# Patient Record
Sex: FEMALE | Race: WHITE | NOT HISPANIC OR LATINO | Employment: FULL TIME | URBAN - METROPOLITAN AREA
[De-identification: names, ages, dates, MRNs, and addresses within clinical notes are randomized per-mention and may not be internally consistent; named-entity substitution may affect disease eponyms.]

---

## 2017-06-01 ENCOUNTER — OFFICE VISIT (OUTPATIENT)
Dept: OBSTETRICS AND GYNECOLOGY | Facility: CLINIC | Age: 42
End: 2017-06-01
Payer: COMMERCIAL

## 2017-06-01 VITALS
DIASTOLIC BLOOD PRESSURE: 76 MMHG | SYSTOLIC BLOOD PRESSURE: 122 MMHG | BODY MASS INDEX: 26.53 KG/M2 | WEIGHT: 169.06 LBS | HEIGHT: 67 IN

## 2017-06-01 DIAGNOSIS — Z12.31 VISIT FOR SCREENING MAMMOGRAM: ICD-10-CM

## 2017-06-01 DIAGNOSIS — Z01.419 WELL WOMAN EXAM WITH ROUTINE GYNECOLOGICAL EXAM: Primary | ICD-10-CM

## 2017-06-01 DIAGNOSIS — Z12.4 ROUTINE PAPANICOLAOU SMEAR: ICD-10-CM

## 2017-06-01 PROCEDURE — 99999 PR PBB SHADOW E&M-EST. PATIENT-LVL III: CPT | Mod: PBBFAC,,, | Performed by: OBSTETRICS & GYNECOLOGY

## 2017-06-01 PROCEDURE — 88175 CYTOPATH C/V AUTO FLUID REDO: CPT

## 2017-06-01 PROCEDURE — 99396 PREV VISIT EST AGE 40-64: CPT | Mod: S$GLB,,, | Performed by: OBSTETRICS & GYNECOLOGY

## 2017-06-01 RX ORDER — AMITRIPTYLINE HYDROCHLORIDE 50 MG/1
TABLET, FILM COATED ORAL
COMMUNITY
Start: 2017-05-23 | End: 2017-09-11

## 2017-06-01 RX ORDER — BUTALBITAL, ACETAMINOPHEN AND CAFFEINE 300; 40; 50 MG/1; MG/1; MG/1
CAPSULE ORAL
COMMUNITY
Start: 2017-05-03 | End: 2017-09-11

## 2017-06-01 NOTE — PROGRESS NOTES
"  HPI:  41 y.o.   OB History      Para Term  AB Living    2 2 2       SAB TAB Ectopic Multiple Live Births               Patient's last menstrual period was 2013.   Patient here for her annual gynecologic exam.  She has no complaints at this time.    ROS:  GENERAL: No fever, chills, fatigability or weight loss.  SKIN: No rashes, itching or changes in color or texture of skin.  HEAD: No headaches or recent head trauma.  EYES: Visual acuity fine. No photophobia, ocular pain or diplopia.  EARS: Denies ear pain, discharge or vertigo.  NOSE: No loss of smell, no epistaxis or postnasal drip.  MOUTH & THROAT: No hoarseness or change in voice. No excessive gum bleeding.  NODES: Denies swollen glands.  CHEST: Denies FERNANDES, cyanosis, wheezing, cough and sputum production.  CARDIOVASCULAR: Denies chest pain, PND, orthopnea or reduced exercise tolerance.  ABDOMEN: Appetite fine. No weight loss. Denies diarrhea, abdominal pain, hematemesis or blood in stool.  URINARY: No flank pain, dysuria or hematuria.  PERIPHERAL VASCULAR: No claudication or cyanosis.  MUSCULOSKELETAL: No joint stiffness or swelling. Denies back pain.  NEUROLOGIC: No history of seizures, paralysis, alteration of gait or coordination.    PE:   /76   Ht 5' 7" (1.702 m)   Wt 76.7 kg (169 lb 1.5 oz)   LMP 2013   BMI 26.48 kg/m²   APPEARANCE: Well nourished, well developed, in no acute distress.  NECK: Neck symmetric without masses or thyromegaly.  NODES: No inguinal lymph node enlargement.  ABDOMEN: Soft. No tenderness or masses. No hepatosplenomegaly. No hernias.  BREASTS: Symmetrical, no skin changes or visible lesions. No palpable masses, nipple discharge or adenopathy bilaterally.  PELVIC: Normal external female genitalia without lesions. Normal hair distribution. Adequate perineal body, normal urethral meatus. Vagina moist and well rugated without lesions or discharge. No significant cystocele or rectocele. Uterus and cervix " surgically absent. Bimanual exam revealed no masses, tenderness or abnormality.    Procedure:  Pap Smear    Assessment:  Normal Gynecologic Exam    Plan:  Mammogram and Colonoscopy as per current recommendations.   Return to clinic in one year or for any problems or complaints.  Vag hyst 2013  Co crmaps  Ovaries in, set up us  p tto call when crmaps bad, ana ltry us at that time  May want removeal

## 2017-09-11 ENCOUNTER — OFFICE VISIT (OUTPATIENT)
Dept: OBSTETRICS AND GYNECOLOGY | Facility: CLINIC | Age: 42
End: 2017-09-11
Payer: COMMERCIAL

## 2017-09-11 VITALS
HEIGHT: 67 IN | BODY MASS INDEX: 26.61 KG/M2 | SYSTOLIC BLOOD PRESSURE: 108 MMHG | WEIGHT: 169.56 LBS | DIASTOLIC BLOOD PRESSURE: 72 MMHG

## 2017-09-11 DIAGNOSIS — R10.2 PELVIC PAIN SYNDROME: Primary | ICD-10-CM

## 2017-09-11 PROCEDURE — 99213 OFFICE O/P EST LOW 20 MIN: CPT | Mod: S$GLB,,, | Performed by: OBSTETRICS & GYNECOLOGY

## 2017-09-11 PROCEDURE — 3008F BODY MASS INDEX DOCD: CPT | Mod: S$GLB,,, | Performed by: OBSTETRICS & GYNECOLOGY

## 2017-09-11 PROCEDURE — 99999 PR PBB SHADOW E&M-EST. PATIENT-LVL II: CPT | Mod: PBBFAC,,, | Performed by: OBSTETRICS & GYNECOLOGY

## 2017-09-11 RX ORDER — SERTRALINE HYDROCHLORIDE 50 MG/1
50 TABLET, FILM COATED ORAL DAILY
COMMUNITY
End: 2017-10-10

## 2017-09-11 RX ORDER — TRAZODONE HYDROCHLORIDE 50 MG/1
TABLET ORAL
COMMUNITY
Start: 2017-08-06 | End: 2018-12-28

## 2017-09-11 NOTE — PROGRESS NOTES
"41 y.o.   OB History      Para Term  AB Living    2 2 2          SAB TAB Ectopic Multiple Live Births                     Comlaining of: pelvic pain  Pt and  here to discuss removal of ovaries, had vag hust   Hx of cycsts, been to ED before, been on suppressive ocp also  Pain sometimes with sex  On and off      ROS:  GENERAL: No fever, chills, fatigability or weight loss.  SKIN: No rashes, itching or changes in color or texture of skin.  HEAD: No headaches or recent head trauma.  EYES: Visual acuity fine. No photophobia, ocular pain or diplopia.  EARS: Denies ear pain, discharge or vertigo.  NOSE: No loss of smell, no epistaxis or postnasal drip.  MOUTH & THROAT: No hoarseness or change in voice. No excessive gum bleeding.  NODES: Denies swollen glands.  CHEST: Denies FERNANDES, cyanosis, wheezing, cough and sputum production.  CARDIOVASCULAR: Denies chest pain, PND, orthopnea or reduced exercise tolerance.  ABDOMEN: Appetite fine. No weight loss. Denies diarrhea, abdominal pain, hematemesis or blood in stool.  URINARY: No flank pain, dysuria or hematuria.  PERIPHERAL VASCULAR: No claudication or cyanosis.  MUSCULOSKELETAL: No joint stiffness or swelling. Denies back pain.  NEUROLOGIC: No history of seizures, paralysis, alteration of gait or coordination      PE: /72   Ht 5' 7" (1.702 m)   Wt 76.9 kg (169 lb 8.5 oz)   LMP 2013   BMI 26.55 kg/m²    Exam def    Discussed removal of ovaires, pro and con, prob can do with scope  Risk of recurrent pain, reasons for pain  Etc    Pt feels its def diff from her bowel problem pain  Plan, set up vag us         "

## 2017-09-12 ENCOUNTER — TELEPHONE (OUTPATIENT)
Dept: OBSTETRICS AND GYNECOLOGY | Facility: CLINIC | Age: 42
End: 2017-09-12

## 2017-09-13 ENCOUNTER — TELEPHONE (OUTPATIENT)
Dept: OBSTETRICS AND GYNECOLOGY | Facility: CLINIC | Age: 42
End: 2017-09-13

## 2017-09-13 NOTE — TELEPHONE ENCOUNTER
----- Message from Gayle Walls sent at 9/13/2017  3:06 PM CDT -----  Contact: Self/ [phone]  Patient is returning your call.  Please advise.

## 2017-09-13 NOTE — TELEPHONE ENCOUNTER
----- Message from Michael A. Wiedemann, MD sent at 9/12/2017  7:19 PM CDT -----  Set up vag us only for pain, ovary cysts, a Wednesday would be good, need order placed, :)   thanks

## 2017-09-14 ENCOUNTER — TELEPHONE (OUTPATIENT)
Dept: OBSTETRICS AND GYNECOLOGY | Facility: CLINIC | Age: 42
End: 2017-09-14

## 2017-09-14 DIAGNOSIS — N83.209 OVARIAN CYST: Primary | ICD-10-CM

## 2017-09-14 NOTE — TELEPHONE ENCOUNTER
----- Message from Margarita Quiroz sent at 9/14/2017 11:35 AM CDT -----  Contact: [phone]/ self   Patient called in returning your call. Please advise.

## 2017-09-14 NOTE — TELEPHONE ENCOUNTER
Returned patients call.   Patient scheduled for ultrasound on 9-20-17 at 0800 per physician previous note. Patient verbalized understanding.

## 2017-09-18 ENCOUNTER — TELEPHONE (OUTPATIENT)
Dept: OBSTETRICS AND GYNECOLOGY | Facility: CLINIC | Age: 42
End: 2017-09-18

## 2017-09-18 DIAGNOSIS — R52 PAIN: ICD-10-CM

## 2017-09-18 DIAGNOSIS — N83.209 OVARIAN CYST: Primary | ICD-10-CM

## 2017-09-18 NOTE — TELEPHONE ENCOUNTER
9/18/2017 @ 1138 spoke with pt surgery confirmed for 10/12 and pre op scheduled for 10/10 at 0815 with Dr Wiedemann. Pt verbalized understanding.

## 2017-09-19 ENCOUNTER — TELEPHONE (OUTPATIENT)
Dept: OBSTETRICS AND GYNECOLOGY | Facility: CLINIC | Age: 42
End: 2017-09-19

## 2017-09-19 NOTE — TELEPHONE ENCOUNTER
Pt would like to know if her surgery that is scheduled for 10/12 can be rescheduled to the following week like 10/18 or 10/19

## 2017-09-19 NOTE — TELEPHONE ENCOUNTER
----- Message from Brock Boateng sent at 9/19/2017  4:37 PM CDT -----  Contact: self/[phone]  Patient states she is calling again to have her procedure rescheduled and still has not received a call back.    Please call and advise.

## 2017-09-20 ENCOUNTER — OFFICE VISIT (OUTPATIENT)
Dept: OBSTETRICS AND GYNECOLOGY | Facility: CLINIC | Age: 42
End: 2017-09-20
Payer: COMMERCIAL

## 2017-09-20 DIAGNOSIS — R10.2 FEMALE PELVIC PAIN: Primary | ICD-10-CM

## 2017-09-20 DIAGNOSIS — N83.209 OVARIAN CYST: ICD-10-CM

## 2017-09-20 PROCEDURE — 76830 TRANSVAGINAL US NON-OB: CPT | Mod: S$GLB,,, | Performed by: OBSTETRICS & GYNECOLOGY

## 2017-09-26 ENCOUNTER — TELEPHONE (OUTPATIENT)
Dept: OBSTETRICS AND GYNECOLOGY | Facility: CLINIC | Age: 42
End: 2017-09-26

## 2017-10-06 NOTE — TELEPHONE ENCOUNTER
----- Message from Kayce Robles sent at 9/25/2017 11:08 AM CDT -----  Contact: [phone]/ self   Pt would oliek to reschedule her procedure . Pt states this is her six message since last week and she haven't received a call back . Please advise

## 2017-10-06 NOTE — TELEPHONE ENCOUNTER
----- Message from Genesis Gatica sent at 9/18/2017  4:31 PM CDT -----  Contact: [place] [phone]  Calling to reschedule her procedure. Please advice

## 2017-10-06 NOTE — TELEPHONE ENCOUNTER
----- Message from Kayec Robles sent at 9/21/2017 10:05 AM CDT -----  Contact: [phone]/self   Pt its requesting to reschedule her procedure . Please advise

## 2017-10-10 ENCOUNTER — ANESTHESIA EVENT (OUTPATIENT)
Dept: SURGERY | Facility: HOSPITAL | Age: 42
End: 2017-10-10
Payer: COMMERCIAL

## 2017-10-10 ENCOUNTER — HOSPITAL ENCOUNTER (OUTPATIENT)
Dept: PREADMISSION TESTING | Facility: HOSPITAL | Age: 42
Discharge: HOME | End: 2017-10-10
Attending: OBSTETRICS & GYNECOLOGY
Payer: COMMERCIAL

## 2017-10-10 ENCOUNTER — CLINICAL SUPPORT (OUTPATIENT)
Dept: LAB | Facility: HOSPITAL | Age: 42
End: 2017-10-10
Attending: OBSTETRICS & GYNECOLOGY
Payer: COMMERCIAL

## 2017-10-10 ENCOUNTER — OFFICE VISIT (OUTPATIENT)
Dept: OBSTETRICS AND GYNECOLOGY | Facility: CLINIC | Age: 42
End: 2017-10-10
Payer: COMMERCIAL

## 2017-10-10 VITALS
HEIGHT: 67 IN | BODY MASS INDEX: 26.37 KG/M2 | WEIGHT: 168 LBS | DIASTOLIC BLOOD PRESSURE: 62 MMHG | SYSTOLIC BLOOD PRESSURE: 116 MMHG

## 2017-10-10 DIAGNOSIS — Z01.818 PRE-OP EXAM: Primary | ICD-10-CM

## 2017-10-10 DIAGNOSIS — Z01.818 PRE-OP EXAM: ICD-10-CM

## 2017-10-10 DIAGNOSIS — R10.2 FEMALE PELVIC PAIN: ICD-10-CM

## 2017-10-10 DIAGNOSIS — Z87.19 HISTORY OF DIVERTICULITIS: ICD-10-CM

## 2017-10-10 DIAGNOSIS — R10.2 FEMALE PELVIC PAIN: Primary | ICD-10-CM

## 2017-10-10 DIAGNOSIS — R10.2 PELVIC PAIN SYNDROME: ICD-10-CM

## 2017-10-10 PROCEDURE — 99024 POSTOP FOLLOW-UP VISIT: CPT | Mod: S$GLB,,, | Performed by: OBSTETRICS & GYNECOLOGY

## 2017-10-10 PROCEDURE — 99999 PR PBB SHADOW E&M-EST. PATIENT-LVL II: CPT | Mod: PBBFAC,,, | Performed by: OBSTETRICS & GYNECOLOGY

## 2017-10-10 PROCEDURE — 93005 ELECTROCARDIOGRAM TRACING: CPT

## 2017-10-10 RX ORDER — FLUOXETINE HYDROCHLORIDE 20 MG/1
20 CAPSULE ORAL DAILY
COMMUNITY
End: 2018-12-28

## 2017-10-10 RX ORDER — LIDOCAINE HYDROCHLORIDE 10 MG/ML
1 INJECTION, SOLUTION EPIDURAL; INFILTRATION; INTRACAUDAL; PERINEURAL ONCE
Status: CANCELLED | OUTPATIENT
Start: 2017-10-10 | End: 2017-10-10

## 2017-10-10 RX ORDER — SODIUM CHLORIDE, SODIUM LACTATE, POTASSIUM CHLORIDE, CALCIUM CHLORIDE 600; 310; 30; 20 MG/100ML; MG/100ML; MG/100ML; MG/100ML
INJECTION, SOLUTION INTRAVENOUS CONTINUOUS
Status: CANCELLED | OUTPATIENT
Start: 2017-10-10

## 2017-10-10 NOTE — PROGRESS NOTES
Pt wants removal of ovaries for pain  Discussed risks, incl riks of laparotomy and bowel injury  Consents done  Pt wants to proceed  Had colon surgery since hyst  Discussed increased risk

## 2017-10-10 NOTE — ANESTHESIA PREPROCEDURE EVALUATION
10/10/2017  Socorro Huang is a 42 y.o., female w ovarian cyst is scheduled for laparoscopic bilateral oophorectomy under [facility] on 10/19/2017.    PRIOR ANES (in Epic)   2013-05-06 Hysteredtomy-Vag GA   [mid 2, fent 150, prop 150] VSS   [sevo, fent 100] VSS NAAC   [ETT 7.0, [place]#2, Grade I]  2012-12-18 Hysteroscopy, D&C   [mid 2, fent 50]   [sevo, phenyleph 100]   [atraumatic]    ANES-RELATED MED/SURG 2017-10-18  Patient Active Problem List   Diagnosis    Irregular intermenstrual bleeding    Endometrial hyperplasia, complex    Pre-operative clearance    S/P hysterectomy    Dysthymic disorder    History of diverticulitis     Past Medical History:   Diagnosis Date    Abnormal Pap smear     Anxiety     celexa & prozac didn't help much    Constipation - functional     Headache(784.0)     Hemorrhoid     Leukopenia     benign, [facility] in [facility] 2000    Menorrhagia     Strain of neck muscle     tx with PT at [facility] in past     Past Surgical History:   Procedure Laterality Date    COLON SURGERY  2014         DILATION AND CURETTAGE OF UTERUS  12/18/2012    complex hyperplasia, no atypia    HEMORRHOID SURGERY  2014    PARTIAL HYSTERECTOMY  2013    for atypia    THYROIDECTOMY, PARTIAL  2013    TONSILLECTOMY      TUBAL LIGATION       ALLERGIES  Review of patient's allergies indicates:   Allergen Reactions    Celexa [citalopram] Nausea Only     Stomach upset     ANES-RELATED HOME Rx  Levothyroxine Trazodone    fluoxetine    Anesthesia Evaluation    I have reviewed the Patient Summary Reports.    I have reviewed the Nursing Notes.   I have reviewed the Medications.     Review of Systems  Anesthesia Hx:  No problems with previous Anesthesia  History of prior surgery of interest to airway management or planning: Previous anesthesia: General, MAC  Procedure performed at an [facility].    Procedure performed at an [facility].  Denies Personal Hx of Anesthesia complications.   Social:  Non-Smoker, Social Alcohol Use    Hematology/Oncology:  Hematology Normal        EENT/Dental:EENT/Dental Normal   Cardiovascular:   Exercise tolerance: good Denies Hypertension.  Denies Dysrhythmias.   Denies Angina.        Pulmonary:  Pulmonary Normal    Renal/:  Renal/ Normal     Hepatic/GI:  Hepatic/GI Normal    Musculoskeletal:  Musculoskeletal Normal    OB/GYN/PEDS:  Intermittent pelvic pain   Neurological:   Headaches (hx of tension headaches)    Endocrine:   Hypothyroidism    Dermatological:  Skin Normal    Psych:   depression        Wt Readings from Last 1 Encounters:   10/10/17 76.2 kg (167 lb 15.9 oz)     Temp Readings from Last 1 Encounters:   07/28/14 37 °C (98.6 °F)     BP Readings from Last 1 Encounters:   10/10/17 116/62     Pulse Readings from Last 1 Encounters:   06/25/15 80     SpO2 Readings from Last 1 Encounters:   10/08/13 100%       Physical Exam  General:  Well nourished    Airway/Jaw/Neck:  Airway Findings: Mouth Opening: Small, but > 3cm Tongue: Normal  General Airway Assessment: Adult  Mallampati: III  Improves to II with phonation.  TM Distance: Normal, at least 6 cm        Eyes/Ears/Nose:  EYES/EARS/NOSE FINDINGS: Normal   Dental:  Dental Findings: In tact   Chest/Lungs:  Chest/Lungs Clear    Heart/Vascular:  Heart Findings: Normal Heart murmur: negative    Abdomen:  Abdomen Findings: Normal      Mental Status:  Mental Status Findings: Normal      Lab Results   Component Value Date    WBC 3.59 (L) 10/10/2017    HGB 13.2 10/10/2017    HCT 38.6 10/10/2017    MCV 92 10/10/2017     10/10/2017       Chemistry        Component Value Date/Time     10/10/2017 0958    K 4.0 10/10/2017 0958     10/10/2017 0958    CO2 28 10/10/2017 0958    BUN 18 10/10/2017 0958    CREATININE 0.7 10/10/2017 0958    GLU 95 10/10/2017 0958        Component Value Date/Time    CALCIUM 9.1  10/10/2017 0958    ALKPHOS 52 (L) 10/27/2014 0747    AST 27 10/27/2014 0747    ALT 30 10/27/2014 0747    BILITOT 0.6 10/27/2014 0747    ESTGFRAFRICA >60 10/10/2017 0958    EGFRNONAA >60 10/10/2017 0958            Lab Results   Component Value Date    ALBUMIN 3.9 10/27/2014      Lab Results   Component Value Date    TSH 1.846 08/01/2012       CXR 2017-10-27  No convincing evidence of intrathoracic disease identified.    EKG 2017-10-10  Normal sinus rhythm  Normal ECG  No previous ECGs available  Confirmed by Moustapha    ECHO        Anesthesia Plan  Type of Anesthesia, risks & benefits discussed:  Anesthesia Type:  general  Patient's Preference:   Intra-op Monitoring Plan:   Intra-op Monitoring Plan Comments:   Post Op Pain Control Plan:   Post Op Pain Control Plan Comments:   Induction:   IV  Beta Blocker:  Patient is not currently on a Beta-Blocker (No further documentation required).       Informed Consent: Patient understands risks and agrees with Anesthesia plan.  Questions answered.   ASA Score: 2     Day of Surgery Review of History & Physical:            Ready For Surgery From Anesthesia Perspective.

## 2017-10-10 NOTE — PRE-PROCEDURE INSTRUCTIONS
[facility] - [phone]    Allergies, medical, surgical, family and psychosocial histories reviewed with patient. Periop plan of care reviewed. Preop instructions given, including medications to take and to hold. Time allotted for questions to be addressed.  Patient verbalized understanding.

## 2017-10-10 NOTE — DISCHARGE INSTRUCTIONS
Your surgery is scheduled for 10/19/17.    Please report to Outpatient Surgery Intake Office on the 2nd FLOOR at 6:30a.m.          INSTRUCTIONS IMPORTANT!!!  ¨ Do not eat or drink after 12 midnight-including water. OK to brush teeth, no   gum, candy or mints!    ¨ Take only these medicines with a small swallow of water-morning of surgery: Levothyroxine        ____  Proceed to [facility] on 10/10/17 for additional blood test.        ____  Do not wear makeup, including mascara.  ____  No powder, lotions or creams to surgical area.  ____  Please remove all jewelry, including piercings and leave at home.  ____  No money or valuables needed. Please leave at home.  ____  Please bring any documents given by your doctor.  ____  If going home the same day, arrange for a ride home. You will not be able to             drive if Anesthesia was used.  ____  Wear loose fitting clothing. Allow for dressings, bandages.  ____  Stop Aspirin, Ibuprofen, Motrin and Aleve at least 3-5 days before surgery, unless otherwise instructed by your doctor, or the nurse.   You MAY use Tylenol/acetaminophen until day of surgery.  ____  Wash the surgical area with Hibiclens the night before surgery, and again the             morning of surgery.  Be sure to rinse hibiclens off completely (if instructed by nurse).  ____  If you take diabetic medication, do not take am of surgery unless instructed by Doctor.  ____  Call MD for temperature above 101 degrees.  ____ Stop taking any Fish Oil supplement or any Vitamins that contain Vitamin E at least 5 days prior to surgery.  ____ Do Not wear your contact lenses the day of your procedure.  You may wear your glasses.        I have read or had read and explained to me, and understand the above information.  Additional comments or instructions:  For additional questions call [phone]     Take a Hibiclens shower twice a day for 3 days prior to surgery, including the morning of  surgery.   Gargle with Listerine twice a day for 3 days prior to surgery, including the morning of surgery.      Pre-Op Bathing Instructions    Before surgery, you can play an important role in your own health.    Because skin is not sterile, we need to be sure that your skin is as free of germs as possible. By following the instructions below, you can reduce the number of germs on your skin before surgery.    IMPORTANT: You will need to shower with a special soap called Hibiclens*, available at any pharmacy.  If you are allergic to Chlorhexidine (the antiseptic in Hibiclens), use an antibacterial soap such as Dial Soap for your preoperative shower.  You will shower with Hibiclens both the night before your surgery and the morning of your surgery.  Do not use Hibiclens on the head, face or genitals to avoid injury to those areas.    STEP #1: THE NIGHT BEFORE YOUR SURGERY     1. Do not shave the area of your body where your surgery will be performed.  2. Shower and wash your hair and body as usual with your normal soap and shampoo.  3. Rinse your hair and body thoroughly after you shower to remove all soap residue.  4. With your hand, apply one packet of Hibiclens soap to the surgical site.   5. Wash the site gently for five (5) minutes. Do not scrub your skin too hard.   6. Do not wash with your regular soap after Hibiclens is used.  7. Rinse your body thoroughly.  8. Pat yourself dry with a clean, soft towel.  9. Do not use lotion, cream, or powder.  10. Wear clean clothes.    STEP #2: THE MORNING OF YOUR SURGERY     1. Repeat Step #1.    * Not to be used by people allergic to Chlorhexidine.          Anesthesia: General Anesthesia     You are watched continuously during your procedure by your anesthesia provider.     Youre due to have surgery. During surgery, youll be given medicine called anesthesia or anesthetic. This will keep you comfortable and pain-free. Your anesthesia provider will use general  anesthesia.  What is general anesthesia?  General anesthesia puts you into a state like deep sleep. It goes into the bloodstream (IV anesthetics), into the lungs (gas anesthetics), or both. You feel nothing during the procedure. You will not remember it. During the procedure, the anesthesia provider monitors you continuously. He or she checks your heart rate and rhythm, blood pressure, breathing, and blood oxygen.  · IV anesthetics. IV anesthetics are given through an IV line in your arm. Theyre often given first. This is so you are asleep before a gas anesthetic is started. Some kinds of IV anesthetics relieve pain. Others relax you. Your doctor will decide which kind is best in your case.  · Gas anesthetics. Gas anesthetics are breathed into the lungs. They are often used to keep you asleep. They can be given through a facemask or a tube placed in your larynx or trachea (breathing tube).  ¨ If you have a facemask, your anesthesia provider will most likely place it over your nose and mouth while youre still awake. Youll breathe oxygen through the mask as your IV anesthetic is started. Gas anesthetic may be added through the mask.  ¨ If you have a tube in the larynx or trachea, it will be inserted into your throat after youre asleep.  Anesthesia tools and medicines  You will likely have:  · IV anesthetics. These are put into an IV line into your bloodstream.  · Gas anesthetics. You breathe these anesthetics into your lungs, where they pass into your bloodstream.  · Pulse oximeter. This is a small clip that is attached to the end of your finger. This measures your blood oxygen level.  · Electrocardiography leads (electrodes). These are small sticky pads that are placed on your chest. They record your heart rate and rhythm.  · Blood pressure cuff. This reads your blood pressure.  Risks and possible complications  General anesthesia has some risks. These include:  · Breathing problems  · Nausea and vomiting  · Sore  throat or hoarseness (usually temporary)  · Allergic reaction to the anesthetic  · Irregular heartbeat (rare)  · Cardiac arrest (rare)   Anesthesia safety  · Follow all instructions you are given for how long not to eat or drink before your procedure.  · Be sure your doctor knows what medicines and drugs you take. This includes over-the-counter medicines, herbs, supplements, alcohol or other drugs. You will be asked when those were last taken.  · Have an adult family member or friend drive you home after the procedure.  · For the first 24 hours after your surgery:  ¨ Do not drive or use heavy equipment.  ¨ Do not make important decisions or sign legal documents. If important decisions or signing legal documents is necessary during the first 24 hours after surgery, have a trusted family member or spouse act on your behalf.  ¨ Avoid alcohol.  ¨ Have a responsible adult stay with you. He or she can watch for problems and help keep you safe.  Date Last Reviewed: 12/1/2016  © 5815-2730 [employer]. [address] [place] [address]. All rights reserved. This information is not intended as a substitute for professional medical care. Always follow your healthcare professional's instructions.        Understanding Unilateral Salpingo-Oophorectomy    A unilateral salpingo-oophorectomy is a type of surgery. During the surgery, a surgeon removes one ovary and one fallopian tube from your pelvis. The ovaries are located on either side of the uterus. They make your eggs (ova). They also make the hormone estrogen. The fallopian tubes link the ovaries to the uterus. They carry the ova to the uterus.  You can still have children after you have this procedure. You have one ovary and fallopian tube left.  How to say it  [id number]-[id number]-[id number]   Why unilateral salpingo-oophorectomy is done  This procedure is done if you have cancer in an ovary or fallopian tube. Or you may have it to lower your risk  for cancer in these parts of the body. You are at high risk if you have a family member who had ovarian or breast cancer. You may also have a mutation in the breast cancer susceptiblity (BRCA) genes. These genes make tumor suppressor proteins. If these genes are mutated, they do not work as they should and cells are more likely to develop cancer.  This procedure may also be done if you have your uterus removed. This is called a hysterectomy.  Other health problems may call for taking out an ovary and fallopian tube at the same time.  How unilateral salpingo-oophorectomy is done  You will check into a hospital. You may need to spend a few days there after this surgery. During the procedure:  · You are given medicine to make you fall asleep. You wont feel any pain.  · The surgeon makes a cut (incision) in your belly to reach your reproductive organs.  · The surgeon removes the ovary and fallopian tube.  · The surgeon then ties and stitches up all open wounds. The cut in the abdomen is closed up.  Risks of unilateral salpingo-oophorectomy  · Stomach cancer  · Bleeding  · Infection  Date Last Reviewed: 6/1/2016  © 4600-7053 [employer]. [address] [place] [address], [place] [address]. All rights reserved. This information is not intended as a substitute for professional medical care. Always follow your healthcare professional's instructions.

## 2017-10-18 NOTE — H&P
Pt is 42, co on and off chronic pelvic pain, with hx of ovarian cysts, desires removal of ovaries. We discussed alternative options, observation, meds for suppression, pt wants the surgery, despite normal us recently. She states it is chronic and affecting her life. We discussed the high risk of surgery, including the poss need for laparotomy, and risk of bowel and bladder injury. Pt and  understand and want to proceed with surgery, review of chart does show prev ov cycsts.   Consents done  PMH no chronic problems , see list,   psh tonisls, hyst, colon surgery, see list in chart for complete  Meds, prozac, synthroi, see list  pe  Healthy, vss  Head/neck normal  abd soft  Pelvic mod tender, no masses  extr normal  Assessment, chronic pelvic pain, subactue, hx of cysts, poss adhesions  Plan, proceed with laparoscopy with removal of ovaries, we also discussed need for hrt after

## 2017-10-19 ENCOUNTER — ANESTHESIA (OUTPATIENT)
Dept: SURGERY | Facility: HOSPITAL | Age: 42
End: 2017-10-19
Payer: COMMERCIAL

## 2017-10-19 ENCOUNTER — HOSPITAL ENCOUNTER (OUTPATIENT)
Facility: HOSPITAL | Age: 42
Discharge: HOME | End: 2017-10-19
Attending: OBSTETRICS & GYNECOLOGY | Admitting: OBSTETRICS & GYNECOLOGY
Payer: COMMERCIAL

## 2017-10-19 ENCOUNTER — SURGERY (OUTPATIENT)
Age: 42
End: 2017-10-19

## 2017-10-19 VITALS
TEMPERATURE: 98 F | HEART RATE: 60 BPM | OXYGEN SATURATION: 95 % | SYSTOLIC BLOOD PRESSURE: 118 MMHG | DIASTOLIC BLOOD PRESSURE: 72 MMHG | RESPIRATION RATE: 16 BRPM

## 2017-10-19 DIAGNOSIS — R10.2 PELVIC PAIN SYNDROME: ICD-10-CM

## 2017-10-19 DIAGNOSIS — R10.2 FEMALE PELVIC PAIN: ICD-10-CM

## 2017-10-19 PROCEDURE — 58661 LAPAROSCOPY REMOVE ADNEXA: CPT | Mod: 50,,, | Performed by: OBSTETRICS & GYNECOLOGY

## 2017-10-19 PROCEDURE — 71000015 HC POSTOP RECOV 1ST HR: Performed by: OBSTETRICS & GYNECOLOGY

## 2017-10-19 PROCEDURE — 36000708 HC OR TIME LEV III 1ST 15 MIN: Performed by: OBSTETRICS & GYNECOLOGY

## 2017-10-19 PROCEDURE — 63600175 PHARM REV CODE 636 W HCPCS: Performed by: NURSE ANESTHETIST, CERTIFIED REGISTERED

## 2017-10-19 PROCEDURE — 88342 IMHCHEM/IMCYTCHM 1ST ANTB: CPT | Mod: 26,,, | Performed by: PATHOLOGY

## 2017-10-19 PROCEDURE — 25000003 PHARM REV CODE 250: Performed by: OBSTETRICS & GYNECOLOGY

## 2017-10-19 PROCEDURE — 71000016 HC POSTOP RECOV ADDL HR: Performed by: OBSTETRICS & GYNECOLOGY

## 2017-10-19 PROCEDURE — 36000709 HC OR TIME LEV III EA ADD 15 MIN: Performed by: OBSTETRICS & GYNECOLOGY

## 2017-10-19 PROCEDURE — 71000033 HC RECOVERY, INTIAL HOUR: Performed by: OBSTETRICS & GYNECOLOGY

## 2017-10-19 PROCEDURE — 63600175 PHARM REV CODE 636 W HCPCS: Performed by: OBSTETRICS & GYNECOLOGY

## 2017-10-19 PROCEDURE — 25000003 PHARM REV CODE 250: Performed by: NURSE ANESTHETIST, CERTIFIED REGISTERED

## 2017-10-19 PROCEDURE — 25000003 PHARM REV CODE 250: Performed by: ANESTHESIOLOGY

## 2017-10-19 PROCEDURE — 25000003 PHARM REV CODE 250: Performed by: NURSE PRACTITIONER

## 2017-10-19 PROCEDURE — 88305 TISSUE EXAM BY PATHOLOGIST: CPT | Mod: 26,,, | Performed by: PATHOLOGY

## 2017-10-19 PROCEDURE — 37000008 HC ANESTHESIA 1ST 15 MINUTES: Performed by: OBSTETRICS & GYNECOLOGY

## 2017-10-19 PROCEDURE — 71000039 HC RECOVERY, EACH ADD'L HOUR: Performed by: OBSTETRICS & GYNECOLOGY

## 2017-10-19 PROCEDURE — 27201423 OPTIME MED/SURG SUP & DEVICES STERILE SUPPLY: Performed by: OBSTETRICS & GYNECOLOGY

## 2017-10-19 PROCEDURE — 37000009 HC ANESTHESIA EA ADD 15 MINS: Performed by: OBSTETRICS & GYNECOLOGY

## 2017-10-19 PROCEDURE — 88305 TISSUE EXAM BY PATHOLOGIST: CPT | Performed by: PATHOLOGY

## 2017-10-19 PROCEDURE — 63600175 PHARM REV CODE 636 W HCPCS: Performed by: ANESTHESIOLOGY

## 2017-10-19 RX ORDER — HYDROMORPHONE HYDROCHLORIDE 2 MG/ML
0.4 INJECTION, SOLUTION INTRAMUSCULAR; INTRAVENOUS; SUBCUTANEOUS EVERY 5 MIN PRN
Status: COMPLETED | OUTPATIENT
Start: 2017-10-19 | End: 2017-10-19

## 2017-10-19 RX ORDER — DIPHENHYDRAMINE HCL 25 MG
25 CAPSULE ORAL EVERY 4 HOURS PRN
Status: DISCONTINUED | OUTPATIENT
Start: 2017-10-19 | End: 2017-10-19 | Stop reason: HOSPADM

## 2017-10-19 RX ORDER — HYDROCODONE BITARTRATE AND ACETAMINOPHEN 5; 325 MG/1; MG/1
1 TABLET ORAL EVERY 4 HOURS PRN
Status: DISCONTINUED | OUTPATIENT
Start: 2017-10-19 | End: 2017-10-19 | Stop reason: HOSPADM

## 2017-10-19 RX ORDER — SODIUM CHLORIDE, SODIUM LACTATE, POTASSIUM CHLORIDE, CALCIUM CHLORIDE 600; 310; 30; 20 MG/100ML; MG/100ML; MG/100ML; MG/100ML
INJECTION, SOLUTION INTRAVENOUS CONTINUOUS
Status: DISCONTINUED | OUTPATIENT
Start: 2017-10-19 | End: 2017-10-19 | Stop reason: HOSPADM

## 2017-10-19 RX ORDER — ONDANSETRON 2 MG/ML
INJECTION INTRAMUSCULAR; INTRAVENOUS
Status: DISCONTINUED | OUTPATIENT
Start: 2017-10-19 | End: 2017-10-19

## 2017-10-19 RX ORDER — ACETAMINOPHEN 10 MG/ML
INJECTION, SOLUTION INTRAVENOUS
Status: DISCONTINUED | OUTPATIENT
Start: 2017-10-19 | End: 2017-10-19

## 2017-10-19 RX ORDER — LIDOCAINE HCL/PF 100 MG/5ML
SYRINGE (ML) INTRAVENOUS
Status: DISCONTINUED | OUTPATIENT
Start: 2017-10-19 | End: 2017-10-19

## 2017-10-19 RX ORDER — ONDANSETRON 8 MG/1
8 TABLET, ORALLY DISINTEGRATING ORAL EVERY 8 HOURS PRN
Status: DISCONTINUED | OUTPATIENT
Start: 2017-10-19 | End: 2017-10-19 | Stop reason: HOSPADM

## 2017-10-19 RX ORDER — DIPHENHYDRAMINE HYDROCHLORIDE 50 MG/ML
25 INJECTION INTRAMUSCULAR; INTRAVENOUS EVERY 4 HOURS PRN
Status: DISCONTINUED | OUTPATIENT
Start: 2017-10-19 | End: 2017-10-19 | Stop reason: HOSPADM

## 2017-10-19 RX ORDER — FENTANYL CITRATE 50 UG/ML
INJECTION, SOLUTION INTRAMUSCULAR; INTRAVENOUS
Status: DISCONTINUED | OUTPATIENT
Start: 2017-10-19 | End: 2017-10-19

## 2017-10-19 RX ORDER — MIDAZOLAM HYDROCHLORIDE 1 MG/ML
INJECTION, SOLUTION INTRAMUSCULAR; INTRAVENOUS
Status: DISCONTINUED | OUTPATIENT
Start: 2017-10-19 | End: 2017-10-19

## 2017-10-19 RX ORDER — CEFAZOLIN SODIUM 2 G/50ML
2 SOLUTION INTRAVENOUS
Status: COMPLETED | OUTPATIENT
Start: 2017-10-19 | End: 2017-10-19

## 2017-10-19 RX ORDER — SODIUM CHLORIDE 0.9 % (FLUSH) 0.9 %
3 SYRINGE (ML) INJECTION
Status: DISCONTINUED | OUTPATIENT
Start: 2017-10-19 | End: 2017-10-19 | Stop reason: HOSPADM

## 2017-10-19 RX ORDER — ROCURONIUM BROMIDE 10 MG/ML
INJECTION, SOLUTION INTRAVENOUS
Status: DISCONTINUED | OUTPATIENT
Start: 2017-10-19 | End: 2017-10-19

## 2017-10-19 RX ORDER — PROPOFOL 10 MG/ML
VIAL (ML) INTRAVENOUS
Status: DISCONTINUED | OUTPATIENT
Start: 2017-10-19 | End: 2017-10-19

## 2017-10-19 RX ORDER — SODIUM CHLORIDE 0.9 % (FLUSH) 0.9 %
3 SYRINGE (ML) INJECTION EVERY 8 HOURS
Status: DISCONTINUED | OUTPATIENT
Start: 2017-10-19 | End: 2017-10-19 | Stop reason: HOSPADM

## 2017-10-19 RX ORDER — HYDROMORPHONE HYDROCHLORIDE 2 MG/ML
0.2 INJECTION, SOLUTION INTRAMUSCULAR; INTRAVENOUS; SUBCUTANEOUS EVERY 5 MIN PRN
Status: DISCONTINUED | OUTPATIENT
Start: 2017-10-19 | End: 2017-10-19 | Stop reason: HOSPADM

## 2017-10-19 RX ORDER — KETOROLAC TROMETHAMINE 30 MG/ML
INJECTION, SOLUTION INTRAMUSCULAR; INTRAVENOUS
Status: DISCONTINUED | OUTPATIENT
Start: 2017-10-19 | End: 2017-10-19

## 2017-10-19 RX ORDER — NEOSTIGMINE METHYLSULFATE 1 MG/ML
INJECTION, SOLUTION INTRAVENOUS
Status: DISCONTINUED | OUTPATIENT
Start: 2017-10-19 | End: 2017-10-19

## 2017-10-19 RX ORDER — LIDOCAINE HYDROCHLORIDE 10 MG/ML
1 INJECTION, SOLUTION EPIDURAL; INFILTRATION; INTRACAUDAL; PERINEURAL ONCE
Status: DISCONTINUED | OUTPATIENT
Start: 2017-10-19 | End: 2017-10-19 | Stop reason: HOSPADM

## 2017-10-19 RX ORDER — PHENYLEPHRINE HYDROCHLORIDE 10 MG/ML
INJECTION INTRAVENOUS
Status: DISCONTINUED | OUTPATIENT
Start: 2017-10-19 | End: 2017-10-19

## 2017-10-19 RX ORDER — MIDAZOLAM HYDROCHLORIDE 5 MG/ML
INJECTION INTRAMUSCULAR; INTRAVENOUS
Status: DISCONTINUED
Start: 2017-10-19 | End: 2017-10-19 | Stop reason: HOSPADM

## 2017-10-19 RX ORDER — GLYCOPYRROLATE 0.2 MG/ML
INJECTION INTRAMUSCULAR; INTRAVENOUS
Status: DISCONTINUED | OUTPATIENT
Start: 2017-10-19 | End: 2017-10-19

## 2017-10-19 RX ORDER — DEXAMETHASONE SODIUM PHOSPHATE 4 MG/ML
INJECTION, SOLUTION INTRA-ARTICULAR; INTRALESIONAL; INTRAMUSCULAR; INTRAVENOUS; SOFT TISSUE
Status: DISCONTINUED | OUTPATIENT
Start: 2017-10-19 | End: 2017-10-19

## 2017-10-19 RX ADMIN — MIDAZOLAM 2 MG: 1 INJECTION INTRAMUSCULAR; INTRAVENOUS at 08:10

## 2017-10-19 RX ADMIN — HYDROMORPHONE HYDROCHLORIDE 0.4 MG: 2 INJECTION, SOLUTION INTRAMUSCULAR; INTRAVENOUS; SUBCUTANEOUS at 09:10

## 2017-10-19 RX ADMIN — MIDAZOLAM 5 MG: 1 INJECTION INTRAMUSCULAR; INTRAVENOUS at 08:10

## 2017-10-19 RX ADMIN — LIDOCAINE HYDROCHLORIDE 75 MG: 20 INJECTION, SOLUTION INTRAVENOUS at 08:10

## 2017-10-19 RX ADMIN — NEOSTIGMINE METHYLSULFATE 4 MG: 1 INJECTION INTRAVENOUS at 08:10

## 2017-10-19 RX ADMIN — CEFAZOLIN SODIUM 2 G: 2 SOLUTION INTRAVENOUS at 08:10

## 2017-10-19 RX ADMIN — PHENYLEPHRINE HYDROCHLORIDE 100 MCG: 10 INJECTION INTRAVENOUS at 08:10

## 2017-10-19 RX ADMIN — DEXAMETHASONE SODIUM PHOSPHATE 8 MG: 4 INJECTION, SOLUTION INTRAMUSCULAR; INTRAVENOUS at 08:10

## 2017-10-19 RX ADMIN — DIPHENHYDRAMINE HYDROCHLORIDE 25 MG: 50 INJECTION, SOLUTION INTRAMUSCULAR; INTRAVENOUS at 10:10

## 2017-10-19 RX ADMIN — SODIUM CHLORIDE, SODIUM LACTATE, POTASSIUM CHLORIDE, AND CALCIUM CHLORIDE: .6; .31; .03; .02 INJECTION, SOLUTION INTRAVENOUS at 08:10

## 2017-10-19 RX ADMIN — ONDANSETRON 8 MG: 2 INJECTION, SOLUTION INTRAMUSCULAR; INTRAVENOUS at 08:10

## 2017-10-19 RX ADMIN — KETOROLAC TROMETHAMINE 30 MG: 30 INJECTION, SOLUTION INTRAMUSCULAR; INTRAVENOUS at 08:10

## 2017-10-19 RX ADMIN — HYDROMORPHONE HYDROCHLORIDE 0.5 MG: 2 INJECTION, SOLUTION INTRAMUSCULAR; INTRAVENOUS; SUBCUTANEOUS at 09:10

## 2017-10-19 RX ADMIN — HYDROCODONE BITARTRATE AND ACETAMINOPHEN 1 TABLET: 5; 325 TABLET ORAL at 10:10

## 2017-10-19 RX ADMIN — ROCURONIUM BROMIDE 20 MG: 10 INJECTION, SOLUTION INTRAVENOUS at 08:10

## 2017-10-19 RX ADMIN — PROPOFOL 100 MG: 10 INJECTION, EMULSION INTRAVENOUS at 08:10

## 2017-10-19 RX ADMIN — ACETAMINOPHEN 1000 MG: 10 INJECTION, SOLUTION INTRAVENOUS at 08:10

## 2017-10-19 RX ADMIN — PROMETHAZINE HYDROCHLORIDE 6.25 MG: 25 INJECTION INTRAMUSCULAR; INTRAVENOUS at 09:10

## 2017-10-19 RX ADMIN — FENTANYL CITRATE 150 MCG: 50 INJECTION, SOLUTION INTRAMUSCULAR; INTRAVENOUS at 08:10

## 2017-10-19 RX ADMIN — GLYCOPYRROLATE 600 MCG: 0.2 INJECTION, SOLUTION INTRAMUSCULAR; INTRAVENOUS at 08:10

## 2017-10-19 RX ADMIN — SODIUM CHLORIDE, SODIUM LACTATE, POTASSIUM CHLORIDE, AND CALCIUM CHLORIDE 10 ML/HR: .6; .31; .03; .02 INJECTION, SOLUTION INTRAVENOUS at 07:10

## 2017-10-19 NOTE — PLAN OF CARE
Patient has met PACU discharge criteria, VSS, pain well controlled, O2 sat 97on RA arouseseasily. Family updated by phone. Released from PACU by Dr. Trinh

## 2017-10-19 NOTE — ANESTHESIA POSTPROCEDURE EVALUATION
Anesthesia Post Evaluation    Patient: Socorro Huang    Procedure(s) Performed: Procedure(s) (LRB):  OOPHORECTOMY-LAPAROSCOPIC (Bilateral)    Final Anesthesia Type: general  Patient location during evaluation: PACU  Patient participation: Yes- Able to Participate  Level of consciousness: awake  Post-procedure vital signs: reviewed and stable  Pain management: adequate  PONV status at discharge: No PONV  Anesthetic complications: no      Cardiovascular status: stable  Respiratory status: unassisted, spontaneous ventilation and room air  Hydration status: euvolemic  Follow-up not needed.        Visit Vitals  BP (!) 94/59   Pulse (!) 54   Temp 36.5 °C (97.7 °F)   Resp 18   LMP 05/05/2013   SpO2 97%   Breastfeeding? No       Pain/Delaney Score: Pain Assessment Performed: Yes (10/19/2017  9:05 AM)  Presence of Pain: complains of pain/discomfort (10/19/2017  9:05 AM)  Pain Rating Prior to Med Admin: 4 (10/19/2017 10:11 AM)  Pain Rating Post Med Admin: 4 (10/19/2017 10:00 AM)  Delaney Score: 9 (10/19/2017 10:00 AM)  Modified Delaney Score: 19 (10/19/2017 10:00 AM)

## 2017-10-19 NOTE — ANESTHESIA RELEASE NOTE
Anesthesia Release from PACU Note    Patient: Socorro Huang    Procedure(s) Performed: Procedure(s) (LRB):  OOPHORECTOMY-LAPAROSCOPIC (Bilateral)    Anesthesia type: general    Post pain: Adequate analgesia    Post assessment: no apparent anesthetic complications    Last Vitals:   Visit Vitals  BP (!) 94/59   Pulse (!) 54   Temp 36.5 °C (97.7 °F)   Resp 18   LMP 05/05/2013   SpO2 97%   Breastfeeding? No       Post vital signs: stable    Level of consciousness: awake    Nausea/Vomiting: no nausea/no vomiting    Complications: none    Airway Patency: patent    Respiratory: unassisted    Cardiovascular: stable    Hydration: euvolemic

## 2017-10-19 NOTE — PLAN OF CARE
Patient sitting up in bed, dressed. AAOX3. VSS. DENIES ANY PAIN. Itching subsided. Patient urinated without difficulty. Patients mother instructed to walk to Dr. Wiedemann's office and obtain prescription for pain medication, verbalized understanding. Instructions given to the patient per MD orders with time for questions, verbalized understanding. Patient discharged via wheelchair to the car with mother.

## 2017-10-19 NOTE — OP NOTE
DATE OF PROCEDURE:  10/19/2017.    SURGEON:  Michael Wiedemann, M.D.    ASSISTANT:  lBanca Jordan.    PREOPERATIVE DIAGNOSES:  1.  Chronic pelvic pain.  2.  History of ovarian cyst.    POSTOPERATIVE DIAGNOSES:  1.  Chronic pelvic pain.  2.  History of ovarian cyst plus slight ovarian adhesions.    PROCEDURE:  Laparoscopic bilateral salpingo-oophorectomy.    PROCEDURE IN DETAIL:  Socorro was placed under general endotracheal anesthesia and   2 g of Ancef were administered.  She was placed in supine position.  A sterile   Keller was placed.  The abdomen was analyzed.  We did a three-puncture   laparoscopy.  The first incision and placement of the Veress needle was about 8   cm, 45 degrees up from the umbilicus.  It was in the left upper quadrant.  This   was done due to previous incisions in the umbilicus and a history of colon   surgery.  The Veress needle was placed.  The 5 mm trocar was placed.  The scope   was inserted and intraabdominal contents were visualized.  There were no   adhesions to the anterior abdominal wall.  There was no injury to the underlying   structures and the gross abdomen and pelvis looked fairly normal.  A second   5-mm puncture in trocar was placed in the suprapubic region and then a 10-mm was   placed in the umbilicus and it was free of adhesions.  Evaluation of the   ovaries showed a normal-sized ovary on the right with 2 piano string wire   adhesions to the sidewall.  These were taken down.  The ovary was lifted way up   away from the sidewall and ureter.  On the left side, this ovary was slightly   enlarged with 3 to 4 little clear cyst and a few more adhesions to the sidewall,   which were also taken down.  We were able to lift the ovaries up and using a   5-mm LigaSure, cauterized and cut the infundibular ligament bilaterally way high   above the ureters.  Both ovaries were placed in the EndoCatch bag and they were   retrieved through the umbilical incision.  The pedicles were inspected.   The   CO2 was let exit.  There was no active bleeding; therefore, all instruments were   removed.  A figure-of-eight 0 Vicryl suture was placed on the fascia and the   umbilical incision.  Then, all incisions were closed with a 4-0 Monocryl.  The   patient tolerated the procedure well.  Blood loss was minimal.  There were no   complications.  The slight adhesions and cystic in nature of the ovaries could   have been a possible source of her pain.  We had discussed this in detail   preoperatively.  In any case, the surgery went well with no complications.  The   patient was taken to Recovery in good condition.      BRAYDON  dd: 10/19/2017 09:30:59 (CDT)  td: 10/19/2017 10:06:05 (CD)  Doc ID   #[id number]  Job ID #[id number]    CC:

## 2017-10-19 NOTE — DISCHARGE INSTRUCTIONS
DIET: You may resume your home diet. If nausea is present, increase your diet gradually with fluids and bland foods    ACTIVITY LEVEL: You have received sedation or an anesthetic, you may feel sleepy for several hours. Rest until you are more awake. Gradually resume your normal activities    Medications: Pain medication should be taken only if needed and as directed. If antibiotics are prescribed, the medication should be taken until completed. You will be given an updated list of you medications.    No driving, alcoholic beverages or signing legal documents for next 24 hours or while taking pain medication.       CALL THE DOCTOR:    For any obvious bleeding (some dried blood over the incision is normal).      Redness, swelling, foul smell around incision or fever over 101.   Shortness of breath, Coughing up Bloody sputum, Pains or Swelling in your Calves .   Persistent pain or nausea not relieved by medication.    If any unusual problems or difficulties occur contact your doctor. If you cannot contact your doctor but feel your signs and symptoms warrant a physicians attention return to the emergency room.      Discharge Instructions for Oophorectomy  You had a procedure called oophorectomy. This is the surgical removal of one or more ovaries. These walnut-sized organs in your pelvic area make and release the eggs. The eggs can grow to become a baby when combined with a mans sperm. Ovaries also make hormones that regulate your menstrual cycle (also called your period). Your periods may stop if both ovaries were removed if you were still menstruating before the surgery. You may have other symptoms of menopause as well, such as hot flashes. A hysterectomy to remove the uterus is often done with oophorectomy.   Activity  · Rest when you are tired.  · Take your medicine exactly as instructed by your doctor.  · Continue the coughing and deep breathing exercises that you learned in the hospital.  · Listen to your  body. If an activity causes pain, stop.  · Limit your activity for 4 to 6 weeks.  · Dont lift anything heavier than 10 pounds.  · Don't do strenuous activities, such as mowing the lawn, vacuuming, or playing sports.  · Limit your activity to regular short walks. Gradually increase your pace and distance as you feel able.  · Dont drive for until you are comfortable without taking narcotics. This may take up to 2 weeks. You may ride in a car for short trips.  Other home care  · Dont put anything in your vagina until your doctor says its OK.  ¨ Dont douche.  ¨ Dont use tampons.  ¨ Dont have sex.  · Shower as needed.  ¨ Wash your incision gently with mild soap and warm water.  ¨ Keep your incision clean and dry.  · Check your temperature each day for 1 week after your surgery.  · Eat a healthy, well-balanced diet.  · Avoid constipation.  ¨ Use laxatives, stool softeners, or enemas as directed by your doctor.  ¨ Eat more high-fiber foods.  ¨ Drink 6 to 8 glasses of water every day, unless directed otherwise.  Follow-up care  Make a follow-up appointment as directed by our staff.     When to call your doctor  Call your doctor right away if you have any of the following:  · Fever above 100.4°F (38°C) or chills  · Bright red bleeding or foul smelling discharge from your vagina  · Trouble urinating, or burning during urination  · Severe abdominal pain or bloating  · Redness, swelling, or draining at your incision site  · Shortness of breath or chest pain  · Nausea and vomiting  · Increasing pain with or without activity   Date Last Reviewed: 5/19/2015  © 2176-0988 [employer]. [address] [place] [address], [place] [address]. All rights reserved. This information is not intended as a substitute for professional medical care. Always follow your healthcare professional's instructions.      Discharge Instructions: After Your Surgery  Youve just had surgery. During surgery, you were given medicine called  anesthesia to keep you relaxed and free of pain. After surgery, you may have some pain or nausea. This is common. Here are some tips for feeling better and getting well after surgery.     Stay on schedule with your medicine.   Going home  Your healthcare provider will show you how to take care of yourself when you go home. He or she will also answer your questions. Have an adult family member or friend drive you home. For the first 24 hours after your surgery:  · Do not drive or use heavy equipment.  · Do not make important decisions or sign legal papers.  · Do not drink alcohol.  · Have someone stay with you, if needed. He or she can watch for problems and help keep you safe.  Be sure to go to all follow-up visits with your healthcare provider. And rest after your surgery for as long as your healthcare provider tells you to.  Coping with pain  If you have pain after surgery, pain medicine will help you feel better. Take it as told, before pain becomes severe. Also, ask your healthcare provider or pharmacist about other ways to control pain. This might be with heat, ice, or relaxation. And follow any other instructions your surgeon or nurse gives you.  Tips for taking pain medicine  To get the best relief possible, remember these points:  · Pain medicines can upset your stomach. Taking them with a little food may help.  · Most pain relievers taken by mouth need at least 20 to 30 minutes to start to work.  · Taking medicine on a schedule can help you remember to take it. Try to time your medicine so that you can take it before starting an activity. This might be before you get dressed, go for a walk, or sit down for dinner.  · Constipation is a common side effect of pain medicines. Call your healthcare provider before taking any medicines such as laxatives or stool softeners to help ease constipation. Also ask if you should skip any foods. Drinking lots of fluids and eating foods such as fruits and vegetables that  are high in fiber can also help. Remember, do not take laxatives unless your surgeon has prescribed them.  · Drinking alcohol and taking pain medicine can cause dizziness and slow your breathing. It can even be deadly. Do not drink alcohol while taking pain medicine.  · Pain medicine can make you react more slowly to things. Do not drive or run machinery while taking pain medicine.  Your healthcare provider may tell you to take acetaminophen to help ease your pain. Ask him or her how much you are supposed to take each day. Acetaminophen or other pain relievers may interact with your prescription medicines or other over-the-counter (OTC) medicines. Some prescription medicines have acetaminophen and other ingredients. Using both prescription and OTC acetaminophen for pain can cause you to overdose. Read the labels on your OTC medicines with care. This will help you to clearly know the list of ingredients, how much to take, and any warnings. It may also help you not take too much acetaminophen. If you have questions or do not understand the information, ask your pharmacist or healthcare provider to explain it to you before you take the OTC medicine.  Managing nausea  Some people have an upset stomach after surgery. This is often because of anesthesia, pain, or pain medicine, or the stress of surgery. These tips will help you handle nausea and eat healthy foods as you get better. If you were on a special food plan before surgery, ask your healthcare provider if you should follow it while you get better. These tips may help:  · Do not push yourself to eat. Your body will tell you when to eat and how much.  · Start off with clear liquids and soup. They are easier to digest.  · Next try semi-solid foods, such as mashed potatoes, applesauce, and gelatin, as you feel ready.  · Slowly move to solid foods. Dont eat fatty, rich, or spicy foods at first.  · Do not force yourself to have 3 large meals a day. Instead eat smaller  amounts more often.  · Take pain medicines with a small amount of solid food, such as crackers or toast, to avoid nausea.     Call your surgeon if  · You still have pain an hour after taking medicine. The medicine may not be strong enough.  · You feel too sleepy, dizzy, or groggy. The medicine may be too strong.  · You have side effects like nausea, vomiting, or skin changes, such as rash, itching, or hives.       If you have obstructive sleep apnea  You were given anesthesia medicine during surgery to keep you comfortable and free of pain. After surgery, you may have more apnea spells because of this medicine and other medicines you were given. The spells may last longer than usual.   At home:  · Keep using the continuous positive airway pressure (CPAP) device when you sleep. Unless your healthcare provider tells you not to, use it when you sleep, day or night. CPAP is a common device used to treat obstructive sleep apnea.  · Talk with your provider before taking any pain medicine, muscle relaxants, or sedatives. Your provider will tell you about the possible dangers of taking these medicines.  Date Last Reviewed: 12/1/2016  © 7304-2553 [employer]. [address] [place] [address], [place] [address]. All rights reserved. This information is not intended as a substitute for professional medical care. Always follow your healthcare professional's instructions.

## 2017-10-20 ENCOUNTER — TELEPHONE (OUTPATIENT)
Dept: OBSTETRICS AND GYNECOLOGY | Facility: CLINIC | Age: 42
End: 2017-10-20

## 2017-10-20 RX ORDER — ESTRADIOL 0.1 MG/D
1 FILM, EXTENDED RELEASE TRANSDERMAL
Qty: 8 PATCH | Refills: 11 | Status: SHIPPED | OUTPATIENT
Start: 2017-10-23 | End: 2022-05-27

## 2017-10-20 NOTE — TELEPHONE ENCOUNTER
1. I sent hormone patches to use because we took both her ovaries,,use till I see her    2. Make appt 4pm on Monday 30th, tell to come 3:30  thanks

## 2017-10-27 ENCOUNTER — TELEPHONE (OUTPATIENT)
Dept: OBSTETRICS AND GYNECOLOGY | Facility: CLINIC | Age: 42
End: 2017-10-27

## 2017-10-27 NOTE — TELEPHONE ENCOUNTER
----- Message from Margarita Quiroz sent at 10/27/2017  9:48 AM CDT -----  Contact: Dr Shetty [phone]  Dr Shetty would like to speak with you regarding patient's results. Please advise.

## 2017-10-30 ENCOUNTER — OFFICE VISIT (OUTPATIENT)
Dept: OBSTETRICS AND GYNECOLOGY | Facility: CLINIC | Age: 42
End: 2017-10-30
Payer: COMMERCIAL

## 2017-10-30 ENCOUNTER — LAB VISIT (OUTPATIENT)
Dept: LAB | Facility: HOSPITAL | Age: 42
End: 2017-10-30
Attending: OBSTETRICS & GYNECOLOGY
Payer: COMMERCIAL

## 2017-10-30 VITALS
HEIGHT: 67 IN | BODY MASS INDEX: 26.82 KG/M2 | SYSTOLIC BLOOD PRESSURE: 122 MMHG | WEIGHT: 170.88 LBS | DIASTOLIC BLOOD PRESSURE: 74 MMHG

## 2017-10-30 DIAGNOSIS — N80.109 CHOCOLATE CYST OF OVARY: Primary | ICD-10-CM

## 2017-10-30 DIAGNOSIS — N80.109 CHOCOLATE CYST OF OVARY: ICD-10-CM

## 2017-10-30 DIAGNOSIS — Z98.890 POST-OPERATIVE STATE: ICD-10-CM

## 2017-10-30 LAB — CANCER AG125 SERPL-ACNC: 12 U/ML

## 2017-10-30 PROCEDURE — 86304 IMMUNOASSAY TUMOR CA 125: CPT

## 2017-10-30 PROCEDURE — 99999 PR PBB SHADOW E&M-EST. PATIENT-LVL II: CPT | Mod: PBBFAC,,, | Performed by: OBSTETRICS & GYNECOLOGY

## 2017-10-30 PROCEDURE — 99024 POSTOP FOLLOW-UP VISIT: CPT | Mod: S$GLB,,, | Performed by: OBSTETRICS & GYNECOLOGY

## 2017-10-30 PROCEDURE — 36415 COLL VENOUS BLD VENIPUNCTURE: CPT

## 2017-10-30 RX ORDER — PREGABALIN 150 MG/1
150 CAPSULE ORAL 2 TIMES DAILY
Refills: 2 | COMMUNITY
Start: 2017-10-05 | End: 2017-11-13

## 2017-10-30 RX ORDER — PANTOPRAZOLE SODIUM 40 MG/1
40 TABLET, DELAYED RELEASE ORAL DAILY
Refills: 2 | Status: ON HOLD | COMMUNITY
Start: 2017-10-08 | End: 2021-11-28

## 2017-10-30 RX ORDER — BUTALBITAL, ACETAMINOPHEN AND CAFFEINE 300; 40; 50 MG/1; MG/1; MG/1
CAPSULE ORAL
Refills: 3 | Status: ON HOLD | COMMUNITY
Start: 2017-10-08 | End: 2021-11-28

## 2017-10-30 NOTE — PROGRESS NOTES
Post bso  Dint use estrogen patch, has hot flashes  abd soft, healing well  Discussed path  Ca 125  discussec car/actiity  Fu 4

## 2017-11-02 ENCOUNTER — TELEPHONE (OUTPATIENT)
Dept: OBSTETRICS AND GYNECOLOGY | Facility: CLINIC | Age: 42
End: 2017-11-02

## 2017-11-02 NOTE — TELEPHONE ENCOUNTER
----- Message from Genesis Gatica sent at 11/2/2017  1:24 PM CDT -----  Contact: Self [phone]  Patient Returning Your Phone Call

## 2017-11-02 NOTE — TELEPHONE ENCOUNTER
----- Message from Genesis Gatica sent at 11/2/2017 10:40 AM CDT -----  Contact: Self [phone]  Calling to talk to nurse concerning questions on the procedure she had and would like her test results on her lab. Please advice

## 2017-11-06 ENCOUNTER — TELEPHONE (OUTPATIENT)
Dept: OBSTETRICS AND GYNECOLOGY | Facility: CLINIC | Age: 42
End: 2017-11-06

## 2017-11-06 NOTE — TELEPHONE ENCOUNTER
----- Message from Michael A. Wiedemann, MD sent at 11/6/2017  6:54 AM CST -----  Tell her, DARRYL, her ca 125 was nroaml, yay

## 2017-11-13 ENCOUNTER — OFFICE VISIT (OUTPATIENT)
Dept: OBSTETRICS AND GYNECOLOGY | Facility: CLINIC | Age: 42
End: 2017-11-13
Payer: COMMERCIAL

## 2017-11-13 ENCOUNTER — TELEPHONE (OUTPATIENT)
Dept: OBSTETRICS AND GYNECOLOGY | Facility: CLINIC | Age: 42
End: 2017-11-13

## 2017-11-13 VITALS
SYSTOLIC BLOOD PRESSURE: 100 MMHG | DIASTOLIC BLOOD PRESSURE: 60 MMHG | BODY MASS INDEX: 26.76 KG/M2 | WEIGHT: 170.88 LBS

## 2017-11-13 DIAGNOSIS — L08.9 INFECTION OF WOUND HEMATOMA: Primary | ICD-10-CM

## 2017-11-13 DIAGNOSIS — T14.8XXA INFECTION OF WOUND HEMATOMA: Primary | ICD-10-CM

## 2017-11-13 PROCEDURE — 99213 OFFICE O/P EST LOW 20 MIN: CPT | Mod: S$GLB,,, | Performed by: OBSTETRICS & GYNECOLOGY

## 2017-11-13 PROCEDURE — 99999 PR PBB SHADOW E&M-EST. PATIENT-LVL II: CPT | Mod: PBBFAC,,, | Performed by: OBSTETRICS & GYNECOLOGY

## 2017-11-13 RX ORDER — SULFAMETHOXAZOLE AND TRIMETHOPRIM 800; 160 MG/1; MG/1
1 TABLET ORAL 2 TIMES DAILY
Qty: 12 TABLET | Refills: 1 | Status: SHIPPED | OUTPATIENT
Start: 2017-11-13 | End: 2018-12-28

## 2017-11-13 RX ORDER — VALACYCLOVIR HYDROCHLORIDE 1 G/1
TABLET, FILM COATED ORAL
Refills: 2 | COMMUNITY
Start: 2017-11-02 | End: 2018-12-28

## 2017-11-13 NOTE — PROGRESS NOTES
42 y.o.   OB History      Para Term  AB Living    2 2 2          SAB TAB Ectopic Multiple Live Births                     Comlaining of: pt had laparoscopy oct  janeth, was rechecked post op dong fine, now co   Redness and discomfort near incison  No trama or drainage  No other co      ROS:  GENERAL: No fever, chills, fatigability or weight loss.  SKIN: No rashes, itching or changes in color or texture of skin.  HEAD: No headaches or recent head trauma.  EYES: Visual acuity fine. No photophobia, ocular pain or diplopia.  EARS: Denies ear pain, discharge or vertigo.  NOSE: No loss of smell, no epistaxis or postnasal drip.  MOUTH & THROAT: No hoarseness or change in voice. No excessive gum bleeding.  NODES: Denies swollen glands.  CHEST: Denies FERNANDES, cyanosis, wheezing, cough and sputum production.  CARDIOVASCULAR: Denies chest pain, PND, orthopnea or reduced exercise tolerance.  ABDOMEN: Appetite fine. No weight loss. Denies diarrhea, abdominal pain, hematemesis or blood in stool.  URINARY: No flank pain, dysuria or hematuria.  PERIPHERAL VASCULAR: No claudication or cyanosis.  MUSCULOSKELETAL: No joint stiffness or swelling. Denies back pain.  NEUROLOGIC: No history of seizures, paralysis, alteration of gait or coordination      PE: /60   Wt 77.5 kg (170 lb 13.7 oz)   LMP 2013   BMI 26.76 kg/m²    abd soft  Inc erythematous, no drainage, sl surrounding erythema  ? Reaction to suture?    ass wound erythem  Plan, will give ab's , may exepect a suture to come out?  Call next week to update me

## 2017-11-13 NOTE — TELEPHONE ENCOUNTER
----- Message from Silvestre Schumacher sent at 11/13/2017  8:07 AM CST -----  Contact: self/[phone]  She had a procedure on 10/19; her incision is red, and painfully swollen; she is requesting an appointment today.

## 2017-11-16 ENCOUNTER — TELEPHONE (OUTPATIENT)
Dept: OBSTETRICS AND GYNECOLOGY | Facility: CLINIC | Age: 42
End: 2017-11-16

## 2017-11-16 DIAGNOSIS — R53.83 FATIGUE, UNSPECIFIED TYPE: Primary | ICD-10-CM

## 2017-11-16 NOTE — TELEPHONE ENCOUNTER
Pt states since starting the patches she is still having hot flashes that have not improved and she is tired all the time. She is wondering if that is due to the patches?

## 2017-11-16 NOTE — TELEPHONE ENCOUNTER
----- Message from Kaylah Gr sent at 11/16/2017  2:16 PM CST -----  Contact: [phone] self  Patient called in requesting to speak with you. Patient prefers to speak with a nurse. Please advise.

## 2017-11-17 NOTE — TELEPHONE ENCOUNTER
No, its not likeley from the patches, but lets do labs,stay on patch till labs done,orders in,t edson Weber , ask how the lower incision is?

## 2017-11-22 ENCOUNTER — HOSPITAL ENCOUNTER (OUTPATIENT)
Dept: RADIOLOGY | Facility: HOSPITAL | Age: 42
Discharge: HOME | End: 2017-11-22
Attending: OBSTETRICS & GYNECOLOGY
Payer: COMMERCIAL

## 2017-11-22 ENCOUNTER — LAB VISIT (OUTPATIENT)
Dept: LAB | Facility: HOSPITAL | Age: 42
End: 2017-11-22
Attending: OBSTETRICS & GYNECOLOGY
Payer: COMMERCIAL

## 2017-11-22 VITALS — HEIGHT: 67 IN | WEIGHT: 170 LBS | BODY MASS INDEX: 26.68 KG/M2

## 2017-11-22 DIAGNOSIS — R53.83 FATIGUE, UNSPECIFIED TYPE: ICD-10-CM

## 2017-11-22 DIAGNOSIS — Z12.31 VISIT FOR SCREENING MAMMOGRAM: ICD-10-CM

## 2017-11-22 LAB
BASOPHILS # BLD AUTO: 0.02 K/UL
BASOPHILS NFR BLD: 0.7 %
DIFFERENTIAL METHOD: ABNORMAL
EOSINOPHIL # BLD AUTO: 0.1 K/UL
EOSINOPHIL NFR BLD: 1.7 %
ERYTHROCYTE [DISTWIDTH] IN BLOOD BY AUTOMATED COUNT: 11.8 %
ESTRADIOL SERPL-MCNC: 50 PG/ML
FSH SERPL-ACNC: 42.2 MIU/ML
HCT VFR BLD AUTO: 37 %
HGB BLD-MCNC: 12.8 G/DL
LYMPHOCYTES # BLD AUTO: 1.1 K/UL
LYMPHOCYTES NFR BLD: 35.3 %
MCH RBC QN AUTO: 31.9 PG
MCHC RBC AUTO-ENTMCNC: 34.6 G/DL
MCV RBC AUTO: 92 FL
MONOCYTES # BLD AUTO: 0.4 K/UL
MONOCYTES NFR BLD: 11.7 %
NEUTROPHILS # BLD AUTO: 1.5 K/UL
NEUTROPHILS NFR BLD: 50.6 %
PLATELET # BLD AUTO: 195 K/UL
PMV BLD AUTO: 8.9 FL
RBC # BLD AUTO: 4.01 M/UL
TSH SERPL DL<=0.005 MIU/L-ACNC: 1.66 UIU/ML
WBC # BLD AUTO: 3 K/UL

## 2017-11-22 PROCEDURE — 77063 BREAST TOMOSYNTHESIS BI: CPT | Mod: 26,,, | Performed by: RADIOLOGY

## 2017-11-22 PROCEDURE — 36415 COLL VENOUS BLD VENIPUNCTURE: CPT

## 2017-11-22 PROCEDURE — 83001 ASSAY OF GONADOTROPIN (FSH): CPT

## 2017-11-22 PROCEDURE — 77067 SCR MAMMO BI INCL CAD: CPT | Mod: 26,,, | Performed by: RADIOLOGY

## 2017-11-22 PROCEDURE — 85025 COMPLETE CBC W/AUTO DIFF WBC: CPT

## 2017-11-22 PROCEDURE — 82670 ASSAY OF TOTAL ESTRADIOL: CPT

## 2017-11-22 PROCEDURE — 77067 SCR MAMMO BI INCL CAD: CPT | Mod: TC

## 2017-11-22 PROCEDURE — 84443 ASSAY THYROID STIM HORMONE: CPT

## 2017-11-26 ENCOUNTER — TELEPHONE (OUTPATIENT)
Dept: OBSTETRICS AND GYNECOLOGY | Facility: CLINIC | Age: 42
End: 2017-11-26

## 2017-11-26 RX ORDER — MEDROXYPROGESTERONE ACETATE 5 MG/1
5 TABLET ORAL DAILY
Qty: 30 TABLET | Refills: 2 | Status: SHIPPED | OUTPATIENT
Start: 2017-11-26 | End: 2018-02-25 | Stop reason: SDUPTHER

## 2017-11-26 NOTE — TELEPHONE ENCOUNTER
Tell her the labs werent bad,, if still having bad hot flashes (if not getting better) i'm going to add an oral progesterone to take daily to see if helps,   Continue the patch twice a week too, thanks

## 2017-11-27 NOTE — TELEPHONE ENCOUNTER
Informed patient of results. Patient voiced understanding. Notified patient to call office if there were any further questions.   Patient would like to try progesterone pill. Please advise.

## 2017-11-29 ENCOUNTER — PATIENT MESSAGE (OUTPATIENT)
Dept: OBSTETRICS AND GYNECOLOGY | Facility: CLINIC | Age: 42
End: 2017-11-29

## 2018-02-26 RX ORDER — MEDROXYPROGESTERONE ACETATE 5 MG/1
5 TABLET ORAL DAILY
Qty: 30 TABLET | Refills: 2 | Status: SHIPPED | OUTPATIENT
Start: 2018-02-26 | End: 2018-06-02 | Stop reason: SDUPTHER

## 2018-06-04 RX ORDER — MEDROXYPROGESTERONE ACETATE 5 MG/1
5 TABLET ORAL DAILY
Qty: 30 TABLET | Refills: 2 | Status: SHIPPED | OUTPATIENT
Start: 2018-06-04 | End: 2018-12-28

## 2018-12-28 ENCOUNTER — OFFICE VISIT (OUTPATIENT)
Dept: OBSTETRICS AND GYNECOLOGY | Facility: CLINIC | Age: 43
End: 2018-12-28
Payer: COMMERCIAL

## 2018-12-28 ENCOUNTER — LAB VISIT (OUTPATIENT)
Dept: LAB | Facility: HOSPITAL | Age: 43
End: 2018-12-28
Attending: OBSTETRICS & GYNECOLOGY
Payer: COMMERCIAL

## 2018-12-28 VITALS
DIASTOLIC BLOOD PRESSURE: 64 MMHG | WEIGHT: 170 LBS | HEIGHT: 67 IN | SYSTOLIC BLOOD PRESSURE: 102 MMHG | BODY MASS INDEX: 26.68 KG/M2

## 2018-12-28 DIAGNOSIS — Z12.4 ROUTINE PAPANICOLAOU SMEAR: Primary | ICD-10-CM

## 2018-12-28 DIAGNOSIS — N85.00 ENDOMETRIAL HYPERPLASIA: ICD-10-CM

## 2018-12-28 LAB — CANCER AG125 SERPL-ACNC: 9 U/ML

## 2018-12-28 PROCEDURE — 86304 IMMUNOASSAY TUMOR CA 125: CPT

## 2018-12-28 PROCEDURE — 99396 PREV VISIT EST AGE 40-64: CPT | Mod: S$GLB,,, | Performed by: OBSTETRICS & GYNECOLOGY

## 2018-12-28 PROCEDURE — 36415 COLL VENOUS BLD VENIPUNCTURE: CPT

## 2018-12-28 PROCEDURE — 99999 PR PBB SHADOW E&M-EST. PATIENT-LVL III: CPT | Mod: PBBFAC,,, | Performed by: OBSTETRICS & GYNECOLOGY

## 2018-12-28 PROCEDURE — 88175 CYTOPATH C/V AUTO FLUID REDO: CPT

## 2018-12-28 RX ORDER — AMITRIPTYLINE HYDROCHLORIDE 10 MG/1
10 TABLET, FILM COATED ORAL DAILY
Refills: 0 | COMMUNITY
Start: 2018-11-29 | End: 2020-06-19

## 2018-12-28 RX ORDER — BUPROPION HYDROCHLORIDE 150 MG/1
150 TABLET ORAL DAILY
Refills: 0 | COMMUNITY
Start: 2018-12-10 | End: 2020-06-19

## 2018-12-28 NOTE — PROGRESS NOTES
"  HPI:  43 y.o.   OB History      Para Term  AB Living    2 2 2          SAB TAB Ectopic Multiple Live Births                    Patient's last menstrual period was 2013.   Patient here for her annual gynecologic exam.  She has no complaints at this time.    ROS:  GENERAL: No fever, chills, fatigability or weight loss.  SKIN: No rashes, itching or changes in color or texture of skin.  HEAD: No headaches or recent head trauma.  EYES: Visual acuity fine. No photophobia, ocular pain or diplopia.  EARS: Denies ear pain, discharge or vertigo.  NOSE: No loss of smell, no epistaxis or postnasal drip.  MOUTH & THROAT: No hoarseness or change in voice. No excessive gum bleeding.  NODES: Denies swollen glands.  CHEST: Denies FERNANDES, cyanosis, wheezing, cough and sputum production.  CARDIOVASCULAR: Denies chest pain, PND, orthopnea or reduced exercise tolerance.  ABDOMEN: Appetite fine. No weight loss. Denies diarrhea, abdominal pain, hematemesis or blood in stool.  URINARY: No flank pain, dysuria or hematuria.  PERIPHERAL VASCULAR: No claudication or cyanosis.  MUSCULOSKELETAL: No joint stiffness or swelling. Denies back pain.  NEUROLOGIC: No history of seizures, paralysis, alteration of gait or coordination.    PE:   /64 (BP Location: Right arm)   Ht 5' 7" (1.702 m)   Wt 77.1 kg (169 lb 15.6 oz)   LMP 2013   BMI 26.62 kg/m²   APPEARANCE: Well nourished, well developed, in no acute distress.  NECK: Neck symmetric without masses or thyromegaly.  NODES: No inguinal lymph node enlargement.  ABDOMEN: Soft. No tenderness or masses. No hepatosplenomegaly. No hernias.  BREASTS: Symmetrical, no skin changes or visible lesions. No palpable masses, nipple discharge or adenopathy bilaterally.  PELVIC: Normal external female genitalia without lesions. Normal hair distribution. Adequate perineal body, normal urethral meatus. Vagina moist and well rugated without lesions or discharge. No significant " cystocele or rectocele. Uterus and cervix surgically absent. Bimanual exam revealed no masses, tenderness or abnormality.    Procedure:  Pap Smear    Assessment:  Normal Gynecologic Exam    Plan:  Mammogram and Colonoscopy as per current recommendations.   Return to clinic in one year or for any problems or complaints.  Ov out  Ca 125  hrt thereafter  Pt was using estrogen pelets, too $  Will try oral if lab good

## 2018-12-30 ENCOUNTER — TELEPHONE (OUTPATIENT)
Dept: OBSTETRICS AND GYNECOLOGY | Facility: CLINIC | Age: 43
End: 2018-12-30

## 2018-12-30 RX ORDER — ESTRADIOL AND NORETHINDRONE ACETATE 1; .5 MG/1; MG/1
1 TABLET ORAL DAILY
Qty: 30 TABLET | Refills: 1 | Status: SHIPPED | OUTPATIENT
Start: 2018-12-30 | End: 2019-02-25 | Stop reason: SDUPTHER

## 2018-12-30 NOTE — TELEPHONE ENCOUNTER
1. Tell ca 125 normal, yay!  2. im sending hromone, both estrogen and prog to try for a month, update us thereafter,  Take each night  thanks

## 2019-02-25 RX ORDER — ESTRADIOL AND NORETHINDRONE ACETATE 1; .5 MG/1; MG/1
TABLET, FILM COATED ORAL
Qty: 30 TABLET | Refills: 1 | Status: ON HOLD | OUTPATIENT
Start: 2019-02-25 | End: 2021-11-28

## 2019-12-13 ENCOUNTER — TELEPHONE (OUTPATIENT)
Dept: ADMINISTRATIVE | Facility: HOSPITAL | Age: 44
End: 2019-12-13

## 2020-06-16 ENCOUNTER — TELEPHONE (OUTPATIENT)
Dept: FAMILY MEDICINE | Facility: CLINIC | Age: 45
End: 2020-06-16

## 2020-06-16 NOTE — TELEPHONE ENCOUNTER
----- Message from Chinyere Orona sent at 6/16/2020  4:14 PM CDT -----  Contact: Tabitha King [phone]  Type:  Sooner Appointment Request     Caller is requesting a sooner appointment.  Caller declined first available appointment listed below.  Caller will not accept being placed on the waitlist and is requesting a message be sent to doctor.  Name of Caller: pt  When is the first available appointment? 07-24-20  Symptoms or Reason: Dizziness    Would the patient rather a call back or a response via [facility]? Call back  Best Call Back Number: [phone]  Additional Information: Pt started feeling dizziness about 3 days ago

## 2020-06-16 NOTE — TELEPHONE ENCOUNTER
Called and spoke with pt in regards of her message. Pt informed me that she once saw Dr. Adams when he was at [facility], and is needing to be seen for Dizziness. Informed pt that Dr. Adams is out of the office this week. Pt made an appt to see Dr. Maldonado on 6/19/2020 for her dizziness.

## 2020-06-19 ENCOUNTER — OFFICE VISIT (OUTPATIENT)
Dept: FAMILY MEDICINE | Facility: CLINIC | Age: 45
End: 2020-06-19
Payer: COMMERCIAL

## 2020-06-19 VITALS
SYSTOLIC BLOOD PRESSURE: 90 MMHG | DIASTOLIC BLOOD PRESSURE: 76 MMHG | BODY MASS INDEX: 24.42 KG/M2 | HEART RATE: 73 BPM | WEIGHT: 155.56 LBS | HEIGHT: 67 IN | TEMPERATURE: 98 F | OXYGEN SATURATION: 98 %

## 2020-06-19 DIAGNOSIS — F41.9 ANXIETY: ICD-10-CM

## 2020-06-19 DIAGNOSIS — E03.9 HYPOTHYROIDISM, UNSPECIFIED TYPE: Primary | ICD-10-CM

## 2020-06-19 DIAGNOSIS — K21.9 GASTROESOPHAGEAL REFLUX DISEASE WITHOUT ESOPHAGITIS: ICD-10-CM

## 2020-06-19 DIAGNOSIS — Z11.4 ENCOUNTER FOR SCREENING FOR HIV: ICD-10-CM

## 2020-06-19 DIAGNOSIS — R42 DIZZINESS: ICD-10-CM

## 2020-06-19 DIAGNOSIS — Z12.31 ENCOUNTER FOR SCREENING MAMMOGRAM FOR MALIGNANT NEOPLASM OF BREAST: ICD-10-CM

## 2020-06-19 DIAGNOSIS — Z00.00 ROUTINE MEDICAL EXAM: ICD-10-CM

## 2020-06-19 PROCEDURE — 99999 PR PBB SHADOW E&M-EST. PATIENT-LVL III: CPT | Mod: PBBFAC,,, | Performed by: INTERNAL MEDICINE

## 2020-06-19 PROCEDURE — 99999 PR PBB SHADOW E&M-EST. PATIENT-LVL III: ICD-10-PCS | Mod: PBBFAC,,, | Performed by: INTERNAL MEDICINE

## 2020-06-19 PROCEDURE — 99204 OFFICE O/P NEW MOD 45 MIN: CPT | Mod: S$GLB,,, | Performed by: INTERNAL MEDICINE

## 2020-06-19 PROCEDURE — 99204 PR OFFICE/OUTPT VISIT, NEW, LEVL IV, 45-59 MIN: ICD-10-PCS | Mod: S$GLB,,, | Performed by: INTERNAL MEDICINE

## 2020-06-19 RX ORDER — BUPROPION HYDROCHLORIDE 150 MG/1
150 TABLET ORAL DAILY
Qty: 90 TABLET | Refills: 1 | Status: ON HOLD | OUTPATIENT
Start: 2020-06-19 | End: 2021-11-28

## 2020-06-19 RX ORDER — HYDROXYZINE PAMOATE 25 MG/1
25 CAPSULE ORAL NIGHTLY PRN
Qty: 90 CAPSULE | Refills: 1 | Status: ON HOLD | OUTPATIENT
Start: 2020-06-19 | End: 2021-11-28

## 2020-06-19 NOTE — PROGRESS NOTES
[facility] Note    Chief Complaint      Chief Complaint   Patient presents with    Dizziness     pt states she has been having dizziness for a month now.     History of Present Illness      Socorro Huang is a 44 y.o. female who presents today for chief complaint dizziness. Patient is new to me.    PCP: Pura Maldonado MD  Patient comes to appointment alone.     Active Problem List with Overview Notes    Diagnosis Date Noted    Dizziness 06/19/2020     x4 episodes on 1 month Feels unsteady walking and like room is spinning, no lightheadedness, no falls loc, having some tinnitus without hearing changes,no sinus congestion or ear pain, saw ENT Vocke last week who said she was having some GERD and does have chronic allergies     Did get a new job recently and unsure of this is anxiety mediated, is not sleeping       Anxiety 06/19/2020     History of now exacerbated by job change, palpitations, anxiety, not sleeping, reflux       Gastroesophageal reflux disease without esophagitis 06/19/2020     Started on nexium by ENT, has seen moi in past       Hypothyroidism 06/19/2020     subtotoal thyroidetomy with benita for goiter       Pelvic pain syndrome 10/19/2017    History of diverticulitis 10/10/2017    S/P hysterectomy 05/06/2013       HPI     Health Maintenance   Topic Date Due    Lipid Panel  10/27/2019    Mammogram  11/22/2019    TETANUS VACCINE  11/14/2022       Past Medical History:   Diagnosis Date    Abnormal Pap smear     Anxiety     celexa & prozac didn't help much    Constipation - functional     Headache(784.0)     Hemorrhoid     Leukopenia     benign, evaluated in [facility] y 2000    Menorrhagia     Strain of neck muscle     tx with PT at [facility] in past       Past Surgical History:   Procedure Laterality Date    BREAST SURGERY      COLON SURGERY  2014         DILATION AND CURETTAGE OF UTERUS  12/18/2012    complex hyperplasia, no atypia    HEMORRHOID  SURGERY  2014    HYSTERECTOMY      OOPHORECTOMY      PARTIAL HYSTERECTOMY  2013    for atypia    THYROIDECTOMY, PARTIAL  2013    TONSILLECTOMY      TUBAL LIGATION         family history includes Alcohol abuse in her cousin and maternal uncle; Breast cancer in her maternal grandmother; Cancer in her father; Depression in her sister; Diabetes in her paternal grandmother; Drug abuse in her maternal aunt; Emphysema in her mother; Heart disease (age of onset: 50) in her father; Hypertension in her paternal grandmother; Ovarian cancer in her maternal grandmother; Schizophrenia in her paternal aunt; Suicide in her brother.    Social History     Tobacco Use    Smoking status: Never Smoker    Smokeless tobacco: Never Used   Substance Use Topics    Alcohol use: Yes     Comment: socially    Drug use: No       Review of Systems   Constitutional: Negative for chills and fever.   HENT: Positive for tinnitus. Negative for congestion, hearing loss and sore throat.    Eyes: Negative for blurred vision and discharge.   Respiratory: Negative for cough and shortness of breath.    Cardiovascular: Negative for chest pain and palpitations.   Gastrointestinal: Negative for constipation, diarrhea, nausea and vomiting.   Genitourinary: Negative for dysuria and hematuria.   Musculoskeletal: Negative for falls and myalgias.   Skin: Negative for itching and rash.   Neurological: Positive for dizziness. Negative for headaches.   Psychiatric/Behavioral: Negative for depression. The patient is nervous/anxious and has insomnia.         Outpatient Encounter Medications as of 6/19/2020   Medication Sig Note Dispense Refill    levothyroxine (SYNTHROID) 125 MCG tablet Take 62.5 mcg by mouth once daily. 12/3/2015: Received from: External Pharmacy  5    pantoprazole (PROTONIX) 40 MG tablet Take 40 mg by mouth once daily. 10/30/2017: Received from: External Pharmacy Received Sig: TAKE 1 TABLET BY MOUTH EVERY DAY  2    buPROPion (WELLBUTRIN  "XL) 150 MG TB24 tablet Take 1 tablet (150 mg total) by mouth once daily.  90 tablet 1    butalbital-acetaminophen-caff -40 mg Cap TAKE ONE CAPSULE BY MOUTH EVERY DAY AS NEEDED FOR HEADACHE 10/30/2017: Received from: External Pharmacy  3    estradiol (VIVELLE-DOT) 0.1 mg/24 hr PTSW Place 1 patch onto the skin twice a week.  8 patch 11    hydrOXYzine pamoate (VISTARIL) 25 MG Cap Take 1 capsule (25 mg total) by mouth nightly as needed.  90 capsule 1    MIMVEY 1-0.5 mg per tablet TAKE 1 TABLET BY MOUTH EVERY DAY (Patient not taking: Reported on 6/19/2020)  30 tablet 1    [DISCONTINUED] amitriptyline (ELAVIL) 10 MG tablet Take 10 mg by mouth once daily.   0    [DISCONTINUED] buPROPion (WELLBUTRIN XL) 150 MG TB24 tablet Take 150 mg by mouth once daily.   0     No facility-administered encounter medications on file as of 6/19/2020.         Review of patient's allergies indicates:   Allergen Reactions    Celexa [citalopram] Nausea Only     Stomach upset           Physical Exam      Vital Signs  Temp: 97.7 °F (36.5 °C)  Temp src: Oral  Pulse: 73  SpO2: 98 %  BP: 90/76  Pain Score: 0-No pain  Height and Weight  Height: 5' 7" (170.2 cm)  Weight: 70.5 kg (155 lb 8.6 oz)  BSA (Calculated - sq m): 1.83 sq meters  BMI (Calculated): 24.4  Weight in (lb) to have BMI = 25: 159.3]    Physical Exam  Vitals signs reviewed.   Constitutional:       Appearance: She is well-developed.   HENT:      Head: Normocephalic and atraumatic.      Right Ear: Tympanic membrane and external ear normal.      Left Ear: Tympanic membrane and external ear normal.   Eyes:      General:         Right eye: No discharge.         Left eye: No discharge.      Comments: No nystagmus, nl EOM   Neck:      Musculoskeletal: Normal range of motion.      Thyroid: No thyromegaly.   Cardiovascular:      Rate and Rhythm: Normal rate and regular rhythm.      Heart sounds: Normal heart sounds. No murmur.      Comments: Frequent pvcs   Pulmonary:      Effort: " Pulmonary effort is normal. No respiratory distress.      Breath sounds: Normal breath sounds.   Abdominal:      General: Bowel sounds are normal. There is no distension.      Palpations: Abdomen is soft.      Tenderness: There is no abdominal tenderness.   Musculoskeletal: Normal range of motion.         General: No deformity.   Skin:     General: Skin is warm and dry.      Findings: No rash.   Neurological:      Mental Status: She is alert and oriented to person, place, and time.   Psychiatric:         Behavior: Behavior normal.          Laboratory & Radiology:  None for review       Assessment/Plan     Socorro Huang is a 44 y.o.female with:    Gastroesophageal reflux disease without esophagitis  Ct ppi     Hypothyroidism  Due tfts    Dizziness  Not characteristic for inner ear causes of dizziness, tx as anxiety for now and if not improving do further work up    Anxiety  Start wellbutrin back with vistaril bridge and follow up 6 weeks       Orders Placed This Encounter   Procedures    Mammo Digital Screening Bilat w/ Jose     Standing Status:   Future     Standing Expiration Date:   8/19/2021     Order Specific Question:   May the Radiologist modify the order per protocol to meet the clinical needs of the patient?     Answer:   Yes    CBC auto differential     Standing Status:   Future     Standing Expiration Date:   8/18/2021    Lipid Panel     Standing Status:   Future     Standing Expiration Date:   8/18/2021    Comprehensive metabolic panel     Standing Status:   Future     Standing Expiration Date:   8/18/2021    T4, free     Standing Status:   Future     Standing Expiration Date:   8/19/2021    TSH     Standing Status:   Future     Standing Expiration Date:   8/19/2021    HIV 1/2 Ag/Ab (4th Gen)     Standing Status:   Future     Standing Expiration Date:   8/18/2021       -Continue current medications and maintain follow up with specialists.  Return to clinic in 6 weeks  months.  Future Appointments    Date Time Provider Department Center   7/11/2020  9:30 AM [facility] M[facility]1 [facility]   7/24/2020  7:40 AM APPOINTMENT LAB, [facility]   7/30/2020  3:30 PM Pura Maldonado MD [facility] [place]       Pura Maldonado MD  6/19/2020 11:58 AM    Primary Care Internal Medicine - [facility]

## 2020-06-19 NOTE — ASSESSMENT & PLAN NOTE
Not characteristic for inner ear causes of dizziness, tx as anxiety for now and if not improving do further work up

## 2020-10-05 ENCOUNTER — PATIENT MESSAGE (OUTPATIENT)
Dept: INTERNAL MEDICINE | Facility: CLINIC | Age: 45
End: 2020-10-05

## 2021-01-04 ENCOUNTER — PATIENT MESSAGE (OUTPATIENT)
Dept: ADMINISTRATIVE | Facility: HOSPITAL | Age: 46
End: 2021-01-04

## 2021-01-05 ENCOUNTER — IMMUNIZATION (OUTPATIENT)
Dept: OBSTETRICS AND GYNECOLOGY | Facility: CLINIC | Age: 46
End: 2021-01-05
Payer: COMMERCIAL

## 2021-01-05 DIAGNOSIS — Z23 NEED FOR VACCINATION: ICD-10-CM

## 2021-01-05 PROCEDURE — 91300 COVID-19, MRNA, LNP-S, PF, 30 MCG/0.3 ML DOSE VACCINE: CPT | Mod: PBBFAC | Performed by: FAMILY MEDICINE

## 2021-01-28 ENCOUNTER — IMMUNIZATION (OUTPATIENT)
Dept: OBSTETRICS AND GYNECOLOGY | Facility: CLINIC | Age: 46
End: 2021-01-28
Payer: COMMERCIAL

## 2021-01-28 DIAGNOSIS — Z23 NEED FOR VACCINATION: Primary | ICD-10-CM

## 2021-01-28 PROCEDURE — 0002A COVID-19, MRNA, LNP-S, PF, 30 MCG/0.3 ML DOSE VACCINE: CPT | Mod: PBBFAC | Performed by: FAMILY MEDICINE

## 2021-01-28 PROCEDURE — 91300 COVID-19, MRNA, LNP-S, PF, 30 MCG/0.3 ML DOSE VACCINE: CPT | Mod: PBBFAC | Performed by: FAMILY MEDICINE

## 2021-03-18 ENCOUNTER — PATIENT MESSAGE (OUTPATIENT)
Dept: RESEARCH | Facility: HOSPITAL | Age: 46
End: 2021-03-18

## 2021-03-26 ENCOUNTER — PATIENT MESSAGE (OUTPATIENT)
Dept: RESEARCH | Facility: HOSPITAL | Age: 46
End: 2021-03-26

## 2021-04-05 ENCOUNTER — PATIENT MESSAGE (OUTPATIENT)
Dept: ADMINISTRATIVE | Facility: HOSPITAL | Age: 46
End: 2021-04-05

## 2021-07-07 ENCOUNTER — PATIENT MESSAGE (OUTPATIENT)
Dept: ADMINISTRATIVE | Facility: HOSPITAL | Age: 46
End: 2021-07-07

## 2021-08-25 DIAGNOSIS — Z12.31 OTHER SCREENING MAMMOGRAM: ICD-10-CM

## 2021-10-05 ENCOUNTER — PATIENT MESSAGE (OUTPATIENT)
Dept: ADMINISTRATIVE | Facility: HOSPITAL | Age: 46
End: 2021-10-05

## 2021-11-05 ENCOUNTER — HOSPITAL ENCOUNTER (OUTPATIENT)
Dept: RADIOLOGY | Facility: HOSPITAL | Age: 46
Discharge: HOME | End: 2021-11-05
Attending: INTERNAL MEDICINE
Payer: COMMERCIAL

## 2021-11-05 VITALS — BODY MASS INDEX: 26.63 KG/M2 | WEIGHT: 170 LBS

## 2021-11-05 DIAGNOSIS — Z12.31 OTHER SCREENING MAMMOGRAM: ICD-10-CM

## 2021-11-05 PROCEDURE — 77067 SCR MAMMO BI INCL CAD: CPT | Mod: 26,,, | Performed by: RADIOLOGY

## 2021-11-05 PROCEDURE — 77063 MAMMO DIGITAL SCREENING BILAT WITH TOMO: ICD-10-PCS | Mod: 26,,, | Performed by: RADIOLOGY

## 2021-11-05 PROCEDURE — 77063 BREAST TOMOSYNTHESIS BI: CPT | Mod: 26,,, | Performed by: RADIOLOGY

## 2021-11-05 PROCEDURE — 77067 MAMMO DIGITAL SCREENING BILAT WITH TOMO: ICD-10-PCS | Mod: 26,,, | Performed by: RADIOLOGY

## 2021-11-05 PROCEDURE — 77067 SCR MAMMO BI INCL CAD: CPT | Mod: TC,PO

## 2021-11-27 ENCOUNTER — HOSPITAL ENCOUNTER (INPATIENT)
Facility: HOSPITAL | Age: 46
LOS: 3 days | Discharge: HOME | DRG: 392 | End: 2021-11-30
Attending: EMERGENCY MEDICINE | Admitting: COLON & RECTAL SURGERY
Payer: COMMERCIAL

## 2021-11-27 DIAGNOSIS — R10.30 LOWER ABDOMINAL PAIN: ICD-10-CM

## 2021-11-27 DIAGNOSIS — K57.92 ACUTE DIVERTICULITIS: Primary | ICD-10-CM

## 2021-11-27 DIAGNOSIS — R19.7 DIARRHEA, UNSPECIFIED TYPE: ICD-10-CM

## 2021-11-27 LAB
ALBUMIN SERPL BCP-MCNC: 3.8 G/DL (ref 3.5–5.2)
ALP SERPL-CCNC: 110 U/L (ref 55–135)
ALT SERPL W/O P-5'-P-CCNC: 78 U/L (ref 10–44)
ANION GAP SERPL CALC-SCNC: 10 MMOL/L (ref 8–16)
AST SERPL-CCNC: 49 U/L (ref 10–40)
BASOPHILS # BLD AUTO: 0.02 K/UL (ref 0–0.2)
BASOPHILS NFR BLD: 0.3 % (ref 0–1.9)
BILIRUB SERPL-MCNC: 0.4 MG/DL (ref 0.1–1)
BILIRUB UR QL STRIP: NEGATIVE
BUN SERPL-MCNC: 7 MG/DL (ref 6–20)
BUN SERPL-MCNC: 7 MG/DL (ref 6–30)
CALCIUM SERPL-MCNC: 9.9 MG/DL (ref 8.7–10.5)
CHLORIDE SERPL-SCNC: 103 MMOL/L (ref 95–110)
CHLORIDE SERPL-SCNC: 104 MMOL/L (ref 95–110)
CLARITY UR REFRACT.AUTO: CLEAR
CO2 SERPL-SCNC: 22 MMOL/L (ref 23–29)
COLOR UR AUTO: NORMAL
CREAT SERPL-MCNC: 0.4 MG/DL (ref 0.5–1.4)
CREAT SERPL-MCNC: 0.6 MG/DL (ref 0.5–1.4)
CRP SERPL-MCNC: 26 MG/L (ref 0–8.2)
CTP QC/QA: YES
DIFFERENTIAL METHOD: ABNORMAL
EOSINOPHIL # BLD AUTO: 0.1 K/UL (ref 0–0.5)
EOSINOPHIL NFR BLD: 1.3 % (ref 0–8)
ERYTHROCYTE [DISTWIDTH] IN BLOOD BY AUTOMATED COUNT: 11.5 % (ref 11.5–14.5)
EST. GFR  (AFRICAN AMERICAN): >60 ML/MIN/1.73 M^2
EST. GFR  (NON AFRICAN AMERICAN): >60 ML/MIN/1.73 M^2
GLUCOSE SERPL-MCNC: 101 MG/DL (ref 70–110)
GLUCOSE SERPL-MCNC: 106 MG/DL (ref 70–110)
GLUCOSE UR QL STRIP: NEGATIVE
HCT VFR BLD AUTO: 37.1 % (ref 37–48.5)
HCT VFR BLD CALC: 35 %PCV (ref 36–54)
HGB BLD-MCNC: 12.7 G/DL (ref 12–16)
HGB UR QL STRIP: NEGATIVE
IMM GRANULOCYTES # BLD AUTO: 0.02 K/UL (ref 0–0.04)
IMM GRANULOCYTES NFR BLD AUTO: 0.3 % (ref 0–0.5)
KETONES UR QL STRIP: NEGATIVE
LEUKOCYTE ESTERASE UR QL STRIP: NEGATIVE
LIPASE SERPL-CCNC: 14 U/L (ref 4–60)
LYMPHOCYTES # BLD AUTO: 0.8 K/UL (ref 1–4.8)
LYMPHOCYTES NFR BLD: 9.9 % (ref 18–48)
MCH RBC QN AUTO: 31.9 PG (ref 27–31)
MCHC RBC AUTO-ENTMCNC: 34.2 G/DL (ref 32–36)
MCV RBC AUTO: 93 FL (ref 82–98)
MONOCYTES # BLD AUTO: 0.5 K/UL (ref 0.3–1)
MONOCYTES NFR BLD: 6.1 % (ref 4–15)
NEUTROPHILS # BLD AUTO: 6.4 K/UL (ref 1.8–7.7)
NEUTROPHILS NFR BLD: 82.1 % (ref 38–73)
NITRITE UR QL STRIP: NEGATIVE
NRBC BLD-RTO: 0 /100 WBC
PH UR STRIP: 8 [PH] (ref 5–8)
PLATELET # BLD AUTO: 203 K/UL (ref 150–450)
PMV BLD AUTO: 9.6 FL (ref 9.2–12.9)
POC IONIZED CALCIUM: 1.18 MMOL/L (ref 1.06–1.42)
POC TCO2 (MEASURED): 27 MMOL/L (ref 23–29)
POTASSIUM BLD-SCNC: 4.3 MMOL/L (ref 3.5–5.1)
POTASSIUM SERPL-SCNC: 4.2 MMOL/L (ref 3.5–5.1)
PROT SERPL-MCNC: 7.4 G/DL (ref 6–8.4)
PROT UR QL STRIP: NEGATIVE
RBC # BLD AUTO: 3.98 M/UL (ref 4–5.4)
SAMPLE: ABNORMAL
SARS-COV-2 RDRP RESP QL NAA+PROBE: NEGATIVE
SODIUM BLD-SCNC: 139 MMOL/L (ref 136–145)
SODIUM SERPL-SCNC: 135 MMOL/L (ref 136–145)
SP GR UR STRIP: 1 (ref 1–1.03)
URN SPEC COLLECT METH UR: NORMAL
WBC # BLD AUTO: 7.81 K/UL (ref 3.9–12.7)

## 2021-11-27 PROCEDURE — 99285 EMERGENCY DEPT VISIT HI MDM: CPT | Mod: CS,,, | Performed by: PHYSICIAN ASSISTANT

## 2021-11-27 PROCEDURE — 96375 TX/PRO/DX INJ NEW DRUG ADDON: CPT

## 2021-11-27 PROCEDURE — 86140 C-REACTIVE PROTEIN: CPT | Performed by: SURGERY

## 2021-11-27 PROCEDURE — 25000003 PHARM REV CODE 250: Performed by: SURGERY

## 2021-11-27 PROCEDURE — U0002 COVID-19 LAB TEST NON-CDC: HCPCS | Performed by: EMERGENCY MEDICINE

## 2021-11-27 PROCEDURE — 83690 ASSAY OF LIPASE: CPT | Performed by: PHYSICIAN ASSISTANT

## 2021-11-27 PROCEDURE — 25000003 PHARM REV CODE 250: Performed by: PHYSICIAN ASSISTANT

## 2021-11-27 PROCEDURE — 99285 EMERGENCY DEPT VISIT HI MDM: CPT | Mod: 25

## 2021-11-27 PROCEDURE — 20600001 HC STEP DOWN PRIVATE ROOM

## 2021-11-27 PROCEDURE — 63600175 PHARM REV CODE 636 W HCPCS: Performed by: PHYSICIAN ASSISTANT

## 2021-11-27 PROCEDURE — 80053 COMPREHEN METABOLIC PANEL: CPT | Performed by: PHYSICIAN ASSISTANT

## 2021-11-27 PROCEDURE — 63600175 PHARM REV CODE 636 W HCPCS: Performed by: SURGERY

## 2021-11-27 PROCEDURE — 96361 HYDRATE IV INFUSION ADD-ON: CPT

## 2021-11-27 PROCEDURE — 96374 THER/PROPH/DIAG INJ IV PUSH: CPT

## 2021-11-27 PROCEDURE — 99285 PR EMERGENCY DEPT VISIT,LEVEL V: ICD-10-PCS | Mod: CS,,, | Performed by: PHYSICIAN ASSISTANT

## 2021-11-27 PROCEDURE — 99222 1ST HOSP IP/OBS MODERATE 55: CPT | Mod: AI,,, | Performed by: COLON & RECTAL SURGERY

## 2021-11-27 PROCEDURE — 85025 COMPLETE CBC W/AUTO DIFF WBC: CPT | Performed by: PHYSICIAN ASSISTANT

## 2021-11-27 PROCEDURE — 99222 PR INITIAL HOSPITAL CARE,LEVL II: ICD-10-PCS | Mod: AI,,, | Performed by: COLON & RECTAL SURGERY

## 2021-11-27 PROCEDURE — 25500020 PHARM REV CODE 255: Performed by: EMERGENCY MEDICINE

## 2021-11-27 PROCEDURE — 81003 URINALYSIS AUTO W/O SCOPE: CPT | Performed by: PHYSICIAN ASSISTANT

## 2021-11-27 RX ORDER — HYDROMORPHONE HYDROCHLORIDE 1 MG/ML
0.5 INJECTION, SOLUTION INTRAMUSCULAR; INTRAVENOUS; SUBCUTANEOUS EVERY 6 HOURS PRN
Status: DISCONTINUED | OUTPATIENT
Start: 2021-11-27 | End: 2021-11-30 | Stop reason: HOSPADM

## 2021-11-27 RX ORDER — DEXTROSE MONOHYDRATE, SODIUM CHLORIDE, AND POTASSIUM CHLORIDE 50; 1.49; 9 G/1000ML; G/1000ML; G/1000ML
INJECTION, SOLUTION INTRAVENOUS CONTINUOUS
Status: DISCONTINUED | OUTPATIENT
Start: 2021-11-27 | End: 2021-11-28

## 2021-11-27 RX ORDER — OXYCODONE AND ACETAMINOPHEN 5; 325 MG/1; MG/1
1 TABLET ORAL EVERY 4 HOURS PRN
Status: ON HOLD | COMMUNITY
End: 2021-11-28

## 2021-11-27 RX ORDER — PROMETHAZINE HYDROCHLORIDE 25 MG/1
25 TABLET ORAL EVERY 4 HOURS
Status: ON HOLD | COMMUNITY
End: 2021-11-28

## 2021-11-27 RX ORDER — ONDANSETRON 2 MG/ML
4 INJECTION INTRAMUSCULAR; INTRAVENOUS
Status: COMPLETED | OUTPATIENT
Start: 2021-11-27 | End: 2021-11-27

## 2021-11-27 RX ORDER — MORPHINE SULFATE 4 MG/ML
4 INJECTION, SOLUTION INTRAMUSCULAR; INTRAVENOUS
Status: COMPLETED | OUTPATIENT
Start: 2021-11-27 | End: 2021-11-27

## 2021-11-27 RX ORDER — DICYCLOMINE HYDROCHLORIDE 10 MG/1
20 CAPSULE ORAL
Status: ON HOLD | COMMUNITY
End: 2021-11-28

## 2021-11-27 RX ORDER — SODIUM CHLORIDE 0.9 % (FLUSH) 0.9 %
10 SYRINGE (ML) INJECTION
Status: DISCONTINUED | OUTPATIENT
Start: 2021-11-27 | End: 2021-11-30 | Stop reason: HOSPADM

## 2021-11-27 RX ORDER — PANTOPRAZOLE SODIUM 40 MG/1
40 TABLET, DELAYED RELEASE ORAL DAILY
COMMUNITY
End: 2022-05-27

## 2021-11-27 RX ORDER — ONDANSETRON 2 MG/ML
4 INJECTION INTRAMUSCULAR; INTRAVENOUS EVERY 8 HOURS PRN
Status: DISCONTINUED | OUTPATIENT
Start: 2021-11-27 | End: 2021-11-28

## 2021-11-27 RX ORDER — ENOXAPARIN SODIUM 100 MG/ML
40 INJECTION SUBCUTANEOUS EVERY 24 HOURS
Status: DISCONTINUED | OUTPATIENT
Start: 2021-11-27 | End: 2021-11-30 | Stop reason: HOSPADM

## 2021-11-27 RX ORDER — ACETAMINOPHEN 500 MG
1000 TABLET ORAL EVERY 6 HOURS
Status: DISCONTINUED | OUTPATIENT
Start: 2021-11-27 | End: 2021-11-30 | Stop reason: HOSPADM

## 2021-11-27 RX ORDER — NALOXONE HCL 0.4 MG/ML
0.02 VIAL (ML) INJECTION
Status: DISCONTINUED | OUTPATIENT
Start: 2021-11-27 | End: 2021-11-30 | Stop reason: HOSPADM

## 2021-11-27 RX ORDER — KETOROLAC TROMETHAMINE 30 MG/ML
15 INJECTION, SOLUTION INTRAMUSCULAR; INTRAVENOUS EVERY 6 HOURS
Status: COMPLETED | OUTPATIENT
Start: 2021-11-27 | End: 2021-11-30

## 2021-11-27 RX ADMIN — ACETAMINOPHEN 1000 MG: 500 TABLET ORAL at 12:11

## 2021-11-27 RX ADMIN — MORPHINE SULFATE 4 MG: 4 INJECTION INTRAVENOUS at 09:11

## 2021-11-27 RX ADMIN — KETOROLAC TROMETHAMINE 15 MG: 30 INJECTION, SOLUTION INTRAMUSCULAR; INTRAVENOUS at 12:11

## 2021-11-27 RX ADMIN — ENOXAPARIN SODIUM 40 MG: 100 INJECTION SUBCUTANEOUS at 06:11

## 2021-11-27 RX ADMIN — KETOROLAC TROMETHAMINE 15 MG: 30 INJECTION, SOLUTION INTRAMUSCULAR; INTRAVENOUS at 06:11

## 2021-11-27 RX ADMIN — DEXTROSE MONOHYDRATE, SODIUM CHLORIDE, AND POTASSIUM CHLORIDE: 50; 9; 1.49 INJECTION, SOLUTION INTRAVENOUS at 12:11

## 2021-11-27 RX ADMIN — ACETAMINOPHEN 1000 MG: 500 TABLET ORAL at 11:11

## 2021-11-27 RX ADMIN — HYDROMORPHONE HYDROCHLORIDE 0.5 MG: 1 INJECTION, SOLUTION INTRAMUSCULAR; INTRAVENOUS; SUBCUTANEOUS at 06:11

## 2021-11-27 RX ADMIN — MORPHINE SULFATE 4 MG: 4 INJECTION INTRAVENOUS at 08:11

## 2021-11-27 RX ADMIN — PIPERACILLIN SODIUM AND TAZOBACTAM SODIUM 4.5 G: 4; .5 INJECTION, POWDER, FOR SOLUTION INTRAVENOUS at 09:11

## 2021-11-27 RX ADMIN — IOHEXOL 75 ML: 350 INJECTION, SOLUTION INTRAVENOUS at 08:11

## 2021-11-27 RX ADMIN — ACETAMINOPHEN 1000 MG: 500 TABLET ORAL at 06:11

## 2021-11-27 RX ADMIN — KETOROLAC TROMETHAMINE 15 MG: 30 INJECTION, SOLUTION INTRAMUSCULAR; INTRAVENOUS at 11:11

## 2021-11-27 RX ADMIN — ONDANSETRON 4 MG: 2 INJECTION INTRAMUSCULAR; INTRAVENOUS at 08:11

## 2021-11-27 RX ADMIN — PIPERACILLIN SODIUM AND TAZOBACTAM SODIUM 4.5 G: 4; .5 INJECTION, POWDER, FOR SOLUTION INTRAVENOUS at 06:11

## 2021-11-28 PROBLEM — K57.92 ACUTE DIVERTICULITIS: Status: ACTIVE | Noted: 2021-11-28

## 2021-11-28 LAB
ANION GAP SERPL CALC-SCNC: 8 MMOL/L (ref 8–16)
BUN SERPL-MCNC: 5 MG/DL (ref 6–20)
CALCIUM SERPL-MCNC: 8.5 MG/DL (ref 8.7–10.5)
CHLORIDE SERPL-SCNC: 109 MMOL/L (ref 95–110)
CO2 SERPL-SCNC: 24 MMOL/L (ref 23–29)
CREAT SERPL-MCNC: 0.6 MG/DL (ref 0.5–1.4)
CRP SERPL-MCNC: 20.4 MG/L (ref 0–8.2)
ERYTHROCYTE [DISTWIDTH] IN BLOOD BY AUTOMATED COUNT: 11.3 % (ref 11.5–14.5)
EST. GFR  (AFRICAN AMERICAN): >60 ML/MIN/1.73 M^2
EST. GFR  (NON AFRICAN AMERICAN): >60 ML/MIN/1.73 M^2
GLUCOSE SERPL-MCNC: 114 MG/DL (ref 70–110)
HCT VFR BLD AUTO: 31.6 % (ref 37–48.5)
HGB BLD-MCNC: 10.4 G/DL (ref 12–16)
MCH RBC QN AUTO: 31.9 PG (ref 27–31)
MCHC RBC AUTO-ENTMCNC: 32.9 G/DL (ref 32–36)
MCV RBC AUTO: 97 FL (ref 82–98)
PLATELET # BLD AUTO: 173 K/UL (ref 150–450)
PMV BLD AUTO: 8.9 FL (ref 9.2–12.9)
POTASSIUM SERPL-SCNC: 3.9 MMOL/L (ref 3.5–5.1)
RBC # BLD AUTO: 3.26 M/UL (ref 4–5.4)
SODIUM SERPL-SCNC: 141 MMOL/L (ref 136–145)
WBC # BLD AUTO: 2.61 K/UL (ref 3.9–12.7)

## 2021-11-28 PROCEDURE — 85027 COMPLETE CBC AUTOMATED: CPT | Performed by: SURGERY

## 2021-11-28 PROCEDURE — 20600001 HC STEP DOWN PRIVATE ROOM

## 2021-11-28 PROCEDURE — 25000003 PHARM REV CODE 250

## 2021-11-28 PROCEDURE — 36415 COLL VENOUS BLD VENIPUNCTURE: CPT | Performed by: SURGERY

## 2021-11-28 PROCEDURE — 63600175 PHARM REV CODE 636 W HCPCS: Performed by: SURGERY

## 2021-11-28 PROCEDURE — 25000003 PHARM REV CODE 250: Performed by: SURGERY

## 2021-11-28 PROCEDURE — 86140 C-REACTIVE PROTEIN: CPT | Performed by: SURGERY

## 2021-11-28 PROCEDURE — 80048 BASIC METABOLIC PNL TOTAL CA: CPT | Performed by: SURGERY

## 2021-11-28 RX ORDER — PROMETHAZINE HYDROCHLORIDE 12.5 MG/1
12.5 TABLET ORAL EVERY 6 HOURS PRN
Status: DISCONTINUED | OUTPATIENT
Start: 2021-11-28 | End: 2021-11-30 | Stop reason: HOSPADM

## 2021-11-28 RX ORDER — ONDANSETRON 2 MG/ML
4 INJECTION INTRAMUSCULAR; INTRAVENOUS EVERY 8 HOURS PRN
Status: DISCONTINUED | OUTPATIENT
Start: 2021-11-28 | End: 2021-11-30 | Stop reason: HOSPADM

## 2021-11-28 RX ORDER — PANTOPRAZOLE SODIUM 40 MG/1
40 TABLET, DELAYED RELEASE ORAL DAILY
Status: DISCONTINUED | OUTPATIENT
Start: 2021-11-28 | End: 2021-11-30 | Stop reason: HOSPADM

## 2021-11-28 RX ADMIN — ACETAMINOPHEN 1000 MG: 500 TABLET ORAL at 11:11

## 2021-11-28 RX ADMIN — KETOROLAC TROMETHAMINE 15 MG: 30 INJECTION, SOLUTION INTRAMUSCULAR; INTRAVENOUS at 05:11

## 2021-11-28 RX ADMIN — PIPERACILLIN SODIUM AND TAZOBACTAM SODIUM 4.5 G: 4; .5 INJECTION, POWDER, FOR SOLUTION INTRAVENOUS at 09:11

## 2021-11-28 RX ADMIN — PANTOPRAZOLE SODIUM 40 MG: 40 TABLET, DELAYED RELEASE ORAL at 09:11

## 2021-11-28 RX ADMIN — ACETAMINOPHEN 1000 MG: 500 TABLET ORAL at 12:11

## 2021-11-28 RX ADMIN — HYDROMORPHONE HYDROCHLORIDE 0.5 MG: 1 INJECTION, SOLUTION INTRAMUSCULAR; INTRAVENOUS; SUBCUTANEOUS at 12:11

## 2021-11-28 RX ADMIN — DEXTROSE MONOHYDRATE, SODIUM CHLORIDE, AND POTASSIUM CHLORIDE: 50; 9; 1.49 INJECTION, SOLUTION INTRAVENOUS at 02:11

## 2021-11-28 RX ADMIN — HYDROMORPHONE HYDROCHLORIDE 0.5 MG: 1 INJECTION, SOLUTION INTRAMUSCULAR; INTRAVENOUS; SUBCUTANEOUS at 06:11

## 2021-11-28 RX ADMIN — PIPERACILLIN SODIUM AND TAZOBACTAM SODIUM 4.5 G: 4; .5 INJECTION, POWDER, FOR SOLUTION INTRAVENOUS at 06:11

## 2021-11-28 RX ADMIN — KETOROLAC TROMETHAMINE 15 MG: 30 INJECTION, SOLUTION INTRAMUSCULAR; INTRAVENOUS at 11:11

## 2021-11-28 RX ADMIN — ACETAMINOPHEN 1000 MG: 500 TABLET ORAL at 05:11

## 2021-11-28 RX ADMIN — KETOROLAC TROMETHAMINE 15 MG: 30 INJECTION, SOLUTION INTRAMUSCULAR; INTRAVENOUS at 12:11

## 2021-11-28 RX ADMIN — ENOXAPARIN SODIUM 40 MG: 100 INJECTION SUBCUTANEOUS at 05:11

## 2021-11-28 RX ADMIN — PROMETHAZINE HYDROCHLORIDE 12.5 MG: 12.5 TABLET ORAL at 06:11

## 2021-11-28 RX ADMIN — PIPERACILLIN SODIUM AND TAZOBACTAM SODIUM 4.5 G: 4; .5 INJECTION, POWDER, FOR SOLUTION INTRAVENOUS at 02:11

## 2021-11-28 RX ADMIN — HYDROMORPHONE HYDROCHLORIDE 0.5 MG: 1 INJECTION, SOLUTION INTRAMUSCULAR; INTRAVENOUS; SUBCUTANEOUS at 08:11

## 2021-11-28 RX ADMIN — PROMETHAZINE HYDROCHLORIDE 12.5 MG: 12.5 TABLET ORAL at 12:11

## 2021-11-29 ENCOUNTER — PATIENT MESSAGE (OUTPATIENT)
Dept: SURGERY | Facility: CLINIC | Age: 46
End: 2021-11-29
Payer: COMMERCIAL

## 2021-11-29 ENCOUNTER — TELEPHONE (OUTPATIENT)
Dept: SURGERY | Facility: CLINIC | Age: 46
End: 2021-11-29
Payer: COMMERCIAL

## 2021-11-29 DIAGNOSIS — K57.92 ACUTE DIVERTICULITIS: Primary | ICD-10-CM

## 2021-11-29 LAB
ANION GAP SERPL CALC-SCNC: 8 MMOL/L (ref 8–16)
BUN SERPL-MCNC: 9 MG/DL (ref 6–20)
CALCIUM SERPL-MCNC: 9.1 MG/DL (ref 8.7–10.5)
CHLORIDE SERPL-SCNC: 104 MMOL/L (ref 95–110)
CO2 SERPL-SCNC: 27 MMOL/L (ref 23–29)
CREAT SERPL-MCNC: 0.6 MG/DL (ref 0.5–1.4)
CRP SERPL-MCNC: 11.7 MG/L (ref 0–8.2)
ERYTHROCYTE [DISTWIDTH] IN BLOOD BY AUTOMATED COUNT: 11.1 % (ref 11.5–14.5)
EST. GFR  (AFRICAN AMERICAN): >60 ML/MIN/1.73 M^2
EST. GFR  (NON AFRICAN AMERICAN): >60 ML/MIN/1.73 M^2
GLUCOSE SERPL-MCNC: 92 MG/DL (ref 70–110)
HCT VFR BLD AUTO: 32.6 % (ref 37–48.5)
HGB BLD-MCNC: 11 G/DL (ref 12–16)
MCH RBC QN AUTO: 31.4 PG (ref 27–31)
MCHC RBC AUTO-ENTMCNC: 33.7 G/DL (ref 32–36)
MCV RBC AUTO: 93 FL (ref 82–98)
PLATELET # BLD AUTO: 210 K/UL (ref 150–450)
PMV BLD AUTO: 9 FL (ref 9.2–12.9)
POTASSIUM SERPL-SCNC: 3.8 MMOL/L (ref 3.5–5.1)
RBC # BLD AUTO: 3.5 M/UL (ref 4–5.4)
SODIUM SERPL-SCNC: 139 MMOL/L (ref 136–145)
WBC # BLD AUTO: 2.23 K/UL (ref 3.9–12.7)

## 2021-11-29 PROCEDURE — 25000003 PHARM REV CODE 250: Performed by: SURGERY

## 2021-11-29 PROCEDURE — 25000003 PHARM REV CODE 250

## 2021-11-29 PROCEDURE — 86140 C-REACTIVE PROTEIN: CPT | Performed by: SURGERY

## 2021-11-29 PROCEDURE — 85027 COMPLETE CBC AUTOMATED: CPT | Performed by: SURGERY

## 2021-11-29 PROCEDURE — 20600001 HC STEP DOWN PRIVATE ROOM

## 2021-11-29 PROCEDURE — 80048 BASIC METABOLIC PNL TOTAL CA: CPT | Performed by: SURGERY

## 2021-11-29 PROCEDURE — 36415 COLL VENOUS BLD VENIPUNCTURE: CPT | Performed by: SURGERY

## 2021-11-29 PROCEDURE — 63600175 PHARM REV CODE 636 W HCPCS: Performed by: SURGERY

## 2021-11-29 RX ORDER — AMOXICILLIN AND CLAVULANATE POTASSIUM 875; 125 MG/1; MG/1
1 TABLET, FILM COATED ORAL EVERY 12 HOURS
Status: DISCONTINUED | OUTPATIENT
Start: 2021-11-29 | End: 2021-11-30 | Stop reason: HOSPADM

## 2021-11-29 RX ADMIN — AMOXICILLIN AND CLAVULANATE POTASSIUM 1 TABLET: 875; 125 TABLET, FILM COATED ORAL at 08:11

## 2021-11-29 RX ADMIN — PANTOPRAZOLE SODIUM 40 MG: 40 TABLET, DELAYED RELEASE ORAL at 10:11

## 2021-11-29 RX ADMIN — KETOROLAC TROMETHAMINE 15 MG: 30 INJECTION, SOLUTION INTRAMUSCULAR; INTRAVENOUS at 06:11

## 2021-11-29 RX ADMIN — KETOROLAC TROMETHAMINE 15 MG: 30 INJECTION, SOLUTION INTRAMUSCULAR; INTRAVENOUS at 12:11

## 2021-11-29 RX ADMIN — PIPERACILLIN SODIUM AND TAZOBACTAM SODIUM 4.5 G: 4; .5 INJECTION, POWDER, FOR SOLUTION INTRAVENOUS at 02:11

## 2021-11-29 RX ADMIN — HYDROMORPHONE HYDROCHLORIDE 0.5 MG: 1 INJECTION, SOLUTION INTRAMUSCULAR; INTRAVENOUS; SUBCUTANEOUS at 03:11

## 2021-11-29 RX ADMIN — AMOXICILLIN AND CLAVULANATE POTASSIUM 1 TABLET: 875; 125 TABLET, FILM COATED ORAL at 10:11

## 2021-11-29 RX ADMIN — ACETAMINOPHEN 1000 MG: 500 TABLET ORAL at 05:11

## 2021-11-29 RX ADMIN — ACETAMINOPHEN 1000 MG: 500 TABLET ORAL at 12:11

## 2021-11-30 VITALS
HEART RATE: 67 BPM | BODY MASS INDEX: 26.68 KG/M2 | RESPIRATION RATE: 14 BRPM | HEIGHT: 67 IN | DIASTOLIC BLOOD PRESSURE: 78 MMHG | SYSTOLIC BLOOD PRESSURE: 117 MMHG | TEMPERATURE: 98 F | WEIGHT: 170 LBS | OXYGEN SATURATION: 96 %

## 2021-11-30 LAB
ANION GAP SERPL CALC-SCNC: 9 MMOL/L (ref 8–16)
BUN SERPL-MCNC: 13 MG/DL (ref 6–20)
CALCIUM SERPL-MCNC: 9.4 MG/DL (ref 8.7–10.5)
CHLORIDE SERPL-SCNC: 105 MMOL/L (ref 95–110)
CO2 SERPL-SCNC: 23 MMOL/L (ref 23–29)
CREAT SERPL-MCNC: 0.6 MG/DL (ref 0.5–1.4)
CRP SERPL-MCNC: 6.8 MG/L (ref 0–8.2)
ERYTHROCYTE [DISTWIDTH] IN BLOOD BY AUTOMATED COUNT: 11.4 % (ref 11.5–14.5)
EST. GFR  (AFRICAN AMERICAN): >60 ML/MIN/1.73 M^2
EST. GFR  (NON AFRICAN AMERICAN): >60 ML/MIN/1.73 M^2
GLUCOSE SERPL-MCNC: 94 MG/DL (ref 70–110)
HCT VFR BLD AUTO: 35.7 % (ref 37–48.5)
HGB BLD-MCNC: 12.1 G/DL (ref 12–16)
MCH RBC QN AUTO: 31.6 PG (ref 27–31)
MCHC RBC AUTO-ENTMCNC: 33.9 G/DL (ref 32–36)
MCV RBC AUTO: 93 FL (ref 82–98)
PLATELET # BLD AUTO: 236 K/UL (ref 150–450)
PMV BLD AUTO: 8.8 FL (ref 9.2–12.9)
POTASSIUM SERPL-SCNC: 3.6 MMOL/L (ref 3.5–5.1)
RBC # BLD AUTO: 3.83 M/UL (ref 4–5.4)
SODIUM SERPL-SCNC: 137 MMOL/L (ref 136–145)
WBC # BLD AUTO: 2.92 K/UL (ref 3.9–12.7)

## 2021-11-30 PROCEDURE — 36415 COLL VENOUS BLD VENIPUNCTURE: CPT | Performed by: SURGERY

## 2021-11-30 PROCEDURE — 25000003 PHARM REV CODE 250: Performed by: SURGERY

## 2021-11-30 PROCEDURE — 25000003 PHARM REV CODE 250

## 2021-11-30 PROCEDURE — 80048 BASIC METABOLIC PNL TOTAL CA: CPT | Performed by: SURGERY

## 2021-11-30 PROCEDURE — 63600175 PHARM REV CODE 636 W HCPCS: Performed by: SURGERY

## 2021-11-30 PROCEDURE — 86140 C-REACTIVE PROTEIN: CPT | Performed by: SURGERY

## 2021-11-30 PROCEDURE — 85027 COMPLETE CBC AUTOMATED: CPT | Performed by: SURGERY

## 2021-11-30 RX ORDER — AMOXICILLIN AND CLAVULANATE POTASSIUM 875; 125 MG/1; MG/1
1 TABLET, FILM COATED ORAL EVERY 12 HOURS
Qty: 14 TABLET | Refills: 0 | Status: SHIPPED | OUTPATIENT
Start: 2021-11-30 | End: 2021-12-07

## 2021-11-30 RX ADMIN — KETOROLAC TROMETHAMINE 15 MG: 30 INJECTION, SOLUTION INTRAMUSCULAR; INTRAVENOUS at 12:11

## 2021-11-30 RX ADMIN — PANTOPRAZOLE SODIUM 40 MG: 40 TABLET, DELAYED RELEASE ORAL at 08:11

## 2021-11-30 RX ADMIN — LEVOTHYROXINE SODIUM 125 MCG: 0.03 TABLET ORAL at 06:11

## 2021-11-30 RX ADMIN — KETOROLAC TROMETHAMINE 15 MG: 30 INJECTION, SOLUTION INTRAMUSCULAR; INTRAVENOUS at 06:11

## 2021-11-30 RX ADMIN — AMOXICILLIN AND CLAVULANATE POTASSIUM 1 TABLET: 875; 125 TABLET, FILM COATED ORAL at 08:11

## 2021-11-30 RX ADMIN — ACETAMINOPHEN 1000 MG: 500 TABLET ORAL at 12:11

## 2021-12-01 ENCOUNTER — PATIENT MESSAGE (OUTPATIENT)
Dept: ENDOSCOPY | Facility: HOSPITAL | Age: 46
End: 2021-12-01
Payer: COMMERCIAL

## 2021-12-01 DIAGNOSIS — Z12.11 COLON CANCER SCREENING: Primary | ICD-10-CM

## 2021-12-01 DIAGNOSIS — Z12.11 SCREENING FOR COLON CANCER: Primary | ICD-10-CM

## 2021-12-01 RX ORDER — POLYETHYLENE GLYCOL 3350, SODIUM SULFATE ANHYDROUS, SODIUM BICARBONATE, SODIUM CHLORIDE, POTASSIUM CHLORIDE 236; 22.74; 6.74; 5.86; 2.97 G/4L; G/4L; G/4L; G/4L; G/4L
4 POWDER, FOR SOLUTION ORAL ONCE
Qty: 4000 ML | Refills: 0 | Status: SHIPPED | OUTPATIENT
Start: 2021-12-01 | End: 2021-12-01

## 2021-12-06 ENCOUNTER — PATIENT MESSAGE (OUTPATIENT)
Dept: SURGERY | Facility: CLINIC | Age: 46
End: 2021-12-06
Payer: COMMERCIAL

## 2021-12-07 ENCOUNTER — TELEPHONE (OUTPATIENT)
Dept: SURGERY | Facility: CLINIC | Age: 46
End: 2021-12-07
Payer: COMMERCIAL

## 2021-12-08 ENCOUNTER — TELEPHONE (OUTPATIENT)
Dept: SURGERY | Facility: CLINIC | Age: 46
End: 2021-12-08
Payer: COMMERCIAL

## 2021-12-08 DIAGNOSIS — K57.92 ACUTE DIVERTICULITIS: Primary | ICD-10-CM

## 2021-12-09 ENCOUNTER — HOSPITAL ENCOUNTER (OUTPATIENT)
Dept: RADIOLOGY | Facility: HOSPITAL | Age: 46
Discharge: HOME | End: 2021-12-09
Attending: COLON & RECTAL SURGERY
Payer: COMMERCIAL

## 2021-12-09 DIAGNOSIS — K57.92 ACUTE DIVERTICULITIS: ICD-10-CM

## 2021-12-09 PROCEDURE — 74177 CT ABDOMEN PELVIS WITH CONTRAST: ICD-10-PCS | Mod: 26,,, | Performed by: RADIOLOGY

## 2021-12-09 PROCEDURE — A9698 NON-RAD CONTRAST MATERIALNOC: HCPCS | Performed by: COLON & RECTAL SURGERY

## 2021-12-09 PROCEDURE — 25500020 PHARM REV CODE 255: Performed by: COLON & RECTAL SURGERY

## 2021-12-09 PROCEDURE — 74177 CT ABD & PELVIS W/CONTRAST: CPT | Mod: 26,,, | Performed by: RADIOLOGY

## 2021-12-09 PROCEDURE — 74177 CT ABD & PELVIS W/CONTRAST: CPT | Mod: TC

## 2021-12-09 RX ADMIN — IOHEXOL 1000 ML: 9 SOLUTION ORAL at 02:12

## 2021-12-09 RX ADMIN — IOHEXOL 75 ML: 350 INJECTION, SOLUTION INTRAVENOUS at 02:12

## 2021-12-13 ENCOUNTER — TELEPHONE (OUTPATIENT)
Dept: SURGERY | Facility: CLINIC | Age: 46
End: 2021-12-13
Payer: COMMERCIAL

## 2021-12-14 ENCOUNTER — OFFICE VISIT (OUTPATIENT)
Dept: SURGERY | Facility: CLINIC | Age: 46
End: 2021-12-14
Payer: COMMERCIAL

## 2021-12-14 VITALS
SYSTOLIC BLOOD PRESSURE: 128 MMHG | DIASTOLIC BLOOD PRESSURE: 78 MMHG | BODY MASS INDEX: 26.45 KG/M2 | HEIGHT: 67 IN | WEIGHT: 168.5 LBS | HEART RATE: 71 BPM

## 2021-12-14 DIAGNOSIS — K57.92 DIVERTICULITIS: Primary | ICD-10-CM

## 2021-12-14 PROCEDURE — 99213 OFFICE O/P EST LOW 20 MIN: CPT | Mod: S$GLB,,, | Performed by: COLON & RECTAL SURGERY

## 2021-12-14 PROCEDURE — 99999 PR PBB SHADOW E&M-EST. PATIENT-LVL III: CPT | Mod: PBBFAC,,, | Performed by: COLON & RECTAL SURGERY

## 2021-12-14 PROCEDURE — 99999 PR PBB SHADOW E&M-EST. PATIENT-LVL III: ICD-10-PCS | Mod: PBBFAC,,, | Performed by: COLON & RECTAL SURGERY

## 2021-12-14 PROCEDURE — 99213 PR OFFICE/OUTPT VISIT, EST, LEVL III, 20-29 MIN: ICD-10-PCS | Mod: S$GLB,,, | Performed by: COLON & RECTAL SURGERY

## 2021-12-29 DIAGNOSIS — Z01.812 PRE-PROCEDURE LAB EXAM: ICD-10-CM

## 2022-01-04 ENCOUNTER — LAB VISIT (OUTPATIENT)
Dept: PRIMARY CARE CLINIC | Facility: CLINIC | Age: 47
End: 2022-01-04
Payer: COMMERCIAL

## 2022-01-04 DIAGNOSIS — Z01.812 PRE-PROCEDURE LAB EXAM: ICD-10-CM

## 2022-01-04 PROCEDURE — U0005 INFEC AGEN DETEC AMPLI PROBE: HCPCS | Performed by: CLINICAL NURSE SPECIALIST

## 2022-01-04 PROCEDURE — U0003 INFECTIOUS AGENT DETECTION BY NUCLEIC ACID (DNA OR RNA); SEVERE ACUTE RESPIRATORY SYNDROME CORONAVIRUS 2 (SARS-COV-2) (CORONAVIRUS DISEASE [COVID-19]), AMPLIFIED PROBE TECHNIQUE, MAKING USE OF HIGH THROUGHPUT TECHNOLOGIES AS DESCRIBED BY CMS-2020-01-R: HCPCS | Performed by: CLINICAL NURSE SPECIALIST

## 2022-01-05 ENCOUNTER — PATIENT MESSAGE (OUTPATIENT)
Dept: WOUND CARE | Facility: CLINIC | Age: 47
End: 2022-01-05
Payer: COMMERCIAL

## 2022-01-05 LAB — SARS-COV-2 RNA RESP QL NAA+PROBE: DETECTED

## 2022-01-05 NOTE — TELEPHONE ENCOUNTER
The patient was called with her covid pos result to confirm her scope/procedure is cancelled.  She is feeling ok but did state she had a cough last week for one day only.

## 2022-01-06 ENCOUNTER — PATIENT MESSAGE (OUTPATIENT)
Dept: ENDOSCOPY | Facility: HOSPITAL | Age: 47
End: 2022-01-06
Payer: COMMERCIAL

## 2022-01-06 DIAGNOSIS — Z01.818 PRE-OP TESTING: Primary | ICD-10-CM

## 2022-01-07 ENCOUNTER — PATIENT MESSAGE (OUTPATIENT)
Dept: ADMINISTRATIVE | Facility: OTHER | Age: 47
End: 2022-01-07
Payer: COMMERCIAL

## 2022-01-10 ENCOUNTER — PATIENT MESSAGE (OUTPATIENT)
Dept: ADMINISTRATIVE | Facility: HOSPITAL | Age: 47
End: 2022-01-10
Payer: COMMERCIAL

## 2022-01-14 ENCOUNTER — OFFICE VISIT (OUTPATIENT)
Dept: INTERNAL MEDICINE | Facility: CLINIC | Age: 47
End: 2022-01-14
Payer: COMMERCIAL

## 2022-01-14 VITALS
HEIGHT: 67 IN | TEMPERATURE: 99 F | WEIGHT: 171.5 LBS | BODY MASS INDEX: 26.92 KG/M2 | RESPIRATION RATE: 18 BRPM | DIASTOLIC BLOOD PRESSURE: 80 MMHG | OXYGEN SATURATION: 96 % | SYSTOLIC BLOOD PRESSURE: 118 MMHG | HEART RATE: 75 BPM

## 2022-01-14 DIAGNOSIS — E03.9 HYPOTHYROIDISM, UNSPECIFIED TYPE: ICD-10-CM

## 2022-01-14 DIAGNOSIS — Z87.19 HISTORY OF DIVERTICULITIS: ICD-10-CM

## 2022-01-14 DIAGNOSIS — G47.00 INSOMNIA, UNSPECIFIED TYPE: Primary | ICD-10-CM

## 2022-01-14 PROCEDURE — 99999 PR PBB SHADOW E&M-EST. PATIENT-LVL III: ICD-10-PCS | Mod: PBBFAC,,, | Performed by: INTERNAL MEDICINE

## 2022-01-14 PROCEDURE — 99214 OFFICE O/P EST MOD 30 MIN: CPT | Mod: S$GLB,,, | Performed by: INTERNAL MEDICINE

## 2022-01-14 PROCEDURE — 99999 PR PBB SHADOW E&M-EST. PATIENT-LVL III: CPT | Mod: PBBFAC,,, | Performed by: INTERNAL MEDICINE

## 2022-01-14 PROCEDURE — 99214 PR OFFICE/OUTPT VISIT, EST, LEVL IV, 30-39 MIN: ICD-10-PCS | Mod: S$GLB,,, | Performed by: INTERNAL MEDICINE

## 2022-01-14 RX ORDER — TRAZODONE HYDROCHLORIDE 100 MG/1
100 TABLET ORAL NIGHTLY
Qty: 30 TABLET | Refills: 1 | Status: SHIPPED | OUTPATIENT
Start: 2022-01-14 | End: 2022-05-27

## 2022-01-14 RX ORDER — BUPROPION HYDROCHLORIDE 150 MG/1
TABLET, EXTENDED RELEASE ORAL
COMMUNITY
Start: 2021-12-24 | End: 2022-05-27

## 2022-01-14 NOTE — PROGRESS NOTES
[facility] Note    Chief Complaint      Chief Complaint   Patient presents with    Hospital Follow Up       History of Present Illness      Socorro Huang is a 46 y.o. female who presents today for   Chief Complaint   Patient presents with    Hospital Follow Up   .  Patient comes to appointment for hosp f/u for diverticulitis back in November . She is obviously completely back to normal at this time . Of note she was scheduled for colonoscopy last week but was postponed until m arch due to a positive covid test on 1/4/22 . She is now and has been since the 4th completely asymptomatic .she was vaccinated and boosted .     Problem List Items Addressed This Visit        Endocrine    Hypothyroidism    Overview     subtotoal thyroidetomy with benita for goiter   Cont current regimen             GI    History of diverticulitis    Overview     Back to baseline is feeling great             Other    Insomnia - Primary    Overview     trazadone 100 mg po qhs                  Past Medical History:  Past Medical History:   Diagnosis Date    Abnormal Pap smear     Anxiety     celexa & prozac didn't help much    Constipation - functional     Headache(784.0)     Hemorrhoid     Leukopenia     benign, evaluated in [facility] y 2000    Menorrhagia     Strain of neck muscle     tx with PT at [facility] in past       Past Surgical History:  Past Surgical History:   Procedure Laterality Date    BREAST RECONSTRUCTION  2018    BREAST SURGERY      COLON SURGERY  2014         DILATION AND CURETTAGE OF UTERUS  12/18/2012    complex hyperplasia, no atypia    HEMORRHOID SURGERY  2014    HYSTERECTOMY      OOPHORECTOMY      PARTIAL HYSTERECTOMY  2013    for atypia    THYROIDECTOMY, PARTIAL  2013    TONSILLECTOMY      TUBAL LIGATION         Family History:  family history includes Alcohol abuse in her cousin and maternal uncle; Breast cancer in her maternal grandmother; Cancer in her father; Depression in  her sister; Diabetes in her paternal grandmother; Drug abuse in her maternal aunt; Emphysema in her mother; Heart disease (age of onset: 50) in her father; Hypertension in her paternal grandmother; Ovarian cancer in her maternal grandmother; Schizophrenia in her paternal aunt; Suicide in her brother.    Social History:  Social History     Socioeconomic History    Marital status:     Number of children: 2   Occupational History     Employer: [employer]   Tobacco Use    Smoking status: Never Smoker    Smokeless tobacco: Never Used   Substance and Sexual Activity    Alcohol use: Yes     Comment: socially    Drug use: No    Sexual activity: Yes     Partners: Male     Birth control/protection: Surgical   Other Topics Concern    Financial Status: Employed Yes     Comment: Dental Assistant and [employer]    Caffeine Use: Substantial Yes    Firearms: Does patient have access to a firearm? 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 Fulton , GYN Dr Ruby                   Review of Systems:   Review of Systems   Constitutional: Negative for fever and weight loss.   HENT: Negative for congestion, hearing loss and sore throat.    Eyes: Negative for blurred vision.   Respiratory: Negative for cough and shortness of breath.    Cardiovascular: Negative for  chest pain, palpitations, claudication and leg swelling.   Gastrointestinal: Negative for abdominal pain, constipation, diarrhea and heartburn.   Genitourinary: Negative for dysuria.   Musculoskeletal: Negative for back pain and myalgias.   Skin: Negative for rash.   Neurological: Negative for focal weakness and headaches.   Psychiatric/Behavioral: Negative for depression and suicidal ideas. The patient is not nervous/anxious.          Medications:  Outpatient Encounter Medications as of 1/14/2022   Medication Sig Dispense Refill    buPROPion (WELLBUTRIN SR) 150 MG TBSR 12 hr tablet       pantoprazole (PROTONIX) 40 MG tablet Take 40 mg by mouth once daily.      estradiol (VIVELLE-DOT) 0.1 mg/24 hr PTSW Place 1 patch onto the skin twice a week. 8 patch 11    traZODone (DESYREL) 100 MG tablet Take 1 tablet (100 mg total) by mouth every evening. 30 tablet 1     No facility-administered encounter medications on file as of 1/14/2022.        Allergies:  Review of patient's allergies indicates:   Allergen Reactions    Celexa [citalopram] Nausea Only     Stomach upset         Physical Exam      Vitals:    01/14/22 0932   BP: 118/80   Pulse: 75   Resp: 18   Temp: 98.6 °F (37 °C)      Body mass index is 26.86 kg/m².    Physical Exam  Constitutional:       Appearance: She is well-developed and well-nourished.   HENT:      Mouth/Throat:      Mouth: Oropharynx is clear and moist.   Eyes:      Extraocular Movements: EOM normal.      Pupils: Pupils are equal, round, and reactive to light.   Neck:      Thyroid: No thyromegaly.   Cardiovascular:      Rate and Rhythm: Normal rate.      Heart sounds: Normal heart sounds. No murmur heard.  No friction rub. No gallop.    Pulmonary:      Breath sounds: Normal breath sounds.   Abdominal:      General: Bowel sounds are normal.      Palpations: Abdomen is soft.   Musculoskeletal:         General: Normal range of motion.      Cervical back: Normal range of motion.   Lymphadenopathy:       Cervical: No cervical adenopathy.   Skin:     General: Skin is warm.      Findings: No rash.   Neurological:      Mental Status: She is alert and oriented to person, place, and time.      Cranial Nerves: No cranial nerve deficit.   Psychiatric:         Mood and Affect: Mood and affect normal.         Behavior: Behavior normal.          Laboratory:  CBC:  Recent Labs   Lab Result Units 11/28/21 0427 11/28/21 0427 11/29/21 0411 11/29/21 0411 11/30/21  0441   WBC K/uL 2.61*   < > 2.23*   < > 2.92*   RBC M/uL 3.26*   < > 3.50*   < > 3.83*   Hemoglobin g/dL 10.4*   < > 11.0*   < > 12.1   Hematocrit % 31.6*   < > 32.6*   < > 35.7*   Platelets K/uL 173   < > 210   < > 236   MCV fL 97   < > 93   < > 93   MCH pg 31.9*   < > 31.4*   < > 31.6*   MCHC g/dL 32.9  --  33.7  --  33.9    < > = values in this interval not displayed.     CMP:  Recent Labs   Lab Result Units 11/27/21  0757 11/28/21 0427 11/30/21 0441   Glucose mg/dL 101   < > 94   Calcium mg/dL 9.9   < > 9.4   Albumin g/dL 3.8  --   --    Total Protein g/dL 7.4  --   --    Sodium mmol/L 135*   < > 137   Potassium mmol/L 4.2   < > 3.6   CO2 mmol/L 22*   < > 23   Chloride mmol/L 103   < > 105   BUN mg/dL 7   < > 13   Alkaline Phosphatase U/L 110  --   --    ALT U/L 78*  --   --    AST U/L 49*  --   --    Total Bilirubin mg/dL 0.4  --   --     < > = values in this interval not displayed.     URINALYSIS:  Recent Labs   Lab Result Units 11/27/21  0746   Color, UA  Straw   Specific [place], UA  1.005   pH, UA  8.0   Protein, UA  Negative   Nitrite, UA  Negative   Leukocytes, UA  Negative      LIPIDS:  No results for input(s): TSH, HDL, CHOL, TRIG, LDLCALC, CHOLHDL, NONHDLCHOL, TOTALCHOLEST in the last 2160 hours.  TSH:  No results for input(s): TSH in the last 2160 hours.  A1C:  No results for input(s): HGBA1C in the last 2160 hours.    Radiology:        Assessment:     Socorro Huang is a 46 y.o.female with:    Insomnia, unspecified type  -     traZODone (DESYREL)  100 MG tablet; Take 1 tablet (100 mg total) by mouth every evening.  Dispense: 30 tablet; Refill: 1    History of diverticulitis    Hypothyroidism, unspecified type          Plan:     Problem List Items Addressed This Visit        Endocrine    Hypothyroidism    Overview     subtotoal thyroidetomy with benita for goiter   Cont current regimen             GI    History of diverticulitis    Overview     Back to baseline is feeling great             Other    Insomnia - Primary    Overview     trazadone 100 mg po qhs                As above, continue current medications and maintain follow up with specialists.  Return to clinic in 6 months.      Frederick W Dantagnan [facility] [place]

## 2022-01-20 ENCOUNTER — PATIENT MESSAGE (OUTPATIENT)
Dept: ENDOSCOPY | Facility: HOSPITAL | Age: 47
End: 2022-01-20
Payer: COMMERCIAL

## 2022-01-21 ENCOUNTER — PATIENT MESSAGE (OUTPATIENT)
Dept: ENDOSCOPY | Facility: HOSPITAL | Age: 47
End: 2022-01-21
Payer: COMMERCIAL

## 2022-01-24 ENCOUNTER — TELEPHONE (OUTPATIENT)
Dept: ENDOSCOPY | Facility: HOSPITAL | Age: 47
End: 2022-01-24
Payer: COMMERCIAL

## 2022-01-24 ENCOUNTER — TELEPHONE (OUTPATIENT)
Dept: GASTROENTEROLOGY | Facility: CLINIC | Age: 47
End: 2022-01-24
Payer: COMMERCIAL

## 2022-01-24 DIAGNOSIS — K57.92 DIVERTICULITIS: Primary | ICD-10-CM

## 2022-01-24 DIAGNOSIS — Z01.818 PRE-OP TESTING: ICD-10-CM

## 2022-01-24 NOTE — TELEPHONE ENCOUNTER
Spoke to patient states she was trying to reach the [facility] number, message sent to staff

## 2022-01-24 NOTE — TELEPHONE ENCOUNTER
Spoke to Socorro Huang scheduled w Dr. Melo, has prep and instructions covid test scheduled prior to appointment

## 2022-01-31 ENCOUNTER — LAB VISIT (OUTPATIENT)
Dept: PRIMARY CARE CLINIC | Facility: CLINIC | Age: 47
End: 2022-01-31
Payer: COMMERCIAL

## 2022-01-31 DIAGNOSIS — Z01.818 PRE-OP TESTING: ICD-10-CM

## 2022-01-31 LAB
SARS-COV-2 RNA RESP QL NAA+PROBE: NOT DETECTED
SARS-COV-2- CYCLE NUMBER: NORMAL

## 2022-01-31 PROCEDURE — U0005 INFEC AGEN DETEC AMPLI PROBE: HCPCS | Performed by: COLON & RECTAL SURGERY

## 2022-01-31 PROCEDURE — U0003 INFECTIOUS AGENT DETECTION BY NUCLEIC ACID (DNA OR RNA); SEVERE ACUTE RESPIRATORY SYNDROME CORONAVIRUS 2 (SARS-COV-2) (CORONAVIRUS DISEASE [COVID-19]), AMPLIFIED PROBE TECHNIQUE, MAKING USE OF HIGH THROUGHPUT TECHNOLOGIES AS DESCRIBED BY CMS-2020-01-R: HCPCS | Performed by: COLON & RECTAL SURGERY

## 2022-05-26 ENCOUNTER — OFFICE VISIT (OUTPATIENT)
Dept: INTERNAL MEDICINE | Facility: CLINIC | Age: 47
End: 2022-05-26
Payer: COMMERCIAL

## 2022-05-26 VITALS
TEMPERATURE: 98 F | OXYGEN SATURATION: 98 % | WEIGHT: 157.94 LBS | HEIGHT: 67 IN | SYSTOLIC BLOOD PRESSURE: 110 MMHG | DIASTOLIC BLOOD PRESSURE: 78 MMHG | BODY MASS INDEX: 24.79 KG/M2 | HEART RATE: 78 BPM | RESPIRATION RATE: 18 BRPM

## 2022-05-26 DIAGNOSIS — J01.10 ACUTE NON-RECURRENT FRONTAL SINUSITIS: Primary | ICD-10-CM

## 2022-05-26 PROCEDURE — 99214 PR OFFICE/OUTPT VISIT, EST, LEVL IV, 30-39 MIN: ICD-10-PCS | Mod: 25,S$GLB,, | Performed by: INTERNAL MEDICINE

## 2022-05-26 PROCEDURE — 99214 OFFICE O/P EST MOD 30 MIN: CPT | Mod: 25,S$GLB,, | Performed by: INTERNAL MEDICINE

## 2022-05-26 PROCEDURE — 99999 PR PBB SHADOW E&M-EST. PATIENT-LVL IV: CPT | Mod: PBBFAC,,, | Performed by: INTERNAL MEDICINE

## 2022-05-26 PROCEDURE — 96372 THER/PROPH/DIAG INJ SC/IM: CPT | Mod: S$GLB,,, | Performed by: INTERNAL MEDICINE

## 2022-05-26 PROCEDURE — 96372 PR INJECTION,THERAP/PROPH/DIAG2ST, IM OR SUBCUT: ICD-10-PCS | Mod: S$GLB,,, | Performed by: INTERNAL MEDICINE

## 2022-05-26 PROCEDURE — 99999 PR PBB SHADOW E&M-EST. PATIENT-LVL IV: ICD-10-PCS | Mod: PBBFAC,,, | Performed by: INTERNAL MEDICINE

## 2022-05-26 RX ORDER — PROMETHAZINE HYDROCHLORIDE AND DEXTROMETHORPHAN HYDROBROMIDE 6.25; 15 MG/5ML; MG/5ML
5 SYRUP ORAL EVERY 6 HOURS PRN
Qty: 180 ML | Refills: 0 | Status: SHIPPED | OUTPATIENT
Start: 2022-05-26 | End: 2022-06-05

## 2022-05-26 RX ORDER — AZITHROMYCIN 250 MG/1
TABLET, FILM COATED ORAL
Qty: 6 TABLET | Refills: 0 | Status: SHIPPED | OUTPATIENT
Start: 2022-05-26 | End: 2022-05-31

## 2022-05-26 RX ORDER — TRIAMCINOLONE ACETONIDE 40 MG/ML
40 INJECTION, SUSPENSION INTRA-ARTICULAR; INTRAMUSCULAR ONCE
Status: COMPLETED | OUTPATIENT
Start: 2022-05-26 | End: 2022-05-26

## 2022-05-26 RX ADMIN — TRIAMCINOLONE ACETONIDE 40 MG: 40 INJECTION, SUSPENSION INTRA-ARTICULAR; INTRAMUSCULAR at 04:05

## 2022-05-26 NOTE — PROGRESS NOTES
[facility] Note    Chief Complaint      Chief Complaint   Patient presents with    Cough       History of Present Illness      Socorro Huang is a 46 y.o. female who presents today for   Chief Complaint   Patient presents with    Cough   .  Patient comes to appointment here for acute visit related to above .started last weekend with cough , sinus pressure and pain . She denies fever . She has taken 2 covid tests both negative     Problem List Items Addressed This Visit        ENT    Acute non-recurrent frontal sinusitis - Primary    Overview     im kenalog  zpak   Promethazine dm                    Past Medical History:  Past Medical History:   Diagnosis Date    Abnormal Pap smear     Anxiety     celexa & prozac didn't help much    Constipation - functional     Headache(784.0)     Hemorrhoid     Leukopenia     benign, evaluated in [place] y 2000    Menorrhagia     Strain of neck muscle     tx with PT at [facility] in past       Past Surgical History:  Past Surgical History:   Procedure Laterality Date    BREAST RECONSTRUCTION  2018    BREAST SURGERY      COLON SURGERY  2014         COLONOSCOPY N/A 2/3/2022    Procedure: COLONOSCOPY;  Surgeon: ASHTYN Melo MD;  Location: [facility];  Service: Endoscopy;  Laterality: N/A;    DILATION AND CURETTAGE OF UTERUS  12/18/2012    complex hyperplasia, no atypia    HEMORRHOID SURGERY  2014    HYSTERECTOMY      OOPHORECTOMY      PARTIAL HYSTERECTOMY  2013    for atypia    THYROIDECTOMY, PARTIAL  2013    TONSILLECTOMY      TUBAL LIGATION         Family History:  family history includes Alcohol abuse in her cousin and maternal uncle; Breast cancer in her maternal grandmother; Cancer in her father; Depression in her sister; Diabetes in her paternal grandmother; Drug abuse in her maternal aunt; Emphysema in her mother; Heart disease (age of onset: 50) in her father; Hypertension in her paternal grandmother; Ovarian cancer in her  maternal grandmother; Schizophrenia in her paternal aunt; Suicide in her brother.    Social History:  Social History     Socioeconomic History    Marital status:     Number of children: 2   Occupational History     Employer: [employer]   Tobacco Use    Smoking status: Never Smoker    Smokeless tobacco: Never Used   Substance and Sexual Activity    Alcohol use: Yes     Comment: socially    Drug use: No    Sexual activity: Yes     Partners: Male     Birth control/protection: Surgical   Other Topics Concern    Financial Status: Employed Yes     Comment: Dental Assistant and [employer]    Caffeine Use: Substantial Yes    Firearms: Does patient have access to a firearm? 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 Fulton , GYN Dr Ruby                   Review of Systems:   Review of Systems   Constitutional: Negative for chills and fever.   HENT: Positive for congestion and sinus pain.    Respiratory: Positive for cough. Negative for sputum production and shortness of breath.    Cardiovascular: Negative for chest pain.   Gastrointestinal: Negative for nausea and vomiting.   Neurological: Negative for headaches.         Medications:  Outpatient Encounter Medications as of 5/26/2022   Medication Sig Dispense Refill    linaCLOtide (LINZESS) 72 mcg Cap  capsule Take 1 capsule (72 mcg total) by mouth before breakfast. 30 capsule 11    azithromycin (Z-SYL) 250 MG tablet Take 2 tablets by mouth on day 1; Take 1 tablet by mouth on days 2-5 6 tablet 0    buPROPion (WELLBUTRIN SR) 150 MG TBSR 12 hr tablet       estradiol (VIVELLE-DOT) 0.1 mg/24 hr PTSW Place 1 patch onto the skin twice a week. 8 patch 11    pantoprazole (PROTONIX) 40 MG tablet Take 40 mg by mouth once daily.      promethazine-dextromethorphan (PROMETHAZINE-DM) 6.25-15 mg/5 mL Syrp Take 5 mLs by mouth every 6 (six) hours as needed (cough). 180 mL 0    traZODone (DESYREL) 100 MG tablet Take 1 tablet (100 mg total) by mouth every evening. 30 tablet 1     Facility-Administered Encounter Medications as of 5/26/2022   Medication Dose Route Frequency Provider Last Rate Last Admin    triamcinolone acetonide injection 40 mg  40 mg Intramuscular Once Blake Adams MD            Allergies:  Review of patient's allergies indicates:   Allergen Reactions    Celexa [citalopram] Nausea Only     Stomach upset         Physical Exam         Vitals:    05/26/22 1509   BP: 110/78   Pulse: 78   Resp: 18   Temp: 97.9 °F (36.6 °C)         Physical Exam  Constitutional:       Appearance: She is well-developed.   HENT:      Nose:      Right Sinus: Frontal sinus tenderness present.      Left Sinus: Frontal sinus tenderness present.   Eyes:      Pupils: Pupils are equal, round, and reactive to light.   Neck:      Thyroid: No thyromegaly.   Cardiovascular:      Rate and Rhythm: Normal rate.      Heart sounds: Normal heart sounds. No murmur heard.    No friction rub. No gallop.   Pulmonary:      Breath sounds: Normal breath sounds.   Abdominal:      General: Bowel sounds are normal.      Palpations: Abdomen is soft.   Musculoskeletal:         General: Normal range of motion.      Cervical back: Normal range of motion.   Lymphadenopathy:      Cervical: No cervical adenopathy.   Skin:     General: Skin is warm.       Findings: No rash.   Neurological:      Mental Status: She is alert and oriented to person, place, and time.      Cranial Nerves: No cranial nerve deficit.   Psychiatric:         Behavior: Behavior normal.          Laboratory:  CBC:  No results for input(s): WBC, RBC, HGB, HCT, PLT, MCV, MCH, MCHC in the last 2160 hours.  CMP:  No results for input(s): GLU, CALCIUM, ALBUMIN, PROT, NA, K, CO2, CL, BUN, ALKPHOS, ALT, AST, BILITOT in the last 2160 hours.    Invalid input(s): CREATININ  URINALYSIS:  No results for input(s): COLORU, CLARITYU, SPECGRAV, PHUR, PROTEINUA, GLUCOSEU, BILIRUBINCON, BLOODU, WBCU, RBCU, BACTERIA, MUCUS, NITRITE, LEUKOCYTESUR, UROBILINOGEN, HYALINECASTS in the last 2160 hours.   LIPIDS:  No results for input(s): TSH, HDL, CHOL, TRIG, LDLCALC, CHOLHDL, NONHDLCHOL, TOTALCHOLEST in the last 2160 hours.  TSH:  No results for input(s): TSH in the last 2160 hours.  A1C:  No results for input(s): HGBA1C in the last 2160 hours.    Radiology:        Assessment:     Socorro Huang is a 46 y.o.female with:    Acute non-recurrent frontal sinusitis  -     azithromycin (Z-SYL) 250 MG tablet; Take 2 tablets by mouth on day 1; Take 1 tablet by mouth on days 2-5  Dispense: 6 tablet; Refill: 0  -     promethazine-dextromethorphan (PROMETHAZINE-DM) 6.25-15 mg/5 mL Syrp; Take 5 mLs by mouth every 6 (six) hours as needed (cough).  Dispense: 180 mL; Refill: 0  -     triamcinolone acetonide injection 40 mg          Plan:     Problem List Items Addressed This Visit        ENT    Acute non-recurrent frontal sinusitis - Primary    Overview     im kenalog  zpak   Promethazine dm                  As above, continue current medications and maintain follow up with specialists.  Return to clinic as schedule      Frederick W Dantagnan [facility]

## 2022-05-26 NOTE — LETTER
May 26, 2022      [facility]  [address]  [facility] [address]  Phone: [phone]  Fax: [phone]       Patient: Socorro Huang   YOB: 1975  Date of Visit: 05/26/2022    To Whom It May Concern:    Padmini Huang  was at [facility] on 05/26/2022. The patient may return to work/school on 05/30/2022 with no restrictions. If you have any questions or concerns, or if I can be of further assistance, please do not hesitate to contact me.    Sincerely,      Blake Adams MD

## 2022-05-30 ENCOUNTER — PATIENT MESSAGE (OUTPATIENT)
Dept: INTERNAL MEDICINE | Facility: CLINIC | Age: 47
End: 2022-05-30
Payer: COMMERCIAL

## 2022-06-08 ENCOUNTER — PATIENT MESSAGE (OUTPATIENT)
Dept: INTERNAL MEDICINE | Facility: CLINIC | Age: 47
End: 2022-06-08
Payer: COMMERCIAL

## 2022-06-09 ENCOUNTER — OFFICE VISIT (OUTPATIENT)
Dept: SURGERY | Facility: CLINIC | Age: 47
End: 2022-06-09
Payer: COMMERCIAL

## 2022-06-09 ENCOUNTER — TELEPHONE (OUTPATIENT)
Dept: SURGERY | Facility: CLINIC | Age: 47
End: 2022-06-09
Payer: COMMERCIAL

## 2022-06-09 ENCOUNTER — PATIENT MESSAGE (OUTPATIENT)
Dept: SURGERY | Facility: CLINIC | Age: 47
End: 2022-06-09

## 2022-06-09 ENCOUNTER — HOSPITAL ENCOUNTER (OUTPATIENT)
Dept: RADIOLOGY | Facility: HOSPITAL | Age: 47
Discharge: HOME | End: 2022-06-09
Attending: COLON & RECTAL SURGERY
Payer: COMMERCIAL

## 2022-06-09 VITALS
BODY MASS INDEX: 24.56 KG/M2 | HEIGHT: 67 IN | SYSTOLIC BLOOD PRESSURE: 118 MMHG | WEIGHT: 156.5 LBS | HEART RATE: 88 BPM | DIASTOLIC BLOOD PRESSURE: 82 MMHG

## 2022-06-09 DIAGNOSIS — K57.92 DIVERTICULITIS: ICD-10-CM

## 2022-06-09 DIAGNOSIS — K57.92 DIVERTICULITIS: Primary | ICD-10-CM

## 2022-06-09 DIAGNOSIS — R10.32 LEFT LOWER QUADRANT ABDOMINAL PAIN: ICD-10-CM

## 2022-06-09 PROCEDURE — 74176 CT ABDOMEN PELVIS WITHOUT CONTRAST: ICD-10-PCS | Mod: 26,,, | Performed by: STUDENT IN AN ORGANIZED HEALTH CARE EDUCATION/TRAINING PROGRAM

## 2022-06-09 PROCEDURE — 74176 CT ABD & PELVIS W/O CONTRAST: CPT | Mod: 26,,, | Performed by: STUDENT IN AN ORGANIZED HEALTH CARE EDUCATION/TRAINING PROGRAM

## 2022-06-09 PROCEDURE — 99213 OFFICE O/P EST LOW 20 MIN: CPT | Mod: S$GLB,,, | Performed by: COLON & RECTAL SURGERY

## 2022-06-09 PROCEDURE — 99999 PR PBB SHADOW E&M-EST. PATIENT-LVL III: ICD-10-PCS | Mod: PBBFAC,,, | Performed by: COLON & RECTAL SURGERY

## 2022-06-09 PROCEDURE — 99213 PR OFFICE/OUTPT VISIT, EST, LEVL III, 20-29 MIN: ICD-10-PCS | Mod: S$GLB,,, | Performed by: COLON & RECTAL SURGERY

## 2022-06-09 PROCEDURE — 99999 PR PBB SHADOW E&M-EST. PATIENT-LVL III: CPT | Mod: PBBFAC,,, | Performed by: COLON & RECTAL SURGERY

## 2022-06-09 PROCEDURE — 74176 CT ABD & PELVIS W/O CONTRAST: CPT | Mod: TC

## 2022-06-09 RX ORDER — AMOXICILLIN AND CLAVULANATE POTASSIUM 875; 125 MG/1; MG/1
1 TABLET, FILM COATED ORAL EVERY 12 HOURS
Qty: 14 TABLET | Refills: 0 | Status: SHIPPED | OUTPATIENT
Start: 2022-06-09 | End: 2022-06-21

## 2022-06-09 RX ORDER — HYOSCYAMINE SULFATE 0.125 MG
125 TABLET ORAL EVERY 4 HOURS PRN
Qty: 60 TABLET | Refills: 4 | Status: SHIPPED | OUTPATIENT
Start: 2022-06-09 | End: 2022-06-13

## 2022-06-09 NOTE — PROGRESS NOTES
CRS Office Visit Follow-up  Referring Md:   No referring provider defined for this encounter.    SUBJECTIVE:     Chief Complaint: diverticulitis    History of Present Illness:  Patient is a 46 y.o. female presents with diverticulitis. The patient is a established patient to this practice.     Course is as follows:  with history of anxiety, history of anal dysplasia, and previous laparoscopic sigmoid colectomy for diverticulitis was admitted 11/27/21 to 11/30/21 for worsening diverticulitis despite outpatient antibiotic management with augmentin.    This is her 1st episode of diverticulitis since her resection in 2018. She states that she thinks she had a colonoscopy within the last 3 years which was unremarkable.  For her anal dysplasia, she follows with Dr. Louis at [facility] for high resolution anoscopy.  Aside from her previous laparoscopic sigmoid colectomy, she has had a transvaginal hysterectomy but no other abdominal surgeries.  She had an aunt with colon cancer in her 60s but denies any family history of inflammatory bowel disease.    Repeat CT on 12/9/21 performed due to ongoing abdominal pain and inflammation was improved.     Current status:  12/14/21:  Presents for evaluation.  Having intermittent increasing difficulty with constipation.  Taking MiraLax daily.  Bowel movements are thin.  Abdominal pain improved.  No further fevers or chills.  Off antibiotics.  6/9/22:  Presents for evaluation for acute onset left lower quadrant abdominal pain radiating to her back.  Increased cramping.  Change in her bowel movements.  No fevers.  Feel similar to past episodes of diverticulitis.    Last Colonoscopy:  2/3/22:  Impression:            - Patent end-to-end colo-rectal anastomosis,                          characterized by healthy appearing mucosa.                          - The examined portion of the ileum was normal.                          - Stool in the entire examined colon.                       "    - No specimens collected.     Review of Systems:  Review of Systems   Constitutional: Negative for chills, diaphoresis, fever, malaise/fatigue and weight loss.   HENT: Negative for congestion.    Respiratory: Negative for shortness of breath.    Cardiovascular: Negative for chest pain and leg swelling.   Gastrointestinal: Positive for constipation. Negative for abdominal pain, blood in stool, nausea and vomiting.   Genitourinary: Negative for dysuria.   Musculoskeletal: Negative for back pain and myalgias.   Skin: Negative for rash.   Neurological: Negative for dizziness and weakness.   Endo/Heme/Allergies: Does not bruise/bleed easily.   Psychiatric/Behavioral: Negative for depression.       OBJECTIVE:     Vital Signs (Most Recent)  /82 (BP Location: Left arm, Patient Position: Sitting, BP Method: Large (Automatic))   Pulse 88   Ht 5' 7" (1.702 m)   Wt 71 kg (156 lb 8.4 oz)   LMP 05/05/2013   BMI 24.52 kg/m²     Physical Exam:  General: White female in no distress   Neuro: alert and oriented x 4.  Moves all extremities.     HEENT: no icterus.  Trachea midline  Respiratory: respirations are even and unlabored  Cardiac: regular rate  Abdomen:  Soft, no diffuse tenderness or peritonitis.  Focally tender in the left lower quadrant.  Nondistended.  Extremities: Warm dry and intact  Skin: no rashes  Anorectal:  Deferred    Labs: Alb 3.8.  Normal renal function    Imaging:   CT abd pelvis on 11/27/21 personally reviewed and shows inflammation just proximal to her anastomosis with an anastomosis of the descending colon to the distal sigmoid colon.    CT abd pelvis on 12/9/21 personally reviewed and shows improvement and resolution of the inflammation above her anastomosis.        ASSESSMENT/PLAN:     Socorro was seen today for abdominal pain.    Diagnoses and all orders for this visit:    Diverticulitis  -     CT Abdomen Pelvis  Without Contrast; Future  -     amoxicillin-clavulanate 875-125mg (AUGMENTIN) " 875-125 mg per tablet; Take 1 tablet by mouth every 12 (twelve) hours.  -     hyoscyamine (ANASPAZ,LEVSIN) 0.125 mg Tab; Take 1 tablet (125 mcg total) by mouth every 4 (four) hours as needed (cramping).        46 y.o. female with recurrent diverticulitis following laparoscopic resection in 2018 at outside hospital.  She presents with a 2nd clinical recurrence.  Plan for further evaluation with a CT scan of the abdomen pelvis as well as empiric treatment with anti spasm medications and antibiotics.  I will follow up with her with the results of her CT.      ASHTYN Melo MD, FACS, FASCRS  Staff Surgeon  Colon & Rectal Surgery

## 2022-06-10 ENCOUNTER — PATIENT MESSAGE (OUTPATIENT)
Dept: SURGERY | Facility: CLINIC | Age: 47
End: 2022-06-10
Payer: COMMERCIAL

## 2022-06-14 ENCOUNTER — PATIENT MESSAGE (OUTPATIENT)
Dept: SURGERY | Facility: CLINIC | Age: 47
End: 2022-06-14
Payer: COMMERCIAL

## 2022-06-21 ENCOUNTER — OFFICE VISIT (OUTPATIENT)
Dept: INTERNAL MEDICINE | Facility: CLINIC | Age: 47
End: 2022-06-21
Payer: COMMERCIAL

## 2022-06-21 VITALS
OXYGEN SATURATION: 98 % | DIASTOLIC BLOOD PRESSURE: 60 MMHG | SYSTOLIC BLOOD PRESSURE: 114 MMHG | WEIGHT: 159.63 LBS | HEIGHT: 67 IN | BODY MASS INDEX: 25.06 KG/M2 | HEART RATE: 68 BPM

## 2022-06-21 DIAGNOSIS — H81.13 BENIGN POSITIONAL VERTIGO, BILATERAL: Primary | ICD-10-CM

## 2022-06-21 PROCEDURE — 99213 PR OFFICE/OUTPT VISIT, EST, LEVL III, 20-29 MIN: ICD-10-PCS | Mod: S$GLB,,, | Performed by: INTERNAL MEDICINE

## 2022-06-21 PROCEDURE — 99213 OFFICE O/P EST LOW 20 MIN: CPT | Mod: S$GLB,,, | Performed by: INTERNAL MEDICINE

## 2022-06-21 PROCEDURE — 99999 PR PBB SHADOW E&M-EST. PATIENT-LVL III: CPT | Mod: PBBFAC,,, | Performed by: INTERNAL MEDICINE

## 2022-06-21 PROCEDURE — 99999 PR PBB SHADOW E&M-EST. PATIENT-LVL III: ICD-10-PCS | Mod: PBBFAC,,, | Performed by: INTERNAL MEDICINE

## 2022-06-21 RX ORDER — MECLIZINE HYDROCHLORIDE 25 MG/1
25 TABLET ORAL 3 TIMES DAILY PRN
Qty: 30 TABLET | Refills: 0 | Status: SHIPPED | OUTPATIENT
Start: 2022-06-21 | End: 2022-07-19 | Stop reason: CLARIF

## 2022-06-21 RX ORDER — FLUTICASONE PROPIONATE 50 MCG
1 SPRAY, SUSPENSION (ML) NASAL DAILY
Qty: 18 G | Refills: 0 | Status: SHIPPED | OUTPATIENT
Start: 2022-06-21 | End: 2023-09-22

## 2022-06-21 NOTE — LETTER
June 21, 2022      [facility]  [address]  [facility] [address]  Phone: [phone]  Fax: [phone]       Patient: Socorro Huang   YOB: 1975  Date of Visit: 06/21/2022    To Whom It May Concern:    Padmini Huang  was at [facility] on 06/21/2022. The patient may return to work 6/22/22. If you have any questions or concerns, or if I can be of further assistance, please do not hesitate to contact me.    Sincerely,            Aaliyah Morataya MD

## 2022-06-21 NOTE — PROGRESS NOTES
Subjective:       Patient ID: Socorro Huang is a 46 y.o. female.    Chief Complaint: Dizziness and Nausea (Symptoms since two weeks)      Pt and problem are new to me.    Socorro Huang is 46 y.o. female who presents for vertigo with change in position X 1 days.  Pt also had episode 2wks ago at beginning of her diverticulitis flair.  Pt does not have hx of vertigo.          Review of Systems   Constitutional: Negative for chills and fever.   HENT: Negative for ear discharge, ear pain, hearing loss, sinus pressure/congestion and tinnitus.    Respiratory: Negative for shortness of breath.    Gastrointestinal: Positive for nausea. Negative for abdominal pain, constipation, diarrhea and vomiting.   Neurological: Positive for vertigo.         Objective:      Physical Exam  Vitals reviewed.   Constitutional:       General: She is awake.      Appearance: Normal appearance.   HENT:      Head: Normocephalic and atraumatic.      Right Ear: Tympanic membrane is not erythematous, retracted or bulging. Tympanic membrane has decreased mobility.      Left Ear: Tympanic membrane is not erythematous, retracted or bulging. Tympanic membrane has decreased mobility.      Ears:      Comments: (+) Hallpike     Nose:      Right Turbinates: Swollen.      Left Turbinates: Swollen.      Mouth/Throat:      Mouth: Mucous membranes are moist.      Pharynx: Oropharynx is clear.   Eyes:      Extraocular Movements: Extraocular movements intact.      Conjunctiva/sclera: Conjunctivae normal.      Pupils: Pupils are equal, round, and reactive to light.   Cardiovascular:      Rate and Rhythm: Normal rate and regular rhythm.      Heart sounds: No murmur heard.    No friction rub. No gallop.   Pulmonary:      Effort: Pulmonary effort is normal.      Breath sounds: Normal breath sounds.   Abdominal:      General: Bowel sounds are normal. There is no distension.      Tenderness: There is no abdominal tenderness. There is no guarding or rebound.    Musculoskeletal:      Right lower leg: No edema.      Left lower leg: No edema.   Lymphadenopathy:      Cervical: No cervical adenopathy.   Neurological:      Mental Status: She is alert and oriented to person, place, and time.   Psychiatric:         Mood and Affect: Mood normal.         Behavior: Behavior is cooperative.         Assessment:       1. Benign positional vertigo, bilateral        Plan:     Pt with s/s of bilateral benign positional vertigo.  Will tx for possible sinus congestion contributing to the BPPV with Claritin 10 mg po daily and Flonase 1 spray per nostril daily.  Will tx vertigo with Meclizine 25 mg po TID prn.      Socorro was seen today for dizziness and nausea.    Diagnoses and all orders for this visit:    Benign positional vertigo, bilateral  -     meclizine (ANTIVERT) 25 mg tablet; Take 1 tablet (25 mg total) by mouth 3 (three) times daily as needed for Dizziness.  -     fluticasone propionate (FLONASE) 50 mcg/actuation nasal spray; 1 spray (50 mcg total) by Each Nostril route once daily.

## 2022-07-05 ENCOUNTER — OFFICE VISIT (OUTPATIENT)
Dept: SURGERY | Facility: CLINIC | Age: 47
End: 2022-07-05
Payer: COMMERCIAL

## 2022-07-05 VITALS
DIASTOLIC BLOOD PRESSURE: 86 MMHG | BODY MASS INDEX: 25.22 KG/M2 | WEIGHT: 160.69 LBS | HEART RATE: 75 BPM | SYSTOLIC BLOOD PRESSURE: 123 MMHG | HEIGHT: 67 IN

## 2022-07-05 DIAGNOSIS — K57.92 DIVERTICULITIS: Primary | ICD-10-CM

## 2022-07-05 PROCEDURE — 99214 PR OFFICE/OUTPT VISIT, EST, LEVL IV, 30-39 MIN: ICD-10-PCS | Mod: S$GLB,,, | Performed by: COLON & RECTAL SURGERY

## 2022-07-05 PROCEDURE — 99999 PR PBB SHADOW E&M-EST. PATIENT-LVL IV: ICD-10-PCS | Mod: PBBFAC,,, | Performed by: COLON & RECTAL SURGERY

## 2022-07-05 PROCEDURE — 99999 PR PBB SHADOW E&M-EST. PATIENT-LVL IV: CPT | Mod: PBBFAC,,, | Performed by: COLON & RECTAL SURGERY

## 2022-07-05 PROCEDURE — 99214 OFFICE O/P EST MOD 30 MIN: CPT | Mod: S$GLB,,, | Performed by: COLON & RECTAL SURGERY

## 2022-07-05 RX ORDER — METRONIDAZOLE 500 MG/1
500 TABLET ORAL 3 TIMES DAILY
Qty: 3 TABLET | Refills: 0 | Status: SHIPPED | OUTPATIENT
Start: 2022-07-05 | End: 2022-07-06

## 2022-07-05 RX ORDER — NEOMYCIN SULFATE 500 MG/1
TABLET ORAL
Qty: 6 TABLET | Refills: 0 | Status: ON HOLD | OUTPATIENT
Start: 2022-07-05 | End: 2022-07-22 | Stop reason: HOSPADM

## 2022-07-05 NOTE — PROGRESS NOTES
CRS Office Visit Follow-up  Referring Md:   No referring provider defined for this encounter.    SUBJECTIVE:     Chief Complaint: diverticulitis    History of Present Illness:  Patient is a 46 y.o. female presents with diverticulitis. The patient is a established patient to this practice.     Course is as follows:  with history of anxiety, history of anal dysplasia, and previous laparoscopic sigmoid colectomy for diverticulitis was admitted 11/27/21 to 11/30/21 for worsening diverticulitis despite outpatient antibiotic management with augmentin.    This is her 1st episode of diverticulitis since her resection in 2018. She states that she thinks she had a colonoscopy within the last 3 years which was unremarkable.  For her anal dysplasia, she follows with Dr. Louis at [facility] for high resolution anoscopy.  Aside from her previous laparoscopic sigmoid colectomy, she has had a transvaginal hysterectomy but no other abdominal surgeries.  She had an aunt with colon cancer in her 60s but denies any family history of inflammatory bowel disease.    Repeat CT on 12/9/21 performed due to ongoing abdominal pain and inflammation was improved.     Current status:  12/14/21:  Presents for evaluation.  Having intermittent increasing difficulty with constipation.  Taking MiraLax daily.  Bowel movements are thin.  Abdominal pain improved.  No further fevers or chills.  Off antibiotics.  6/9/22:  Presents for evaluation for acute onset left lower quadrant abdominal pain radiating to her back.  Increased cramping.  Change in her bowel movements.  No fevers.  Feel similar to past episodes of diverticulitis.   - CT with Acute diverticulitis involving proximal descending colon.  No evidence of complication such as perforation or abscess  7/5/22:  Presents for evaluation.  Due to 2 episodes of diverticulitis within 6 months, she wishes to have resection performed.  Functionally, she has intermittent constipation and has bowel  "movements 2-3 times per week.    Last Colonoscopy:  2/3/22:  Impression:            - Patent end-to-end colo-rectal anastomosis,                          characterized by healthy appearing mucosa.                          - The examined portion of the ileum was normal.                          - Stool in the entire examined colon.                          - No specimens collected.     Review of Systems:  Review of Systems   Constitutional: Negative for chills, diaphoresis, fever, malaise/fatigue and weight loss.   HENT: Negative for congestion.    Respiratory: Negative for shortness of breath.    Cardiovascular: Negative for chest pain and leg swelling.   Gastrointestinal: Positive for constipation. Negative for abdominal pain, blood in stool, nausea and vomiting.   Genitourinary: Negative for dysuria.   Musculoskeletal: Negative for back pain and myalgias.   Skin: Negative for rash.   Neurological: Negative for dizziness and weakness.   Endo/Heme/Allergies: Does not bruise/bleed easily.   Psychiatric/Behavioral: Negative for depression.       OBJECTIVE:     Vital Signs (Most Recent)  /86 (BP Location: Left arm, Patient Position: Sitting, BP Method: Large (Automatic))   Pulse 75   Ht 5' 7" (1.702 m)   Wt 72.9 kg (160 lb 11.5 oz)   LMP 05/05/2013   BMI 25.17 kg/m²     Physical Exam:  General: White female in no distress   Neuro: alert and oriented x 4.  Moves all extremities.     HEENT: no icterus.  Trachea midline  Respiratory: respirations are even and unlabored  Cardiac: regular rate  Abdomen:  Soft, no diffuse tenderness or peritonitis.  Nondistended.  No hernia.  Extremities: Warm dry and intact  Skin: no rashes  Anorectal:  Deferred    Labs: Alb 3.8.  Normal renal function    Imaging:   CT abd pelvis on 11/27/21 personally reviewed and shows inflammation just proximal to her anastomosis with an anastomosis of the descending colon to the distal sigmoid colon.    CT abd pelvis on 12/9/21 personally " reviewed and shows improvement and resolution of the inflammation above her anastomosis.    CT abd pelvis on 6/9/22 personally reviewed and shows proximal descending colon segmental inflammation approximately 5 cm distal to the splenic flexure     ASSESSMENT/PLAN:     Socorro was seen today for consult for surgery.    Diagnoses and all orders for this visit:    Diverticulitis  -     Case Request Operating Room: COLECTOMY, SIGMOID, LAPAROSCOPIC, ERAS low, SIGMOIDOSCOPY, FLEXIBLE, ANOSCOPY        46 y.o. female with recurrent diverticulitis following laparoscopic resection in 2018 at outside hospital.  She recently recovered from her 2nd recurrence over the past 6 months.  She therefore wishes to have repeat resection performed to decrease her chance of future recurrence.  Discussed the risks of anastomotic leak, inability to get the colon to reach, bleeding, recurrence, hernia.  On review of her CT scan, she appears to have adequate proximal colon to allow for a transverse colon to upper rectal anastomosis.  This could be performed either with a Baker style anastomosis or an end-to-end depending on intraoperative findings.  Will plan to access the abdomen through a Pfannenstiel incision for hand port.  Consents were signed today and all questions were answered.        ASHTYN Melo MD, FACS, FASCRS  Staff Surgeon  Colon & Rectal Surgery

## 2022-07-05 NOTE — H&P (VIEW-ONLY)
CRS Office Visit Follow-up  Referring Md:   No referring provider defined for this encounter.    SUBJECTIVE:     Chief Complaint: diverticulitis    History of Present Illness:  Patient is a 46 y.o. female presents with diverticulitis. The patient is a established patient to this practice.     Course is as follows:  with history of anxiety, history of anal dysplasia, and previous laparoscopic sigmoid colectomy for diverticulitis was admitted 11/27/21 to 11/30/21 for worsening diverticulitis despite outpatient antibiotic management with augmentin.    This is her 1st episode of diverticulitis since her resection in 2018. She states that she thinks she had a colonoscopy within the last 3 years which was unremarkable.  For her anal dysplasia, she follows with Dr. Louis at [facility] for high resolution anoscopy.  Aside from her previous laparoscopic sigmoid colectomy, she has had a transvaginal hysterectomy but no other abdominal surgeries.  She had an aunt with colon cancer in her 60s but denies any family history of inflammatory bowel disease.    Repeat CT on 12/9/21 performed due to ongoing abdominal pain and inflammation was improved.     Current status:  12/14/21:  Presents for evaluation.  Having intermittent increasing difficulty with constipation.  Taking MiraLax daily.  Bowel movements are thin.  Abdominal pain improved.  No further fevers or chills.  Off antibiotics.  6/9/22:  Presents for evaluation for acute onset left lower quadrant abdominal pain radiating to her back.  Increased cramping.  Change in her bowel movements.  No fevers.  Feel similar to past episodes of diverticulitis.   - CT with Acute diverticulitis involving proximal descending colon.  No evidence of complication such as perforation or abscess  7/5/22:  Presents for evaluation.  Due to 2 episodes of diverticulitis within 6 months, she wishes to have resection performed.  Functionally, she has intermittent constipation and has bowel  "movements 2-3 times per week.    Last Colonoscopy:  2/3/22:  Impression:            - Patent end-to-end colo-rectal anastomosis,                          characterized by healthy appearing mucosa.                          - The examined portion of the ileum was normal.                          - Stool in the entire examined colon.                          - No specimens collected.     Review of Systems:  Review of Systems   Constitutional: Negative for chills, diaphoresis, fever, malaise/fatigue and weight loss.   HENT: Negative for congestion.    Respiratory: Negative for shortness of breath.    Cardiovascular: Negative for chest pain and leg swelling.   Gastrointestinal: Positive for constipation. Negative for abdominal pain, blood in stool, nausea and vomiting.   Genitourinary: Negative for dysuria.   Musculoskeletal: Negative for back pain and myalgias.   Skin: Negative for rash.   Neurological: Negative for dizziness and weakness.   Endo/Heme/Allergies: Does not bruise/bleed easily.   Psychiatric/Behavioral: Negative for depression.       OBJECTIVE:     Vital Signs (Most Recent)  /86 (BP Location: Left arm, Patient Position: Sitting, BP Method: Large (Automatic))   Pulse 75   Ht 5' 7" (1.702 m)   Wt 72.9 kg (160 lb 11.5 oz)   LMP 05/05/2013   BMI 25.17 kg/m²     Physical Exam:  General: White female in no distress   Neuro: alert and oriented x 4.  Moves all extremities.     HEENT: no icterus.  Trachea midline  Respiratory: respirations are even and unlabored  Cardiac: regular rate  Abdomen:  Soft, no diffuse tenderness or peritonitis.  Nondistended.  No hernia.  Extremities: Warm dry and intact  Skin: no rashes  Anorectal:  Deferred    Labs: Alb 3.8.  Normal renal function    Imaging:   CT abd pelvis on 11/27/21 personally reviewed and shows inflammation just proximal to her anastomosis with an anastomosis of the descending colon to the distal sigmoid colon.    CT abd pelvis on 12/9/21 personally " reviewed and shows improvement and resolution of the inflammation above her anastomosis.    CT abd pelvis on 6/9/22 personally reviewed and shows proximal descending colon segmental inflammation approximately 5 cm distal to the splenic flexure     ASSESSMENT/PLAN:     Socorro was seen today for consult for surgery.    Diagnoses and all orders for this visit:    Diverticulitis  -     Case Request Operating Room: COLECTOMY, SIGMOID, LAPAROSCOPIC, ERAS low, SIGMOIDOSCOPY, FLEXIBLE, ANOSCOPY        46 y.o. female with recurrent diverticulitis following laparoscopic resection in 2018 at outside hospital.  She recently recovered from her 2nd recurrence over the past 6 months.  She therefore wishes to have repeat resection performed to decrease her chance of future recurrence.  Discussed the risks of anastomotic leak, inability to get the colon to reach, bleeding, recurrence, hernia.  On review of her CT scan, she appears to have adequate proximal colon to allow for a transverse colon to upper rectal anastomosis.  This could be performed either with a Baker style anastomosis or an end-to-end depending on intraoperative findings.  Will plan to access the abdomen through a Pfannenstiel incision for hand port.  Consents were signed today and all questions were answered.        ASHTYN Melo MD, FACS, FASCRS  Staff Surgeon  Colon & Rectal Surgery

## 2022-07-19 ENCOUNTER — ANESTHESIA EVENT (OUTPATIENT)
Dept: SURGERY | Facility: HOSPITAL | Age: 47
DRG: 331 | End: 2022-07-19
Payer: COMMERCIAL

## 2022-07-19 ENCOUNTER — TELEPHONE (OUTPATIENT)
Dept: SURGERY | Facility: CLINIC | Age: 47
End: 2022-07-19
Payer: COMMERCIAL

## 2022-07-19 RX ORDER — METRONIDAZOLE 500 MG/1
500 TABLET ORAL
Status: ON HOLD | COMMUNITY
End: 2022-07-22 | Stop reason: HOSPADM

## 2022-07-19 NOTE — PRE-PROCEDURE INSTRUCTIONS
Preoperative NPO and Bowel Prep instructions given per CRS Dept. Patient's questions answered and patient V/C/U.    PREOP INSTRUCTIONS:    Shower instructions as well as directions to the Surgery Center were given.Encouraged to wear loose fitting,comfortable clothing.Medication instructions for pm prior to and am of procedure reviewed.Instructed to avoid taking vitamins,supplements,aspirin and ibuprofen the morning of surgery.     Patient advised of the updated visitor policy    Patient reports h/o PONV in the past, but not w/recent treatment  Patient given arrival time of 0600 to [facility]

## 2022-07-19 NOTE — ANESTHESIA PREPROCEDURE EVALUATION
[facility]  Anesthesia Pre-Operative Evaluation         Patient Name: Socorro Huang  YOB: 1975  MRN: [id number]    SUBJECTIVE:     Pre-operative evaluation for Procedure(s) (LRB):  COLECTOMY, SIGMOID, LAPAROSCOPIC, ERAS low (N/A)  SIGMOIDOSCOPY, FLEXIBLE (N/A)  ANOSCOPY (N/A)     07/19/2022    Socorro Huang is a 46 y.o. female w/ a significant PMHx of Anxiety, diverticulitis, and GERD who now presents for the above procedure(s).      LDA: None documented     Prev airway: 10/19/2017  Present Prior to Hospital Arrival?: No; Placement Date: 10/19/17; Placement Time: 0822; Method of Intubation: Direct laryngoscopy; Inserted by: Other (Charles Fahrenholtz SRNA); Airway Device: Endotracheal Tube; Mask Ventilation: Easy; Intubated: Postinduction; Blade: Marysol #3; Airway Device Size: 7.5; Style: Cuffed; Cuff Inflation: Minimal occlusive pressure; Placement Verified By: Auscultation, ETT Condensation, Capnometry; Grade: Grade I; Complicating Factors: None; Intubation Findings: Positive EtCO2, Bilateral breath sounds, Atraumatic/Condition of teeth unchanged;  Depth of Insertion: 23; Securment: Lips; Complications: None; Breath Sounds: Equal Bilateral; Insertion Attempts: 1; Removal Date: 10/19/17;  Removal Time: 0900    Drips: None documented.    Patient Active Problem List   Diagnosis    S/P hysterectomy    History of diverticulitis    Pelvic pain syndrome    Dizziness    Anxiety    Gastroesophageal reflux disease without esophagitis    Hypothyroidism    Acute diverticulitis    Insomnia    Acute non-recurrent frontal sinusitis       Review of patient's allergies indicates:   Allergen Reactions    Celexa [citalopram] Nausea Only     Stomach upset       Current Outpatient Medications:  No current facility-administered medications for this encounter.    Current Outpatient Medications:     metroNIDAZOLE (FLAGYL) 500 MG tablet, Take 500 mg by mouth. Take 1 tablet at 1pm, 2pm and  11 pm the day before your Surgery., Disp: , Rfl:     neomycin (MYCIFRADIN) 500 mg Tab, Take 2 tablets at 1pm, 2pm and 11 pm., Disp: 6 tablet, Rfl: 0    fluticasone propionate (FLONASE) 50 mcg/actuation nasal spray, 1 spray (50 mcg total) by Each Nostril route once daily., Disp: 18 g, Rfl: 0    hyoscyamine (ANASPAZ,LEVSIN) 0.125 mg Tab, TAKE 1 TABLET (125 MCG TOTAL) BY MOUTH EVERY 4 (FOUR) HOURS AS NEEDED (CRAMPING)., Disp: 60 tablet, Rfl: 4    linaCLOtide (LINZESS) 72 mcg Cap capsule, Take 1 capsule (72 mcg total) by mouth before breakfast., Disp: 30 capsule, Rfl: 11    Past Surgical History:   Procedure Laterality Date    BREAST RECONSTRUCTION  2018    BREAST SURGERY      COLON SURGERY  2014         COLONOSCOPY N/A 2/3/2022    Procedure: COLONOSCOPY;  Surgeon: ASHTYN Melo MD;  Location: [facility];  Service: Endoscopy;  Laterality: N/A;    DILATION AND CURETTAGE OF UTERUS  12/18/2012    complex hyperplasia, no atypia    HEMORRHOID SURGERY  2014    HYSTERECTOMY      OOPHORECTOMY      PARTIAL HYSTERECTOMY  2013    for atypia    THYROIDECTOMY, PARTIAL  2013    TONSILLECTOMY      TUBAL LIGATION         Social History     Socioeconomic History    Marital status:     Number of children: 2   Occupational History     Employer: [employer]   Tobacco Use    Smoking status: Never Smoker    Smokeless tobacco: Never Used   Substance and Sexual Activity    Alcohol use: Yes     Comment: socially    Drug use: No    Sexual activity: Yes     Partners: Male     Birth control/protection: Surgical   Other Topics Concern    Financial Status: Employed Yes     Comment: Dental Assistant and [employer]    Caffeine Use: Substantial Yes    Firearms: Does patient have access to a firearm? 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 Fulton , GYN Dr Ruby                   OBJECTIVE:     Vital Signs Range (Last 24H):         Significant Labs:  Lab Results   Component Value Date    WBC 2.92 (L) 11/30/2021    HGB 12.1 11/30/2021    HCT 35.7 (L) 11/30/2021     11/30/2021    CHOL 140 10/27/2014    TRIG 37 10/27/2014    HDL 54 10/27/2014    ALT 78 (H) 11/27/2021    AST 49 (H) 11/27/2021     11/30/2021    K 3.6 11/30/2021     11/30/2021    CREATININE 0.6 11/30/2021    BUN 13 11/30/2021    CO2 23 11/30/2021    TSH 1.659 11/22/2017       Diagnostic Studies: No relevant studies.    EKG:   Results for orders placed or performed in visit on 10/10/17   EKG 12-lead    Collection Time: 10/10/17 10:04 AM    Narrative    Test Reason : Z01.818  Blood Pressure :   Vent. Rate : 068 BPM     Atrial Rate : 068 BPM     P-R Int : 150 ms          QRS Dur : 090 ms      QT Int : 388 ms       P-R-T Axes : -02 027 060 degrees     QTc Int : 412 ms    Normal sinus rhythm  Normal ECG  No previous ECGs available  Confirmed by Yadira Gerard MD ([id number]) on 10/10/2017 11:13:54 AM    Referred By: KATHIE KUMAR           Confirmed By:Yadira Gerard MD       2D ECHO:  TTE:  No results found for this or any previous visit.    FELECIA:  No results found for this or any previous visit.    ASSESSMENT/PLAN:                                                                                                                  07/19/2022  Socorro Huang is a 46 y.o.,  female.      Pre-op Assessment    I have reviewed the Patient Summary Reports.     I have reviewed the Nursing Notes. I have reviewed the NPO Status.   I have reviewed the Medications.     Review of Systems  Anesthesia Hx:  Hx of Anesthetic complications PONV History of prior surgery of interest to airway management or planning: Denies Family Hx of Anesthesia complications.  Personal Hx of Anesthesia complications, Post-Operative Nausea/Vomiting, in the past, but not with recent anesthetics / prophylaxis   Social:  Non-Smoker    Hepatic/GI:   GERD    Psych:   Psychiatric History anxiety          Physical Exam  General: Well nourished, Cooperative, Alert and Oriented    Airway:  Mallampati: I / I  Mouth Opening: Normal  TM Distance: Normal  Tongue: Normal  Neck ROM: Normal ROM    Dental:  Intact    Chest/Lungs:  Clear to auscultation, Normal Respiratory Rate    Heart:  Rate: Normal  Rhythm: Regular Rhythm        Anesthesia Plan  Type of Anesthesia, risks & benefits discussed:    Anesthesia Type: Gen ETT  Intra-op Monitoring Plan: Standard ASA Monitors  Post Op Pain Control Plan: multimodal analgesia and IV/PO Opioids PRN  Induction:  IV  Airway Plan: Direct, Post-Induction  Informed Consent: Informed consent signed with the Patient and all parties understand the risks and agree with anesthesia plan.  All questions answered.   ASA Score: 2  Day of Surgery Review of History & Physical: H&P Update referred to the surgeon/provider.    Ready For Surgery From Anesthesia Perspective.     .

## 2022-07-20 ENCOUNTER — ANESTHESIA (OUTPATIENT)
Dept: SURGERY | Facility: HOSPITAL | Age: 47
DRG: 331 | End: 2022-07-20
Payer: COMMERCIAL

## 2022-07-20 ENCOUNTER — HOSPITAL ENCOUNTER (INPATIENT)
Facility: HOSPITAL | Age: 47
LOS: 2 days | Discharge: HOME | DRG: 331 | End: 2022-07-22
Attending: COLON & RECTAL SURGERY | Admitting: COLON & RECTAL SURGERY
Payer: COMMERCIAL

## 2022-07-20 DIAGNOSIS — K57.92 DIVERTICULITIS: Primary | ICD-10-CM

## 2022-07-20 LAB
ABO + RH BLD: NORMAL
BLD GP AB SCN CELLS X3 SERPL QL: NORMAL

## 2022-07-20 PROCEDURE — 25000003 PHARM REV CODE 250

## 2022-07-20 PROCEDURE — 44213 LAP MOBIL SPLENIC FL ADD-ON: CPT | Mod: ,,, | Performed by: COLON & RECTAL SURGERY

## 2022-07-20 PROCEDURE — D9220A PRA ANESTHESIA: ICD-10-PCS | Mod: ,,, | Performed by: STUDENT IN AN ORGANIZED HEALTH CARE EDUCATION/TRAINING PROGRAM

## 2022-07-20 PROCEDURE — 36415 COLL VENOUS BLD VENIPUNCTURE: CPT | Performed by: COLON & RECTAL SURGERY

## 2022-07-20 PROCEDURE — 63600175 PHARM REV CODE 636 W HCPCS

## 2022-07-20 PROCEDURE — 44207 L COLECTOMY/COLOPROCTOSTOMY: CPT | Mod: ,,, | Performed by: COLON & RECTAL SURGERY

## 2022-07-20 PROCEDURE — 37000008 HC ANESTHESIA 1ST 15 MINUTES: Performed by: COLON & RECTAL SURGERY

## 2022-07-20 PROCEDURE — 27201423 OPTIME MED/SURG SUP & DEVICES STERILE SUPPLY: Performed by: COLON & RECTAL SURGERY

## 2022-07-20 PROCEDURE — 88307 TISSUE EXAM BY PATHOLOGIST: CPT | Mod: 26,,, | Performed by: PATHOLOGY

## 2022-07-20 PROCEDURE — 20600001 HC STEP DOWN PRIVATE ROOM

## 2022-07-20 PROCEDURE — 25000003 PHARM REV CODE 250: Performed by: NURSE PRACTITIONER

## 2022-07-20 PROCEDURE — D9220A PRA ANESTHESIA: Mod: ,,, | Performed by: STUDENT IN AN ORGANIZED HEALTH CARE EDUCATION/TRAINING PROGRAM

## 2022-07-20 PROCEDURE — S0030 INJECTION, METRONIDAZOLE: HCPCS

## 2022-07-20 PROCEDURE — 44213 PR LAP, SURG MOBIL SPLENIC FL DUR PTL COLECTOMY: ICD-10-PCS | Mod: ,,, | Performed by: COLON & RECTAL SURGERY

## 2022-07-20 PROCEDURE — 88307 PR  SURG PATH,LEVEL V: ICD-10-PCS | Mod: 26,,, | Performed by: PATHOLOGY

## 2022-07-20 PROCEDURE — 88305 TISSUE EXAM BY PATHOLOGIST: CPT | Performed by: PATHOLOGY

## 2022-07-20 PROCEDURE — 71000033 HC RECOVERY, INTIAL HOUR: Performed by: COLON & RECTAL SURGERY

## 2022-07-20 PROCEDURE — 37000009 HC ANESTHESIA EA ADD 15 MINS: Performed by: COLON & RECTAL SURGERY

## 2022-07-20 PROCEDURE — 71000015 HC POSTOP RECOV 1ST HR: Performed by: COLON & RECTAL SURGERY

## 2022-07-20 PROCEDURE — 86901 BLOOD TYPING SEROLOGIC RH(D): CPT | Performed by: NURSE PRACTITIONER

## 2022-07-20 PROCEDURE — 63600175 PHARM REV CODE 636 W HCPCS: Performed by: SURGERY

## 2022-07-20 PROCEDURE — 88305 TISSUE EXAM BY PATHOLOGIST: CPT | Mod: 26,,, | Performed by: PATHOLOGY

## 2022-07-20 PROCEDURE — 44207 PR LAP,SURG,COLECTOMY,W/ANAST: ICD-10-PCS | Mod: ,,, | Performed by: COLON & RECTAL SURGERY

## 2022-07-20 PROCEDURE — 36000710: Performed by: COLON & RECTAL SURGERY

## 2022-07-20 PROCEDURE — 88307 TISSUE EXAM BY PATHOLOGIST: CPT | Performed by: PATHOLOGY

## 2022-07-20 PROCEDURE — 63600175 PHARM REV CODE 636 W HCPCS: Performed by: NURSE PRACTITIONER

## 2022-07-20 PROCEDURE — 36000711: Performed by: COLON & RECTAL SURGERY

## 2022-07-20 PROCEDURE — 25000003 PHARM REV CODE 250: Performed by: SURGERY

## 2022-07-20 PROCEDURE — 88305 TISSUE EXAM BY PATHOLOGIST: ICD-10-PCS | Mod: 26,,, | Performed by: PATHOLOGY

## 2022-07-20 PROCEDURE — 99900035 HC TECH TIME PER 15 MIN (STAT)

## 2022-07-20 RX ORDER — KETAMINE HCL IN 0.9 % NACL 50 MG/5 ML
SYRINGE (ML) INTRAVENOUS
Status: DISCONTINUED | OUTPATIENT
Start: 2022-07-20 | End: 2022-07-20

## 2022-07-20 RX ORDER — HALOPERIDOL 5 MG/ML
0.5 INJECTION INTRAMUSCULAR EVERY 10 MIN PRN
Status: DISCONTINUED | OUTPATIENT
Start: 2022-07-20 | End: 2022-07-20 | Stop reason: HOSPADM

## 2022-07-20 RX ORDER — METRONIDAZOLE 500 MG/100ML
500 INJECTION, SOLUTION INTRAVENOUS
Status: DISCONTINUED | OUTPATIENT
Start: 2022-07-20 | End: 2022-07-20 | Stop reason: HOSPADM

## 2022-07-20 RX ORDER — SODIUM CHLORIDE 9 MG/ML
INJECTION, SOLUTION INTRAVENOUS CONTINUOUS
Status: DISCONTINUED | OUTPATIENT
Start: 2022-07-20 | End: 2022-07-22 | Stop reason: HOSPADM

## 2022-07-20 RX ORDER — HYDROMORPHONE HYDROCHLORIDE 1 MG/ML
0.25 INJECTION, SOLUTION INTRAMUSCULAR; INTRAVENOUS; SUBCUTANEOUS ONCE
Status: DISCONTINUED | OUTPATIENT
Start: 2022-07-20 | End: 2022-07-22 | Stop reason: HOSPADM

## 2022-07-20 RX ORDER — SODIUM CHLORIDE 0.9 % (FLUSH) 0.9 %
10 SYRINGE (ML) INJECTION
Status: DISCONTINUED | OUTPATIENT
Start: 2022-07-20 | End: 2022-07-22 | Stop reason: HOSPADM

## 2022-07-20 RX ORDER — SCOLOPAMINE TRANSDERMAL SYSTEM 1 MG/1
1 PATCH, EXTENDED RELEASE TRANSDERMAL ONCE
Status: DISCONTINUED | OUTPATIENT
Start: 2022-07-20 | End: 2022-07-22 | Stop reason: HOSPADM

## 2022-07-20 RX ORDER — ALVIMOPAN 12 MG/1
12 CAPSULE ORAL 2 TIMES DAILY
Status: DISCONTINUED | OUTPATIENT
Start: 2022-07-21 | End: 2022-07-22 | Stop reason: HOSPADM

## 2022-07-20 RX ORDER — ONDANSETRON 2 MG/ML
INJECTION INTRAMUSCULAR; INTRAVENOUS
Status: DISCONTINUED | OUTPATIENT
Start: 2022-07-20 | End: 2022-07-20

## 2022-07-20 RX ORDER — FLUTICASONE PROPIONATE 50 MCG
1 SPRAY, SUSPENSION (ML) NASAL DAILY
Status: DISCONTINUED | OUTPATIENT
Start: 2022-07-20 | End: 2022-07-22 | Stop reason: HOSPADM

## 2022-07-20 RX ORDER — OXYCODONE HYDROCHLORIDE 5 MG/1
5 TABLET ORAL EVERY 4 HOURS PRN
Status: DISCONTINUED | OUTPATIENT
Start: 2022-07-20 | End: 2022-07-22 | Stop reason: HOSPADM

## 2022-07-20 RX ORDER — MIDAZOLAM HYDROCHLORIDE 1 MG/ML
INJECTION, SOLUTION INTRAMUSCULAR; INTRAVENOUS
Status: DISCONTINUED | OUTPATIENT
Start: 2022-07-20 | End: 2022-07-20

## 2022-07-20 RX ORDER — IBUPROFEN 400 MG/1
800 TABLET ORAL EVERY 8 HOURS
Status: DISCONTINUED | OUTPATIENT
Start: 2022-07-21 | End: 2022-07-22 | Stop reason: HOSPADM

## 2022-07-20 RX ORDER — LIDOCAINE HYDROCHLORIDE 20 MG/ML
INJECTION INTRAVENOUS
Status: DISCONTINUED | OUTPATIENT
Start: 2022-07-20 | End: 2022-07-20

## 2022-07-20 RX ORDER — SODIUM CHLORIDE 9 MG/ML
INJECTION, SOLUTION INTRAVENOUS CONTINUOUS
Status: DISCONTINUED | OUTPATIENT
Start: 2022-07-20 | End: 2022-07-20

## 2022-07-20 RX ORDER — ACETAMINOPHEN 500 MG
1000 TABLET ORAL ONCE
Status: DISCONTINUED | OUTPATIENT
Start: 2022-07-20 | End: 2022-07-20

## 2022-07-20 RX ORDER — MUPIROCIN 20 MG/G
OINTMENT TOPICAL 2 TIMES DAILY
Status: DISCONTINUED | OUTPATIENT
Start: 2022-07-20 | End: 2022-07-22 | Stop reason: HOSPADM

## 2022-07-20 RX ORDER — LIDOCAINE HYDROCHLORIDE 10 MG/ML
1 INJECTION, SOLUTION EPIDURAL; INFILTRATION; INTRACAUDAL; PERINEURAL
Status: COMPLETED | OUTPATIENT
Start: 2022-07-20 | End: 2022-07-20

## 2022-07-20 RX ORDER — ACETAMINOPHEN 10 MG/ML
1000 INJECTION, SOLUTION INTRAVENOUS EVERY 8 HOURS
Status: COMPLETED | OUTPATIENT
Start: 2022-07-20 | End: 2022-07-21

## 2022-07-20 RX ORDER — ONDANSETRON 2 MG/ML
4 INJECTION INTRAMUSCULAR; INTRAVENOUS EVERY 8 HOURS PRN
Status: DISCONTINUED | OUTPATIENT
Start: 2022-07-20 | End: 2022-07-22 | Stop reason: HOSPADM

## 2022-07-20 RX ORDER — MUPIROCIN 20 MG/G
1 OINTMENT TOPICAL
Status: COMPLETED | OUTPATIENT
Start: 2022-07-20 | End: 2022-07-20

## 2022-07-20 RX ORDER — GABAPENTIN 300 MG/1
300 CAPSULE ORAL 3 TIMES DAILY
Status: DISCONTINUED | OUTPATIENT
Start: 2022-07-20 | End: 2022-07-22 | Stop reason: HOSPADM

## 2022-07-20 RX ORDER — ACETAMINOPHEN 650 MG/20.3ML
975 LIQUID ORAL
Status: COMPLETED | OUTPATIENT
Start: 2022-07-20 | End: 2022-07-20

## 2022-07-20 RX ORDER — SODIUM CHLORIDE 9 MG/ML
INJECTION, SOLUTION INTRAVENOUS
Status: COMPLETED | OUTPATIENT
Start: 2022-07-20 | End: 2022-07-20

## 2022-07-20 RX ORDER — GABAPENTIN 300 MG/1
300 CAPSULE ORAL
Status: COMPLETED | OUTPATIENT
Start: 2022-07-20 | End: 2022-07-20

## 2022-07-20 RX ORDER — LIDOCAINE HYDROCHLORIDE ANHYDROUS AND DEXTROSE MONOHYDRATE .8; 5 G/100ML; G/100ML
INJECTION, SOLUTION INTRAVENOUS CONTINUOUS PRN
Status: DISCONTINUED | OUTPATIENT
Start: 2022-07-20 | End: 2022-07-20

## 2022-07-20 RX ORDER — HEPARIN SODIUM 5000 [USP'U]/ML
5000 INJECTION, SOLUTION INTRAVENOUS; SUBCUTANEOUS EVERY 8 HOURS
Status: COMPLETED | OUTPATIENT
Start: 2022-07-20 | End: 2022-07-20

## 2022-07-20 RX ORDER — ALVIMOPAN 12 MG/1
12 CAPSULE ORAL
Status: COMPLETED | OUTPATIENT
Start: 2022-07-20 | End: 2022-07-20

## 2022-07-20 RX ORDER — HYDROMORPHONE HYDROCHLORIDE 1 MG/ML
0.25 INJECTION, SOLUTION INTRAMUSCULAR; INTRAVENOUS; SUBCUTANEOUS EVERY 4 HOURS PRN
Status: DISCONTINUED | OUTPATIENT
Start: 2022-07-20 | End: 2022-07-22 | Stop reason: HOSPADM

## 2022-07-20 RX ORDER — OXYCODONE HYDROCHLORIDE 10 MG/1
10 TABLET ORAL
Status: DISCONTINUED | OUTPATIENT
Start: 2022-07-20 | End: 2022-07-22 | Stop reason: HOSPADM

## 2022-07-20 RX ORDER — ENOXAPARIN SODIUM 100 MG/ML
40 INJECTION SUBCUTANEOUS EVERY 24 HOURS
Status: DISCONTINUED | OUTPATIENT
Start: 2022-07-21 | End: 2022-07-22 | Stop reason: HOSPADM

## 2022-07-20 RX ORDER — ROCURONIUM BROMIDE 10 MG/ML
INJECTION, SOLUTION INTRAVENOUS
Status: DISCONTINUED | OUTPATIENT
Start: 2022-07-20 | End: 2022-07-20

## 2022-07-20 RX ORDER — PROPOFOL 10 MG/ML
VIAL (ML) INTRAVENOUS
Status: DISCONTINUED | OUTPATIENT
Start: 2022-07-20 | End: 2022-07-20

## 2022-07-20 RX ORDER — HYDROMORPHONE HYDROCHLORIDE 1 MG/ML
0.2 INJECTION, SOLUTION INTRAMUSCULAR; INTRAVENOUS; SUBCUTANEOUS EVERY 5 MIN PRN
Status: DISCONTINUED | OUTPATIENT
Start: 2022-07-20 | End: 2022-07-20 | Stop reason: HOSPADM

## 2022-07-20 RX ORDER — ACETAMINOPHEN 500 MG
1000 TABLET ORAL EVERY 8 HOURS
Status: DISCONTINUED | OUTPATIENT
Start: 2022-07-21 | End: 2022-07-22 | Stop reason: HOSPADM

## 2022-07-20 RX ORDER — DEXAMETHASONE SODIUM PHOSPHATE 4 MG/ML
INJECTION, SOLUTION INTRA-ARTICULAR; INTRALESIONAL; INTRAMUSCULAR; INTRAVENOUS; SOFT TISSUE
Status: DISCONTINUED | OUTPATIENT
Start: 2022-07-20 | End: 2022-07-20

## 2022-07-20 RX ORDER — FENTANYL CITRATE 50 UG/ML
INJECTION, SOLUTION INTRAMUSCULAR; INTRAVENOUS
Status: DISCONTINUED | OUTPATIENT
Start: 2022-07-20 | End: 2022-07-20

## 2022-07-20 RX ORDER — HALOPERIDOL 5 MG/ML
INJECTION INTRAMUSCULAR
Status: COMPLETED
Start: 2022-07-20 | End: 2022-07-20

## 2022-07-20 RX ORDER — PHENYLEPHRINE HCL IN 0.9% NACL 1 MG/10 ML
SYRINGE (ML) INTRAVENOUS
Status: DISCONTINUED | OUTPATIENT
Start: 2022-07-20 | End: 2022-07-20

## 2022-07-20 RX ORDER — EPHEDRINE SULFATE 50 MG/ML
INJECTION, SOLUTION INTRAVENOUS
Status: DISCONTINUED | OUTPATIENT
Start: 2022-07-20 | End: 2022-07-20

## 2022-07-20 RX ORDER — METRONIDAZOLE 500 MG/100ML
INJECTION, SOLUTION INTRAVENOUS
Status: DISCONTINUED | OUTPATIENT
Start: 2022-07-20 | End: 2022-07-20

## 2022-07-20 RX ORDER — SODIUM CHLORIDE 0.9 % (FLUSH) 0.9 %
10 SYRINGE (ML) INJECTION
Status: DISCONTINUED | OUTPATIENT
Start: 2022-07-20 | End: 2022-07-20 | Stop reason: HOSPADM

## 2022-07-20 RX ORDER — TRIPROLIDINE/PSEUDOEPHEDRINE 2.5MG-60MG
600 TABLET ORAL
Status: COMPLETED | OUTPATIENT
Start: 2022-07-20 | End: 2022-07-20

## 2022-07-20 RX ADMIN — Medication 200 MCG: at 09:07

## 2022-07-20 RX ADMIN — HALOPERIDOL 0.5 MG: 5 INJECTION INTRAMUSCULAR at 11:07

## 2022-07-20 RX ADMIN — ACETAMINOPHEN 976.6 MG: 160 SOLUTION ORAL at 06:07

## 2022-07-20 RX ADMIN — GABAPENTIN 300 MG: 300 CAPSULE ORAL at 02:07

## 2022-07-20 RX ADMIN — GABAPENTIN 300 MG: 300 CAPSULE ORAL at 09:07

## 2022-07-20 RX ADMIN — IBUPROFEN 800 MG: 800 INJECTION INTRAVENOUS at 09:07

## 2022-07-20 RX ADMIN — LIDOCAINE HYDROCHLORIDE 100 MG: 20 INJECTION, SOLUTION INTRAVENOUS at 08:07

## 2022-07-20 RX ADMIN — ALVIMOPAN 12 MG: 12 CAPSULE ORAL at 06:07

## 2022-07-20 RX ADMIN — PROPOFOL 150 MG: 10 INJECTION, EMULSION INTRAVENOUS at 08:07

## 2022-07-20 RX ADMIN — Medication 30 MG: at 08:07

## 2022-07-20 RX ADMIN — FENTANYL CITRATE 100 MCG: 50 INJECTION, SOLUTION INTRAMUSCULAR; INTRAVENOUS at 08:07

## 2022-07-20 RX ADMIN — OXYCODONE HYDROCHLORIDE 10 MG: 10 TABLET ORAL at 09:07

## 2022-07-20 RX ADMIN — CEFTRIAXONE SODIUM 2 G: 1 INJECTION, SOLUTION INTRAVENOUS at 08:07

## 2022-07-20 RX ADMIN — MUPIROCIN 1 G: 20 OINTMENT TOPICAL at 06:07

## 2022-07-20 RX ADMIN — EPHEDRINE SULFATE 15 MG: 50 INJECTION INTRAVENOUS at 09:07

## 2022-07-20 RX ADMIN — Medication 100 MCG: at 10:07

## 2022-07-20 RX ADMIN — ONDANSETRON 4 MG: 2 INJECTION INTRAMUSCULAR; INTRAVENOUS at 10:07

## 2022-07-20 RX ADMIN — Medication 200 MCG: at 08:07

## 2022-07-20 RX ADMIN — Medication 10 MG: at 09:07

## 2022-07-20 RX ADMIN — ROCURONIUM BROMIDE 20 MG: 10 INJECTION, SOLUTION INTRAVENOUS at 09:07

## 2022-07-20 RX ADMIN — SODIUM CHLORIDE: 0.9 INJECTION, SOLUTION INTRAVENOUS at 07:07

## 2022-07-20 RX ADMIN — LIDOCAINE HYDROCHLORIDE 0.02 MG/KG/MIN: 8 INJECTION, SOLUTION INTRAVENOUS at 08:07

## 2022-07-20 RX ADMIN — ROCURONIUM BROMIDE 10 MG: 10 INJECTION, SOLUTION INTRAVENOUS at 08:07

## 2022-07-20 RX ADMIN — METRONIDAZOLE 500 MG: 500 INJECTION, SOLUTION INTRAVENOUS at 08:07

## 2022-07-20 RX ADMIN — HYDROMORPHONE HYDROCHLORIDE 0.25 MG: 1 INJECTION, SOLUTION INTRAMUSCULAR; INTRAVENOUS; SUBCUTANEOUS at 10:07

## 2022-07-20 RX ADMIN — SODIUM CHLORIDE: 0.9 INJECTION, SOLUTION INTRAVENOUS at 11:07

## 2022-07-20 RX ADMIN — MUPIROCIN: 20 OINTMENT TOPICAL at 09:07

## 2022-07-20 RX ADMIN — LIDOCAINE HYDROCHLORIDE 10 MG: 10 INJECTION, SOLUTION EPIDURAL; INFILTRATION; INTRACAUDAL at 06:07

## 2022-07-20 RX ADMIN — DEXAMETHASONE SODIUM PHOSPHATE 8 MG: 4 INJECTION, SOLUTION INTRAMUSCULAR; INTRAVENOUS at 08:07

## 2022-07-20 RX ADMIN — SUGAMMADEX 200 MG: 100 INJECTION, SOLUTION INTRAVENOUS at 10:07

## 2022-07-20 RX ADMIN — GABAPENTIN 300 MG: 300 CAPSULE ORAL at 06:07

## 2022-07-20 RX ADMIN — HEPARIN SODIUM 5000 UNITS: 5000 INJECTION INTRAVENOUS; SUBCUTANEOUS at 06:07

## 2022-07-20 RX ADMIN — Medication 100 MCG: at 08:07

## 2022-07-20 RX ADMIN — IBUPROFEN 600 MG: 100 SUSPENSION ORAL at 06:07

## 2022-07-20 RX ADMIN — HYDROMORPHONE HYDROCHLORIDE 0.25 MG: 1 INJECTION, SOLUTION INTRAMUSCULAR; INTRAVENOUS; SUBCUTANEOUS at 12:07

## 2022-07-20 RX ADMIN — ACETAMINOPHEN 1000 MG: 10 INJECTION INTRAVENOUS at 09:07

## 2022-07-20 RX ADMIN — HALOPERIDOL LACTATE 0.5 MG: 5 INJECTION, SOLUTION INTRAMUSCULAR at 11:07

## 2022-07-20 RX ADMIN — OXYCODONE HYDROCHLORIDE 10 MG: 10 TABLET ORAL at 02:07

## 2022-07-20 RX ADMIN — SODIUM CHLORIDE, SODIUM GLUCONATE, SODIUM ACETATE, POTASSIUM CHLORIDE, MAGNESIUM CHLORIDE, SODIUM PHOSPHATE, DIBASIC, AND POTASSIUM PHOSPHATE: .53; .5; .37; .037; .03; .012; .00082 INJECTION, SOLUTION INTRAVENOUS at 08:07

## 2022-07-20 RX ADMIN — SCOPALAMINE 1 PATCH: 1 PATCH, EXTENDED RELEASE TRANSDERMAL at 06:07

## 2022-07-20 RX ADMIN — ACETAMINOPHEN 1000 MG: 10 INJECTION INTRAVENOUS at 01:07

## 2022-07-20 RX ADMIN — ROCURONIUM BROMIDE 40 MG: 10 INJECTION, SOLUTION INTRAVENOUS at 08:07

## 2022-07-20 RX ADMIN — OXYCODONE HYDROCHLORIDE 10 MG: 10 TABLET ORAL at 06:07

## 2022-07-20 RX ADMIN — IBUPROFEN 800 MG: 800 INJECTION INTRAVENOUS at 01:07

## 2022-07-20 RX ADMIN — Medication 200 MCG: at 10:07

## 2022-07-20 RX ADMIN — OXYCODONE 5 MG: 5 TABLET ORAL at 11:07

## 2022-07-20 RX ADMIN — SODIUM CHLORIDE: 0.9 INJECTION, SOLUTION INTRAVENOUS at 08:07

## 2022-07-20 RX ADMIN — MIDAZOLAM HYDROCHLORIDE 2 MG: 1 INJECTION, SOLUTION INTRAMUSCULAR; INTRAVENOUS at 07:07

## 2022-07-20 NOTE — OP NOTE
MESSI ROCHA  [id number]  [id number]    OPERATIVE NOTE:    DATE OF OPERATION: 07/20/2022    PREOPERATIVE DIAGNOSIS:  Recurrent diverticulitis following sigmoid resection    POSTOPERATIVE DIAGNOSIS:  Recurrent diverticulitis following sigmoid resection    PROCEDURE PERFORMED:   1. Laparoscopic left colectomy  2. Laparoscopic mobilization of the splenic flexure  3. Flexible sigmoidoscopy.      ATTENDING SURGEON: ASHTYN Melo MD    RESIDENT/FELLOW SURGEON: Dragan Stoner MD    ANESTHESIA: General    ESTIMATED BLOOD LOSS:  100 mL    FINDINGS:  1. Prior end-to-end colocolonic anastomosis in the distal sigmoid colon.  Negative leak test.  Bilateral ureters identified and protected.    SPECIMENS:   1. Left colon  2. Proximal anastomotic donut  3. Distal anastomotic donut.     COMPLICATIONS:  None apparent    DRAINS:  None    DISPOSITION: PACU    CONDITION: good    INDICATION FOR PROCEDURE:  Messi Rocha is a 46 y.o. female who previously undergone sigmoid resection for symptomatic diverticulitis.  She presented twice over the past year with recurrence.  It appeared that she had had an incomplete resection of her sigmoid colon.  Repeat resection was therefore offered to include the descending colon based on prior CT scans.        DESCRIPTION OF PROCEDURE:    After informed consent was reviewed, the patient was taken to the Operating Room and placed under general anesthesia.  A Keller  catheter was sterilely inserted.  Preoperative antibiotics with ceftriaxone and Flagyl were given.  The patient was placed in lithotomy position. The arms were tucked and the anterior abdominal wall was prepped and draped in the usual sterile fashion.  A time out was perfomed.                 The abdomen was entered through a 8 cm Pfannenstiel incision 2 fingerbreadths above the pubic symphysis.  The skin was incised sharply.  The fascia was incised with electrocautery.  Fascial flaps were elevated up to the umbilicus and down to the  pubic symphysis.  The midline of the rectus muscles was then divided.  The abdomen was entered sharply without injury to the underlying bowel.  The sleeve of the GelPort was then placed. Three 5mm trocars were inserted in the right lower quadrant, just above the umbilicus, and the left lower quadrant.   The top of the GelPort was then placed and the abdomen was insufflated.    The ligament Treitz was identified.  Next to the ligament of Treitz, the inferior mesenteric vein was identified.  The IMV had not been previously divided.  The IMV was elevated and the peritoneum was incised.  The retroperitoneum was identified and swept posteriorly out to the abdominal sidewall and to the superior pole of the kidney.  Dissection continued inferiorly around the left colic artery.  The DIANNA pedicle was then elevated.  The DIANNA pedicle remained intact with a patent DIANNA as well as left colic artery.  Both the DIANNA and the left colic artery were divided with the ligature after identification of the left ureter..   Lateral attachments were taken down with the ligature.  The omentum was taken off of the transverse colon the lesser sac was entered.  Splenic flexure was completely mobilized using the ligature.  Splenic flexure was then rotated medially and attachments to the inferior border the pancreas were divided.  The left colon was mobilized up and over the 4th portion of the duodenum.  Prior to mobilization, the splenic flexure was marked as the proximal transection point.  With this, the colon had excellent laxity.   The cap of the GelPort was then removed.  The specimen was brought out.  Dissection continued down to the top of the rectum was identified by the splaying of the tenia coli, the absence of epiploica, and the passage of rectal sizers.  This appeared approximately 5-7 cm below the prior end-to-end anastomosis.  The rectum was circumferentially dissected and divided with a contour stapler with a green load.  The  intervening mesentery was divided with the ligature.  The proximal extent of resection was identified by the marking previously placed near the splenic flexure to include the left colon.  The colon was isolated at this level.  The mesentery was divided with the ligature up to the marginal artery.  The marginal artery was tested and found to be pulsatile.  The colon was divided with a Furness clamp after a 2 0 nylon pursestring had been placed. The colon was divided and opened and appeared healthy.  A 29 EEA anvil was inserted and the pursestring was tied down and wrapped around the 2nd time for reinforcement.                The EEA stapler was placed into the anal canal and advanced to the top of the rectal stump.  The spike was deployed just posterior to the staple line.  The anvil was connected and the stapler closed.  Care was taken to avoid entrapment of the bladder or ureters.  The stapler was fired and removed.  On the back table, two circumferential anastmotic donuts were found.  Leak testing was done with a colonoscope.  The anastomosis appeared healthy.  Leak test was negative. The anastomosis was then oversewn with 3-0 vicryl sutures.  The abdomen was reinsufflated.  The transverse colon set straight in abdomen without any twisting.  The small bowel was placed on top of the transverse colon and the omentum was pulled down into the pelvis.              All members of the team then changed gowns and gloves and new instruments were used for a clean closure.     The Pfannenstiel incision was closed in layers.  The posterior peritoneum and fascia were closed with a #1 Running PDS.  Simple #1 PDS sutures were used to reapproximate the muscle.  The anterior rectus fascia was closed with #1 Running PDS after hemostasis had been ensured.    All incisions were irrigated with saline and hemostasis was noted.  Skin incisions were closed with 4-0 Vicryl in subcuticular fashion and steri-strips were applied.                 The patient tolerated the procedure well.  There were no apparent intraoperative complications.  All sponge, needle, and instrument counts were correct x2.  The patient was extubated and taken to PACU in stable condition.                ASHTYN Melo MD, FACS, FASCRS  Staff Surgeon  Colon & Rectal Surgery

## 2022-07-20 NOTE — BRIEF OP NOTE
[facility] - Surgery ([facility])  Brief Operative Note    SUMMARY     Surgery Date: 7/20/2022     Surgeon(s) and Role:     * ASHTYN Vargas MD - Primary     * Amol Stoner MD - Fellow    Assisting Surgeon: None    Pre-op Diagnosis:  Diverticulitis [K57.92]    Post-op Diagnosis:  Post-Op Diagnosis Codes:     * Diverticulitis [K57.92]    Procedure(s) (LRB):  COLECTOMY, SIGMOID, LAPAROSCOPIC, ERAS low (Left)  SIGMOIDOSCOPY, FLEXIBLE (N/A)  ANOSCOPY (N/A)  MOBILIZATION, SPLENIC FLEXURE    Anesthesia: General    Operative Findings: No unexpected findings.  Evidence of previous disease just distal to splenic flexure.  Negative leak test    Estimated Blood Loss: * No values recorded between 7/20/2022  8:25 AM and 7/20/2022 10:42 AM *    EBL: 20cc         Specimens:   Specimen (24h ago, onward)             Start     Ordered    07/20/22 0959  Specimen to Pathology, Surgery General Surgery (Colon Rectal)  Once        Comments: Pre-op Diagnosis: Diverticulitis [K57.92]Procedure(s):COLECTOMY, SIGMOID, LAPAROSCOPIC, ERAS lowSIGMOIDOSCOPY, FLEXIBLEANOSCOPY Number of specimens: 2Name of specimens: 1. Left Colon - perm2. Proximal and distal anastomotic donuts - perm     References:    Click here for ordering Quick Tip   Question Answer Comment   Procedure Type: General Surgery Colon Rectal   Specimen Class: Complex case/Special    Which provider would you like to cc? ASHTYN VARGAS    Release to patient Immediate        07/20/22 1001                [id number]

## 2022-07-20 NOTE — PLAN OF CARE
POC reviewed with pt, verbalized understanding.    AAOx4, VSS on RA.   C/o pain to incision site,  Pain managed with scheduled and PRN meds.  Clear liquid diet,   3 lap sites and one transverse incision with steri strips intact.    Keller in place, yellow urine output   All needs met, no complaints offered at this time. Bed locked in lowest position, call bell within reach. Frequent rounds for safety.

## 2022-07-20 NOTE — ANESTHESIA PROCEDURE NOTES
Intubation    Date/Time: 7/20/2022 8:17 AM  Performed by: Luz Marina Varghese MD  Authorized by: Weston Chacon MD     Intubation:     Induction:  Intravenous    Intubated:  Postinduction    Mask Ventilation:  Easy mask    Attempts:  1    Attempted By:  Student    Method of Intubation:  Direct    Blade:  Marysol 3    Laryngeal View Grade: Grade I - full view of cords      Difficult Airway Encountered?: No      Complications:  None    Airway Device:  Oral endotracheal tube    Airway Device Size:  7.0    Style/Cuff Inflation:  Cuffed (inflated to minimal occlusive pressure)    Tube secured:  22    Secured at:  The teeth    Placement Verified By:  Capnometry    Complicating Factors:  None    Findings Post-Intubation:  BS equal bilateral and atraumatic/condition of teeth unchanged

## 2022-07-20 NOTE — NURSING TRANSFER
Nursing Transfer Note      7/20/2022     Reason patient is being transferred: post-procedure    Transfer To: [place] [id number]    Transfer via bed    Transfer with IV pole and fluids    Transported by transporter x2    Medicines sent: n/a    Any special needs or follow-up needed: routine    Chart send with patient: Yes    Notified: spouse    Patient reassessed at: 07/20/2022 at 1100

## 2022-07-20 NOTE — TRANSFER OF CARE
"Anesthesia Transfer of Care Note    Patient: Socorro Huang    Procedure(s) Performed: Procedure(s) (LRB):  COLECTOMY, SIGMOID, LAPAROSCOPIC, ERAS low (Left)  SIGMOIDOSCOPY, FLEXIBLE (N/A)  ANOSCOPY (N/A)  MOBILIZATION, SPLENIC FLEXURE    Patient location: PACU    Anesthesia Type: general    Transport from OR: Transported from OR on 6-10 L/min O2 by face mask with adequate spontaneous ventilation    Post pain: adequate analgesia    Post assessment: no apparent anesthetic complications    Post vital signs: stable    Level of consciousness: awake and alert    Nausea/Vomiting: no nausea/vomiting    Complications: none    Transfer of care protocol was followed      Last vitals:   Visit Vitals  /76 (BP Location: Right arm, Patient Position: Lying)   Pulse 71   Temp 36.6 °C (97.8 °F) (Oral)   Resp 14   Ht 5' 7" (1.702 m)   Wt 70.9 kg (156 lb 4.9 oz)   LMP 05/05/2013   SpO2 97%   Breastfeeding No   BMI 24.48 kg/m²     " From: Janet Bartlett  To: Eric Ko M.D.  Sent: 8/11/2017 11:38 AM PDT  Subject: Procedure Question    Please fax the chest x-ray order to the [facility] -- [phone]    Janet has an appointment with you on Monday, but she needs to get this chest xray done today in [place] because your technician isn't in the office on Monday the day of her appointment.    Thanks  Hannah Bartlett  daughter-in -law

## 2022-07-21 LAB
ANION GAP SERPL CALC-SCNC: 7 MMOL/L (ref 8–16)
BASOPHILS # BLD AUTO: 0.01 K/UL (ref 0–0.2)
BASOPHILS NFR BLD: 0.2 % (ref 0–1.9)
BUN SERPL-MCNC: 4 MG/DL (ref 6–20)
CALCIUM SERPL-MCNC: 8.6 MG/DL (ref 8.7–10.5)
CHLORIDE SERPL-SCNC: 108 MMOL/L (ref 95–110)
CO2 SERPL-SCNC: 24 MMOL/L (ref 23–29)
CREAT SERPL-MCNC: 0.6 MG/DL (ref 0.5–1.4)
DIFFERENTIAL METHOD: ABNORMAL
EOSINOPHIL # BLD AUTO: 0 K/UL (ref 0–0.5)
EOSINOPHIL NFR BLD: 0.2 % (ref 0–8)
ERYTHROCYTE [DISTWIDTH] IN BLOOD BY AUTOMATED COUNT: 11.9 % (ref 11.5–14.5)
EST. GFR  (AFRICAN AMERICAN): >60 ML/MIN/1.73 M^2
EST. GFR  (NON AFRICAN AMERICAN): >60 ML/MIN/1.73 M^2
GLUCOSE SERPL-MCNC: 91 MG/DL (ref 70–110)
HCT VFR BLD AUTO: 35.9 % (ref 37–48.5)
HGB BLD-MCNC: 12.2 G/DL (ref 12–16)
IMM GRANULOCYTES # BLD AUTO: 0.01 K/UL (ref 0–0.04)
IMM GRANULOCYTES NFR BLD AUTO: 0.2 % (ref 0–0.5)
LYMPHOCYTES # BLD AUTO: 0.7 K/UL (ref 1–4.8)
LYMPHOCYTES NFR BLD: 11.3 % (ref 18–48)
MCH RBC QN AUTO: 32.9 PG (ref 27–31)
MCHC RBC AUTO-ENTMCNC: 34 G/DL (ref 32–36)
MCV RBC AUTO: 97 FL (ref 82–98)
MONOCYTES # BLD AUTO: 0.6 K/UL (ref 0.3–1)
MONOCYTES NFR BLD: 9.3 % (ref 4–15)
NEUTROPHILS # BLD AUTO: 5.1 K/UL (ref 1.8–7.7)
NEUTROPHILS NFR BLD: 78.8 % (ref 38–73)
NRBC BLD-RTO: 0 /100 WBC
PLATELET # BLD AUTO: 207 K/UL (ref 150–450)
PMV BLD AUTO: 8.4 FL (ref 9.2–12.9)
POTASSIUM SERPL-SCNC: 3.8 MMOL/L (ref 3.5–5.1)
RBC # BLD AUTO: 3.71 M/UL (ref 4–5.4)
SODIUM SERPL-SCNC: 139 MMOL/L (ref 136–145)
WBC # BLD AUTO: 6.45 K/UL (ref 3.9–12.7)

## 2022-07-21 PROCEDURE — 97165 OT EVAL LOW COMPLEX 30 MIN: CPT

## 2022-07-21 PROCEDURE — 63600175 PHARM REV CODE 636 W HCPCS: Performed by: SURGERY

## 2022-07-21 PROCEDURE — 25000003 PHARM REV CODE 250: Performed by: NURSE PRACTITIONER

## 2022-07-21 PROCEDURE — 25000003 PHARM REV CODE 250: Performed by: SURGERY

## 2022-07-21 PROCEDURE — 97161 PT EVAL LOW COMPLEX 20 MIN: CPT

## 2022-07-21 PROCEDURE — 36415 COLL VENOUS BLD VENIPUNCTURE: CPT | Performed by: SURGERY

## 2022-07-21 PROCEDURE — 85025 COMPLETE CBC W/AUTO DIFF WBC: CPT | Performed by: SURGERY

## 2022-07-21 PROCEDURE — 80048 BASIC METABOLIC PNL TOTAL CA: CPT | Performed by: SURGERY

## 2022-07-21 PROCEDURE — 25000242 PHARM REV CODE 250 ALT 637 W/ HCPCS: Performed by: SURGERY

## 2022-07-21 PROCEDURE — 20600001 HC STEP DOWN PRIVATE ROOM

## 2022-07-21 RX ADMIN — MUPIROCIN: 20 OINTMENT TOPICAL at 08:07

## 2022-07-21 RX ADMIN — IBUPROFEN 800 MG: 400 TABLET, FILM COATED ORAL at 01:07

## 2022-07-21 RX ADMIN — ACETAMINOPHEN 1000 MG: 500 TABLET ORAL at 01:07

## 2022-07-21 RX ADMIN — OXYCODONE HYDROCHLORIDE 10 MG: 10 TABLET ORAL at 11:07

## 2022-07-21 RX ADMIN — OXYCODONE HYDROCHLORIDE 10 MG: 10 TABLET ORAL at 08:07

## 2022-07-21 RX ADMIN — ALVIMOPAN 12 MG: 12 CAPSULE ORAL at 08:07

## 2022-07-21 RX ADMIN — IBUPROFEN 800 MG: 400 TABLET, FILM COATED ORAL at 10:07

## 2022-07-21 RX ADMIN — ACETAMINOPHEN 1000 MG: 500 TABLET ORAL at 10:07

## 2022-07-21 RX ADMIN — HYDROMORPHONE HYDROCHLORIDE 0.25 MG: 1 INJECTION, SOLUTION INTRAMUSCULAR; INTRAVENOUS; SUBCUTANEOUS at 08:07

## 2022-07-21 RX ADMIN — GABAPENTIN 300 MG: 300 CAPSULE ORAL at 01:07

## 2022-07-21 RX ADMIN — OXYCODONE HYDROCHLORIDE 10 MG: 10 TABLET ORAL at 03:07

## 2022-07-21 RX ADMIN — IBUPROFEN 800 MG: 800 INJECTION INTRAVENOUS at 06:07

## 2022-07-21 RX ADMIN — GABAPENTIN 300 MG: 300 CAPSULE ORAL at 08:07

## 2022-07-21 RX ADMIN — OXYCODONE HYDROCHLORIDE 10 MG: 10 TABLET ORAL at 07:07

## 2022-07-21 RX ADMIN — ACETAMINOPHEN 1000 MG: 10 INJECTION INTRAVENOUS at 05:07

## 2022-07-21 RX ADMIN — ENOXAPARIN SODIUM 40 MG: 100 INJECTION SUBCUTANEOUS at 05:07

## 2022-07-21 RX ADMIN — HYDROMORPHONE HYDROCHLORIDE 0.25 MG: 1 INJECTION, SOLUTION INTRAMUSCULAR; INTRAVENOUS; SUBCUTANEOUS at 01:07

## 2022-07-21 RX ADMIN — FLUTICASONE PROPIONATE 50 MCG: 50 SPRAY, METERED NASAL at 08:07

## 2022-07-21 RX ADMIN — HYDROMORPHONE HYDROCHLORIDE 0.25 MG: 1 INJECTION, SOLUTION INTRAMUSCULAR; INTRAVENOUS; SUBCUTANEOUS at 06:07

## 2022-07-21 NOTE — PLAN OF CARE
Problem: Physical Therapy  Goal: Physical Therapy Goal  Description: Discharge Recommendation: Home, no PT needs.    Evaluation completed today. Pt demonstrated a high level of functional mobility and does not require acute PT services.     Peace Jaime, WALI  7/21/2022      Outcome: Met

## 2022-07-21 NOTE — ASSESSMENT & PLAN NOTE
OR 7/20 sigmoid resection    -await return of bowel function, lfd  -continue multi modality for pain control  -encourage ambulation and use of IS  -cinthya hose and SCD  -prophalactic lovenox and PPI  -dc nuñez, await void

## 2022-07-21 NOTE — PLAN OF CARE
POC reviewed with pt, verbalized understanding.    AAOx4, VSS on RA.   C/o pain to incision site,  Pain managed with scheduled and PRN meds.  Low fiber low residue diet,   3 lap sites and one transverse incision with steri strips intact.    Keller dcd this AM, voided adequate in the restroom  Ambulated independently in the hallway  All needs met, no complaints at this time. Bed locked in lowest position, call bell within reach. Frequent rounds for safety.

## 2022-07-21 NOTE — PT/OT/SLP EVAL
"Occupational Therapy   Co-Evaluation and Discharge Note    Name: Socorro Huang  MRN: [id number]  Admitting Diagnosis:  History of diverticulitis   Recent Surgery: Procedure(s) (LRB):  COLECTOMY, SIGMOID, LAPAROSCOPIC, ERAS low (Left)  SIGMOIDOSCOPY, FLEXIBLE (N/A)  ANOSCOPY (N/A)  MOBILIZATION, SPLENIC FLEXURE (N/A) 1 Day Post-Op    Recommendations:     Discharge Recommendations: home  Discharge Equipment Recommendations:  none  Barriers to discharge:  None    Assessment:     Socorro Huang is a 46 y.o. female with a medical diagnosis of History of diverticulitis. Pt agreeable to participate in evaluation.  Pt demonstrated tub and toilet transfer, as well as UB/LB dressing and functional mobility without the need for physical assistance. Pt expressed no concerns with her ability to perform ADLs and functional mobility at home. At this time, patient is functioning at their prior level of function and does not require further acute OT services. Pt is safe to return home with assist from family as needed when medically appropriate for d/c.    Plan:     During this hospitalization, patient does not require further acute OT services.  Please re-consult if situation changes.    · Plan of Care Reviewed with: patient    Subjective     Chief Complaint: Pain at incision site   Patient/Family Comments/goals: "My  works from home and can help if I need anything"    Occupational Profile:  Living Environment: Pt lives with her  in a [facility] with no STANISLAV. Pt has access to a tub/shower combo and a WIS with bath bench.   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  Patient found up in chair with telemetry upon OT entry to room.    Additional staff present: PT present for co-tx as appropriate for patient 2* medical complexities, decreased activity tolerance for 2 sessions, and level of skilled assist needed for task completion.    General Precautions: Standard, fall   Orthopedic Precautions:N/A   Braces: N/A  Respiratory Status: Room air     Occupational Performance:    Bed Mobility:    · Not assessed 2* pt up in bedside chair upon OT entry     Functional Mobility/Transfers:  · Patient completed Sit <> Stand Transfer from bedside chair with supervision with no assistive device   · Patient completed Toilet Transfer Step Transfer technique with supervision with no AD  · Patient completed  Shower Transfer Step Transfer technique with supervision with no AD  · Functional Mobility: Pt ambulated community distances within linares with supervision and no AD    Activities of Daily Living:  · Upper Body Dressing: supervision to don hospital gown over back in standing   · Lower Body Dressing: supervision don/doff B socks sitting in bedside chair    Cognitive/Visual Perceptual:  Cognitive/Psychosocial Skills:     -       Oriented to: Person, Place, Time and Situation   -       Follows Commands/attention:Follows multistep  commands  -       Communication: clear/fluent  -       Memory: No Deficits noted  -       Safety awareness/insight to disability: intact   -       Mood/Affect/Coping skills/emotional control: Appropriate to situation, Cooperative and Pleasant  Visual/Perceptual:      -Intact     Physical Exam:  Balance:    -       Supervision sitting and standing balance   Upper Extremity Range of Motion:     -       Right Upper Extremity: WFL  -       Left Upper Extremity: WFL  Upper Extremity Strength:    -       Right  Upper Extremity: WFL  -       Left Upper Extremity: WFL   Strength:    -       Right Upper Extremity: WFL  -       Left Upper Extremity: WFL    AMPAC 6 Click ADL:  AMPAC Total Score: 23    Treatment & Education:  - Discussed OT POC and answered all questions within OT scope of practice   - Instructed patient to use call light to have nursing staff assist with needs/transfers  - Therapist provided facilitation and instruction of proper body mechanics and fall prevention strategies during tasks listed above  - Instructed patient to sit in bedside chair and walk within halls daily to increase OOB activity tolerance  - Encouraged patient to alert team if any concerns arise regarding ADLs   Education:    Patient left ambulatory in room with all lines intact    GOALS:   Multidisciplinary Problems     Occupational Therapy Goals     Not on file          Multidisciplinary Problems (Resolved)        Problem: Occupational Therapy    Goal Priority Disciplines Outcome Interventions   Occupational Therapy Goal   (Resolved)     OT, PT/OT Met                    History:     Past Medical History:   Diagnosis Date    Abnormal Pap smear     Anxiety     celexa & prozac didn't help much    Constipation - functional     Headache(784.0)     Hemorrhoid     Leukopenia     benign, [facility]    Menorrhagia     Strain of neck muscle     tx with PT at [facility] in past       Past Surgical History:   Procedure Laterality Date    ANOSCOPY N/A 7/20/2022    Procedure: ANOSCOPY;  Surgeon: ASHTYN Melo MD;  Location: [address];  Service: Colon and Rectal;  Laterality: N/A;    BREAST RECONSTRUCTION  2018    BREAST SURGERY      COLON SURGERY  2014         COLONOSCOPY N/A 2/3/2022    Procedure: COLONOSCOPY;  Surgeon: ASHTYN Melo MD;  Location: [facility];  Service: Endoscopy;  Laterality: N/A;    DILATION AND CURETTAGE OF UTERUS  12/18/2012    complex hyperplasia, no atypia    FLEXIBLE SIGMOIDOSCOPY  N/A 7/20/2022    Procedure: SIGMOIDOSCOPY, FLEXIBLE;  Surgeon: ASHTYN Melo MD;  Location: [facility] OR 2ND FLR;  Service: Colon and Rectal;  Laterality: N/A;    HEMORRHOID SURGERY  2014    HYSTERECTOMY      LAPAROSCOPIC SIGMOIDECTOMY Left 7/20/2022    Procedure: COLECTOMY, SIGMOID, LAPAROSCOPIC, ERAS low;  Surgeon: ASHTYN Melo MD;  Location: [facility] OR 2ND FLR;  Service: Colon and Rectal;  Laterality: Left;    MOBILIZATION OF SPLENIC FLEXURE N/A 7/20/2022    Procedure: MOBILIZATION, SPLENIC FLEXURE;  Surgeon: ASHTYN Melo MD;  [facility];  Service: Colon and Rectal;  Laterality: N/A;    OOPHORECTOMY      PARTIAL HYSTERECTOMY  2013    for atypia    THYROIDECTOMY, PARTIAL  2013    TONSILLECTOMY      TUBAL LIGATION         Time Tracking:     OT Date of Treatment: 07/21/22  OT Start Time: 0938  OT Stop Time: 0947  OT Total Time (min): 9 min    Billable Minutes:Evaluation 9    7/21/2022

## 2022-07-21 NOTE — PLAN OF CARE
Pt Aox4, VS remained stable throughout shift, pain controlled with PRN meds. Pt has nuñez catheter for urinary elimination scheduled to be removed at 0600. Educated on POC, resting comfortably, bed low and locked, call light within reach, will continue to monitor.       Problem: Adult Inpatient Plan of Care  Goal: Plan of Care Review  Outcome: Ongoing, Progressing     Problem: Adult Inpatient Plan of Care  Goal: Patient-Specific Goal (Individualized)  Outcome: Ongoing, Progressing     Problem: Adult Inpatient Plan of Care  Goal: Absence of Hospital-Acquired Illness or Injury  Outcome: Ongoing, Progressing     Problem: Adult Inpatient Plan of Care  Goal: Optimal Comfort and Wellbeing  Outcome: Ongoing, Progressing

## 2022-07-21 NOTE — PT/OT/SLP EVAL
"Physical Therapy Co-Evaluation and Discharge Note    Patient Name:  Socorro Huang   MRN:  [id number]  Admitting Diagnosis:  History of diverticulitis   Recent Surgery: Procedure(s) (LRB):  COLECTOMY, SIGMOID, LAPAROSCOPIC, ERAS low (Left)  SIGMOIDOSCOPY, FLEXIBLE (N/A)  ANOSCOPY (N/A)  MOBILIZATION, SPLENIC FLEXURE (N/A) 1 Day Post-Op  Admit Date: 7/20/2022  Length of Stay: 1 days    Recommendations:     Discharge Recommendations:  home   Discharge Equipment Recommendations: none   Barriers to discharge: None    Assessment:     Socorro Hunag is a 46 y.o. female admitted with a medical diagnosis of History of diverticulitis. At this time, patient is functioning at their prior level of function and does not require further acute PT services. Pt educated on continuing to ambulate in hallway 3x/day.      GOALS: No goals identified at [facility].    Plan:     During this hospitalization, patient does not require further acute PT services.  Please re-consult if situation changes.      Subjective     RN notified prior to session.  Patient agreeable to PT evaluation.    Chief Complaint: "I feel great. I was about to take a shower."  Patient/Family Comments/goals: To return to PLOF  Pain/Comfort:  · Pain Rating 1: 5/10 (abdomen pain)  · Pain Addressed 1: Reposition, Distraction  · Pain Rating Post-Intervention 1: other (see comments) (did not rate)    Social History:  Residence: lives with their spouse 1-story house with 0 STANISLAV. Pt's bathroom has a walk-in shower.  Support available: family  Equipment Used: bath bench  Equipment Owned (not using): None  Prior level of function: independent  Work: Employed.   Drive: yes.     Patient reports they will have assistance from spouse upon discharge.    Objective:     Additional staff present:  OT and Supervising PT; OT for co-evaluation due to patient's medical complexities requiring two skilled therapists in order to appropriately assess patient's functional deficits as well as ensure " "patient safety, accommodate for limited activity tolerance, and provide appropriate, skilled assistance to maximize functional potential during evaluation.    Patient found up in chair with   upon PT entry to room.    General Precautions: Standard, fall   Orthopedic Precautions:N/A   Braces: N/A   Body mass index is 24.48 kg/m².  Oxygen Device: Room Air  Vitals: /66 (BP Location: Right arm, Patient Position: Lying)   Pulse 73   Temp 97.8 °F (36.6 °C) (Oral)   Resp 20   Ht 5' 7" (1.702 m)   Wt 70.9 kg (156 lb 4.9 oz)   LMP 05/05/2013   SpO2 98%   Breastfeeding No   BMI 24.48 kg/m²     Exam:  · Cognition:   · Alert and Cooperative  · AxOx4  · Command following: Follows multistep verbal commands  · Fluency: clear/fluent  · Skin Integrity: Visible skin intact  · Edema: None noted   · Sensation: Intact  · Hearing: Intact  · Vision:  Intact  · Postural Assessment: no deviations noted  · Range of Motion:  · RUE: WNL  · LUE: WNL  · RLE: WNL  · LLE: WNL  · Strength Exam:  · Bilateral Upper Extremity Strength: grossly 5/5  · Bilateral Lower Extremity Strength: grossly 5/5    Outcome Measures:  AM-PAC 6 CLICK MOBILITY  Turning over in bed (including adjusting bedclothes, sheets and blankets)?: 4  Sitting down on and standing up from a chair with arms (e.g., wheelchair, bedside commode, etc.): 4  Moving from lying on back to sitting on the side of the bed?: 4  Moving to and from a bed to a chair (including a wheelchair)?: 4  Need to walk in hospital room?: 4  Climbing 3-5 steps with a railing?: 4  Basic Mobility Total Score: 24     Functional Mobility:  Bed Mobility:   · Pt found/returned to bedside chair    Transfers:   · Sit <> Stand Transfer: supervision with no assistive device   · Stand <> Sit Transfer: supervision with no assistive device   · [id number] from bedside chair    Standing Balance:   Static Standing Balance: Good : able to maintain standing balance against moderate resistance   Dynamic Standing " Balance: Good : able to stand independently unsupported and cross midline moderately   Assistance Level Required: Supervision   Patient used: no assistive device    Postural deviations noted: no deviations noted   Comments: no LOB and VSS    Gait:   · Patient ambulated: 300 feet   · Patient required: supervision  · Patient used:  no assistive device   · Gait Pattern observed: reciprocal gait  · Gait Deviation(s): decreased elvia  · Impairments due to: pain  · all lines remained intact throughout ambulation trial  · Mask donned for out of room ambulation  · Comments: Pt demonstrated good endurance and did not require any standing or seated rest breaks; Pt able to perform 180 degree turns and vertical/horizontal head turns with no LOB    Education:   Time provided for education, counseling and discussion of health disposition in regards to patient's current status   All questions answered within PT scope of practice and to patient's satisfaction   PT role in POC to address current functional deficits   Pt educated on proper body mechanics, safety techniques, and energy conservation with PT facilitation and cueing throughout session   Call nursing/pct to transfer to chair/use bathroom. Pt stated understanding.    Patient left up in chair with all lines intact, call button in reach and RN notified.    History:     Past Medical History:   Diagnosis Date    Abnormal Pap smear     Anxiety     celexa & prozac didn't help much    Constipation - functional     Headache(784.0)     Hemorrhoid     Leukopenia     benign, evaluated in [facility] y 2000    Menorrhagia     Strain of neck muscle     tx with PT at [facility] in past       Past Surgical History:   Procedure Laterality Date    ANOSCOPY N/A 7/20/2022    Procedure: ANOSCOPY;  Surgeon: ASHTYN Melo MD;  Location: [facility] OR [address] [place] [address];  Service: Colon and Rectal;  Laterality: N/A;    BREAST RECONSTRUCTION  2018    BREAST SURGERY      COLON SURGERY  2014          COLONOSCOPY N/A 2/3/2022    Procedure: COLONOSCOPY;  Surgeon: ASHTYN Melo MD;  Location: [facility] ENDO;  Service: Endoscopy;  Laterality: N/A;    DILATION AND CURETTAGE OF UTERUS  12/18/2012    complex hyperplasia, no atypia    FLEXIBLE SIGMOIDOSCOPY N/A 7/20/2022    Procedure: SIGMOIDOSCOPY, FLEXIBLE;  Surgeon: ASHTYN Melo MD;  Location: [facility] OR 2ND FLR;  Service: Colon and Rectal;  Laterality: N/A;    HEMORRHOID SURGERY  2014    HYSTERECTOMY      LAPAROSCOPIC SIGMOIDECTOMY Left 7/20/2022    Procedure: COLECTOMY, SIGMOID, LAPAROSCOPIC, ERAS low;  Surgeon: ASHTYN Melo MD;  Location: [facility] OR 2ND FLR;  Service: Colon and Rectal;  Laterality: Left;    MOBILIZATION OF SPLENIC FLEXURE N/A 7/20/2022    Procedure: MOBILIZATION, SPLENIC FLEXURE;  Surgeon: ASHTYN Melo MD;  [facility];  Service: Colon and Rectal;  Laterality: N/A;    OOPHORECTOMY      PARTIAL HYSTERECTOMY  2013    for atypia    THYROIDECTOMY, PARTIAL  2013    TONSILLECTOMY      TUBAL LIGATION         Family History   Problem Relation Age of Onset    Emphysema Mother     Cancer Father         lung    Heart disease Father 50    Diabetes Paternal Grandmother     Hypertension Paternal Grandmother     Depression Sister     Suicide Brother     Drug abuse Maternal Aunt     Schizophrenia Paternal Aunt     Alcohol abuse Maternal Uncle     Alcohol abuse Cousin     Breast cancer Maternal Grandmother     Ovarian cancer Maternal Grandmother     ADD / ADHD Neg Hx     Anxiety disorder Neg Hx     Bipolar disorder Neg Hx     Dementia Neg Hx     OCD Neg Hx     Paranoid behavior Neg Hx     Physical abuse Neg Hx     Seizures Neg Hx     Self injury Neg Hx     Sexual abuse Neg Hx        Social History     Socioeconomic History    Marital status:     Number of children: 2   Occupational History     Employer: [employer]   Tobacco Use    Smoking status: Never Smoker    Smokeless  tobacco: Never Used   Substance and Sexual Activity    Alcohol use: Yes     Comment: socially    Drug use: No    Sexual activity: Yes     Partners: Male     Birth control/protection: Surgical   Other Topics Concern    Financial Status: Employed Yes     Comment: Dental Assistant and Estition    Caffeine Use: Substantial Yes    Firearms: Does patient have access to a firearm? 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 Fulton , GYN Dr Ruby                   Time Tracking:     PT Received On: 07/21/22  PT Start Time: 0938     PT Stop Time: 0947  PT Total Time (min): 9 min     Billable Minutes: Evaluation 9 minutes    Peace Jaime, SPT  7/21/2022

## 2022-07-21 NOTE — PLAN OF CARE
[address] [facility]  Initial Discharge Assessment       Primary Care Provider: Blake Adams MD    Admission Diagnosis: Diverticulitis [K57.92]    Admission Date: 7/20/2022  Expected Discharge Date: 7/23/2022    Discharge Barriers Identified: None    Payor: AETNA / Plan: AETNA OPEN CHOICE / Product Type: PPO /     Extended Emergency Contact Information  Primary Emergency Contact: Fernando Huang  Address: [address] [place] [address] [facility]  Home Phone: [phone]  Relation: Spouse    Discharge Plan A: Home  Discharge Plan B: Home Health      [id number] - [place] - [address]  [address] [address]  Phone: [phone] Fax: [phone]      Initial Assessment (most recent)     Adult Discharge Assessment - 07/21/22 1145        Discharge Assessment    Assessment Type Discharge Planning Assessment     Confirmed/corrected address, phone number and insurance Yes     Confirmed Demographics Correct on Facesheet     Source of Information patient     Communicated ZORAIDA with patient/caregiver Yes     Lives With spouse     Do you expect to return to your current living situation? Yes     Do you have help at home or someone to help you manage your care at home? Yes     Prior to hospitilization cognitive status: Alert/Oriented     Current cognitive status: Alert/Oriented     Walking or Climbing Stairs Difficulty none     Dressing/Bathing Difficulty none     Equipment Currently Used at Home none     Readmission within 30 days? No     Do you currently have service(s) that help you manage your care at home? No     Do you take prescription medications? Yes     Do you have prescription coverage? Yes     Do you have any problems affording any of your prescribed medications? No     Is the patient taking medications as prescribed? yes     Who is going to help you get home at discharge? Spouse     Are you on dialysis? No     Do you take  coumadin? No     Discharge Plan A Home     Discharge Plan B Home Health     DME Needed Upon Discharge  none     Discharge Plan discussed with: Patient     Discharge Barriers Identified None                  Kristi Iyer RN, CM   Ext: [phone]

## 2022-07-21 NOTE — ASSESSMENT & PLAN NOTE
Patient has chronic hypothyroidism. TFTs reviewed-   Lab Results   Component Value Date    TSH 1.659 11/22/2017   . Will continue chronic levothyroxine and adjust for and clinical changes.

## 2022-07-21 NOTE — PLAN OF CARE
Evaluation completed; pt is functioning at prior level and does not require skilled acute OT services at this time.   Problem: Occupational Therapy  Goal: Occupational Therapy Goal  Outcome: Met

## 2022-07-21 NOTE — PROGRESS NOTES
[facility]  Colorectal Surgery  Progress Note    Patient Name: Socorro Huang  MRN: [id number]  Admission Date: 7/20/2022  Hospital Length of Stay: 1 days  Attending Physician: ASHTYN Melo MD    Subjective:     Interval History: no acute events overnite, adequate pain control, no n/v, denies flatus or bm    Post-Op Info:  Procedure(s) (LRB):  COLECTOMY, SIGMOID, LAPAROSCOPIC, ERAS low (Left)  SIGMOIDOSCOPY, FLEXIBLE (N/A)  ANOSCOPY (N/A)  MOBILIZATION, SPLENIC FLEXURE (N/A)   1 Day Post-Op      Medications:  Continuous Infusions:   sodium chloride 0.9% 40 mL/hr at 07/20/22 1117     Scheduled Meds:   acetaminophen  1,000 mg Oral Q8H    alvimopan  12 mg Oral BID    enoxaparin  40 mg Subcutaneous Daily    fluticasone propionate  1 spray Each Nostril Daily    gabapentin  300 mg Oral TID    HYDROmorphone  0.25 mg Intravenous Once    ibuprofen  800 mg Oral Q8H    mupirocin   Nasal BID    scopolamine  1 patch Transdermal Once     PRN Meds:   HYDROmorphone    ondansetron    oxyCODONE    oxyCODONE    sodium chloride 0.9%        Objective:     Vital Signs (Most Recent):  Temp: 98.3 °F (36.8 °C) (07/21/22 0821)  Pulse: 61 (07/21/22 0821)  Resp: 16 (07/21/22 0822)  BP: 112/69 (07/21/22 0821)  SpO2: 98 % (07/21/22 0821) Vital Signs (24h Range):  Temp:  [97.3 °F (36.3 °C)-98.3 °F (36.8 °C)] 98.3 °F (36.8 °C)  Pulse:  [61-81] 61  Resp:  [13-18] 16  SpO2:  [96 %-100 %] 98 %  BP: (112-125)/(59-75) 112/69     Intake/Output - Last 3 Shifts         07/19 0700 07/20 0659 07/20 0700 07/21 0659 07/21 0700 07/22 0659    IV Piggyback  800     Total Intake(mL/kg)  800 (11.3)     Urine (mL/kg/hr)  3500 (2.1)     Stool  0     Total Output  3500     Net  -2700            Stool Occurrence  0 x             Physical Exam  Vitals and nursing note reviewed.   Constitutional:       Appearance: She is well-developed.   HENT:      Head: Normocephalic.   Eyes:      Pupils: Pupils are equal, round, and reactive to light.    Cardiovascular:      Rate and Rhythm: Normal rate and regular rhythm.      Heart sounds: Normal heart sounds.   Pulmonary:      Effort: Pulmonary effort is normal. No respiratory distress.      Breath sounds: Normal breath sounds. No wheezing or rales.   Abdominal:      Palpations: Abdomen is soft. There is no mass.      Tenderness: There is no guarding or rebound.      Comments: Abd inc line healing well   Skin:     General: Skin is warm and dry.   Neurological:      Mental Status: She is alert and oriented to person, place, and time.   Psychiatric:         Behavior: Behavior normal.         Thought Content: Thought content normal.         Judgment: Judgment normal.           Significant Labs:  BMP (Last 3 Results):   Recent Labs   Lab 07/21/22  0244   GLU 91      K 3.8      CO2 24   BUN 4*   CREATININE 0.6   CALCIUM 8.6*     CBC (Last 3 Results):   Recent Labs   Lab 07/21/22  0244   WBC 6.45   RBC 3.71*   HGB 12.2   HCT 35.9*      MCV 97   MCH 32.9*   MCHC 34.0       Significant Diagnostics:  None    Assessment/Plan:     * History of diverticulitis  OR 7/20 sigmoid resection    -await return of bowel function, lfd  -continue multi modality for pain control  -encourage ambulation and use of IS  -cinthya hose and SCD  -prophalactic lovenox and PPI  -dc nuñez, await void    Hypothyroidism  Patient has chronic hypothyroidism. TFTs reviewed-   Lab Results   Component Value Date    TSH 1.659 11/22/2017   . Will continue chronic levothyroxine and adjust for and clinical changes.    Gastroesophageal reflux disease without esophagitis  Continue home meds    Anxiety  Resume home meds          Kim Crooks NP  Colorectal Surgery  [facility]

## 2022-07-21 NOTE — SUBJECTIVE & OBJECTIVE
Subjective:     Interval History: no acute events overnite, adequate pain control, no n/v, denies flatus or bm    Post-Op Info:  Procedure(s) (LRB):  COLECTOMY, SIGMOID, LAPAROSCOPIC, ERAS low (Left)  SIGMOIDOSCOPY, FLEXIBLE (N/A)  ANOSCOPY (N/A)  MOBILIZATION, SPLENIC FLEXURE (N/A)   1 Day Post-Op      Medications:  Continuous Infusions:   sodium chloride 0.9% 40 mL/hr at 07/20/22 1117     Scheduled Meds:   acetaminophen  1,000 mg Oral Q8H    alvimopan  12 mg Oral BID    enoxaparin  40 mg Subcutaneous Daily    fluticasone propionate  1 spray Each Nostril Daily    gabapentin  300 mg Oral TID    HYDROmorphone  0.25 mg Intravenous Once    ibuprofen  800 mg Oral Q8H    mupirocin   Nasal BID    scopolamine  1 patch Transdermal Once     PRN Meds:   HYDROmorphone    ondansetron    oxyCODONE    oxyCODONE    sodium chloride 0.9%        Objective:     Vital Signs (Most Recent):  Temp: 98.3 °F (36.8 °C) (07/21/22 0821)  Pulse: 61 (07/21/22 0821)  Resp: 16 (07/21/22 0822)  BP: 112/69 (07/21/22 0821)  SpO2: 98 % (07/21/22 0821) Vital Signs (24h Range):  Temp:  [97.3 °F (36.3 °C)-98.3 °F (36.8 °C)] 98.3 °F (36.8 °C)  Pulse:  [61-81] 61  Resp:  [13-18] 16  SpO2:  [96 %-100 %] 98 %  BP: (112-125)/(59-75) 112/69     Intake/Output - Last 3 Shifts         07/19 0700 07/20 0659 07/20 0700 07/21 0659 07/21 0700 07/22 0659    IV Piggyback  800     Total Intake(mL/kg)  800 (11.3)     Urine (mL/kg/hr)  3500 (2.1)     Stool  0     Total Output  3500     Net  -2700            Stool Occurrence  0 x             Physical Exam  Vitals and nursing note reviewed.   Constitutional:       Appearance: She is well-developed.   HENT:      Head: Normocephalic.   Eyes:      Pupils: Pupils are equal, round, and reactive to light.   Cardiovascular:      Rate and Rhythm: Normal rate and regular rhythm.      Heart sounds: Normal heart sounds.   Pulmonary:      Effort: Pulmonary effort is normal. No respiratory distress.      Breath sounds: Normal  breath sounds. No wheezing or rales.   Abdominal:      Palpations: Abdomen is soft. There is no mass.      Tenderness: There is no guarding or rebound.      Comments: Abd inc line healing well   Skin:     General: Skin is warm and dry.   Neurological:      Mental Status: She is alert and oriented to person, place, and time.   Psychiatric:         Behavior: Behavior normal.         Thought Content: Thought content normal.         Judgment: Judgment normal.           Significant Labs:  BMP (Last 3 Results):   Recent Labs   Lab 07/21/22  0244   GLU 91      K 3.8      CO2 24   BUN 4*   CREATININE 0.6   CALCIUM 8.6*     CBC (Last 3 Results):   Recent Labs   Lab 07/21/22  0244   WBC 6.45   RBC 3.71*   HGB 12.2   HCT 35.9*      MCV 97   MCH 32.9*   MCHC 34.0       Significant Diagnostics:  None

## 2022-07-21 NOTE — ANESTHESIA POSTPROCEDURE EVALUATION
Anesthesia Post Evaluation    Patient: Socorro Huang    Procedure(s) Performed: Procedure(s) (LRB):  COLECTOMY, SIGMOID, LAPAROSCOPIC, ERAS low (Left)  SIGMOIDOSCOPY, FLEXIBLE (N/A)  ANOSCOPY (N/A)  MOBILIZATION, SPLENIC FLEXURE (N/A)    Final Anesthesia Type: general      Patient location during evaluation: PACU  Patient participation: Yes- Able to Participate  Level of consciousness: awake and alert, awake and oriented  Post-procedure vital signs: reviewed and stable  Pain management: adequate  Airway patency: patent    PONV status at discharge: No PONV  Anesthetic complications: no      Cardiovascular status: blood pressure returned to baseline, hemodynamically stable and stable  Respiratory status: unassisted, spontaneous ventilation and room air  Hydration status: euvolemic  Follow-up not needed.          Vitals Value Taken Time   /65 07/21/22 0344   Temp 36.6 °C (97.8 °F) 07/21/22 0344   Pulse 68 07/21/22 0344   Resp 18 07/21/22 0613   SpO2 96 % 07/21/22 0627         Event Time   Out of Recovery 11:19:00         Pain/Delaney Score: Pain Rating Prior to Med Admin: 9 (7/21/2022  6:13 AM)  Pain Rating Post Med Admin: 5 (7/21/2022  6:24 AM)  Delaney Score: 10 (7/20/2022 11:23 AM)

## 2022-07-22 VITALS
OXYGEN SATURATION: 96 % | SYSTOLIC BLOOD PRESSURE: 110 MMHG | RESPIRATION RATE: 18 BRPM | HEART RATE: 74 BPM | HEIGHT: 67 IN | TEMPERATURE: 98 F | WEIGHT: 156.31 LBS | BODY MASS INDEX: 24.53 KG/M2 | DIASTOLIC BLOOD PRESSURE: 65 MMHG

## 2022-07-22 PROCEDURE — 25000003 PHARM REV CODE 250: Performed by: NURSE PRACTITIONER

## 2022-07-22 PROCEDURE — 25000003 PHARM REV CODE 250: Performed by: SURGERY

## 2022-07-22 PROCEDURE — 63600175 PHARM REV CODE 636 W HCPCS

## 2022-07-22 RX ORDER — IBUPROFEN 600 MG/1
600 TABLET ORAL EVERY 8 HOURS
Qty: 21 TABLET | Refills: 0 | Status: SHIPPED | OUTPATIENT
Start: 2022-07-22 | End: 2022-07-29

## 2022-07-22 RX ORDER — ACETAMINOPHEN 500 MG
1000 TABLET ORAL EVERY 8 HOURS
Qty: 60 TABLET | Refills: 0 | Status: SHIPPED | OUTPATIENT
Start: 2022-07-22 | End: 2022-08-01

## 2022-07-22 RX ORDER — OXYCODONE HYDROCHLORIDE 5 MG/1
5 TABLET ORAL EVERY 4 HOURS PRN
Qty: 20 TABLET | Refills: 0 | Status: SHIPPED | OUTPATIENT
Start: 2022-07-22 | End: 2022-07-26 | Stop reason: SDUPTHER

## 2022-07-22 RX ORDER — DIPHENHYDRAMINE HYDROCHLORIDE 50 MG/ML
25 INJECTION INTRAMUSCULAR; INTRAVENOUS EVERY 6 HOURS PRN
Status: DISCONTINUED | OUTPATIENT
Start: 2022-07-22 | End: 2022-07-22 | Stop reason: HOSPADM

## 2022-07-22 RX ORDER — POLYETHYLENE GLYCOL 3350 17 G/17G
17 POWDER, FOR SOLUTION ORAL DAILY
Qty: 238 G | Refills: 0 | Status: SHIPPED | OUTPATIENT
Start: 2022-07-22 | End: 2022-08-05

## 2022-07-22 RX ADMIN — OXYCODONE HYDROCHLORIDE 10 MG: 10 TABLET ORAL at 09:07

## 2022-07-22 RX ADMIN — IBUPROFEN 800 MG: 400 TABLET, FILM COATED ORAL at 05:07

## 2022-07-22 RX ADMIN — OXYCODONE HYDROCHLORIDE 10 MG: 10 TABLET ORAL at 02:07

## 2022-07-22 RX ADMIN — OXYCODONE HYDROCHLORIDE 10 MG: 10 TABLET ORAL at 05:07

## 2022-07-22 RX ADMIN — ACETAMINOPHEN 1000 MG: 500 TABLET ORAL at 05:07

## 2022-07-22 RX ADMIN — ALVIMOPAN 12 MG: 12 CAPSULE ORAL at 09:07

## 2022-07-22 RX ADMIN — MUPIROCIN: 20 OINTMENT TOPICAL at 09:07

## 2022-07-22 RX ADMIN — GABAPENTIN 300 MG: 300 CAPSULE ORAL at 09:07

## 2022-07-22 RX ADMIN — DIPHENHYDRAMINE HYDROCHLORIDE 25 MG: 50 INJECTION INTRAMUSCULAR; INTRAVENOUS at 05:07

## 2022-07-22 RX ADMIN — FLUTICASONE PROPIONATE 50 MCG: 50 SPRAY, METERED NASAL at 09:07

## 2022-07-22 NOTE — PROGRESS NOTES
POC reviewed. Pt aaox4. On room air. VS stable. Pain managed w/ prn pain meds. IV benadryl order acquired d/t pt being very itchy. OOB2B independently overnight. IVF running per MAR.    Care transferred to oncoming nurse.

## 2022-07-22 NOTE — PLAN OF CARE
Problem: Adult Inpatient Plan of Care  Goal: Absence of Hospital-Acquired Illness or Injury  Outcome: Ongoing, Progressing     Problem: Adult Inpatient Plan of Care  Goal: Optimal Comfort and Wellbeing  Outcome: Ongoing, Progressing     Problem: Adult Inpatient Plan of Care  Goal: Readiness for Transition of Care  Outcome: Ongoing, Progressing     Problem: Infection  Goal: Absence of Infection Signs and Symptoms  Outcome: Ongoing, Progressing     Problem: Fall Injury Risk  Goal: Absence of Fall and Fall-Related Injury  Outcome: Ongoing, Progressing

## 2022-07-22 NOTE — PLAN OF CARE
[facility]  Discharge Final Note    Primary Care Provider: Blake Adams MD    Expected Discharge Date: 7/22/2022    Final Discharge Note (most recent)     Final Note - 07/22/22 0945        Final Note    Assessment Type Final Discharge Note     Anticipated Discharge Disposition Home or Self Care     Hospital Resources/Appts/Education Provided Appointments scheduled and added to AVS        Post-Acute Status    Post-Acute Authorization Other     Other Status No Post-Acute Service Needs               Contact Ashley Melo MD   Specialty: Colon and Rectal Surgery    [facility] [facility] [address]   Phone: [phone]       Next Steps: Follow up on 8/9/2022    Instructions: Follow up at 2:40PM        Kristi Iyer RN, CM   Ext: [phone]

## 2022-07-22 NOTE — DISCHARGE SUMMARY
[facility]  Colorectal Surgery  Discharge Summary      Patient Name: Socorro Huang  MRN: [id number]  Admission Date: 7/20/2022  Hospital Length of Stay: 2 days  Discharge Date and Time:  07/22/2022 10:20 AM  Attending Physician: ASHTYN Melo MD   Discharging Provider: Amol Stoner MD  Primary Care Provider: Blake Adams MD     HPI:  46-year-old female with diverticulitis status post previous sigmoid resection presents for planned laparoscopic left colectomy following recurrent diverticulitis    Procedure(s) (LRB):  COLECTOMY, SIGMOID, LAPAROSCOPIC, ERAS low (Left)  SIGMOIDOSCOPY, FLEXIBLE (N/A)  ANOSCOPY (N/A)  MOBILIZATION, SPLENIC FLEXURE (N/A)     Hospital Course:  Patient underwent above-described procedure on 07/20/2022.  The day of discharge she is tolerating a diet with pain well controlled with oral medications and ambulating without difficulty.  Additional follow-up and instructions as below    General:  Alert and oriented no acute distress  HEENT:  EOM intact, trachea midline  CV:  Normal rate, good peripheral perfusion  Pulmonary:  No increased work of breathing, symmetrical expansion  GI:  Abdomen soft, nondistended, appropriately tender to palpation.  Incisions clean and dry with Steri-Strips intact  Skin: Warm, dry, intact  Extremities:  No obvious deformities  Neuro:  Alert and oriented x3, no focal deficits  Psych:  Cooperative, appropriate mood and affect    Dragan Stoner MD  Fellow  Colon & Rectal Surgery     Significant Diagnostic Studies: Labs: All labs within the past 24 hours have been reviewed    Pending Diagnostic Studies:     Procedure Component Value Units Date/Time    Specimen to Pathology, Surgery General Surgery (Colon Rectal) [[id number]] Collected: 07/20/22 1020    Order Status: Sent Lab Status: In process Updated: 07/20/22 1617    Specimen: Tissue         Final Active Diagnoses:    Diagnosis Date Noted POA    PRINCIPAL PROBLEM:  History of diverticulitis  [Z87.19] 10/10/2017 Not Applicable    Gastroesophageal reflux disease without esophagitis [K21.9] 06/19/2020 Yes    Hypothyroidism [E03.9] 06/19/2020 Yes    Anxiety [F41.9] 06/19/2020 Yes      Problems Resolved During this Admission:      Discharged Condition: good    Disposition: Home or Self Care    Follow Up:   Follow-up Information     GLENIS Melo MD Follow up.    Specialty: Colon and Rectal Surgery  Why: as scheduled in 2-4 weeks  Contact information:  [address] [facility] [address]  [phone]                       Patient Instructions:      Diet Adult Regular   Order Comments: Low residue/low fiber diet     Lifting restrictions   Order Comments: Do not lift more than 20 lbs (2 milk jugs) for 4-6 weeks     No driving until:   Order Comments: No driving within 48 hours of taking narcotic pain medication     Notify your health care provider if you experience any of the following:  temperature >100.4     Notify your health care provider if you experience any of the following:  persistent nausea and vomiting or diarrhea     Notify your health care provider if you experience any of the following:  severe uncontrolled pain     Notify your health care provider if you experience any of the following:  redness, tenderness, or signs of infection (pain, swelling, redness, odor or green/yellow discharge around incision site)     Activity as tolerated   Order Comments: Keep active and out of the bed during the day     Shower on day dressing removed (No bath)   Order Comments: Showering ok. Pat incision dry.  Do not swim or take bath until clinic follow up     Medications:  Reconciled Home Medications:      Medication List      START taking these medications    acetaminophen 500 MG tablet  Commonly known as: TYLENOL  Take 2 tablets (1,000 mg total) by mouth every 8 (eight) hours. Stay hydrated and do not drink alcohol while taking for 10 days     ibuprofen 600 MG tablet  Commonly known as:  ADVIL,MOTRIN  Take 1 tablet (600 mg total) by mouth every 8 (eight) hours. Stay well hydrated for 7 days     oxyCODONE 5 MG immediate release tablet  Commonly known as: ROXICODONE  Take 1 tablet (5 mg total) by mouth every 4 (four) hours as needed for Pain. Take scheduled ibuprofen and/or tylenol as prescribed while taking narcotic medication as needed     polyethylene glycol 17 gram/dose powder  Commonly known as: GLYCOLAX  Mix 1 capful (17 g) with a liquid and take by mouth once daily for 14 days.        CONTINUE taking these medications    fluticasone propionate 50 mcg/actuation nasal spray  Commonly known as: FLONASE  1 spray (50 mcg total) by Each Nostril route once daily.     hyoscyamine 0.125 mg Tab  Commonly known as: ANASPAZLEVSIN  TAKE 1 TABLET (125 MCG TOTAL) BY MOUTH EVERY 4 (FOUR) HOURS AS NEEDED (CRAMPING).        STOP taking these medications    linaCLOtide 72 mcg Cap capsule  Commonly known as: LINZESS     metroNIDAZOLE 500 MG tablet  Commonly known as: FLAGYL     neomycin 500 mg Tab  Commonly known as: MYCIFRADIN            Amol Stoner MD  Colorectal Surgery  [facility]

## 2022-07-22 NOTE — PLAN OF CARE
POC reviewed with pt, verbalized understanding.    AAOx4, VSS on RA.   C/o pain to incision site,  Pain managed with scheduled and PRN meds.  Low fiber low residue diet,   3 lap sites and one transverse incision with steri strips intact.     voided adequate in the restroom  Ambulated independently in the hallway  All needs met, no complaints at this time. Bed locked in lowest position, call bell within reach. Frequent rounds for safety.  Discharge instruction given, dcd the IV , Meds delivered at the bedside, refused the patient escort, left the floor on foot

## 2022-07-26 ENCOUNTER — PATIENT MESSAGE (OUTPATIENT)
Dept: SURGERY | Facility: CLINIC | Age: 47
End: 2022-07-26
Payer: COMMERCIAL

## 2022-07-26 DIAGNOSIS — G89.18 POST-OP PAIN: Primary | ICD-10-CM

## 2022-07-26 RX ORDER — OXYCODONE HYDROCHLORIDE 5 MG/1
5 TABLET ORAL EVERY 4 HOURS PRN
Qty: 20 TABLET | Refills: 0 | Status: SHIPPED | OUTPATIENT
Start: 2022-07-26 | End: 2022-08-01 | Stop reason: SDUPTHER

## 2022-07-26 NOTE — TELEPHONE ENCOUNTER
Colon and Rectal Surgery Post Discharge Follow Up    1. Have you experienced any chest pain or trouble breathing: No  2. Have you had trouble eating or drinking (ie nausea, vomiting) for more than a day: No  3. Have you had any fevers? How high was the fever if present: No  4. When was the last time you had a BM (or ostomy output): Within the past 24 hours  5. If has an ostomy: How much is the output per day: Not applicable  6. Have you had any trouble urinating--dark color, foul smell, or pain: No  7. Have has your wounds been healing--any redness, swelling, drainage. Request a picture. Healing well.  8. How has your abdominal pain been: Controlled; pain present but able to function.  9. Any new concerns or changes in how you have been feeling: No     Spoke with patient regarding post operative care at home. Refill request sent in to Dr. Melo. Instructed patient to call for any concerns. Patient verbalizes understanding.

## 2022-07-27 LAB
FINAL PATHOLOGIC DIAGNOSIS: NORMAL
Lab: NORMAL

## 2022-08-01 DIAGNOSIS — G89.18 POST-OP PAIN: ICD-10-CM

## 2022-08-01 RX ORDER — OXYCODONE HYDROCHLORIDE 5 MG/1
5 TABLET ORAL EVERY 4 HOURS PRN
Qty: 20 TABLET | Refills: 0 | Status: SHIPPED | OUTPATIENT
Start: 2022-08-01 | End: 2022-08-06

## 2022-08-08 ENCOUNTER — TELEPHONE (OUTPATIENT)
Dept: SURGERY | Facility: CLINIC | Age: 47
End: 2022-08-08
Payer: COMMERCIAL

## 2022-08-09 ENCOUNTER — OFFICE VISIT (OUTPATIENT)
Dept: SURGERY | Facility: CLINIC | Age: 47
End: 2022-08-09
Payer: COMMERCIAL

## 2022-08-09 ENCOUNTER — PATIENT MESSAGE (OUTPATIENT)
Dept: SURGERY | Facility: CLINIC | Age: 47
End: 2022-08-09

## 2022-08-09 VITALS
HEIGHT: 67 IN | SYSTOLIC BLOOD PRESSURE: 125 MMHG | DIASTOLIC BLOOD PRESSURE: 82 MMHG | BODY MASS INDEX: 25.67 KG/M2 | WEIGHT: 163.56 LBS | HEART RATE: 77 BPM

## 2022-08-09 DIAGNOSIS — R30.0 DYSURIA: Primary | ICD-10-CM

## 2022-08-09 LAB
BILIRUB UR QL STRIP: NEGATIVE
CLARITY UR REFRACT.AUTO: CLEAR
COLOR UR AUTO: COLORLESS
GLUCOSE UR QL STRIP: NEGATIVE
HGB UR QL STRIP: NEGATIVE
KETONES UR QL STRIP: NEGATIVE
LEUKOCYTE ESTERASE UR QL STRIP: NEGATIVE
NITRITE UR QL STRIP: NEGATIVE
PH UR STRIP: 7 [PH] (ref 5–8)
PROT UR QL STRIP: NEGATIVE
SP GR UR STRIP: 1.01 (ref 1–1.03)
URN SPEC COLLECT METH UR: ABNORMAL

## 2022-08-09 PROCEDURE — 99024 PR POST-OP FOLLOW-UP VISIT: ICD-10-PCS | Mod: S$GLB,,, | Performed by: COLON & RECTAL SURGERY

## 2022-08-09 PROCEDURE — 99024 POSTOP FOLLOW-UP VISIT: CPT | Mod: S$GLB,,, | Performed by: COLON & RECTAL SURGERY

## 2022-08-09 PROCEDURE — 99999 PR PBB SHADOW E&M-EST. PATIENT-LVL III: ICD-10-PCS | Mod: PBBFAC,,, | Performed by: COLON & RECTAL SURGERY

## 2022-08-09 PROCEDURE — 99999 PR PBB SHADOW E&M-EST. PATIENT-LVL III: CPT | Mod: PBBFAC,,, | Performed by: COLON & RECTAL SURGERY

## 2022-08-09 PROCEDURE — 81003 URINALYSIS AUTO W/O SCOPE: CPT | Performed by: COLON & RECTAL SURGERY

## 2022-08-09 NOTE — PROGRESS NOTES
"  CRS Office Post Operative Visit    SUBJECTIVE:     Chief Complaint: followup from surgery.     Procedure:   7/20/22:  Laparoscopic redo segmental colectomy for diverticulitis     Indication: recurrent diverticulitis     Significant post operative events: did well     Pathology:   A.   COLON, LEFT, RESECTION:          -Diverticulitis.          -Negative for malignancy.   B.   DESIGNATED "PROXIMAL AND DISTAL ANASTOMOTIC DONUTS":          -Benign colon with focal mucosal hemorrhage and submucosal vascular   congestion.          -Negative for malignancy.     Current Status:   8/9/22: Doing well since the operation.  Pain is controlled.  Tolerating a diet without nausea or emesis.  Having daily bowel movements without straining or blood.  Incisions are healing well without drainage.  No fevers or chills.  Having some frequency of urine and feels like she cannot hold her bladder and has to rush to the bathroom.  Some pain when urinating.    Review of Systems:  Review of Systems   Constitutional: Negative for chills and fever.   Gastrointestinal: Negative for abdominal pain, blood in stool, constipation, diarrhea, nausea and vomiting.   Genitourinary: Positive for dysuria, frequency and urgency.       OBJECTIVE:     Vital Signs (Most Recent)  /82 (BP Location: Left arm, Patient Position: Sitting, BP Method: Large (Automatic))   Pulse 77   Ht 5' 7" (1.702 m)   Wt 74.2 kg (163 lb 9.3 oz)   LMP 05/05/2013   BMI 25.62 kg/m²     Physical Exam:  General: White female in no distress   Respiratory: respirations are even and unlabored  Cardiac: regular rate  Abdomen: soft, nondistended, nontender, incisions are clean, dry, and intact healing well  Extremities: Warm dry and intact  Anorectal: deferred    ASSESSMENT/PLAN:     Diagnoses and all orders for this visit:    Dysuria  -     Urinalysis, Reflex to Urine Culture Urine, Clean Catch        46 y.o. female post op from lap redo segmental colectomy on 7/20/22.  " Progressing well.    Recommended stopping MiraLax    Plan:  - Will perform a urinalysis today and treat if results consistent with UTI  - Follow up in clinic in 4 weeks    ASHTYN Melo MD, FACS  Staff Surgeon  Colon & Rectal Surgery

## 2022-08-10 NOTE — TELEPHONE ENCOUNTER
----- Message from Jimbo Arguelles sent at 6/9/2022 10:42 AM CDT -----  Contact: @[phone]  Pt requesting a call back to see if she can be seen earlier today as the nurse messaged her to confirm.    
Spoke with patient and appointment rescheduled per request.  
No

## 2022-08-12 ENCOUNTER — PATIENT MESSAGE (OUTPATIENT)
Dept: SURGERY | Facility: CLINIC | Age: 47
End: 2022-08-12
Payer: COMMERCIAL

## 2022-08-12 DIAGNOSIS — R30.0 DYSURIA: Primary | ICD-10-CM

## 2022-08-12 RX ORDER — PHENAZOPYRIDINE HYDROCHLORIDE 100 MG/1
100 TABLET, FILM COATED ORAL 3 TIMES DAILY PRN
Qty: 30 TABLET | Refills: 0 | Status: SHIPPED | OUTPATIENT
Start: 2022-08-12 | End: 2022-08-22

## 2022-08-24 DIAGNOSIS — G47.00 INSOMNIA, UNSPECIFIED TYPE: ICD-10-CM

## 2022-08-24 RX ORDER — TRAZODONE HYDROCHLORIDE 100 MG/1
TABLET ORAL
Qty: 30 TABLET | Refills: 1 | Status: SHIPPED | OUTPATIENT
Start: 2022-08-24 | End: 2022-09-08

## 2022-08-24 NOTE — TELEPHONE ENCOUNTER
No new care gaps identified.  [facility] Embedded Care Gaps. Reference number: [id number]. 8/24/2022   10:35:13 AM MANNYT

## 2022-08-29 PROBLEM — J01.10 ACUTE NON-RECURRENT FRONTAL SINUSITIS: Status: RESOLVED | Noted: 2022-05-26 | Resolved: 2022-08-29

## 2022-09-07 DIAGNOSIS — G47.00 INSOMNIA, UNSPECIFIED TYPE: ICD-10-CM

## 2022-09-08 RX ORDER — TRAZODONE HYDROCHLORIDE 100 MG/1
TABLET ORAL
Qty: 90 TABLET | Refills: 1 | Status: SHIPPED | OUTPATIENT
Start: 2022-09-08 | End: 2023-09-25

## 2022-09-08 NOTE — TELEPHONE ENCOUNTER
No new care gaps identified.  [facility] Embedded Care Gaps. Reference number: [id number]. 9/07/2022   7:48:57 PM CDT

## 2022-09-20 ENCOUNTER — PATIENT MESSAGE (OUTPATIENT)
Dept: INTERNAL MEDICINE | Facility: CLINIC | Age: 47
End: 2022-09-20
Payer: COMMERCIAL

## 2022-09-20 DIAGNOSIS — Z00.00 ANNUAL PHYSICAL EXAM: Primary | ICD-10-CM

## 2022-10-03 ENCOUNTER — LAB VISIT (OUTPATIENT)
Dept: LAB | Facility: HOSPITAL | Age: 47
End: 2022-10-03
Attending: INTERNAL MEDICINE
Payer: COMMERCIAL

## 2022-10-03 DIAGNOSIS — Z00.00 ANNUAL PHYSICAL EXAM: ICD-10-CM

## 2022-10-03 LAB
ALBUMIN SERPL BCP-MCNC: 3.9 G/DL (ref 3.5–5.2)
ALP SERPL-CCNC: 79 U/L (ref 55–135)
ALT SERPL W/O P-5'-P-CCNC: 66 U/L (ref 10–44)
ANION GAP SERPL CALC-SCNC: 10 MMOL/L (ref 8–16)
AST SERPL-CCNC: 35 U/L (ref 10–40)
BASOPHILS # BLD AUTO: 0.02 K/UL (ref 0–0.2)
BASOPHILS NFR BLD: 0.8 % (ref 0–1.9)
BILIRUB SERPL-MCNC: 0.5 MG/DL (ref 0.1–1)
BUN SERPL-MCNC: 15 MG/DL (ref 6–20)
CALCIUM SERPL-MCNC: 9.1 MG/DL (ref 8.7–10.5)
CHLORIDE SERPL-SCNC: 107 MMOL/L (ref 95–110)
CHOLEST SERPL-MCNC: 179 MG/DL (ref 120–199)
CHOLEST/HDLC SERPL: 2.8 {RATIO} (ref 2–5)
CO2 SERPL-SCNC: 23 MMOL/L (ref 23–29)
CREAT SERPL-MCNC: 0.7 MG/DL (ref 0.5–1.4)
DIFFERENTIAL METHOD: ABNORMAL
EOSINOPHIL # BLD AUTO: 0.1 K/UL (ref 0–0.5)
EOSINOPHIL NFR BLD: 3 % (ref 0–8)
ERYTHROCYTE [DISTWIDTH] IN BLOOD BY AUTOMATED COUNT: 11.6 % (ref 11.5–14.5)
EST. GFR  (NO RACE VARIABLE): >60 ML/MIN/1.73 M^2
GLUCOSE SERPL-MCNC: 87 MG/DL (ref 70–110)
HCT VFR BLD AUTO: 37.8 % (ref 37–48.5)
HDLC SERPL-MCNC: 65 MG/DL (ref 40–75)
HDLC SERPL: 36.3 % (ref 20–50)
HGB BLD-MCNC: 12.7 G/DL (ref 12–16)
IMM GRANULOCYTES # BLD AUTO: 0.01 K/UL (ref 0–0.04)
IMM GRANULOCYTES NFR BLD AUTO: 0.4 % (ref 0–0.5)
LDLC SERPL CALC-MCNC: 103.8 MG/DL (ref 63–159)
LYMPHOCYTES # BLD AUTO: 0.8 K/UL (ref 1–4.8)
LYMPHOCYTES NFR BLD: 33.1 % (ref 18–48)
MCH RBC QN AUTO: 32.5 PG (ref 27–31)
MCHC RBC AUTO-ENTMCNC: 33.6 G/DL (ref 32–36)
MCV RBC AUTO: 97 FL (ref 82–98)
MONOCYTES # BLD AUTO: 0.2 K/UL (ref 0.3–1)
MONOCYTES NFR BLD: 9.7 % (ref 4–15)
NEUTROPHILS # BLD AUTO: 1.3 K/UL (ref 1.8–7.7)
NEUTROPHILS NFR BLD: 53 % (ref 38–73)
NONHDLC SERPL-MCNC: 114 MG/DL
NRBC BLD-RTO: 0 /100 WBC
PLATELET # BLD AUTO: 213 K/UL (ref 150–450)
PMV BLD AUTO: 8.9 FL (ref 9.2–12.9)
POTASSIUM SERPL-SCNC: 4 MMOL/L (ref 3.5–5.1)
PROT SERPL-MCNC: 6.4 G/DL (ref 6–8.4)
RBC # BLD AUTO: 3.91 M/UL (ref 4–5.4)
SODIUM SERPL-SCNC: 140 MMOL/L (ref 136–145)
T3 SERPL-MCNC: 85 NG/DL (ref 60–180)
T4 FREE SERPL-MCNC: 0.82 NG/DL (ref 0.71–1.51)
TRIGL SERPL-MCNC: 51 MG/DL (ref 30–150)
TSH SERPL DL<=0.005 MIU/L-ACNC: 0.9 UIU/ML (ref 0.4–4)
WBC # BLD AUTO: 2.36 K/UL (ref 3.9–12.7)

## 2022-10-03 PROCEDURE — 84443 ASSAY THYROID STIM HORMONE: CPT | Performed by: INTERNAL MEDICINE

## 2022-10-03 PROCEDURE — 85025 COMPLETE CBC W/AUTO DIFF WBC: CPT | Performed by: INTERNAL MEDICINE

## 2022-10-03 PROCEDURE — 80053 COMPREHEN METABOLIC PANEL: CPT | Performed by: INTERNAL MEDICINE

## 2022-10-03 PROCEDURE — 84480 ASSAY TRIIODOTHYRONINE (T3): CPT | Performed by: INTERNAL MEDICINE

## 2022-10-03 PROCEDURE — 84439 ASSAY OF FREE THYROXINE: CPT | Performed by: INTERNAL MEDICINE

## 2022-10-03 PROCEDURE — 36415 COLL VENOUS BLD VENIPUNCTURE: CPT | Performed by: INTERNAL MEDICINE

## 2022-10-03 PROCEDURE — 80061 LIPID PANEL: CPT | Performed by: INTERNAL MEDICINE

## 2022-10-07 ENCOUNTER — OFFICE VISIT (OUTPATIENT)
Dept: INTERNAL MEDICINE | Facility: CLINIC | Age: 47
End: 2022-10-07
Payer: COMMERCIAL

## 2022-10-07 VITALS
HEIGHT: 67 IN | WEIGHT: 158.38 LBS | RESPIRATION RATE: 18 BRPM | SYSTOLIC BLOOD PRESSURE: 110 MMHG | TEMPERATURE: 99 F | BODY MASS INDEX: 24.86 KG/M2 | DIASTOLIC BLOOD PRESSURE: 80 MMHG | OXYGEN SATURATION: 98 % | HEART RATE: 71 BPM

## 2022-10-07 DIAGNOSIS — Z23 NEED FOR INFLUENZA VACCINATION: ICD-10-CM

## 2022-10-07 DIAGNOSIS — Z00.00 ANNUAL PHYSICAL EXAM: Primary | ICD-10-CM

## 2022-10-07 PROCEDURE — 99999 PR PBB SHADOW E&M-EST. PATIENT-LVL IV: CPT | Mod: PBBFAC,,, | Performed by: INTERNAL MEDICINE

## 2022-10-07 PROCEDURE — 99999 PR PBB SHADOW E&M-EST. PATIENT-LVL IV: ICD-10-PCS | Mod: PBBFAC,,, | Performed by: INTERNAL MEDICINE

## 2022-10-07 PROCEDURE — 90686 FLU VACCINE (QUAD) GREATER THAN OR EQUAL TO 3YO PRESERVATIVE FREE IM: ICD-10-PCS | Mod: S$GLB,,, | Performed by: INTERNAL MEDICINE

## 2022-10-07 PROCEDURE — 99396 PREV VISIT EST AGE 40-64: CPT | Mod: 25,S$GLB,, | Performed by: INTERNAL MEDICINE

## 2022-10-07 PROCEDURE — 90471 IMMUNIZATION ADMIN: CPT | Mod: S$GLB,,, | Performed by: INTERNAL MEDICINE

## 2022-10-07 PROCEDURE — 90686 IIV4 VACC NO PRSV 0.5 ML IM: CPT | Mod: S$GLB,,, | Performed by: INTERNAL MEDICINE

## 2022-10-07 PROCEDURE — 90471 FLU VACCINE (QUAD) GREATER THAN OR EQUAL TO 3YO PRESERVATIVE FREE IM: ICD-10-PCS | Mod: S$GLB,,, | Performed by: INTERNAL MEDICINE

## 2022-10-07 PROCEDURE — 99396 PR PREVENTIVE VISIT,EST,40-64: ICD-10-PCS | Mod: 25,S$GLB,, | Performed by: INTERNAL MEDICINE

## 2022-10-07 NOTE — PROGRESS NOTES
[facility] Note    Chief Complaint      Chief Complaint   Patient presents with    Annual Exam       History of Present Illness      Socorro Huang is a 47 y.o. female who presents today for   Chief Complaint   Patient presents with    Annual Exam   .  Patient comes to appointment here for annual preventative visit with me .   She jsut had full labs . All reviewed with patient today .mild leucopenia . She has had this in past and has had negative workup . She has had colectomy with dr melo for diverticulitis 7/20/22 . She is dealing with some,  migraine headache .  Problem List Items Addressed This Visit          ID    Need for influenza vaccination       Other    Annual physical exam - Primary    Overview     pe documented all screening labs reviewed with him               Past Medical History:  Past Medical History:   Diagnosis Date    Abnormal Pap smear     Anxiety     celexa & prozac didn't help much    Constipation - functional     Headache(784.0)     Hemorrhoid     Leukopenia     benign, evaluated in [facility] y 2000    Menorrhagia     Strain of neck muscle     tx with PT at [facility] in past       Past Surgical History:  Past Surgical History:   Procedure Laterality Date    ANOSCOPY N/A 7/20/2022    Procedure: ANOSCOPY;  Surgeon: ASHTYN Melo MD;  Location: [address];  Service: Colon and Rectal;  Laterality: N/A;    BREAST RECONSTRUCTION  2018    BREAST SURGERY      COLON SURGERY  2014         COLONOSCOPY N/A 2/3/2022    Procedure: COLONOSCOPY;  Surgeon: ASHTYN Melo MD;  Location: [facility];  Service: Endoscopy;  Laterality: N/A;    DILATION AND CURETTAGE OF UTERUS  12/18/2012    complex hyperplasia, no atypia    FLEXIBLE SIGMOIDOSCOPY N/A 7/20/2022    Procedure: SIGMOIDOSCOPY, FLEXIBLE;  Surgeon: ASHTYN Melo MD;  Location: [address];  Service: Colon and Rectal;  Laterality: N/A;    HEMORRHOID SURGERY  2014    HYSTERECTOMY      LAPAROSCOPIC  SIGMOIDECTOMY Left 7/20/2022    Procedure: COLECTOMY, SIGMOID, LAPAROSCOPIC, ERAS low;  Surgeon: ASHTYN Melo MD;  Location: [facility] OR 2ND FLR;  Service: Colon and Rectal;  Laterality: Left;    MOBILIZATION OF SPLENIC FLEXURE N/A 7/20/2022    Procedure: MOBILIZATION, SPLENIC FLEXURE;  Surgeon: ASHTYN Melo MD;  Location: [facility] OR 2ND FLR;  Service: Colon and Rectal;  Laterality: N/A;    OOPHORECTOMY      PARTIAL HYSTERECTOMY  2013    for atypia    THYROIDECTOMY, PARTIAL  2013    TONSILLECTOMY      TUBAL LIGATION         Family History:  family history includes Alcohol abuse in her cousin and maternal uncle; Breast cancer in her maternal grandmother; Cancer in her father; Depression in her sister; Diabetes in her paternal grandmother; Drug abuse in her maternal aunt; Emphysema in her mother; Heart disease (age of onset: 50) in her father; Hypertension in her paternal grandmother; Ovarian cancer in her maternal grandmother; Schizophrenia in her paternal aunt; Suicide in her brother.    Social History:  Social History     Socioeconomic History    Marital status:     Number of children: 2   Occupational History     Employer: [employer]   Tobacco Use    Smoking status: Never    Smokeless tobacco: Never   Substance and Sexual Activity    Alcohol use: Yes     Comment: socially    Drug use: No    Sexual activity: Yes     Partners: Male     Birth control/protection: Surgical   Other Topics Concern    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 Fulton , GYN Dr Ruby                   Review of Systems:   Review of Systems   Constitutional:  Negative for fever and weight loss.   HENT:  Negative for congestion, hearing loss and sore throat.    Eyes:  Negative for blurred vision.   Respiratory:  Negative for cough and shortness of breath.    Cardiovascular:  Negative for chest pain, palpitations, claudication and leg swelling.   Gastrointestinal:  Negative for abdominal pain, constipation, diarrhea and heartburn.   Genitourinary:  Negative for dysuria.   Musculoskeletal:  Negative for back pain and myalgias.   Skin:  Negative for rash.   Neurological:  Negative for focal weakness and headaches.   Psychiatric/Behavioral:  Negative for depression and suicidal ideas. The patient is not nervous/anxious.        Medications:  Outpatient Encounter Medications as of 10/7/2022   Medication Sig Note Dispense Refill    fluticasone propionate (FLONASE) 50 mcg/actuation nasal spray 1 spray (50 mcg total) by Each Nostril route once daily. 7/19/2022: Take as prescribed am of procedure                      18 g 0    traZODone (DESYREL) 100 MG tablet TAKE 1 TABLET BY MOUTH EVERY DAY IN THE EVENING  90 tablet 1    hyoscyamine (ANASPAZ,LEVSIN) 0.125 mg Tab TAKE 1 TABLET (125 MCG TOTAL) BY MOUTH EVERY 4 (FOUR) HOURS AS NEEDED (CRAMPING).  60 tablet 4     No facility-administered encounter medications on file as of 10/7/2022.        Allergies:  Review of patient's allergies indicates:   Allergen Reactions    Celexa [citalopram] Nausea Only     Stomach upset         Physical Exam         Vitals:    10/07/22 1314   BP: 110/80   Pulse:  71   Resp: 18   Temp: 98.7 °F (37.1 °C)         Physical Exam  Constitutional:       Appearance: She is well-developed.   Eyes:      Pupils: Pupils are equal, round, and reactive to light.   Neck:      Thyroid: No thyromegaly.   Cardiovascular:      Rate and Rhythm: Normal rate.      Heart sounds: Normal heart sounds. No murmur heard.    No friction rub. No gallop.   Pulmonary:      Breath sounds: Normal breath sounds.   Abdominal:      General: Bowel sounds are normal.      Palpations: Abdomen is soft.   Musculoskeletal:         General: Normal range of motion.      Cervical back: Normal range of motion.   Lymphadenopathy:      Cervical: No cervical adenopathy.   Skin:     General: Skin is warm.      Findings: No rash.   Neurological:      Mental Status: She is alert and oriented to person, place, and time.      Cranial Nerves: No cranial nerve deficit.   Psychiatric:         Behavior: Behavior normal.        Laboratory:  CBC:  Recent Labs   Lab Result Units 07/21/22  0244 10/03/22  0715   WBC K/uL 6.45 2.36*   RBC M/uL 3.71* 3.91*   Hemoglobin g/dL 12.2 12.7   Hematocrit % 35.9* 37.8   Platelets K/uL 207 213   MCV fL 97 97   MCH pg 32.9* 32.5*   MCHC g/dL 34.0 33.6     CMP:  Recent Labs   Lab Result Units 10/03/22  0715   Glucose mg/dL 87   Calcium mg/dL 9.1   Albumin g/dL 3.9   Total Protein g/dL 6.4   Sodium mmol/L 140   Potassium mmol/L 4.0   CO2 mmol/L 23   Chloride mmol/L 107   BUN mg/dL 15   Alkaline Phosphatase U/L 79   ALT U/L 66*   AST U/L 35   Total Bilirubin mg/dL 0.5     URINALYSIS:  Recent Labs   Lab Result Units 08/09/22  1513   Color, UA  Colorless*   Specific Gravity, UA  1.010   pH, UA  7.0   Protein, UA  Negative   Nitrite, UA  Negative   Leukocytes, UA  Negative      LIPIDS:  Recent Labs   Lab Result Units 10/03/22  0715   TSH uIU/mL 0.897   HDL mg/dL 65   Cholesterol mg/dL 179   Triglycerides mg/dL 51   LDL Cholesterol mg/dL 103.8   HDL/Cholesterol Ratio % 36.3   Non-HDL Cholesterol mg/dL 114    Total Cholesterol/HDL Ratio  2.8     TSH:  Recent Labs   Lab Result Units 10/03/22  0715   TSH uIU/mL 0.897     A1C:  No results for input(s): HGBA1C in the last 2160 hours.    Radiology:        Assessment:     Socorro Huang is a 47 y.o.female with:    Annual physical exam    Need for influenza vaccination  -     Influenza - Quadrivalent *Preferred* (6 months+) (PF)        Plan:     Problem List Items Addressed This Visit          ID    Need for influenza vaccination       Other    Annual physical exam - Primary    Overview     pe documented all screening labs reviewed with him             As above, continue current medications and maintain follow up with specialists.  Return to clinic in 12 months.      Frederick W Dantagnan [facility] [place]

## 2022-10-10 ENCOUNTER — PATIENT MESSAGE (OUTPATIENT)
Dept: INTERNAL MEDICINE | Facility: CLINIC | Age: 47
End: 2022-10-10
Payer: COMMERCIAL

## 2022-10-11 DIAGNOSIS — D72.819 LEUKOPENIA, UNSPECIFIED TYPE: Primary | ICD-10-CM

## 2022-11-01 ENCOUNTER — OFFICE VISIT (OUTPATIENT)
Dept: HEMATOLOGY/ONCOLOGY | Facility: CLINIC | Age: 47
End: 2022-11-01
Payer: COMMERCIAL

## 2022-11-01 DIAGNOSIS — D72.810 LYMPHOPENIA: ICD-10-CM

## 2022-11-01 DIAGNOSIS — K57.92 ACUTE DIVERTICULITIS: Primary | ICD-10-CM

## 2022-11-01 DIAGNOSIS — D72.819 LEUKOPENIA, UNSPECIFIED TYPE: ICD-10-CM

## 2022-11-01 PROCEDURE — 99204 PR OFFICE/OUTPT VISIT, NEW, LEVL IV, 45-59 MIN: ICD-10-PCS | Mod: 95,,, | Performed by: INTERNAL MEDICINE

## 2022-11-01 PROCEDURE — 99204 OFFICE O/P NEW MOD 45 MIN: CPT | Mod: 95,,, | Performed by: INTERNAL MEDICINE

## 2022-11-01 NOTE — PROGRESS NOTES
Hematology and Medical Oncology   New Patient Consult     11/01/2022  Referred by:  Dr. Blake Akins*    Reason For Referral: Leukopenia    Telemedicine Documentation:  The patient location is: home  The chief complaint leading to consultation is: low white count    Visit type: audiovisual    Face to Face time with patient: 25  45 minutes of total time spent on the encounter, which includes face to face time and non-face to face time preparing to see the patient (eg, review of tests), Obtaining and/or reviewing separately obtained history, Documenting clinical information in the electronic or other health record, Independently interpreting results (not separately reported) and communicating results to the patient/family/caregiver, or Care coordination (not separately reported).     Each patient to whom he or she provides medical services by telemedicine is:  (1) informed of the relationship between the physician and patient and the respective role of any other health care provider with respect to management of the patient; and (2) notified that he or she may decline to receive medical services by telemedicine and may withdraw from such care at any time.      History of Present Ilness:   Socorro Huang (Socorro) is a pleasant 47 y.o.female who presents to discuss her low white count.     On chart review dating back to 2011 her white count has been in the 2-3.5 range consistently, with the exception of 2 instances:    November 2021 when she had diverticulitis and July 2022 when she had a partial colectomy. She has since recovered nicely no further episodes    Feels achy, has a bad headache.    PAST MEDICAL HISTORY:   Past Medical History:   Diagnosis Date    Abnormal Pap smear     Anxiety     celexa & prozac didn't help much    Constipation - functional     Headache(784.0)     Hemorrhoid     Leukopenia     benign, evaluated in [facility] y 2000    Menorrhagia     Strain of neck muscle     tx with PT at [facility] in past        PAST SURGICAL HISTORY:   Past Surgical History:   Procedure Laterality Date    ANOSCOPY N/A 7/20/2022    Procedure: ANOSCOPY;  Surgeon: ASHTYN Melo MD;  Location: [facility];  Service: Colon and Rectal;  Laterality: N/A;    BREAST RECONSTRUCTION  2018    BREAST SURGERY      COLON SURGERY  2014         COLONOSCOPY N/A 2/3/2022    Procedure: COLONOSCOPY;  Surgeon: ASHTYN Melo MD;  Location: [facility] ENDO;  Service: Endoscopy;  Laterality: N/A;    DILATION AND CURETTAGE OF UTERUS  12/18/2012    complex hyperplasia, no atypia    FLEXIBLE SIGMOIDOSCOPY N/A 7/20/2022    Procedure: SIGMOIDOSCOPY, FLEXIBLE;  Surgeon: ASHTNY Melo MD;  [facility]: [facility];  Service: Colon and Rectal;  Laterality: N/A;    HEMORRHOID SURGERY  2014    HYSTERECTOMY      LAPAROSCOPIC SIGMOIDECTOMY Left 7/20/2022    Procedure: COLECTOMY, SIGMOID, LAPAROSCOPIC, ERAS low;  Surgeon: ASHTYN Melo MD;  Location: [facility] OR 2ND FLR;  Service: Colon and Rectal;  Laterality: Left;    MOBILIZATION OF SPLENIC FLEXURE N/A 7/20/2022    Procedure: MOBILIZATION, SPLENIC FLEXURE;  Surgeon: ASHTYN Melo MD;  [facility]: [facility];  Service: Colon and Rectal;  Laterality: N/A;    OOPHORECTOMY      PARTIAL HYSTERECTOMY  2013    for atypia    THYROIDECTOMY, PARTIAL  2013    TONSILLECTOMY      TUBAL LIGATION         PAST SOCIAL HISTORY:   reports that she has never smoked. She has never used smokeless tobacco. She reports current alcohol use. She reports that she does not use drugs.    FAMILY HISTORY:  Family History   Problem Relation Age of Onset    Emphysema Mother     Cancer Father         lung    Heart disease Father 50    Diabetes Paternal Grandmother     Hypertension Paternal Grandmother     Depression Sister     Suicide Brother     Drug abuse Maternal Aunt     Schizophrenia Paternal Aunt     Alcohol abuse Maternal Uncle     Alcohol abuse Cousin     Breast cancer Maternal Grandmother     Ovarian cancer  Maternal Grandmother     ADD / ADHD Neg Hx     Anxiety disorder Neg Hx     Bipolar disorder Neg Hx     Dementia Neg Hx     OCD Neg Hx     Paranoid behavior Neg Hx     Physical abuse Neg Hx     Seizures Neg Hx     Self injury Neg Hx     Sexual abuse Neg Hx        CURRENT MEDICATIONS:   Current Outpatient Medications   Medication Sig    fluticasone propionate (FLONASE) 50 mcg/actuation nasal spray 1 spray (50 mcg total) by Each Nostril route once daily.    hyoscyamine (ANASPAZ,LEVSIN) 0.125 mg Tab TAKE 1 TABLET (125 MCG TOTAL) BY MOUTH EVERY 4 (FOUR) HOURS AS NEEDED (CRAMPING).    traZODone (DESYREL) 100 MG tablet TAKE 1 TABLET BY MOUTH EVERY DAY IN THE EVENING     No current facility-administered medications for this visit.     ALLERGIES:   Review of patient's allergies indicates:   Allergen Reactions    Celexa [citalopram] Nausea Only     Stomach upset         Review of Systems:     Review of Systems   Constitutional:  Negative for appetite change and unexpected weight change.   HENT:   Negative for mouth sores.    Respiratory:  Negative for cough and shortness of breath.    Cardiovascular:  Negative for chest pain.   Gastrointestinal:  Negative for abdominal pain and diarrhea.   Genitourinary:  Negative for frequency.    Musculoskeletal:  Negative for back pain.   Skin:  Negative for rash.   Neurological:  Negative for headaches.   Hematological:  Negative for adenopathy.   Psychiatric/Behavioral:  The patient is nervous/anxious.         Physical Exam:     Limited secondary to virtual visit    ECOG Performance Status: (foot note - ECOG PS provided by Eastern Cooperative Oncology Group) 0 - Asymptomatic    Karnofsky Performance Score:  100%- Normal, No Complaints, No Evidence of Disease    Labs:   Lab Results   Component Value Date    WBC 2.36 (L) 10/03/2022    HGB 12.7 10/03/2022    HCT 37.8 10/03/2022     10/03/2022    CHOL 179 10/03/2022    TRIG 51 10/03/2022    HDL 65 10/03/2022    ALT 66 (H) 10/03/2022     AST 35 10/03/2022     10/03/2022    K 4.0 10/03/2022     10/03/2022    CREATININE 0.7 10/03/2022    BUN 15 10/03/2022    CO2 23 10/03/2022    TSH 0.897 10/03/2022         Imaging: Previous imaging has been reviewed     Assessment and Plan:     Ms. Huang is a pleasant 47 year old with a long standing mild leukopenia    Leukopenia  --Long standing and consistent  --ANC is within an acceptable range  --No prophylactic medication needed  --This is her long standing baseline production  --At this time I would defer a bone marrow biopsy      25 minutes were spent face to face with the patient to discuss the labs and possible causes, natural history. I have provided the patient with an opportunity to ask questions and have all questions answered to her satisfaction.       she will return to clinic PRN, but knows to call in the interim if symptoms change or should a problem arise.        Shannan Sheikh MD  Hematology and Medical Oncology  Bone Marrow Transplant  [facility]      BMT Chart Routing      Follow up with physician No follow up needed.   Follow up with ESPERANZA    Provider visit type    Infusion scheduling note    Injection scheduling note    Labs    Imaging    Pharmacy appointment    Other referrals

## 2022-11-03 PROBLEM — D72.810 LYMPHOPENIA: Status: ACTIVE | Noted: 2022-11-03

## 2022-11-18 ENCOUNTER — PATIENT MESSAGE (OUTPATIENT)
Dept: INTERNAL MEDICINE | Facility: CLINIC | Age: 47
End: 2022-11-18
Payer: COMMERCIAL

## 2022-11-18 DIAGNOSIS — G43.109 MIGRAINE WITH AURA AND WITHOUT STATUS MIGRAINOSUS, NOT INTRACTABLE: Primary | ICD-10-CM

## 2022-12-08 ENCOUNTER — OFFICE VISIT (OUTPATIENT)
Dept: INTERNAL MEDICINE | Facility: CLINIC | Age: 47
End: 2022-12-08
Payer: COMMERCIAL

## 2022-12-08 ENCOUNTER — PATIENT MESSAGE (OUTPATIENT)
Dept: INTERNAL MEDICINE | Facility: CLINIC | Age: 47
End: 2022-12-08

## 2022-12-08 VITALS
WEIGHT: 153.69 LBS | HEIGHT: 67 IN | OXYGEN SATURATION: 98 % | DIASTOLIC BLOOD PRESSURE: 72 MMHG | TEMPERATURE: 98 F | SYSTOLIC BLOOD PRESSURE: 116 MMHG | BODY MASS INDEX: 24.12 KG/M2 | HEART RATE: 80 BPM

## 2022-12-08 DIAGNOSIS — J40 BRONCHITIS: Primary | ICD-10-CM

## 2022-12-08 DIAGNOSIS — R05.1 ACUTE COUGH: ICD-10-CM

## 2022-12-08 LAB
CTP QC/QA: YES
CTP QC/QA: YES
POC MOLECULAR INFLUENZA A AGN: NEGATIVE
POC MOLECULAR INFLUENZA B AGN: NEGATIVE
SARS-COV-2 RDRP RESP QL NAA+PROBE: NEGATIVE

## 2022-12-08 PROCEDURE — 96372 PR INJECTION,THERAP/PROPH/DIAG2ST, IM OR SUBCUT: ICD-10-PCS | Mod: S$GLB,,, | Performed by: NURSE PRACTITIONER

## 2022-12-08 PROCEDURE — 99999 PR PBB SHADOW E&M-EST. PATIENT-LVL III: CPT | Mod: PBBFAC,,, | Performed by: NURSE PRACTITIONER

## 2022-12-08 PROCEDURE — 99214 PR OFFICE/OUTPT VISIT, EST, LEVL IV, 30-39 MIN: ICD-10-PCS | Mod: 25,S$GLB,, | Performed by: NURSE PRACTITIONER

## 2022-12-08 PROCEDURE — 87502 INFLUENZA DNA AMP PROBE: CPT | Mod: QW,S$GLB,, | Performed by: NURSE PRACTITIONER

## 2022-12-08 PROCEDURE — 99214 OFFICE O/P EST MOD 30 MIN: CPT | Mod: 25,S$GLB,, | Performed by: NURSE PRACTITIONER

## 2022-12-08 PROCEDURE — 87502 POCT INFLUENZA A/B MOLECULAR: ICD-10-PCS | Mod: QW,S$GLB,, | Performed by: NURSE PRACTITIONER

## 2022-12-08 PROCEDURE — 87635 SARS-COV-2 COVID-19 AMP PRB: CPT | Mod: QW,S$GLB,, | Performed by: NURSE PRACTITIONER

## 2022-12-08 PROCEDURE — 96372 THER/PROPH/DIAG INJ SC/IM: CPT | Mod: S$GLB,,, | Performed by: NURSE PRACTITIONER

## 2022-12-08 PROCEDURE — 87635: ICD-10-PCS | Mod: QW,S$GLB,, | Performed by: NURSE PRACTITIONER

## 2022-12-08 PROCEDURE — 99999 PR PBB SHADOW E&M-EST. PATIENT-LVL III: ICD-10-PCS | Mod: PBBFAC,,, | Performed by: NURSE PRACTITIONER

## 2022-12-08 RX ORDER — CEFTRIAXONE 500 MG/1
500 INJECTION, POWDER, FOR SOLUTION INTRAMUSCULAR; INTRAVENOUS
Status: COMPLETED | OUTPATIENT
Start: 2022-12-08 | End: 2022-12-08

## 2022-12-08 RX ORDER — DOXYCYCLINE HYCLATE 100 MG
100 TABLET ORAL 2 TIMES DAILY
Qty: 14 TABLET | Refills: 0 | Status: SHIPPED | OUTPATIENT
Start: 2022-12-08 | End: 2022-12-15

## 2022-12-08 RX ORDER — CODEINE PHOSPHATE AND GUAIFENESIN 10; 100 MG/5ML; MG/5ML
5 SOLUTION ORAL 3 TIMES DAILY PRN
Qty: 140 ML | Refills: 0 | Status: SHIPPED | OUTPATIENT
Start: 2022-12-08 | End: 2022-12-18

## 2022-12-08 RX ORDER — TRIAMCINOLONE ACETONIDE 40 MG/ML
40 INJECTION, SUSPENSION INTRA-ARTICULAR; INTRAMUSCULAR ONCE
Status: COMPLETED | OUTPATIENT
Start: 2022-12-08 | End: 2022-12-08

## 2022-12-08 RX ADMIN — CEFTRIAXONE 500 MG: 500 INJECTION, POWDER, FOR SOLUTION INTRAMUSCULAR; INTRAVENOUS at 02:12

## 2022-12-08 RX ADMIN — TRIAMCINOLONE ACETONIDE 40 MG: 40 INJECTION, SUSPENSION INTRA-ARTICULAR; INTRAMUSCULAR at 02:12

## 2022-12-08 NOTE — PROGRESS NOTES
[facility]    Chief Complaint      Chief Complaint   Patient presents with    Headache    Sore Throat     STARTED YESTERDAY     Sinus Problem    Fatigue     History of Present Illness      Socorro Huang is a 47 y.o. female patient of Dr. Byrne who presents today for c/o head and chest congestion, sinus pressure, sore throat, fatigue, nausea, cough, bad headaches, tight feeling in chest from coughing. Has had coughing for months. Taking OTC.   Comes to appt with   Mother has lung cancer    POCT flu and covid negative    Health Maintenance   Topic Date Due    Hepatitis C Screening  Never done    Mammogram  11/05/2022    TETANUS VACCINE  11/14/2022    Lipid Panel  10/03/2027    DEXA Scan  Discontinued       Past Medical History:   Diagnosis Date    Abnormal Pap smear     Anxiety     celexa & prozac didn't help much    Constipation - functional     Headache(784.0)     Hemorrhoid     Leukopenia     benign, evaluated in [facility] y 2000    Menorrhagia     Strain of neck muscle     tx with PT at [facility] in past       Past Surgical History:   Procedure Laterality Date    ANOSCOPY N/A 07/20/2022    Procedure: ANOSCOPY;  Surgeon: ASHTYN Melo MD;  Location: [address];  Service: Colon and Rectal;  Laterality: N/A;    BREAST RECONSTRUCTION  2018    BREAST SURGERY      COLON SURGERY  2014         COLONOSCOPY N/A 02/03/2022    Procedure: COLONOSCOPY;  Surgeon: ASHTYN Melo MD;  Location: [facility];  Service: Endoscopy;  Laterality: N/A;    DILATION AND CURETTAGE OF UTERUS  12/18/2012    complex hyperplasia, no atypia    FLEXIBLE SIGMOIDOSCOPY N/A 07/20/2022    Procedure: SIGMOIDOSCOPY, FLEXIBLE;  Surgeon: ASHTYN Melo MD;  Location: [address];  Service: Colon and Rectal;  Laterality: N/A;    HEMORRHOID SURGERY  2014    HYSTERECTOMY      LAPAROSCOPIC SIGMOIDECTOMY Left 07/20/2022    Procedure: COLECTOMY, SIGMOID, LAPAROSCOPIC, ERAS low;  Surgeon: ASHTYN Castillo  MD Kameron;  Location: [facility] OR 2ND FLR;  Service: Colon and Rectal;  Laterality: Left;    MOBILIZATION OF SPLENIC FLEXURE N/A 07/20/2022    Procedure: MOBILIZATION, SPLENIC FLEXURE;  Surgeon: ASHTYN Melo MD;  Location: [facility] OR 2ND FLR;  Service: Colon and Rectal;  Laterality: N/A;    OOPHORECTOMY      PARTIAL HYSTERECTOMY  2013    for atypia    SMALL INTESTINE SURGERY  2017    THYROIDECTOMY, PARTIAL  2013    TONSILLECTOMY      TUBAL LIGATION         family history includes Alcohol abuse in her cousin and maternal uncle; Breast cancer in her maternal grandmother; COPD in her mother; Cancer in her father and mother; Depression in her sister; Diabetes in her mother and paternal grandmother; Drug abuse in her maternal aunt; Emphysema in her mother; Heart disease in her father; Hypertension in her paternal grandmother; Ovarian cancer in her maternal grandmother; Schizophrenia in her paternal aunt; Suicide in her brother.    Social History     Tobacco Use    Smoking status: Never    Smokeless tobacco: Never   Substance Use Topics    Alcohol use: Not Currently     Comment: socially    Drug use: No       Review of Systems   Constitutional:  Positive for chills and malaise/fatigue. Negative for fever.   HENT:  Positive for congestion, sinus pain and sore throat.    Eyes:  Negative for blurred vision and double vision.   Respiratory:  Positive for cough, sputum production and shortness of breath.    Cardiovascular:  Negative for chest pain and palpitations.   Gastrointestinal:  Positive for nausea. Negative for abdominal pain, diarrhea and vomiting.   Genitourinary:  Negative for dysuria.   Musculoskeletal:  Positive for myalgias. Negative for back pain and neck pain.   Neurological:  Positive for headaches. Negative for dizziness, tingling and weakness.      Outpatient Encounter Medications as of 12/8/2022   Medication Sig Note Dispense Refill    fluticasone propionate (FLONASE) 50 mcg/actuation nasal spray 1  "spray (50 mcg total) by Each Nostril route once daily. 7/19/2022: Take as prescribed am of procedure                      18 g 0    traZODone (DESYREL) 100 MG tablet TAKE 1 TABLET BY MOUTH EVERY DAY IN THE EVENING  90 tablet 1    doxycycline (VIBRA-TABS) 100 MG tablet Take 1 tablet (100 mg total) by mouth 2 (two) times daily. for 7 days  14 tablet 0    guaiFENesin-codeine 100-10 mg/5 ml (TUSSI-ORGANIDIN NR)  mg/5 mL syrup Take 5 mLs by mouth 3 (three) times daily as needed for Cough.  140 mL 0    hyoscyamine (ANASPAZ,LEVSIN) 0.125 mg Tab TAKE 1 TABLET (125 MCG TOTAL) BY MOUTH EVERY 4 (FOUR) HOURS AS NEEDED (CRAMPING).  60 tablet 4     Facility-Administered Encounter Medications as of 12/8/2022   Medication Dose Route Frequency Provider Last Rate Last Admin    [COMPLETED] cefTRIAXone injection 500 mg  500 mg Intramuscular 1 time in Clinic/HOD Lalitha Alexander NP   500 mg at 12/08/22 1448    [COMPLETED] triamcinolone acetonide injection 40 mg  40 mg Intramuscular Once Lalitha Alexander NP   40 mg at 12/08/22 1450        Review of patient's allergies indicates:   Allergen Reactions    Celexa [citalopram] Nausea Only     Stomach upset       Physical Exam      Vital Signs  Temp: 98.3 °F (36.8 °C)  Pulse: 80  SpO2: 98 %  BP: 116/72  BP Location: Left arm  Patient Position: Sitting  Height and Weight  Height: 5' 7" (170.2 cm)  Weight: 69.7 kg (153 lb 10.6 oz)  BSA (Calculated - sq m): 1.82 sq meters  BMI (Calculated): 24.1  Weight in (lb) to have BMI = 25: 159.3    Physical Exam  Vitals and nursing note reviewed.   Constitutional:       General: She is not in acute distress.     Appearance: Normal appearance. She is ill-appearing.   HENT:      Head: Normocephalic and atraumatic.      Ears:      Comments: Redness to bilateral ear canals     Mouth/Throat:      Mouth: Mucous membranes are moist.      Pharynx: Posterior oropharyngeal erythema present.   Cardiovascular:      Rate and Rhythm: Regular rhythm. Tachycardia " present.      Heart sounds: Normal heart sounds.   Pulmonary:      Effort: Pulmonary effort is normal. No respiratory distress.   Musculoskeletal:         General: Normal range of motion.      Cervical back: Normal range of motion.   Lymphadenopathy:      Cervical: Cervical adenopathy present.   Skin:     General: Skin is warm.   Neurological:      Mental Status: She is alert.   Psychiatric:         Mood and Affect: Mood normal.         Behavior: Behavior normal.         Thought Content: Thought content normal.         Judgment: Judgment normal.        Laboratory:  CBC:  Lab Results   Component Value Date    WBC 2.36 (L) 10/03/2022    RBC 3.91 (L) 10/03/2022    HGB 12.7 10/03/2022    HCT 37.8 10/03/2022     10/03/2022    MCV 97 10/03/2022    MCH 32.5 (H) 10/03/2022    MCHC 33.6 10/03/2022    MCHC 34.0 07/21/2022    MCHC 33.9 11/30/2021     CMP:  Lab Results   Component Value Date    GLU 87 10/03/2022    CALCIUM 9.1 10/03/2022    ALBUMIN 3.9 10/03/2022    PROT 6.4 10/03/2022     10/03/2022    K 4.0 10/03/2022    CO2 23 10/03/2022     10/03/2022    BUN 15 10/03/2022    ALKPHOS 79 10/03/2022    ALT 66 (H) 10/03/2022    AST 35 10/03/2022    BILITOT 0.5 10/03/2022    BILITOT 0.4 11/27/2021    BILITOT 0.6 10/27/2014     URINALYSIS:  Lab Results   Component Value Date    COLORU Colorless (A) 08/09/2022    SPECGRAV 1.010 08/09/2022    PHUR 7.0 08/09/2022    PROTEINUA Negative 08/09/2022    BACTERIA Few (A) 10/08/2013    NITRITE Negative 08/09/2022    LEUKOCYTESUR Negative 08/09/2022    UROBILINOGEN Negative 10/27/2014      LIPIDS:  Lab Results   Component Value Date    TSH 0.897 10/03/2022    TSH 1.659 11/22/2017    TSH 1.846 08/01/2012    HDL 65 10/03/2022    HDL 54 10/27/2014    HDL 55 04/12/2013    CHOL 179 10/03/2022    CHOL 140 10/27/2014    CHOL 135 04/12/2013    TRIG 51 10/03/2022    TRIG 37 10/27/2014    TRIG 50 04/12/2013    LDLCALC 103.8 10/03/2022    LDLCALC 78.6 10/27/2014    LDLCALC 70.0  04/12/2013    CHOLHDL 36.3 10/03/2022    CHOLHDL 38.6 10/27/2014    CHOLHDL 40.7 04/12/2013    NONHDLCHOL 114 10/03/2022    NONHDLCHOL 86 10/27/2014    TOTALCHOLEST 2.8 10/03/2022    TOTALCHOLEST 2.6 10/27/2014    TOTALCHOLEST 2.5 04/12/2013     TSH:  Lab Results   Component Value Date    TSH 0.897 10/03/2022    TSH 1.659 11/22/2017    TSH 1.846 08/01/2012     A1C:  No results found for: HGBA1C      Assessment/Plan     Socorro Huang is a 47 y.o.female with:    Bronchitis  -     POCT Influenza A/B Molecular  -     POCT COVID-19 Rapid Screening  -     cefTRIAXone injection 500 mg  -     triamcinolone acetonide injection 40 mg  -     guaiFENesin-codeine 100-10 mg/5 ml (TUSSI-ORGANIDIN NR)  mg/5 mL syrup; Take 5 mLs by mouth 3 (three) times daily as needed for Cough.  Dispense: 140 mL; Refill: 0  -     doxycycline (VIBRA-TABS) 100 MG tablet; Take 1 tablet (100 mg total) by mouth 2 (two) times daily. for 7 days  Dispense: 14 tablet; Refill: 0    Acute cough  -     guaiFENesin-codeine 100-10 mg/5 ml (TUSSI-ORGANIDIN NR)  mg/5 mL syrup; Take 5 mLs by mouth 3 (three) times daily as needed for Cough.  Dispense: 140 mL; Refill: 0  -     doxycycline (VIBRA-TABS) 100 MG tablet; Take 1 tablet (100 mg total) by mouth 2 (two) times daily. for 7 days  Dispense: 14 tablet; Refill: 0        Health Maintenance Due   Topic Date Due    Hepatitis C Screening  Never done    HIV Screening  Never done    COVID-19 Vaccine (4 - Booster for Pfizer series) 11/26/2021    Mammogram  11/05/2022    TETANUS VACCINE  11/14/2022      Stay hydrated  Monitor symptoms  Complete anbx  Take meds as prescribed    I spent 35 minutes on the day of this encounter for preparing for, evaluating, treating, and managing this patient.      -Continue current medications and maintain follow up with specialists.  Return to clinic as needed for any concerns   No follow-ups on file.       Lalitha Alexander, NP-C  [facility] [facility]  location

## 2022-12-11 ENCOUNTER — PATIENT MESSAGE (OUTPATIENT)
Dept: INTERNAL MEDICINE | Facility: CLINIC | Age: 47
End: 2022-12-11
Payer: COMMERCIAL

## 2022-12-11 DIAGNOSIS — R05.3 CHRONIC COUGH: Primary | ICD-10-CM

## 2022-12-12 ENCOUNTER — PATIENT MESSAGE (OUTPATIENT)
Dept: INTERNAL MEDICINE | Facility: CLINIC | Age: 47
End: 2022-12-12
Payer: COMMERCIAL

## 2022-12-13 ENCOUNTER — HOSPITAL ENCOUNTER (OUTPATIENT)
Dept: RADIOLOGY | Facility: HOSPITAL | Age: 47
Discharge: HOME | End: 2022-12-13
Attending: INTERNAL MEDICINE
Payer: COMMERCIAL

## 2022-12-13 DIAGNOSIS — R05.3 CHRONIC COUGH: ICD-10-CM

## 2022-12-13 PROCEDURE — 71046 X-RAY EXAM CHEST 2 VIEWS: CPT | Mod: 26,,, | Performed by: RADIOLOGY

## 2022-12-13 PROCEDURE — 71046 XR CHEST PA AND LATERAL: ICD-10-PCS | Mod: 26,,, | Performed by: RADIOLOGY

## 2022-12-13 PROCEDURE — 71046 X-RAY EXAM CHEST 2 VIEWS: CPT | Mod: TC,PO

## 2022-12-21 ENCOUNTER — OFFICE VISIT (OUTPATIENT)
Dept: NEUROLOGY | Facility: CLINIC | Age: 47
End: 2022-12-21
Payer: COMMERCIAL

## 2022-12-21 DIAGNOSIS — G43.109 MIGRAINE WITH AURA AND WITHOUT STATUS MIGRAINOSUS, NOT INTRACTABLE: ICD-10-CM

## 2022-12-21 PROCEDURE — 99213 OFFICE O/P EST LOW 20 MIN: CPT | Mod: 95,,, | Performed by: PSYCHIATRY & NEUROLOGY

## 2022-12-21 PROCEDURE — 99213 PR OFFICE/OUTPT VISIT, EST, LEVL III, 20-29 MIN: ICD-10-PCS | Mod: 95,,, | Performed by: PSYCHIATRY & NEUROLOGY

## 2022-12-21 NOTE — PROGRESS NOTES
[facility]  Virtual Visit      [facility] - NEUROLOGY [address]TH [address] [facility] [place]    Date: 12/21/22  Patient Name: Socorro Huagn   MRN: [id number]   PCP: Blake Adams  Referring Provider: Blake Adams.*    The patient location is: Workplace  The chief complaint leading to consultation is: Migraine  Visit type: Virtual visit with synchronous audio and video  Total time spent with patient: 10 min  Each patient to whom he or she provides medical services by telemedicine is:  (1) informed of the relationship between the physician and patient and the respective role of any other health care provider with respect to management of the patient; and (2) notified that he or she may decline to receive medical services by telemedicine and may withdraw from such care at any time.    Notes:     Assessment:   Socorro Huang is a 47 y.o. female Presenting to establish care for migraine headache.  Attempting to expedite approval for Botox injections for patient.  We will continue on current therapy as discontinuation of therapy has led to exacerbation of patient's current symptoms.  All questions answered.    Plan:     Problem List Items Addressed This Visit    None  Visit Diagnoses       Migraine with aura and without status migrainosus, not intractable        Relevant Orders    Ambulatory consult to Neurology          I spent a total of 20 minutes preparing to see the patient, obtaining and reviewing history and results, performing a medically appropriate exam, counseling and educating the patient/family/caregiver, documenting clinical information, coordinating care, and ordering medications, tests, procedures, and referrals.    Agus Montes MD  [facility]   Department of Neurology    Patient note was created using MModal Dictation.  Any errors in syntax or even information may not have been identified and edited on initial review prior  to signing this note.  Subjective:   Patient seen in consultation at the request of Blake Adams for the evaluation of migraine. A copy of this note will be sent to the referring physician.          HPI:   Ms. Socorro Huang is a 47 y.o. female presenting to establish care for migraine headache.  Patient reports that she has suffered from migraines for years and has previously received care at [facility].  She states that she has good headache control when she is taking her Botox however her previous physician has been out of work for over a month and she is overdue for injections.  She states she is under extreme stress, which is a known migraine trigger for her, due to her mother's recent diagnosis with lung cancer.  She endorses typical migraine with unilateral pain with associated photophobia and phonophobia.  She denies aura or associated focal neurologic deficits.  She denies any other prior prophylactic medications.  At present she is experiencing a headache every single day.    Prior Meds: Imitrex (makes her sick), Nurtec    PAST MEDICAL HISTORY:  Past Medical History:   Diagnosis Date    Abnormal Pap smear     Anxiety     celexa & prozac didn't help much    Constipation - functional     Headache(784.0)     Hemorrhoid     Leukopenia     benign, evaluated in [facility] y 2000    Menorrhagia     Strain of neck muscle     tx with PT at [facility] in past       PAST SURGICAL HISTORY:  Past Surgical History:   Procedure Laterality Date    ANOSCOPY N/A 07/20/2022    Procedure: ANOSCOPY;  Surgeon: ASHTYN Melo MD;  Location: [facility] OR [address] [place] [address];  Service: Colon and Rectal;  Laterality: N/A;    BREAST RECONSTRUCTION  2018    BREAST SURGERY      COLON SURGERY  2014         COLONOSCOPY N/A 02/03/2022    Procedure: COLONOSCOPY;  Surgeon: ASHTYN Melo MD;  Location: [facility];  Service: Endoscopy;  Laterality: N/A;    DILATION AND CURETTAGE OF UTERUS  12/18/2012    complex hyperplasia, no  atypia    FLEXIBLE SIGMOIDOSCOPY N/A 07/20/2022    Procedure: SIGMOIDOSCOPY, FLEXIBLE;  Surgeon: ASHTYN Melo MD;  Location: [facility] OR 2ND FLR;  Service: Colon and Rectal;  Laterality: N/A;    HEMORRHOID SURGERY  2014    HYSTERECTOMY      LAPAROSCOPIC SIGMOIDECTOMY Left 07/20/2022    Procedure: COLECTOMY, SIGMOID, LAPAROSCOPIC, ERAS low;  Surgeon: ASHTYN Melo MD;  Location: [facility] OR 2ND FLR;  Service: Colon and Rectal;  Laterality: Left;    MOBILIZATION OF SPLENIC FLEXURE N/A 07/20/2022    Procedure: MOBILIZATION, SPLENIC FLEXURE;  Surgeon: ASHTYN Melo MD;  Location: [facility] OR 2ND FLR;  Service: Colon and Rectal;  Laterality: N/A;    OOPHORECTOMY      PARTIAL HYSTERECTOMY  2013    for atypia    SMALL INTESTINE SURGERY  2017    THYROIDECTOMY, PARTIAL  2013    TONSILLECTOMY      TUBAL LIGATION         CURRENT MEDS:  Current Outpatient Medications   Medication Sig Dispense Refill    fluticasone propionate (FLONASE) 50 mcg/actuation nasal spray 1 spray (50 mcg total) by Each Nostril route once daily. 18 g 0    traZODone (DESYREL) 100 MG tablet TAKE 1 TABLET BY MOUTH EVERY DAY IN THE EVENING 90 tablet 1     No current facility-administered medications for this visit.       ALLERGIES:  Review of patient's allergies indicates:   Allergen Reactions    Celexa [citalopram] Nausea Only     Stomach upset       FAMILY HISTORY:  Family History   Problem Relation Age of Onset    Emphysema Mother     Cancer Mother         Lung    COPD Mother     Diabetes Mother     Cancer Father         Lung    Heart disease Father     Diabetes Paternal Grandmother     Hypertension Paternal Grandmother     Depression Sister     Suicide Brother     Drug abuse Maternal Aunt     Schizophrenia Paternal Aunt     Alcohol abuse Maternal Uncle     Alcohol abuse Cousin     Breast cancer Maternal Grandmother     Ovarian cancer Maternal Grandmother     ADD / ADHD Neg Hx     Anxiety disorder Neg Hx     Bipolar disorder Neg Hx     Dementia  Neg Hx     OCD Neg Hx     Paranoid behavior Neg Hx     Physical abuse Neg Hx     Seizures Neg Hx     Self injury Neg Hx     Sexual abuse Neg Hx        SOCIAL HISTORY:  Social History     Tobacco Use    Smoking status: Never    Smokeless tobacco: Never   Substance Use Topics    Alcohol use: Not Currently     Comment: socially    Drug use: No       Review of Systems:  12 system review of systems is negative except for the symptoms mentioned in HPI.      Objective:   There were no vitals filed for this visit.  General: NAD, well nourished   Eyes: no tearing, discharge, no erythema   ENT: moist mucous membranes of the oral cavity, nares patent    Neck: Supple, full range of motion  Cardiovascular: Appears well perfused  Lungs: Normal work of breathing, normal chest wall excursions  Skin: No rash, lesions, or breakdown on exposed skin  Psychiatry: Mood and affect are appropriate   Abdomen: nondistended, non tender  Extremeties: No cyanosis, clubbing or edema visible    Neurological   MENTAL STATUS: Alert and oriented to person, place, and time. Attention and concentration within normal limits. Speech without dysarthria, able to name and repeat without difficulty. Recent and remote memory within normal limits   CRANIAL NERVES: Visual fields intact.  EOMI. Facial sensation intact. Face symmetrical. Hearing grossly intact. Full shoulder shrug bilaterally. Tongue protrudes midline   SENSORY: Sensation is intact to light touch throughout.   MOTOR: Normal bulk.  Moves all extremities symmetrically.  REFLEXES: Deferred due to virtual visit  CEREBELLAR/COORDINATION/GAIT: Gait steady.  Finger to nose intact. Normal rapid alternating movements.

## 2022-12-28 DIAGNOSIS — G43.109 MIGRAINE WITH AURA AND WITHOUT STATUS MIGRAINOSUS, NOT INTRACTABLE: Primary | ICD-10-CM

## 2023-01-07 ENCOUNTER — PATIENT MESSAGE (OUTPATIENT)
Dept: HEMATOLOGY/ONCOLOGY | Facility: CLINIC | Age: 48
End: 2023-01-07
Payer: COMMERCIAL

## 2023-01-07 DIAGNOSIS — D72.819 LEUKOPENIA, UNSPECIFIED TYPE: Primary | ICD-10-CM

## 2023-01-09 PROBLEM — Z00.00 ANNUAL PHYSICAL EXAM: Status: RESOLVED | Noted: 2022-10-07 | Resolved: 2023-01-09

## 2023-01-13 ENCOUNTER — LAB VISIT (OUTPATIENT)
Dept: LAB | Facility: HOSPITAL | Age: 48
End: 2023-01-13
Attending: INTERNAL MEDICINE
Payer: COMMERCIAL

## 2023-01-13 DIAGNOSIS — D72.819 LEUKOPENIA, UNSPECIFIED TYPE: ICD-10-CM

## 2023-01-13 LAB
BASOPHILS # BLD AUTO: 0.01 K/UL (ref 0–0.2)
BASOPHILS NFR BLD: 0.3 % (ref 0–1.9)
DIFFERENTIAL METHOD: ABNORMAL
EOSINOPHIL # BLD AUTO: 0.1 K/UL (ref 0–0.5)
EOSINOPHIL NFR BLD: 1.9 % (ref 0–8)
ERYTHROCYTE [DISTWIDTH] IN BLOOD BY AUTOMATED COUNT: 11.4 % (ref 11.5–14.5)
HCT VFR BLD AUTO: 42.8 % (ref 37–48.5)
HGB BLD-MCNC: 14.8 G/DL (ref 12–16)
IMM GRANULOCYTES # BLD AUTO: 0.01 K/UL (ref 0–0.04)
IMM GRANULOCYTES NFR BLD AUTO: 0.3 % (ref 0–0.5)
LYMPHOCYTES # BLD AUTO: 0.9 K/UL (ref 1–4.8)
LYMPHOCYTES NFR BLD: 27.1 % (ref 18–48)
MCH RBC QN AUTO: 32.2 PG (ref 27–31)
MCHC RBC AUTO-ENTMCNC: 34.6 G/DL (ref 32–36)
MCV RBC AUTO: 93 FL (ref 82–98)
MONOCYTES # BLD AUTO: 0.4 K/UL (ref 0.3–1)
MONOCYTES NFR BLD: 11.1 % (ref 4–15)
NEUTROPHILS # BLD AUTO: 1.9 K/UL (ref 1.8–7.7)
NEUTROPHILS NFR BLD: 59.3 % (ref 38–73)
NRBC BLD-RTO: 0 /100 WBC
PLATELET # BLD AUTO: 219 K/UL (ref 150–450)
PMV BLD AUTO: 8.5 FL (ref 9.2–12.9)
RBC # BLD AUTO: 4.59 M/UL (ref 4–5.4)
WBC # BLD AUTO: 3.14 K/UL (ref 3.9–12.7)

## 2023-01-13 PROCEDURE — 85025 COMPLETE CBC W/AUTO DIFF WBC: CPT | Performed by: INTERNAL MEDICINE

## 2023-01-13 PROCEDURE — 36415 COLL VENOUS BLD VENIPUNCTURE: CPT | Performed by: INTERNAL MEDICINE

## 2023-02-03 ENCOUNTER — OFFICE VISIT (OUTPATIENT)
Dept: PRIMARY CARE CLINIC | Facility: CLINIC | Age: 48
End: 2023-02-03
Payer: COMMERCIAL

## 2023-02-03 VITALS
DIASTOLIC BLOOD PRESSURE: 70 MMHG | BODY MASS INDEX: 25 KG/M2 | OXYGEN SATURATION: 98 % | HEART RATE: 83 BPM | TEMPERATURE: 98 F | WEIGHT: 159.63 LBS | SYSTOLIC BLOOD PRESSURE: 110 MMHG

## 2023-02-03 DIAGNOSIS — J40 BRONCHITIS: ICD-10-CM

## 2023-02-03 DIAGNOSIS — R05.2 SUBACUTE COUGH: Primary | ICD-10-CM

## 2023-02-03 DIAGNOSIS — K21.9 GASTROESOPHAGEAL REFLUX DISEASE WITHOUT ESOPHAGITIS: ICD-10-CM

## 2023-02-03 PROCEDURE — 99999 PR PBB SHADOW E&M-EST. PATIENT-LVL IV: ICD-10-PCS | Mod: PBBFAC,,, | Performed by: NURSE PRACTITIONER

## 2023-02-03 PROCEDURE — 99999 PR PBB SHADOW E&M-EST. PATIENT-LVL IV: CPT | Mod: PBBFAC,,, | Performed by: NURSE PRACTITIONER

## 2023-02-03 PROCEDURE — 99214 OFFICE O/P EST MOD 30 MIN: CPT | Mod: S$GLB,,, | Performed by: NURSE PRACTITIONER

## 2023-02-03 PROCEDURE — 99214 PR OFFICE/OUTPT VISIT, EST, LEVL IV, 30-39 MIN: ICD-10-PCS | Mod: S$GLB,,, | Performed by: NURSE PRACTITIONER

## 2023-02-03 RX ORDER — DOXYCYCLINE 100 MG/1
100 CAPSULE ORAL 2 TIMES DAILY
Qty: 14 CAPSULE | Refills: 0 | Status: SHIPPED | OUTPATIENT
Start: 2023-02-03 | End: 2023-02-10

## 2023-02-03 RX ORDER — CODEINE PHOSPHATE AND GUAIFENESIN 10; 100 MG/5ML; MG/5ML
5 SOLUTION ORAL 3 TIMES DAILY PRN
Qty: 140 ML | Refills: 0 | Status: SHIPPED | OUTPATIENT
Start: 2023-02-03 | End: 2023-02-13

## 2023-02-03 RX ORDER — BUTALBITAL, ACETAMINOPHEN AND CAFFEINE 50; 325; 40 MG/1; MG/1; MG/1
1 TABLET ORAL EVERY 6 HOURS
COMMUNITY
Start: 2022-09-16 | End: 2023-07-06 | Stop reason: SDUPTHER

## 2023-02-03 RX ORDER — PANTOPRAZOLE SODIUM 40 MG/1
40 TABLET, DELAYED RELEASE ORAL DAILY
Qty: 30 TABLET | Refills: 11 | Status: SHIPPED | OUTPATIENT
Start: 2023-02-03 | End: 2024-02-03

## 2023-02-03 RX ORDER — UBIDECARENONE 30 MG
CAPSULE ORAL
COMMUNITY

## 2023-02-03 NOTE — PROGRESS NOTES
[facility] Note    Chief Complaint      Chief Complaint   Patient presents with    Cough     History of Present Illness      Socorro Huang is a 47 y.o. female patient of Dr. Byrne with chronic conditions of anxiety, hypothyroid, GERD, insomnia who presents today for c/o still having a chronic cough. Pt's mother has cancer and she is her caregiver. Pt reports last week she took 4 keflex and some old cough syrup she had, still with dry coughing, sometimes productive in the mornings or worse coughing at night. Having pnd. No c/o fever, chills, sob, cp, n/v/d. Has had this on and off since December.  Also feels that maybe she is coughing sometimes from GERD. Will refill the pantoprazole to trial. Burned in her throat after eating a peppermint  Has had covid in the past    Health Maintenance   Topic Date Due    Hepatitis C Screening  Never done    Mammogram  11/05/2022    TETANUS VACCINE  11/14/2022    Lipid Panel  10/03/2027    DEXA Scan  Discontinued       Past Medical History:   Diagnosis Date    Abnormal Pap smear     Anxiety     celexa & prozac didn't help much    Constipation - functional     Headache(784.0)     Hemorrhoid     Leukopenia     benign, evaluated in [facility] y 2000    Menorrhagia     Strain of neck muscle     tx with PT at [facility] in past       Past Surgical History:   Procedure Laterality Date    ANOSCOPY N/A 07/20/2022    Procedure: ANOSCOPY;  Surgeon: ASHTYN Melo MD;  Location: [address];  Service: Colon and Rectal;  Laterality: N/A;    BREAST RECONSTRUCTION  2018    BREAST SURGERY      COLON SURGERY  2014         COLONOSCOPY N/A 02/03/2022    Procedure: COLONOSCOPY;  Surgeon: ASHTYN Meol MD;  Location: [facility];  Service: Endoscopy;  Laterality: N/A;    DILATION AND CURETTAGE OF UTERUS  12/18/2012    complex hyperplasia, no atypia    FLEXIBLE SIGMOIDOSCOPY N/A 07/20/2022    Procedure: SIGMOIDOSCOPY, FLEXIBLE;  Surgeon: ASHTYN Melo MD;  Location:  [facility] OR 2ND FLR;  Service: Colon and Rectal;  Laterality: N/A;    HEMORRHOID SURGERY  2014    HYSTERECTOMY      LAPAROSCOPIC SIGMOIDECTOMY Left 07/20/2022    Procedure: COLECTOMY, SIGMOID, LAPAROSCOPIC, ERAS low;  Surgeon: ASHTYN Melo MD;  Location: [facility] OR 2ND FLR;  Service: Colon and Rectal;  Laterality: Left;    MOBILIZATION OF SPLENIC FLEXURE N/A 07/20/2022    Procedure: MOBILIZATION, SPLENIC FLEXURE;  Surgeon: ASHTYN Melo MD;  Location: [facility] OR 2ND FLR;  Service: Colon and Rectal;  Laterality: N/A;    OOPHORECTOMY      PARTIAL HYSTERECTOMY  2013    for atypia    SMALL INTESTINE SURGERY  2017    THYROIDECTOMY, PARTIAL  2013    TONSILLECTOMY      TUBAL LIGATION         family history includes Alcohol abuse in her cousin and maternal uncle; Breast cancer in her maternal grandmother; COPD in her mother; Cancer in her father and mother; Depression in her sister; Diabetes in her mother and paternal grandmother; Drug abuse in her maternal aunt; Emphysema in her mother; Heart disease in her father; Hypertension in her paternal grandmother; Ovarian cancer in her maternal grandmother; Schizophrenia in her paternal aunt; Suicide in her brother.    Social History     Tobacco Use    Smoking status: Never    Smokeless tobacco: Never   Substance Use Topics    Alcohol use: Not Currently     Comment: socially    Drug use: No       Review of Systems   Constitutional:  Negative for chills, fever, malaise/fatigue and weight loss.   HENT:  Negative for congestion, ear pain and sore throat.    Eyes:  Negative for blurred vision.   Respiratory:  Positive for cough and sputum production. Negative for hemoptysis, shortness of breath and wheezing.    Cardiovascular:  Negative for chest pain.   Gastrointestinal:  Positive for heartburn. Negative for abdominal pain, nausea and vomiting.   Musculoskeletal:  Negative for myalgias.   Skin:  Negative for rash.   Neurological:  Negative for dizziness and headaches.    Endo/Heme/Allergies:  Negative for environmental allergies.      Outpatient Encounter Medications as of 2/3/2023   Medication Sig Note Dispense Refill    butalbital-acetaminophen-caffeine -40 mg (FIORICET, ESGIC) -40 mg per tablet Take 1 tablet by mouth every 6 (six) hours.       co-enzyme Q-10 30 mg capsule Take by mouth.       fluticasone propionate (FLONASE) 50 mcg/actuation nasal spray 1 spray (50 mcg total) by Each Nostril route once daily. 7/19/2022: Take as prescribed am of procedure                      18 g 0    traZODone (DESYREL) 100 MG tablet TAKE 1 TABLET BY MOUTH EVERY DAY IN THE EVENING  90 tablet 1     No facility-administered encounter medications on file as of 2/3/2023.        Review of patient's allergies indicates:   Allergen Reactions    Celexa [citalopram] Nausea Only     Stomach upset       Physical Exam      Vital Signs  Temp: 97.6 °F (36.4 °C)  Pulse: 83  SpO2: 98 %  BP: 110/70  Height and Weight  Weight: 72.4 kg (159 lb 9.8 oz)    Physical Exam  Vitals and nursing note reviewed.   Constitutional:       General: She is not in acute distress.     Appearance: Normal appearance. She is ill-appearing.   HENT:      Head: Normocephalic and atraumatic.   Cardiovascular:      Rate and Rhythm: Normal rate and regular rhythm.      Heart sounds: Normal heart sounds.   Pulmonary:      Effort: Pulmonary effort is normal. No respiratory distress.      Breath sounds: Normal breath sounds. No wheezing or rhonchi.   Skin:     General: Skin is warm.   Neurological:      Mental Status: She is alert and oriented to person, place, and time.   Psychiatric:         Mood and Affect: Mood normal.         Behavior: Behavior normal.         Thought Content: Thought content normal.         Judgment: Judgment normal.        Laboratory:  CBC:  Lab Results   Component Value Date    WBC 3.14 (L) 01/13/2023    RBC 4.59 01/13/2023    HGB 14.8 01/13/2023    HCT 42.8 01/13/2023     01/13/2023    MCV 93  01/13/2023    MCH 32.2 (H) 01/13/2023    MCHC 34.6 01/13/2023    MCHC 33.6 10/03/2022    MCHC 34.0 07/21/2022     CMP:  Lab Results   Component Value Date    GLU 87 10/03/2022    CALCIUM 9.1 10/03/2022    ALBUMIN 3.9 10/03/2022    PROT 6.4 10/03/2022     10/03/2022    K 4.0 10/03/2022    CO2 23 10/03/2022     10/03/2022    BUN 15 10/03/2022    ALKPHOS 79 10/03/2022    ALT 66 (H) 10/03/2022    AST 35 10/03/2022    BILITOT 0.5 10/03/2022    BILITOT 0.4 11/27/2021    BILITOT 0.6 10/27/2014     URINALYSIS:  Lab Results   Component Value Date    COLORU Colorless (A) 08/09/2022    SPECGRAV 1.010 08/09/2022    PHUR 7.0 08/09/2022    PROTEINUA Negative 08/09/2022    BACTERIA Few (A) 10/08/2013    NITRITE Negative 08/09/2022    LEUKOCYTESUR Negative 08/09/2022    UROBILINOGEN Negative 10/27/2014      LIPIDS:  Lab Results   Component Value Date    TSH 0.897 10/03/2022    TSH 1.659 11/22/2017    TSH 1.846 08/01/2012    HDL 65 10/03/2022    HDL 54 10/27/2014    HDL 55 04/12/2013    CHOL 179 10/03/2022    CHOL 140 10/27/2014    CHOL 135 04/12/2013    TRIG 51 10/03/2022    TRIG 37 10/27/2014    TRIG 50 04/12/2013    LDLCALC 103.8 10/03/2022    LDLCALC 78.6 10/27/2014    LDLCALC 70.0 04/12/2013    CHOLHDL 36.3 10/03/2022    CHOLHDL 38.6 10/27/2014    CHOLHDL 40.7 04/12/2013    NONHDLCHOL 114 10/03/2022    NONHDLCHOL 86 10/27/2014    TOTALCHOLEST 2.8 10/03/2022    TOTALCHOLEST 2.6 10/27/2014    TOTALCHOLEST 2.5 04/12/2013     TSH:  Lab Results   Component Value Date    TSH 0.897 10/03/2022    TSH 1.659 11/22/2017    TSH 1.846 08/01/2012     A1C:  No results found for: HGBA1C      Assessment/Plan     Socorro Huang is a 47 y.o.female with:    There are no diagnoses linked to this encounter.      Health Maintenance Due   Topic Date Due    Hepatitis C Screening  Never done    HIV Screening  Never done    COVID-19 Vaccine (4 - Booster for Pfizer series) 11/26/2021    Mammogram  11/05/2022    TETANUS VACCINE  11/14/2022       Stay hydrated with water, warm tea and honey, soup/broth  Monitor symptoms  Take meds as prescribed  Complete anbx  Pt has medrol pk at home    I spent 35 minutes on the day of this encounter for preparing for, evaluating, treating, and managing this patient.      -Continue current medications and maintain follow up with specialists.  Return to clinic as needed for any concerns   No follow-ups on file.       MAYTE Shelton  [facility]

## 2023-02-07 ENCOUNTER — PATIENT MESSAGE (OUTPATIENT)
Dept: PRIMARY CARE CLINIC | Facility: CLINIC | Age: 48
End: 2023-02-07
Payer: COMMERCIAL

## 2023-02-07 DIAGNOSIS — F41.9 ANXIETY: Primary | ICD-10-CM

## 2023-02-07 DIAGNOSIS — F51.02 ADJUSTMENT INSOMNIA: ICD-10-CM

## 2023-02-08 ENCOUNTER — PATIENT MESSAGE (OUTPATIENT)
Dept: PRIMARY CARE CLINIC | Facility: CLINIC | Age: 48
End: 2023-02-08
Payer: COMMERCIAL

## 2023-02-08 RX ORDER — HYDROXYZINE HYDROCHLORIDE 25 MG/1
TABLET, FILM COATED ORAL
Qty: 30 TABLET | Refills: 0 | Status: SHIPPED | OUTPATIENT
Start: 2023-02-08 | End: 2023-09-22

## 2023-03-10 ENCOUNTER — OFFICE VISIT (OUTPATIENT)
Dept: NEUROLOGY | Facility: CLINIC | Age: 48
End: 2023-03-10
Payer: COMMERCIAL

## 2023-03-10 VITALS
DIASTOLIC BLOOD PRESSURE: 77 MMHG | HEART RATE: 75 BPM | HEIGHT: 67 IN | BODY MASS INDEX: 25.12 KG/M2 | WEIGHT: 160.06 LBS | SYSTOLIC BLOOD PRESSURE: 116 MMHG

## 2023-03-10 DIAGNOSIS — G43.709 CHRONIC MIGRAINE W/O AURA W/O STATUS MIGRAINOSUS, NOT INTRACTABLE: Primary | ICD-10-CM

## 2023-03-10 DIAGNOSIS — M54.2 NECK PAIN: ICD-10-CM

## 2023-03-10 PROCEDURE — 99999 PR PBB SHADOW E&M-EST. PATIENT-LVL III: CPT | Mod: PBBFAC,,, | Performed by: STUDENT IN AN ORGANIZED HEALTH CARE EDUCATION/TRAINING PROGRAM

## 2023-03-10 PROCEDURE — 99214 OFFICE O/P EST MOD 30 MIN: CPT | Mod: S$GLB,,, | Performed by: STUDENT IN AN ORGANIZED HEALTH CARE EDUCATION/TRAINING PROGRAM

## 2023-03-10 PROCEDURE — 99999 PR PBB SHADOW E&M-EST. PATIENT-LVL III: ICD-10-PCS | Mod: PBBFAC,,, | Performed by: STUDENT IN AN ORGANIZED HEALTH CARE EDUCATION/TRAINING PROGRAM

## 2023-03-10 PROCEDURE — 99214 PR OFFICE/OUTPT VISIT, EST, LEVL IV, 30-39 MIN: ICD-10-PCS | Mod: S$GLB,,, | Performed by: STUDENT IN AN ORGANIZED HEALTH CARE EDUCATION/TRAINING PROGRAM

## 2023-03-10 RX ORDER — LANOLIN ALCOHOL/MO/W.PET/CERES
400 CREAM (GRAM) TOPICAL DAILY
Qty: 30 TABLET | Refills: 5 | Status: SHIPPED | OUTPATIENT
Start: 2023-03-10 | End: 2023-09-06

## 2023-03-10 NOTE — PROGRESS NOTES
Chief Complaint and Duration     Chief Complaint   Patient presents with    Migraine   For years    History of Present Illness     Socorro Huang is a 47 y.o. female with a history of multiple medical diagnoses as listed below that presents for chronic migraines. Has had migraines since she was a teenager.    Gradually started when she was a teenager, mom has hx of migraines. Patient had hysterectomy in 2017, no alcohol, drug use, tobacco use.    Has been under the care of Dr Samuel Boateng prior neurologist, has tried different migraine medications including imitrex in which she did not tolerate and wellbutrin, NSAIDs. Also tried occipital nerve blocks. Has been getting botox for her migraines at [facility] through pain management - has had 3-4 rounds at this point with improvement in her headaches - were more frequent prior.    Currently on nurtec as well as botox for her migraines.   Frequency = 8 times a month requiring abortive medication.  Can last hours up to several days.  Occipital, can radiate frontally, can have blurry vision. Associated w photophobia and nausea.  No hx of head or neck trauma, poor sleep at night.    Due for her next botox injections on the 23rd of this month.    Review of Systems: (positive bolded)  Constitutional: Negative for fatigue, activity change, fevers, or chills.   HENT:  Negative for new visual disturbances or difficulty swallowing.    Respiratory:  Negative for shortness of breath.    Cardiovascular:  Negative for palpitations.   Gastrointestinal:  Negative for constipation, nausea, or vomiting.   Endocrine: Negative for temperature instability/intolerance.   Genitourinary:  Negative for difficulty urinating.   Musculoskeletal:  Negative for neck pain, back pain, myalgias, joint swelling, or gait problem.  Skin:  Negative for rash or lesions.   Neurological:  Negative for seizures, headaches, dizziness, tremors, syncope, speech difficulty, weakness, light-headedness, or  numbness.   Hematological:  Does not bruise/bleed easily.   Psychiatric/Behavioral: Negative for decreased concentration or sleep disturbance.    Review of patient's allergies indicates:   Allergen Reactions    Celexa [citalopram] Nausea Only     Stomach upset     Current Outpatient Medications   Medication Sig Dispense Refill    butalbital-acetaminophen-caffeine -40 mg (FIORICET, ESGIC) -40 mg per tablet Take 1 tablet by mouth every 6 (six) hours.      co-enzyme Q-10 30 mg capsule Take by mouth.      fluticasone propionate (FLONASE) 50 mcg/actuation nasal spray 1 spray (50 mcg total) by Each Nostril route once daily. 18 g 0    hydrOXYzine HCL (ATARAX) 25 MG tablet Take 1 tab at night as needed for anxiety/sleep 30 tablet 0    magnesium oxide (MAG-OX) 400 mg (241.3 mg magnesium) tablet Take 1 tablet (400 mg total) by mouth once daily. 30 tablet 5    pantoprazole (PROTONIX) 40 MG tablet Take 1 tablet (40 mg total) by mouth once daily. 30 tablet 11    traZODone (DESYREL) 100 MG tablet TAKE 1 TABLET BY MOUTH EVERY DAY IN THE EVENING 90 tablet 1     No current facility-administered medications for this visit.       Medical History     Past Medical History:   Diagnosis Date    Abnormal Pap smear     Anxiety     celexa & prozac didn't help much    Constipation - functional     Headache(784.0)     Hemorrhoid     Leukopenia     benign, evaluated in [facility] y 2000    Menorrhagia     Strain of neck muscle     tx with PT at [facility] in past     Past Surgical History:   Procedure Laterality Date    ANOSCOPY N/A 07/20/2022    Procedure: ANOSCOPY;  Surgeon: ASHTYN Melo MD;  Location: [facility] OR [address] [place] [address];  Service: Colon and Rectal;  Laterality: N/A;    BREAST RECONSTRUCTION  2018    BREAST SURGERY      COLON SURGERY  2014         COLONOSCOPY N/A 02/03/2022    Procedure: COLONOSCOPY;  Surgeon: ASHTYN Melo MD;  Location: [facility];  Service: Endoscopy;  Laterality: N/A;    DILATION AND CURETTAGE OF UTERUS   12/18/2012    complex hyperplasia, no atypia    FLEXIBLE SIGMOIDOSCOPY N/A 07/20/2022    Procedure: SIGMOIDOSCOPY, FLEXIBLE;  Surgeon: ASHTYN Melo MD;  Location: [facility] OR 2ND FLR;  Service: Colon and Rectal;  Laterality: N/A;    HEMORRHOID SURGERY  2014    HYSTERECTOMY      LAPAROSCOPIC SIGMOIDECTOMY Left 07/20/2022    Procedure: COLECTOMY, SIGMOID, LAPAROSCOPIC, ERAS low;  Surgeon: ASHTYN Melo MD;  Location: [facility] OR 2ND FLR;  Service: Colon and Rectal;  Laterality: Left;    MOBILIZATION OF SPLENIC FLEXURE N/A 07/20/2022    Procedure: MOBILIZATION, SPLENIC FLEXURE;  Surgeon: ASHTYN Melo MD;  Location: [facility] OR 2ND FLR;  Service: Colon and Rectal;  Laterality: N/A;    OOPHORECTOMY      PARTIAL HYSTERECTOMY  2013    for atypia    SMALL INTESTINE SURGERY  2017    THYROIDECTOMY, PARTIAL  2013    TONSILLECTOMY      TUBAL LIGATION       Family History   Problem Relation Age of Onset    Emphysema Mother     Cancer Mother         Lung    COPD Mother     Diabetes Mother     Cancer Father         Lung    Heart disease Father     Diabetes Paternal Grandmother     Hypertension Paternal Grandmother     Depression Sister     Suicide Brother     Drug abuse Maternal Aunt     Schizophrenia Paternal Aunt     Alcohol abuse Maternal Uncle     Alcohol abuse Cousin     Breast cancer Maternal Grandmother     Ovarian cancer Maternal Grandmother     ADD / ADHD Neg Hx     Anxiety disorder Neg Hx     Bipolar disorder Neg Hx     Dementia Neg Hx     OCD Neg Hx     Paranoid behavior Neg Hx     Physical abuse Neg Hx     Seizures Neg Hx     Self injury Neg Hx     Sexual abuse Neg Hx      Social History     Socioeconomic History    Marital status:     Number of children: 2   Occupational History     Employer: [employer]   Tobacco Use    Smoking status: Never    Smokeless tobacco: Never   Substance and Sexual Activity    Alcohol use: Not Currently     Comment: socially    Drug use: No    Sexual activity: Yes      Partners: Male     Birth control/protection: See Surgical Hx   Other Topics Concern    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 Fulton , GYN Dr Ruby                   Exam     Vitals:    03/10/23 0848   BP: 116/77   Pulse: 75      Physical Exam:  General: She is not in acute distress. She is not ill-appearing.   HENT: Normocephalic and atraumatic. Moist mucous membranes.  Eyes: Conjunctivae normal.   Pulmonary: Pulmonary effort is normal.   Abdominal: Abdomen is soft and flat.   Skin: Skin is warm and dry. No rashes.   Psychiatric: Mood normal.        Neurologic Exam   Mental status: oriented to person, place, and time  Attention: Normal. Concentration: normal.  Speech: speech is normal.  Cranial Nerves: PERRL, EOMI intact, V1-V3 Facial sensation intact. Symmetric facies. Hearing grossly intact. Palate and uvula midline, symmetric. No tongue deviation. Trapezius strength intact.     Motor exam: bulk and tone normal. Strength 5/5 in bilateral upper extremities: deltoids, biceps, triceps, wrist flexion/extension, finger abduction/adduction. Strength 5/5 in bilateral lower extremities: hip flexion/extension, thigh  adduction/abduction, knee flexion/extension, dorsiflexion/plantarflexion, foot eversion/inversion.    Reflexes: 2+ in bilateral upper extremities: biceps and brachiaradialis, 2+ in bilateral lower extremities: patellar and achilles  Plantar reflex: normal  Rivera's/Clonus: negative    Sensory exam: light touch intact    Gait exam: normal  Romberg: negative  Coordination: normal    Tremor: none    Labs and Imaging     Patient to bring in records from prior neurologist     Assessment and Plan     Problem List Items Addressed This Visit          Neuro    Chronic migraine w/o aura w/o status migrainosus, not intractable - Primary    Relevant Medications    magnesium oxide (MAG-OX) 400 mg (241.3 mg magnesium) tablet       Orthopedic    Neck pain     47 year old female w chronic migraines wo aura, mild chronic neck pain. Nonfocal exam today. Was under care of prior neurologist and tried on multiple medications for prevention and abortive. Currently on nurtec and botox, with botox being done this past year with significant relief in migraine frequency. Now has it approximately 8 times a month. Will continue current regiment, add on magnesium 400mg daily for prevention. Discussed lifestyle modifications given multifactorial nature of migraines/headaches.  To get botox on the 23rd, followup in 3 months to see if patient would benefit from being on a different preventative medication other than nurtec.    Follow-up: Follow up in about 3 months (around 6/10/2023).

## 2023-03-23 ENCOUNTER — PROCEDURE VISIT (OUTPATIENT)
Dept: NEUROLOGY | Facility: CLINIC | Age: 48
End: 2023-03-23
Payer: COMMERCIAL

## 2023-03-23 VITALS
DIASTOLIC BLOOD PRESSURE: 79 MMHG | SYSTOLIC BLOOD PRESSURE: 117 MMHG | BODY MASS INDEX: 24.59 KG/M2 | HEART RATE: 72 BPM | WEIGHT: 157 LBS

## 2023-03-23 DIAGNOSIS — G43.709 CHRONIC MIGRAINE W/O AURA W/O STATUS MIGRAINOSUS, NOT INTRACTABLE: Primary | ICD-10-CM

## 2023-03-23 PROCEDURE — 64615 CHEMODENERV MUSC MIGRAINE: CPT | Mod: S$GLB,,, | Performed by: PHYSICIAN ASSISTANT

## 2023-03-23 PROCEDURE — 64615 PR CHEMODENERVATION OF MUSCLE FOR CHRONIC MIGRAINE: ICD-10-PCS | Mod: S$GLB,,, | Performed by: PHYSICIAN ASSISTANT

## 2023-03-23 NOTE — PROCEDURES
PROCEDURE NOTE:  BOTOX was performed as an indicated therapy for intractable chronic migraine headaches given that the patient failed more than 2 headache medications     A time out was conducted just before the start of the procedure to verify the correct patient and procedure, procedure location, and all relevant critical information.      Botulinum Toxin Injection Procedure      PROCEDURE PERFORMED: Botulinum toxin injection (46744)  CLINICAL INDICATION: Chronic Migraines  After risks and benefits were explained including bleeding, infection, worsening of pain, damage to the areas being injected, weakness of muscles, loss of muscle control, dysphagia if injecting the head or neck, facial droop if injecting the facial area, painful injection, allergic or other reaction to the medications being injected, and the failure of the procedure to help the problem, a signed consent was obtained.   The patient was placed in a comfortable area and the sites to be treated were identified.The area to be treated was prepped three times with alcohol and the alcohol allowed to dry. Next, a 30 gauge needle was used to inject the medication in the area to be treated.      Total Botox used: 155 Units   Unavoidable waste: 45 Units      Injection sites:    muscle bilaterally ( a total of 10 units divided into 2 sites)   Procerus muscle (5 units)   Frontalis muscle bilaterally (a total of 20 units divided into 4 sites)   Temporalis muscle bilaterally (a total of 40 units divided into 8 sites)   Occipitalis muscle bilaterally (a total of 30 units divided into 6 sites)   Cervical paraspinal muscles (a total of 20 units divided into 4 sites)   Trapezius muscle bilaterally (a total of 30 units divided into 6 sites)   Complications: none   RTC for the next Botox injection: 12 weeks     Problem List Items Addressed This Visit          Neuro    Chronic migraine w/o aura w/o status migrainosus, not intractable - Primary    Relevant  Medications    onabotulinumtoxina injection 200 Units (Completed) (Start on 3/23/2023  9:45 AM)    Other Relevant Orders    Prior authorization Order         [facility] Department of Neurology   [phone]

## 2023-03-24 ENCOUNTER — OFFICE VISIT (OUTPATIENT)
Dept: OTOLARYNGOLOGY | Facility: CLINIC | Age: 48
End: 2023-03-24
Payer: COMMERCIAL

## 2023-03-24 VITALS — BODY MASS INDEX: 24.93 KG/M2 | WEIGHT: 159.19 LBS

## 2023-03-24 DIAGNOSIS — J31.0 CHRONIC RHINITIS: ICD-10-CM

## 2023-03-24 DIAGNOSIS — R43.8 HYPOSMIA: ICD-10-CM

## 2023-03-24 DIAGNOSIS — R05.3 CHRONIC COUGH: Primary | ICD-10-CM

## 2023-03-24 DIAGNOSIS — K21.9 LARYNGOPHARYNGEAL REFLUX (LPR): ICD-10-CM

## 2023-03-24 PROCEDURE — 99204 OFFICE O/P NEW MOD 45 MIN: CPT | Mod: 25,S$GLB,, | Performed by: PHYSICIAN ASSISTANT

## 2023-03-24 PROCEDURE — 31575 LARYNGOSCOPY: ICD-10-PCS | Mod: S$GLB,,, | Performed by: PHYSICIAN ASSISTANT

## 2023-03-24 PROCEDURE — 99999 PR PBB SHADOW E&M-EST. PATIENT-LVL III: ICD-10-PCS | Mod: PBBFAC,,, | Performed by: PHYSICIAN ASSISTANT

## 2023-03-24 PROCEDURE — 99999 PR PBB SHADOW E&M-EST. PATIENT-LVL III: CPT | Mod: PBBFAC,,, | Performed by: PHYSICIAN ASSISTANT

## 2023-03-24 PROCEDURE — 31575 DIAGNOSTIC LARYNGOSCOPY: CPT | Mod: S$GLB,,, | Performed by: PHYSICIAN ASSISTANT

## 2023-03-24 PROCEDURE — 99204 PR OFFICE/OUTPT VISIT, NEW, LEVL IV, 45-59 MIN: ICD-10-PCS | Mod: 25,S$GLB,, | Performed by: PHYSICIAN ASSISTANT

## 2023-03-24 NOTE — PROCEDURES
"Laryngoscopy    Date/Time: 3/24/2023 11:00 AM  Performed by: Forrest Ibarra PA-C  Authorized by: Forrest Ibarra PA-C     Time out: Immediately prior to procedure a "time out" was called to verify the correct patient, procedure, equipment, support staff and site/side marked as required.    Consent Done?:  Yes (Verbal)  Anesthesia:     Local anesthetic:  Lidocaine 4% and Isak-Synephrine 1/2%    Patient tolerance:  Patient tolerated the procedure well with no immediate complications  Laryngoscopy:     Areas examined:  Nasal cavities, nasopharynx, oropharynx, hypopharynx, larynx and vocal cords    Laryngoscope size:  4 mm  Nose Intranasal:      Mucosa no polyps     Mucosa ulcers not present     No mucosa lesions present     No septum gross deformity     Enlarged turbinates  Nasopharynx:      No mucosa lesions     Adenoids present     Posterior choanae patent     Eustachian tube patent  Larynx/hypopharynx:      No epiglottis lesions     No epiglottis edema     No AE folds lesions     No vocal cord polyps     Equal and normal bilateral     No hypopharynx lesions     No piriform sinus pooling     No piriform sinus lesions     No post cricoid edema     No post cricoid erythema     Erythematous MTs bilaterally  Septum appears straight  Stringy mucous bilaterally with thick clear mucous in R posterior choana  Erythema and edema to arytenoids  "

## 2023-03-24 NOTE — PROGRESS NOTES
Subjective:     HPI: Socorro Huang is a 47 y.o. female who was self-referred for nasal obstruction.    Patient reports years of nasal obstruction left more than right with associated postnasal drip.  Patient states more recently she is developed coughing that is worse in the morning and usually is able to clear out some discolored mucus.  She reports the sensation like a globus sensation, not worse in evening. She has also noticed hoarse voice, bilateral facial pressure, hyposmia.  Patient denies any rhinorrhea, sneezing, watery/itchy eyes, or dysphagia.   She has tried multiple OTC meds which have not helped.     Patient usually follows with Dr. Oleary at .  She previously had severe sinus infections that she states would resolve with levaquin.  Patient reports  septoplasty and balloon sinuplasty which has not improved her symptoms.  She previously had CT scan done which was told was normal.  She was recently started on protonix 40 mg PO BID x2 months ago by her ENT.   She drinks one cup of coffee in the AM that usually improves her cough.     Current sinonasal medications include flonase 2 SEN once daily.  The last course of antibiotics was a long time ago.    She does not regularly use nasal decongestant sprays (afrin/oxymetazoline/phenylephrine).  She does not recall previously having allergy testing.  She denies a history of asthma.  She denies a history of reflux symptoms.    She denies a diagnosis of obstructive sleep apnea.   She has previously had sinonasal surgery as above.    She does not recall a prior history of nasal trauma.  She has had a tonsillectomy.  She is not a tobacco smoker.   She has NOT had S. pneumo titers checked.    SNOT-22 score: : (P) 29  NOSE score:: (P) 35%  ETDQ-7 score:: (P) 1    Past Medical/Past Surgical History  Past Medical History:   Diagnosis Date    Abnormal Pap smear     Anxiety     celexa & prozac didn't help much    Constipation - functional     Headache(784.0)      Hemorrhoid     Leukopenia     benign, evaluated in [facility]    Menorrhagia     Strain of neck muscle     tx with PT at [facility] in past     She has a past surgical history that includes Dilation and curettage of uterus (12/18/2012); Tonsillectomy; Partial hysterectomy (2013); Tubal ligation; Thyroidectomy, partial (2013); Colon surgery (2014); Hemorrhoid surgery (2014); Hysterectomy; Oophorectomy; Breast surgery; Breast reconstruction (2018); Colonoscopy (N/A, 02/03/2022); Laparoscopic sigmoidectomy (Left, 07/20/2022); Flexible sigmoidoscopy (N/A, 07/20/2022); Anoscopy (N/A, 07/20/2022); Mobilization of splenic flexure (N/A, 07/20/2022); and Small intestine surgery (2017).    Family History/Social History  Her family history includes Alcohol abuse in her cousin and maternal uncle; Breast cancer in her maternal grandmother; COPD in her mother; Cancer in her father and mother; Depression in her sister; Diabetes in her mother and paternal grandmother; Drug abuse in her maternal aunt; Emphysema in her mother; Heart disease in her father; Hypertension in her paternal grandmother; Ovarian cancer in her maternal grandmother; Schizophrenia in her paternal aunt; Suicide in her brother.  She reports that she has never smoked. She has never used smokeless tobacco. She reports that she does not currently use alcohol. She reports that she does not use drugs.    Allergies/Immunizations  She is allergic to celexa [citalopram].  Immunization History   Administered Date(s) Administered    COVID-19, MRNA, LN-S, PF (Pfizer) (Purple Cap) 01/05/2021, 01/28/2021, 10/01/2021    Hepatitis B 10/11/2012, 11/14/2012, 04/26/2013    Influenza - Quadrivalent 10/15/2014    Influenza - Quadrivalent - PF *Preferred* (6 months and older) 10/07/2022    Tdap 11/14/2012        Medications   butalbital-acetaminophen-caffeine -40 mg  co-enzyme Q-10  fluticasone propionate  hydrOXYzine HCL  magnesium oxide  pantoprazole  traZODone     Review of  Systems     Constitutional: Positive for fatigue.      HENT: Positive for sore throat and voice change.      Eyes:  Negative for change in eyesight, eye drainage, eye itching and photophobia.     Respiratory:  Positive for cough.      Cardiovascular:  Negative for chest pain, foot swelling, irregular heartbeat and swollen veins.     Gastrointestinal:  Negative for abdominal pain, acid reflux, constipation, diarrhea, heartburn and vomiting.     Genitourinary: Negative for difficulty urinating, sexual problems and frequent urination.     Musc: Negative for aching joints, aching muscles, back pain and neck pain.     Skin: Negative for rash.     Allergy: Negative for food allergies and seasonal allergies.     Endocrine: Positive for heat intolerance.     Neurological: Positive for headaches.     Hematologic: Positive for bruises/bleeds easily.     Psychiatric: Positive for decreased concentration, nervous/anxious and sleep disturbance.         Objective:     LMP 05/05/2013        Constitutional:   She appears well-developed and well-nourished. Normal speech.      Head:  Normocephalic and atraumatic. Facial strength is normal.      Ears:    Right Ear: No drainage or swelling. Tympanic membrane is not perforated and not erythematous. No middle ear effusion.   Left Ear: No drainage or swelling. Tympanic membrane is not perforated and not erythematous.  No middle ear effusion.   EAC angled superiorly    Nose:  No mucosal edema, rhinorrhea, septal deviation or polyps.  No foreign bodies. Turbinate hypertrophy (mild R).  Turbinates normal and no turbinate masses.  Right sinus exhibits no maxillary sinus tenderness and no frontal sinus tenderness. Left sinus exhibits no maxillary sinus tenderness and no frontal sinus tenderness.   Dried flaky green mucous on R anterior septum    Mouth/Throat  Oropharynx clear and moist without lesions or asymmetry, normal uvula midline and lips, teeth, and gums normal. No trismus or mucous  membrane lesions. No oropharyngeal exudate, posterior oropharyngeal edema or posterior oropharyngeal erythema. Tonsils not present.      Neck:  Neck normal without thyromegaly masses, asymmetry, normal tracheal structure, crepitus, and tenderness and phonation normal.     She has no cervical adenopathy.     Pulmonary/Chest:   Effort normal.     Psychiatric:   She has a normal mood and affect. Her speech is normal and behavior is normal.     Procedure    Flexible laryngoscopy performed.  See procedure note.     L nasal valve     L MT     L ET with adenoidectomy scar     R nasal valve     R MT     R ET     Hypopharynx/larynx     VF mobility      Data Reviewed  I personally reviewed the chart, including any outside records, and pertinent data below:    WBC (K/uL)   Date Value   01/13/2023 3.14 (L)     Eosinophil % (%)   Date Value   01/13/2023 1.9     Eos # (K/uL)   Date Value   01/13/2023 0.1     Platelets (K/uL)   Date Value   01/13/2023 219     Glucose (mg/dL)   Date Value   10/03/2022 87     No results found for: IGE    No sinus imaging available.    Assessment & Plan:     1. Chronic cough   - scope with evidence of chronic rhinitis and LPR   - advised patient to complete 3 months of PPI   - START nasal saline daily prior to flonase  2. Chronic rhinitis   - No abnormality on scope to explain nasal obstruction, but rhinitis present to mucosa   - continue fluticasone propionate and also educated patient on how to properly use nasal sprays  3. Laryngopharyngeal reflux (LPR)   -  Discussed the etiology of LPR and management strategies including nonpharmacologic treatments: eating smaller meals, eating at least 3 hours before bed, elevation of the head of bed at night, avoidance of caffeine, chocolate, nicotine and peppermint, and avoiding tight fitting clothing.    4. Hyposmia   - reports h/o COVID in the last 3 years  - no evidence of cause on scope    She will Follow up in about 6 weeks (around 5/5/2023) for  re-evaluate post treatment.  I had a discussion with the patient regarding her condition and the further workup and management options.    All questions were answered, and the patient is in agreement with the above.     Disclaimer:  This note may have been prepared utilizing voice recognition software which may result in occasional typographical errors in the text such as sound alike words.   If further clarification is needed, please contact the ENT department of [facility].

## 2023-03-24 NOTE — PATIENT INSTRUCTIONS
NeilMed Sinus rinse   Try purchasing this nasal rinse below.  Its over the counter and can use several times daily without any side effects, but please use at least 1-2 times daily.  Use it before applying your nasal spray medications.  This spray can help wash away mucus and crusting and allow for better absorption of the medications.  Please use the nasal saline spray about 15 minutes before applying your medicated nasal sprays. This bottle uses distilled water (NOT tap water).  The Instructions in the box are detailed so please read them before using.          Things you may be able to do to prevent reflux.  1. Do not smoke.  Smoking will worsen reflux.    2. Avoid eating at least 2-3 hours prior to bedtime.  Try to avoid very large meals at night.    4. Weight loss.  For patient's with recent weight gain, shedding a few pounds is all that is required to improve reflux.    5. Avoid reflux triggers. These can worsen acid in the stomach or even cause the esophagus muscles to relax: caffeine, soft drinks, acidic foods (tomato, lots of fruit) mints, alcoholic beverages, particularly at night, cheese, fried foods, spicy foods, eggs, and chocolate.    6. Sleep with the head of bed elevated at least 6 inches.    Treatments for LPR include: postural changes (sleeping or laying with an incline), weight reduction, diet modification, medication to reduce stomach acid and promote normal motility, and rarely: surgery to prevent reflux. Most patients will begin to notice some relief in their symptoms about 2-4 weeks after starting the medication; however it is generally recommended the medication should be continued for at least 2 months. If the symptoms completely resolve, the medication can then be tapered.  Some people will remain symptom free while others may have relapses which required treatment again.      LARYNGOPHARYNGEAL REFLUX  (SILENT OR ATYPICAL REFLUX)      Do you have to clear your throat or cough often? Are you  "hoarse? Do you have trouble swallowing? If you have these or other throat symptoms, you may have acid reflux. This occurs when stomach acid flows back up and irritates your throat.    Why you have throat symptoms  There are muscles (esophageal sphincters) at both ends of the tube that carries food to your stomach (the esophagus). These muscles relax to let food pass. Then they tighten to keep stomach acid down. When the lower esophageal sphincter (LES) doesnt tighten enough, acid can flow back (reflux) from your stomach into your esophagus. This may cause heartburn. In some cases the upper esophageal sphincter (UES) also doesnt work well. Then acid can travel higher and enter your throat (pharynx). In many cases, this causes throat symptoms.  Common throat symptoms  Need to clear your throat often  Feeling like youre choking  Long-term (chronic) cough  Hoarseness  Trouble swallowing  Feel like you have a lump in your throat  Sour or acid taste  Sore throat that keeps coming back     If you have any of the following symptoms you may have laryngopharyngeal reflux (LPR):  hoarseness, thick or too much mucus, chronic throat pain/irritation, chronic throat clearing, chronic cough, especially cough that wake you up from sleep, chronic "postnasal drip" without the need to blow your nose.     Many people with LPR do not have symptoms of heartburn. Compared to the esophagus, the voice box and the back of the throat are significantly more sensitive to the effects of acid and digestive enzymes on surrounding tissue. Acid passing quickly through the esophagus does not have a chance to irritate the area for too long.  However acid that pools in the throat or voice box can cause prolonged irritation resulting in the symptoms of LPR.    The symptoms of LPR can consist of a dry cough and the sensation of something being stuck in the throat.  Some people will complain of heartburn while others may have intermittent hoarseness or " "loss of voice.  Another major symptom of LPR is "postnasal drip."  Patients are often told symptoms are due to abnormal nasal drainage or sinus infection; however this is rarely the cause of chronic throat irritation. For post nasal drip to cause the complaints described, signs and symptoms of an active nasal infection should be present.    "

## 2023-03-29 ENCOUNTER — OFFICE VISIT (OUTPATIENT)
Dept: PRIMARY CARE CLINIC | Facility: CLINIC | Age: 48
End: 2023-03-29
Payer: COMMERCIAL

## 2023-03-29 VITALS
HEIGHT: 67 IN | OXYGEN SATURATION: 99 % | DIASTOLIC BLOOD PRESSURE: 72 MMHG | BODY MASS INDEX: 25.37 KG/M2 | SYSTOLIC BLOOD PRESSURE: 120 MMHG | WEIGHT: 161.63 LBS | HEART RATE: 78 BPM

## 2023-03-29 DIAGNOSIS — M54.6 ACUTE MIDLINE THORACIC BACK PAIN: Primary | ICD-10-CM

## 2023-03-29 DIAGNOSIS — M62.830 MUSCLE SPASM OF BACK: ICD-10-CM

## 2023-03-29 PROCEDURE — 99213 PR OFFICE/OUTPT VISIT, EST, LEVL III, 20-29 MIN: ICD-10-PCS | Mod: S$GLB,,, | Performed by: STUDENT IN AN ORGANIZED HEALTH CARE EDUCATION/TRAINING PROGRAM

## 2023-03-29 PROCEDURE — 99999 PR PBB SHADOW E&M-EST. PATIENT-LVL III: CPT | Mod: PBBFAC,,, | Performed by: STUDENT IN AN ORGANIZED HEALTH CARE EDUCATION/TRAINING PROGRAM

## 2023-03-29 PROCEDURE — 99213 OFFICE O/P EST LOW 20 MIN: CPT | Mod: S$GLB,,, | Performed by: STUDENT IN AN ORGANIZED HEALTH CARE EDUCATION/TRAINING PROGRAM

## 2023-03-29 PROCEDURE — 99999 PR PBB SHADOW E&M-EST. PATIENT-LVL III: ICD-10-PCS | Mod: PBBFAC,,, | Performed by: STUDENT IN AN ORGANIZED HEALTH CARE EDUCATION/TRAINING PROGRAM

## 2023-03-29 RX ORDER — METHYLPREDNISOLONE 4 MG/1
TABLET ORAL
Qty: 21 EACH | Refills: 0 | Status: SHIPPED | OUTPATIENT
Start: 2023-03-29 | End: 2023-04-19

## 2023-03-29 NOTE — PROGRESS NOTES
INTERNAL MEDICINE SAME DAY PRIMARY CARE VISIT NOTE    Subjective:     Chief Complaint: Back Pain       Patient ID: Socorro Huang is a 47 y.o. female, here today for focused same-day primary care visit.    Today, patient with complaint of back pain.    Patient started having left sided mid to lower back pain 3 days ago.  Pain started after painting a bedroom in her home.  She describes the pain as feeling tight.  Is not currently taking anything for the pain.  This is her 1st evaluation for this issue.  No lower extremity weakness or changes in sensation.  No history of back injuries in the past.    Past Medical History:  Past Medical History:   Diagnosis Date    Abnormal Pap smear     Anxiety     celexa & prozac didn't help much    Constipation - functional     Headache(784.0)     Hemorrhoid     Leukopenia     benign, evaluated in [facility] y 2000    Menorrhagia     Strain of neck muscle     [place] [facility] [place]       Home Medications:  Prior to Admission medications    Medication Sig Start Date End Date Taking? Authorizing Provider   co-enzyme Q-10 30 mg capsule Take by mouth.   Yes Historical Provider   fluticasone propionate (FLONASE) 50 mcg/actuation nasal spray 1 spray (50 mcg total) by Each Nostril route once daily. 6/21/22  Yes Aaliyah Morataya MD   hydrOXYzine HCL (ATARAX) 25 MG tablet Take 1 tab at night as needed for anxiety/sleep 2/8/23  Yes Lalitha Alexander NP   magnesium oxide (MAG-OX) 400 mg (241.3 mg magnesium) tablet Take 1 tablet (400 mg total) by mouth once daily. 3/10/23 9/6/23 Yes Kenna Ragsdale MD   pantoprazole (PROTONIX) 40 MG tablet Take 1 tablet (40 mg total) by mouth once daily. 2/3/23 2/3/24 Yes Lalitha Alexander NP   traZODone (DESYREL) 100 MG tablet TAKE 1 TABLET BY MOUTH EVERY DAY IN THE EVENING 9/8/22  Yes Blake Adams MD   butalbital-acetaminophen-caffeine -40 mg (FIORICET, ESGIC) -40 mg per tablet Take 1 tablet by mouth every 6 (six) hours. 9/16/22    "Historical Provider   methylPREDNISolone (MEDROL DOSEPACK) 4 mg tablet use as directed 3/29/23 4/19/23  Danika Marin MD       Allergies:  Review of patient's allergies indicates:   Allergen Reactions    Celexa [citalopram] Nausea Only     Stomach upset       Social History:  Social History     Tobacco Use    Smoking status: Never    Smokeless tobacco: Never   Substance Use Topics    Alcohol use: Not Currently     Comment: socially    Drug use: No         Review of Systems   Constitutional:  Negative for diaphoresis, fatigue and fever.   HENT:  Negative for congestion, ear pain and voice change.    Eyes:  Negative for discharge, redness and itching.   Respiratory:  Negative for shortness of breath and wheezing.    Cardiovascular:  Negative for chest pain.   Gastrointestinal:  Negative for abdominal pain.   Musculoskeletal:  Negative for gait problem and joint swelling.   Skin:  Negative for color change, pallor, rash and wound.   Neurological:  Negative for weakness.         Objective:   /72 (BP Location: Right arm, Patient Position: Sitting, BP Method: Medium (Manual))   Pulse 78   Ht 5' 7" (1.702 m)   Wt 73.3 kg (161 lb 9.6 oz)   LMP 05/05/2013   SpO2 99%   BMI 25.31 kg/m²        General: AAO x3, no apparent distress  HEENT: PERRL, OP clear  CV: RRR, no m/r/g  Pulm: Lungs CTAB, no crackles, no wheezes  Abd: s/NT/ND +BS  Extremities: no c/c/e    Labs:         Assessment/Plan     Socorro was seen today for back pain.    Diagnoses and all orders for this visit:    Acute midline thoracic back pain  -     methylPREDNISolone (MEDROL DOSEPACK) 4 mg tablet; use as directed    Muscle spasm of back    Muscle relaxers deferred at this time as patient is traveling soon.  Medrol Dosepak prescribed.  Continue NSAIDs as needed. Ok to do ibuprofen 600 mg every six hours as needed for pain.   Apply heating pad to lumbar back for 15 min four times daily.  Consider PT referral and x-rays if symptoms worsen.  Return " to clinic in 2 weeks if symptoms unimproved or worsening.       RTC prn and with PCP as per routine.    Danika Marin MD  Department of Internal Medicine - [facility]  03/29/2023

## 2023-04-17 ENCOUNTER — TELEPHONE (OUTPATIENT)
Dept: NEUROLOGY | Facility: CLINIC | Age: 48
End: 2023-04-17
Payer: COMMERCIAL

## 2023-04-17 NOTE — TELEPHONE ENCOUNTER
Outgoing call to Holli to speak to Sea. Sea was unavailable so spoke to Ortega. Informed Ortega that the botox from 3/23/23 was listed as a medical buy and bill so the botox did not come from a specialty pharmacy. Ortega stated that he would make a note in the patient's chart and pass the message along to Sea who will reach out to me if she has any further questions.

## 2023-04-17 NOTE — TELEPHONE ENCOUNTER
----- Message from Sandeep Ibrahim MA sent at 4/17/2023  2:42 PM CDT -----  Lety asked me to send this to you to handle. If you have any questions, feel free to ask me or her.  ----- Message -----  From: Mame Huang  Sent: 4/17/2023   2:25 PM CDT  To: Sergio Munguia Staff    Type:  Patient Returning Call    Who Called:Tia/ [employer]   Would the patient rather a call back or a response via [facility]? Call back   Best Call Back Number:[phone]   Additional Information: need to know if the Botox came from a Specialty Pharmacy     EST 7 am -4 pm   VM is password protected so message can be left

## 2023-04-19 ENCOUNTER — OFFICE VISIT (OUTPATIENT)
Dept: OTOLARYNGOLOGY | Facility: CLINIC | Age: 48
End: 2023-04-19
Payer: COMMERCIAL

## 2023-04-19 VITALS — BODY MASS INDEX: 24.34 KG/M2 | WEIGHT: 155.44 LBS

## 2023-04-19 DIAGNOSIS — H69.91 DYSFUNCTION OF RIGHT EUSTACHIAN TUBE: Primary | ICD-10-CM

## 2023-04-19 DIAGNOSIS — K21.9 LARYNGOPHARYNGEAL REFLUX (LPR): ICD-10-CM

## 2023-04-19 DIAGNOSIS — R05.3 CHRONIC COUGH: ICD-10-CM

## 2023-04-19 DIAGNOSIS — J31.0 CHRONIC RHINITIS: ICD-10-CM

## 2023-04-19 PROCEDURE — 99213 PR OFFICE/OUTPT VISIT, EST, LEVL III, 20-29 MIN: ICD-10-PCS | Mod: S$GLB,,, | Performed by: PHYSICIAN ASSISTANT

## 2023-04-19 PROCEDURE — 99999 PR PBB SHADOW E&M-EST. PATIENT-LVL III: CPT | Mod: PBBFAC,,, | Performed by: PHYSICIAN ASSISTANT

## 2023-04-19 PROCEDURE — 99213 OFFICE O/P EST LOW 20 MIN: CPT | Mod: S$GLB,,, | Performed by: PHYSICIAN ASSISTANT

## 2023-04-19 PROCEDURE — 99999 PR PBB SHADOW E&M-EST. PATIENT-LVL III: ICD-10-PCS | Mod: PBBFAC,,, | Performed by: PHYSICIAN ASSISTANT

## 2023-04-19 NOTE — PROGRESS NOTES
"  Subjective:      Socorro is a 47 y.o. female who comes for follow-up of rhinitis.  Her last visit with me was on 3/24/2023.  Scope with evidence of rhinitis and LPR so advised to add nasal saline to flonase regimen.    Patient reports recently having a cold with increased cough.  This improved but has noticed right ear fullness, tinnitus and muffled hearing sensation x2 days.  Patient denies any right ear pain or drainage.  No significant history of noise exposure.  No previous ear surgery.  Patient states she was on a plane 2 weeks ago and ears were able to pop at that time.  Patient unable to pop her ears today.    Also, patient with significant improvement in nasal breathing as well as chronic cough.  Cough is still present but milder.     Per last office visit 3/24/23:  "Patient reports years of nasal obstruction left more than right with associated postnasal drip.  Patient states more recently she is developed coughing that is worse in the morning and usually is able to clear out some discolored mucus.  She reports the sensation like a globus sensation, not worse in evening. She has also noticed hoarse voice, bilateral facial pressure, hyposmia.  Patient denies any rhinorrhea, sneezing, watery/itchy eyes, or dysphagia.   She has tried multiple OTC meds which have not helped.      Patient usually follows with Dr. Oleary at .  She previously had severe sinus infections that she states would resolve with levaquin.  Patient reports  septoplasty and balloon sinuplasty which has not improved her symptoms.  She previously had CT scan done which was told was normal.  She was recently started on protonix 40 mg PO BID x2 months ago by her ENT.   She drinks one cup of coffee in the AM that usually improves her cough."     The patient's medications, allergies, past medical, surgical, social and family histories were reviewed and updated as appropriate.    A detailed review of systems was obtained with pertinent " positives as per the above HPI, and otherwise negative.        Objective:     Wt 70.5 kg (155 lb 6.8 oz)   LMP 05/05/2013   BMI 24.34 kg/m²        Constitutional:   She appears well-developed and well-nourished. Normal speech.      Head:  Normocephalic and atraumatic. Facial strength is normal.      Ears:    Right Ear: No drainage or swelling. Tympanic membrane is not perforated and not erythematous. No middle ear effusion.   Left Ear: No drainage or swelling. Tympanic membrane is not perforated and not erythematous.  No middle ear effusion.   AU EAC angled superiorly    Nose:  No mucosal edema, rhinorrhea, septal deviation or polyps.  No foreign bodies. Turbinate hypertrophy (mild R).  Turbinates normal and no turbinate masses.  Right sinus exhibits no maxillary sinus tenderness and no frontal sinus tenderness. Left sinus exhibits no maxillary sinus tenderness and no frontal sinus tenderness.   Positive modified sarahi's    Mouth/Throat  Oropharynx clear and moist without lesions or asymmetry, normal uvula midline and lips, teeth, and gums normal. No trismus or mucous membrane lesions. No oropharyngeal exudate, posterior oropharyngeal edema or posterior oropharyngeal erythema. Tonsils not present.      Neck:  Neck normal without thyromegaly masses, asymmetry, normal tracheal structure, crepitus, and tenderness and phonation normal.     Pulmonary/Chest:   Effort normal.     Psychiatric:   She has a normal mood and affect. Her speech is normal and behavior is normal.     Procedure    None    Data Reviewed    WBC (K/uL)   Date Value   01/13/2023 3.14 (L)     Eosinophil % (%)   Date Value   01/13/2023 1.9     Eos # (K/uL)   Date Value   01/13/2023 0.1     Platelets (K/uL)   Date Value   01/13/2023 219     Glucose (mg/dL)   Date Value   10/03/2022 87     No results found for: IGE    Assessment:     1. Dysfunction of right eustachian tube    2. Chronic cough    3. Chronic rhinitis    4. Laryngopharyngeal reflux (LPR)          Plan:     - likely from recent virus/inflammation  - Otoscopic exam benign- no cerumen, infection or fluid noted.    - Discussed the anatomy and function of the eustachian tube including aerating and draining the middle ear.    - Common symptoms include: muffled/reduced hearing, plugged feeling, ear clicking/ popping, or ear pain.    - Advised increased  Flonase 2 SEN BID x1 week and gentle auto-insufflation 1-2 times daily.    - will consider audiogram if symptoms do not improve    Chronic cough improving, continue PPI    Follow up in about 6 weeks (around 5/31/2023) for re-evaluate post treatment, consider audiogram.

## 2023-04-19 NOTE — PATIENT INSTRUCTIONS
You can use flonase 2 sprays each nostril twice daily.  Do this over the next week.      Eustachian Tube Dysfunction  The eustachian tube is behind the eardrum. It connects the middle ear to the back of the throat. The tube is usually closed. But it opens periodically to help make the air pressure in the middle ear the same as outside the ear. The tube also drains mucus made in the middle ear.  A blocked tube is called a eustachian tube obstruction.  Eustachian tube dysfunction (ETD) is the failure of the Eustachian tube (a passage that leads to your middle ear) to open and/or close properly.    A eustachian tube that is blocked causes pressure, pain, and loss of hearing. Sounds may be muffled. The ear may feel full.  An adult may complain of ear pain, ear fullness/ pressure in the ear, dizziness, or even trouble hearing.  The blockage often goes away on its own without treatment. In other cases, treatments may be given to help reduce swelling within the tube. These may include a nasal decongestant, antihistamine, nasal spray (prescription steroid or over-the-counter saline), or allergy treatment.  A blocked eustachian tube is usually a short-term problem.   Home care  Nasal Steroid Spray such as Flonase.  2 sprays in each nostril once daily.   Use an antihistamine of your choice such as Zyrtec, Allegra, or Claritin during the day for allergy relief or you may use Xyzal at night (it may make you sleepy)  Try swallow, yawn or 'pop' your ears to open the tubes and equalize pressure 1-2 times daily.  Do NOT do this if there is any pain or discomfort.     FLONASE INSTRUCTIONS    You have been prescribed or instructed to take a nasal steroid spray.  Examples of this medication include Flonase (fluticasone), Nasacort (triamcinolone), and Rhinocort (budesonide). Some symptoms will experience relief within a few days; however, it may take 2-3 weeks to begin to see improvement. This medication needs to be taken consistently  to see results- be patient :)    Helpful hints for maximizing medication into the nose  - Use the opposite hand to spray the nostril (example: right hand for left nostril). This will help avoid spraying the medication onto the septum (the area that divides the left and right nasal cavity.)  - Tilt the bottle so that it is facing at a slight angle up or straight back, but avoid pointing the bottle straight up while spraying.   - Gently sniff (do not snort) a few seconds after spraying.

## 2023-05-05 ENCOUNTER — TELEPHONE (OUTPATIENT)
Dept: OTOLARYNGOLOGY | Facility: CLINIC | Age: 48
End: 2023-05-05
Payer: COMMERCIAL

## 2023-06-14 DIAGNOSIS — M79.671 RIGHT FOOT PAIN: Primary | ICD-10-CM

## 2023-06-15 ENCOUNTER — TELEPHONE (OUTPATIENT)
Dept: NEUROLOGY | Facility: CLINIC | Age: 48
End: 2023-06-15
Payer: COMMERCIAL

## 2023-06-15 NOTE — TELEPHONE ENCOUNTER
----- Message from Lavern Rider MA sent at 6/14/2023  4:18 PM CDT -----  Regarding: FW: appt  Contact: @[phone]    ----- Message -----  From: Duke Pace  Sent: 6/14/2023   4:16 PM CDT  To: [facility] Staff  Subject: appt                                             Pt calling needing to resched BOTOX inj ... pls call and [phone]

## 2023-06-19 ENCOUNTER — HOSPITAL ENCOUNTER (OUTPATIENT)
Dept: RADIOLOGY | Facility: HOSPITAL | Age: 48
Discharge: HOME | End: 2023-06-19
Attending: PODIATRIST
Payer: COMMERCIAL

## 2023-06-19 ENCOUNTER — PATIENT MESSAGE (OUTPATIENT)
Dept: NEUROLOGY | Facility: CLINIC | Age: 48
End: 2023-06-19
Payer: COMMERCIAL

## 2023-06-19 DIAGNOSIS — M79.671 RIGHT FOOT PAIN: ICD-10-CM

## 2023-06-19 PROCEDURE — 73630 X-RAY EXAM OF FOOT: CPT | Mod: TC,RT

## 2023-06-19 PROCEDURE — 73630 X-RAY EXAM OF FOOT: CPT | Mod: 26,RT,, | Performed by: STUDENT IN AN ORGANIZED HEALTH CARE EDUCATION/TRAINING PROGRAM

## 2023-06-19 PROCEDURE — 73630 XR FOOT COMPLETE 3 VIEW RIGHT: ICD-10-PCS | Mod: 26,RT,, | Performed by: STUDENT IN AN ORGANIZED HEALTH CARE EDUCATION/TRAINING PROGRAM

## 2023-06-19 NOTE — TELEPHONE ENCOUNTER
Called patient, she would like to discuss different treatment strategies.  She has seen Dr. Ragsdale and Dr. Montes, sees me for Botox.  We will assist her in scheduling her Botox, and patient will follow up with either neurologist to discuss treatment strategies.

## 2023-06-21 RX ORDER — METHYLPREDNISOLONE 4 MG/1
TABLET ORAL
Qty: 1 EACH | Refills: 0 | Status: SHIPPED | OUTPATIENT
Start: 2023-06-21 | End: 2023-09-13 | Stop reason: DRUGHIGH

## 2023-06-22 ENCOUNTER — PATIENT MESSAGE (OUTPATIENT)
Dept: PODIATRY | Facility: CLINIC | Age: 48
End: 2023-06-22

## 2023-06-22 ENCOUNTER — OFFICE VISIT (OUTPATIENT)
Dept: PODIATRY | Facility: CLINIC | Age: 48
End: 2023-06-22
Payer: COMMERCIAL

## 2023-06-22 VITALS
DIASTOLIC BLOOD PRESSURE: 81 MMHG | BODY MASS INDEX: 24.33 KG/M2 | SYSTOLIC BLOOD PRESSURE: 123 MMHG | HEART RATE: 92 BPM | HEIGHT: 67 IN | WEIGHT: 155 LBS

## 2023-06-22 DIAGNOSIS — M20.5X1 HALLUX LIMITUS, ACQUIRED, RIGHT: Primary | ICD-10-CM

## 2023-06-22 DIAGNOSIS — M79.671 RIGHT FOOT PAIN: ICD-10-CM

## 2023-06-22 PROCEDURE — 99999 PR PBB SHADOW E&M-EST. PATIENT-LVL III: ICD-10-PCS | Mod: PBBFAC,,, | Performed by: PODIATRIST

## 2023-06-22 PROCEDURE — 99203 PR OFFICE/OUTPT VISIT, NEW, LEVL III, 30-44 MIN: ICD-10-PCS | Mod: S$GLB,,, | Performed by: PODIATRIST

## 2023-06-22 PROCEDURE — 99203 OFFICE O/P NEW LOW 30 MIN: CPT | Mod: S$GLB,,, | Performed by: PODIATRIST

## 2023-06-22 PROCEDURE — 99999 PR PBB SHADOW E&M-EST. PATIENT-LVL III: CPT | Mod: PBBFAC,,, | Performed by: PODIATRIST

## 2023-06-22 NOTE — PROGRESS NOTES
PODIATRY NOTE       PATIENT ID:  Socorro Huang is a 47 y.o. female.       CHIEF CONCERN:   Foot Pain (Right Foot)               MEDICAL DECISION MAKING:          Problem List Items Addressed This Visit    None  Visit Diagnoses       Hallux limitus, acquired, right    -  Primary    Right foot pain                    I counseled the patient on the patient's conditions, their implications and medical management.    I ordered xrays and reviewed the xrays with the patient.     We discussed non-surgical treatment options, including pharmacologic approach, protective padding which can include a prefabricated insole, shoe recommendations or modifications, and custom foot orthotics.        Shoe and activity modification as needed for relief.  Low-heeled shoe or rocker-soled shoes may be best.     In terms of pharmacologic approach, I advised OTC NSAID as tolerated and patient may also consider a topical non-steroidal anti-inflammatory gel.     Another temporary treatment option may include intra-articular steroid injection.       Discussed surgery options, ie. Cheilectomy.   Patient will consider and call if she would like to proceed.                         HISTORY OF PRESENT ILLNESS:  Socorro Huang is a 47 y.o. female presents to clinic with concerns of pain in the big toe joint, right  foot.   Pt states pain has been present for a couple of months.     Trauma:  none recalled.  Walking makes pain worse.   Treatment tried: ibuprofen at home.  She is generally in athletic shoes.            Patient Active Problem List   Diagnosis    S/P hysterectomy    History of diverticulitis    Pelvic pain syndrome    Dizziness    Anxiety    Gastroesophageal reflux disease without esophagitis    Hypothyroidism    Acute diverticulitis    Insomnia    Need for influenza vaccination    Lymphopenia    Chronic migraine w/o aura w/o status migrainosus, not intractable    Neck pain           Current Outpatient Medications on File Prior to  "Visit   Medication Sig Dispense Refill    butalbital-acetaminophen-caffeine -40 mg (FIORICET, ESGIC) -40 mg per tablet Take 1 tablet by mouth every 6 (six) hours.      co-enzyme Q-10 30 mg capsule Take by mouth.      fluticasone propionate (FLONASE) 50 mcg/actuation nasal spray 1 spray (50 mcg total) by Each Nostril route once daily. 18 g 0    hydrOXYzine HCL (ATARAX) 25 MG tablet Take 1 tab at night as needed for anxiety/sleep 30 tablet 0    magnesium oxide (MAG-OX) 400 mg (241.3 mg magnesium) tablet Take 1 tablet (400 mg total) by mouth once daily. 30 tablet 5    methylPREDNISolone (MEDROL DOSEPACK) 4 mg tablet use as directed 1 each 0    pantoprazole (PROTONIX) 40 MG tablet Take 1 tablet (40 mg total) by mouth once daily. 30 tablet 11    traZODone (DESYREL) 100 MG tablet TAKE 1 TABLET BY MOUTH EVERY DAY IN THE EVENING 90 tablet 1     No current facility-administered medications on file prior to visit.           Review of patient's allergies indicates:   Allergen Reactions    Celexa [citalopram] Nausea Only     Stomach upset             Review of Systems -   General ROS: negative for - chills, fever or night sweats  Respiratory ROS: no cough, shortness of breath, or wheezing  Cardiovascular ROS: no chest pain or dyspnea on exertion  Musculoskeletal ROS: positive for - joint pain and joint stiffness  negative for - muscle pain or muscular weakness  Neurological ROS: no TIA or stroke symptoms      EXAM:     Vitals:    06/22/23 1439   BP: 123/81   Pulse: 92   Weight: 70.3 kg (155 lb)   Height: 5' 7" (1.702 m)        General:  Alert and Oriented x 3;  No acute distress      Right  Lower extremity exam:    Vascular:   Dorsalis Pedis:  present   Posterior Tibial:  present  Capillary refill time:  3 seconds  Temperature of toes warm to touch  Edema:  Trace       Neurological:     Sharp touch:  normal  Light touch: normal  Tinels Sign:  Absent  Mulders Click:   Absent        Dermatological:   Skin tone, color, " turgor is appropriate for age and gender  Open wounds:  absent  Bruising:  absent  Redness:  mild 2/2 shoe irritation.       Musculoskeletal:   Dorsal bony prominence noted  at the 1st MTPJ.     1st MTPJ range of motion without  crepitus,  trackbound joint.   But with pain at end dorsiflexion.   Approximately 70-80 degrees dorsiflexion unloaded/loaded.            6/22/2023 RIGHT foot xray  Imaging:  FINDINGS:  No acute fracture, dislocation, or osseous destruction.  Joint spaces appear maintained.  Soft tissues are unremarkable.

## 2023-06-29 ENCOUNTER — PROCEDURE VISIT (OUTPATIENT)
Dept: NEUROLOGY | Facility: CLINIC | Age: 48
End: 2023-06-29
Payer: COMMERCIAL

## 2023-06-29 VITALS — SYSTOLIC BLOOD PRESSURE: 109 MMHG | HEART RATE: 73 BPM | DIASTOLIC BLOOD PRESSURE: 67 MMHG

## 2023-06-29 DIAGNOSIS — G43.709 CHRONIC MIGRAINE W/O AURA W/O STATUS MIGRAINOSUS, NOT INTRACTABLE: Primary | ICD-10-CM

## 2023-06-29 PROCEDURE — 64615 PR CHEMODENERVATION OF MUSCLE FOR CHRONIC MIGRAINE: ICD-10-PCS | Mod: S$GLB,,, | Performed by: PHYSICIAN ASSISTANT

## 2023-06-29 PROCEDURE — 64615 CHEMODENERV MUSC MIGRAINE: CPT | Mod: S$GLB,,, | Performed by: PHYSICIAN ASSISTANT

## 2023-06-29 NOTE — PROCEDURES
PROCEDURE NOTE:  BOTOX was performed as an indicated therapy for intractable chronic migraine headaches given that the patient failed more than 2 headache medications     A time out was conducted just before the start of the procedure to verify the correct patient and procedure, procedure location, and all relevant critical information.      Botulinum Toxin Injection Procedure      PROCEDURE PERFORMED: Botulinum toxin injection (70411)  CLINICAL INDICATION: Chronic Migraines  After risks and benefits were explained including bleeding, infection, worsening of pain, damage to the areas being injected, weakness of muscles, loss of muscle control, dysphagia if injecting the head or neck, facial droop if injecting the facial area, painful injection, allergic or other reaction to the medications being injected, and the failure of the procedure to help the problem, a signed consent was obtained.   The patient was placed in a comfortable area and the sites to be treated were identified.The area to be treated was prepped three times with alcohol and the alcohol allowed to dry. Next, a 30 gauge needle was used to inject the medication in the area to be treated.      Total Botox used: 155 Units   Unavoidable waste: 45 Units      Injection sites:    muscle bilaterally ( a total of 10 units divided into 2 sites)   Procerus muscle (5 units)   Frontalis muscle bilaterally (a total of 20 units divided into 4 sites)   Temporalis muscle bilaterally (a total of 40 units divided into 8 sites)   Occipitalis muscle bilaterally (a total of 30 units divided into 6 sites)   Cervical paraspinal muscles (a total of 20 units divided into 4 sites)   Trapezius muscle bilaterally (a total of 30 units divided into 6 sites)   Complications: none   RTC for the next Botox injection: 12 weeks     Problem List Items Addressed This Visit          Neuro    Chronic migraine w/o aura w/o status migrainosus, not intractable - Primary    Relevant  Medications    onabotulinumtoxina injection 200 Units (Completed) (Start on 6/29/2023 12:00 PM)         [facility]DA [facility] Department of Neurology   [phone]

## 2023-07-05 ENCOUNTER — PATIENT MESSAGE (OUTPATIENT)
Dept: PRIMARY CARE CLINIC | Facility: CLINIC | Age: 48
End: 2023-07-05
Payer: COMMERCIAL

## 2023-07-06 ENCOUNTER — OFFICE VISIT (OUTPATIENT)
Dept: NEUROLOGY | Facility: CLINIC | Age: 48
End: 2023-07-06
Payer: COMMERCIAL

## 2023-07-06 DIAGNOSIS — G43.109 MIGRAINE WITH AURA AND WITHOUT STATUS MIGRAINOSUS, NOT INTRACTABLE: Primary | ICD-10-CM

## 2023-07-06 PROCEDURE — 99024 PR POST-OP FOLLOW-UP VISIT: ICD-10-PCS | Mod: 95,,, | Performed by: PSYCHIATRY & NEUROLOGY

## 2023-07-06 PROCEDURE — 99024 POSTOP FOLLOW-UP VISIT: CPT | Mod: 95,,, | Performed by: PSYCHIATRY & NEUROLOGY

## 2023-07-06 RX ORDER — BUTALBITAL, ACETAMINOPHEN AND CAFFEINE 50; 325; 40 MG/1; MG/1; MG/1
1 TABLET ORAL EVERY 6 HOURS
Qty: 12 TABLET | Refills: 5 | Status: SHIPPED | OUTPATIENT
Start: 2023-07-06 | End: 2023-12-19

## 2023-07-06 NOTE — PROGRESS NOTES
[facility] Department of Neurology  Virtual Visit      [facility] - NEUROLOGY [address] [facility], [place]    Date: 7/6/23  Patient Name: Socorro Huang   MRN: [id number]   PCP: Blake Adams  Referring Provider: No ref. provider found    The patient location is: Workplace  The chief complaint leading to consultation is: Migraine  Visit type: Virtual visit with synchronous audio and video  Total time spent with patient: 12 min  Each patient to whom he or she provides medical services by telemedicine is:  (1) informed of the relationship between the physician and patient and the respective role of any other health care provider with respect to management of the patient; and (2) notified that he or she may decline to receive medical services by telemedicine and may withdraw from such care at any time.    Notes:     Assessment:   Socorro Huang is a 47 y.o. female Presenting to establish care for migraine headache.  Continuing Botox with PRN fioricet, counseled against overuse (no more than 3 doses per week). Patient expressed understanding.  Plan:     Problem List Items Addressed This Visit    None  Visit Diagnoses       Migraine with aura and without status migrainosus, not intractable    -  Primary    Relevant Medications    butalbital-acetaminophen-caffeine -40 mg (FIORICET, ESGIC) -40 mg per tablet          I spent a total of 20 minutes preparing to see the patient, obtaining and reviewing history and results, performing a medically appropriate exam, counseling and educating the patient/family/caregiver, documenting clinical information, coordinating care, and ordering medications, tests, procedures, and referrals.    Agus Montes MD  [facility]   Department of Neurology    Patient note was created using MModal Dictation.  Any errors in syntax or even information may not have been identified and edited on initial review prior to signing this  note.  Subjective:   Patient seen in consultation at the request of No ref. provider found for the evaluation of migraine. A copy of this note will be sent to the referring physician.          HPI:   Ms. Socorro Huang is a 47 y.o. female presenting to establish care for migraine headache.  Patient reports marked improvement in headache frequency and severity with reinitiation of Botox and discontinuation of OTC analgesic use. Prior to this, the patient experienced migraines that persisted for up to 2 weeks at a time. She now has approximately 1 migraine per week that rapidly responds to fioricet or excedrin (though noting she now is avoiding overuse)     Prior Meds: Imitrex (makes her sick), Nurtec, Fioricet (helps)    PAST MEDICAL HISTORY:  Past Medical History:   Diagnosis Date    Abnormal Pap smear     Anxiety     celexa & prozac didn't help much    Constipation - functional     Headache(784.0)     Hemorrhoid     Leukopenia     benign, evaluated in [facility] y 2000    Menorrhagia     Strain of neck muscle     tx with PT at [facility] in past     PAST SURGICAL HISTORY:  Past Surgical History:   Procedure Laterality Date    ANOSCOPY N/A 07/20/2022    Procedure: ANOSCOPY;  Surgeon: ASHTYN Melo MD;  Location: [address];  Service: Colon and Rectal;  Laterality: N/A;    BREAST RECONSTRUCTION  2018    BREAST SURGERY      COLON SURGERY  2014         COLONOSCOPY N/A 02/03/2022    Procedure: COLONOSCOPY;  Surgeon: ASHTYN Melo MD;  Location: [facility];  Service: Endoscopy;  Laterality: N/A;    DILATION AND CURETTAGE OF UTERUS  12/18/2012    complex hyperplasia, no atypia    FLEXIBLE SIGMOIDOSCOPY N/A 07/20/2022    Procedure: SIGMOIDOSCOPY, FLEXIBLE;  Surgeon: ASHTYN Melo MD;  Location: [address];  Service: Colon and Rectal;  Laterality: N/A;    HEMORRHOID SURGERY  2014    HYSTERECTOMY      LAPAROSCOPIC SIGMOIDECTOMY Left 07/20/2022    Procedure: COLECTOMY, SIGMOID, LAPAROSCOPIC, ERAS low;   Surgeon: ASHTYN Melo MD;  Location: [facility] OR 2ND FLR;  Service: Colon and Rectal;  Laterality: Left;    MOBILIZATION OF SPLENIC FLEXURE N/A 07/20/2022    Procedure: MOBILIZATION, SPLENIC FLEXURE;  Surgeon: ASHTYN Melo MD;  Location: [facility] OR 2ND FLR;  Service: Colon and Rectal;  Laterality: N/A;    OOPHORECTOMY      PARTIAL HYSTERECTOMY  2013    for atypia    SMALL INTESTINE SURGERY  2017    THYROIDECTOMY, PARTIAL  2013    TONSILLECTOMY      TUBAL LIGATION       CURRENT MEDS:  Current Outpatient Medications   Medication Sig Dispense Refill    butalbital-acetaminophen-caffeine -40 mg (FIORICET, ESGIC) -40 mg per tablet Take 1 tablet by mouth every 6 (six) hours.      co-enzyme Q-10 30 mg capsule Take by mouth.      fluticasone propionate (FLONASE) 50 mcg/actuation nasal spray 1 spray (50 mcg total) by Each Nostril route once daily. 18 g 0    hydrOXYzine HCL (ATARAX) 25 MG tablet Take 1 tab at night as needed for anxiety/sleep 30 tablet 0    magnesium oxide (MAG-OX) 400 mg (241.3 mg magnesium) tablet Take 1 tablet (400 mg total) by mouth once daily. 30 tablet 5    methylPREDNISolone (MEDROL DOSEPACK) 4 mg tablet use as directed 1 each 0    pantoprazole (PROTONIX) 40 MG tablet Take 1 tablet (40 mg total) by mouth once daily. 30 tablet 11    traZODone (DESYREL) 100 MG tablet TAKE 1 TABLET BY MOUTH EVERY DAY IN THE EVENING 90 tablet 1     No current facility-administered medications for this visit.     ALLERGIES:  Review of patient's allergies indicates:   Allergen Reactions    Celexa [citalopram] Nausea Only     Stomach upset     FAMILY HISTORY:  Family History   Problem Relation Age of Onset    Emphysema Mother     Cancer Mother         Lung    COPD Mother     Diabetes Mother     Cancer Father         Lung    Heart disease Father     Diabetes Paternal Grandmother     Hypertension Paternal Grandmother     Depression Sister     Suicide Brother     Drug abuse Maternal Aunt     Schizophrenia  Paternal Aunt     Alcohol abuse Maternal Uncle     Alcohol abuse Cousin     Breast cancer Maternal Grandmother     Ovarian cancer Maternal Grandmother     ADD / ADHD Neg Hx     Anxiety disorder Neg Hx     Bipolar disorder Neg Hx     Dementia Neg Hx     OCD Neg Hx     Paranoid behavior Neg Hx     Physical abuse Neg Hx     Seizures Neg Hx     Self injury Neg Hx     Sexual abuse Neg Hx        SOCIAL HISTORY:  Social History     Tobacco Use    Smoking status: Never    Smokeless tobacco: Never   Substance Use Topics    Alcohol use: Not Currently     Comment: socially    Drug use: No     Review of Systems:  12 system review of systems is negative except for the symptoms mentioned in HPI.      Objective:   There were no vitals filed for this visit.  General: NAD, well nourished   Eyes: no tearing, discharge, no erythema   ENT: moist mucous membranes of the oral cavity, nares patent    Neck: Supple, full range of motion  Cardiovascular: Appears well perfused  Lungs: Normal work of breathing, normal chest wall excursions  Skin: No rash, lesions, or breakdown on exposed skin  Psychiatry: Mood and affect are appropriate   Abdomen: nondistended, non tender  Extremeties: No cyanosis, clubbing or edema visible    Neurological   MENTAL STATUS: Alert and oriented to person, place, and time. Attention and concentration within normal limits. Speech without dysarthria. Recent and remote memory within normal limits   CRANIAL NERVES: Visual fields intact.  EOMI. Facial sensation intact. Face symmetrical. Hearing grossly intact. Full shoulder shrug bilaterally. Tongue protrudes midline   SENSORY: Sensation is intact to light touch throughout.   MOTOR: Normal bulk.  Moves all extremities symmetrically.  REFLEXES: Deferred due to virtual visit  CEREBELLAR/COORDINATION/GAIT: Gait steady. Gross motor movements smooth.

## 2023-07-14 ENCOUNTER — TELEPHONE (OUTPATIENT)
Dept: PODIATRY | Facility: CLINIC | Age: 48
End: 2023-07-14
Payer: COMMERCIAL

## 2023-07-14 ENCOUNTER — OFFICE VISIT (OUTPATIENT)
Dept: PRIMARY CARE CLINIC | Facility: CLINIC | Age: 48
End: 2023-07-14
Payer: COMMERCIAL

## 2023-07-14 VITALS
SYSTOLIC BLOOD PRESSURE: 110 MMHG | DIASTOLIC BLOOD PRESSURE: 70 MMHG | BODY MASS INDEX: 24.65 KG/M2 | HEART RATE: 73 BPM | OXYGEN SATURATION: 98 % | WEIGHT: 157.44 LBS | RESPIRATION RATE: 16 BRPM

## 2023-07-14 DIAGNOSIS — M79.674 GREAT TOE PAIN, RIGHT: ICD-10-CM

## 2023-07-14 DIAGNOSIS — M20.5X1 HALLUX LIMITUS OF RIGHT FOOT: ICD-10-CM

## 2023-07-14 DIAGNOSIS — F41.9 ANXIETY: ICD-10-CM

## 2023-07-14 DIAGNOSIS — Z01.818 PRE-OP EXAMINATION: Primary | ICD-10-CM

## 2023-07-14 PROCEDURE — 99999 PR PBB SHADOW E&M-EST. PATIENT-LVL III: ICD-10-PCS | Mod: PBBFAC,,, | Performed by: NURSE PRACTITIONER

## 2023-07-14 PROCEDURE — 99215 PR OFFICE/OUTPT VISIT, EST, LEVL V, 40-54 MIN: ICD-10-PCS | Mod: S$GLB,,, | Performed by: NURSE PRACTITIONER

## 2023-07-14 PROCEDURE — 99215 OFFICE O/P EST HI 40 MIN: CPT | Mod: S$GLB,,, | Performed by: NURSE PRACTITIONER

## 2023-07-14 PROCEDURE — 99999 PR PBB SHADOW E&M-EST. PATIENT-LVL III: CPT | Mod: PBBFAC,,, | Performed by: NURSE PRACTITIONER

## 2023-07-19 ENCOUNTER — TELEPHONE (OUTPATIENT)
Dept: PODIATRY | Facility: CLINIC | Age: 48
End: 2023-07-19
Payer: COMMERCIAL

## 2023-07-19 NOTE — TELEPHONE ENCOUNTER
Spoke with patient requesting a call back if an earlier appt. becomes available before her scheduled appt. on 08/09/2023 with Dr. Sanchez(Podiatry)

## 2023-07-25 ENCOUNTER — OFFICE VISIT (OUTPATIENT)
Dept: PODIATRY | Facility: CLINIC | Age: 48
End: 2023-07-25
Payer: COMMERCIAL

## 2023-07-25 VITALS
SYSTOLIC BLOOD PRESSURE: 142 MMHG | BODY MASS INDEX: 24.58 KG/M2 | WEIGHT: 156.63 LBS | DIASTOLIC BLOOD PRESSURE: 88 MMHG | HEIGHT: 67 IN | HEART RATE: 84 BPM

## 2023-07-25 DIAGNOSIS — M79.671 RIGHT FOOT PAIN: Primary | ICD-10-CM

## 2023-07-25 DIAGNOSIS — M21.621 TAILOR'S BUNION OF RIGHT FOOT: ICD-10-CM

## 2023-07-25 DIAGNOSIS — M20.5X1 HALLUX LIMITUS, ACQUIRED, RIGHT: ICD-10-CM

## 2023-07-25 DIAGNOSIS — M20.41 HAMMER TOE OF RIGHT FOOT: ICD-10-CM

## 2023-07-25 PROCEDURE — 99214 OFFICE O/P EST MOD 30 MIN: CPT | Mod: S$GLB,,, | Performed by: PODIATRIST

## 2023-07-25 PROCEDURE — 99999 PR PBB SHADOW E&M-EST. PATIENT-LVL III: ICD-10-PCS | Mod: PBBFAC,,, | Performed by: PODIATRIST

## 2023-07-25 PROCEDURE — 99214 PR OFFICE/OUTPT VISIT, EST, LEVL IV, 30-39 MIN: ICD-10-PCS | Mod: S$GLB,,, | Performed by: PODIATRIST

## 2023-07-25 PROCEDURE — 99999 PR PBB SHADOW E&M-EST. PATIENT-LVL III: CPT | Mod: PBBFAC,,, | Performed by: PODIATRIST

## 2023-07-26 NOTE — PROGRESS NOTES
PODIATRY NOTE       PATIENT ID:  Socorro Huang is a 47 y.o. female.       CHIEF CONCERN:   Consult       Patient presents with an issue of hallux limitus/painful bone spur right great toe joint.  In addition she has a painful right 5th toe and tailor's bunion.  She had similar procedure to left side which was very successful.  She is attempted conservative treatments including changes in shoe gear/foot pads/topical oral anti-inflammatories as well as over-the-counter insoles without much relief and no resolution of the issue.  She is here for surgical consult today for these issues.        MEDICAL DECISION MAKING:          Problem List Items Addressed This Visit    None  Visit Diagnoses       Right foot pain    -  Primary    Hallux limitus, acquired, right        Hammer toe of right foot        Tailor's bunion of right foot                    I counseled the patient on the patient's conditions, their implications and medical management.    I ordered xrays and reviewed the xrays with the patient.     We discussed non-surgical treatment options, including pharmacologic approach, protective padding which can include a prefabricated insole, shoe recommendations or modifications, and custom foot orthotics.        Shoe and activity modification as needed for relief.  Low-heeled shoe or rocker-soled shoes may be best.     In terms of pharmacologic approach, I advised OTC NSAID as tolerated and patient may also consider a topical non-steroidal anti-inflammatory gel.     Another temporary treatment option may include intra-articular steroid injection.       Discussed surgery options, ie. Cheilectomy.   Patient will consider and call if she would like to proceed.                         HISTORY OF PRESENT ILLNESS:  Socorro Huang is a 47 y.o. female presents to clinic with concerns of pain in the big toe joint, right  foot.   Pt states pain has been present for a couple of months.     Trauma:  none recalled.  Walking  makes pain worse.   Treatment tried: ibuprofen at home.  She is generally in athletic shoes.            Patient Active Problem List   Diagnosis    S/P hysterectomy    History of diverticulitis    Pelvic pain syndrome    Dizziness    Anxiety    Gastroesophageal reflux disease without esophagitis    Hypothyroidism    Acute diverticulitis    Insomnia    Need for influenza vaccination    Lymphopenia    Chronic migraine w/o aura w/o status migrainosus, not intractable    Neck pain           Current Outpatient Medications on File Prior to Visit   Medication Sig Dispense Refill    butalbital-acetaminophen-caffeine -40 mg (FIORICET, ESGIC) -40 mg per tablet Take 1 tablet by mouth every 6 (six) hours. 12 tablet 5    co-enzyme Q-10 30 mg capsule Take by mouth.      fluticasone propionate (FLONASE) 50 mcg/actuation nasal spray 1 spray (50 mcg total) by Each Nostril route once daily. 18 g 0    magnesium oxide (MAG-OX) 400 mg (241.3 mg magnesium) tablet Take 1 tablet (400 mg total) by mouth once daily. 30 tablet 5    pantoprazole (PROTONIX) 40 MG tablet Take 1 tablet (40 mg total) by mouth once daily. 30 tablet 11    traZODone (DESYREL) 100 MG tablet TAKE 1 TABLET BY MOUTH EVERY DAY IN THE EVENING 90 tablet 1    hydrOXYzine HCL (ATARAX) 25 MG tablet Take 1 tab at night as needed for anxiety/sleep (Patient not taking: Reported on 7/14/2023) 30 tablet 0    methylPREDNISolone (MEDROL DOSEPACK) 4 mg tablet use as directed (Patient not taking: Reported on 7/14/2023) 1 each 0     No current facility-administered medications on file prior to visit.           Review of patient's allergies indicates:   Allergen Reactions    Celexa [citalopram] Nausea Only     Stomach upset             Review of Systems -   General ROS: negative for - chills, fever or night sweats  Respiratory ROS: no cough, shortness of breath, or wheezing  Cardiovascular ROS: no chest pain or dyspnea on exertion  Musculoskeletal ROS: positive for - joint  "pain and joint stiffness  negative for - muscle pain or muscular weakness  Neurological ROS: no TIA or stroke symptoms      EXAM:     Vitals:    07/25/23 0948   BP: (!) 142/88   BP Location: Right arm   Patient Position: Sitting   BP Method: Medium (Automatic)   Pulse: 84   Weight: 71.1 kg (156 lb 10.2 oz)   Height: 5' 7" (1.702 m)        General:  Alert and Oriented x 3;  No acute distress      Right  Lower extremity exam:    Vascular:   Dorsalis Pedis:  present   Posterior Tibial:  present  Capillary refill time:  3 seconds  Temperature of toes warm to touch  Edema:  Trace       Neurological:     Sharp touch:  normal  Light touch: normal  Tinels Sign:  Absent  Mulders Click:   Absent        Dermatological:   Skin tone, color, turgor is appropriate for age and gender  Open wounds:  absent  Bruising:  absent  Redness:  mild 2/2 shoe irritation.       Musculoskeletal:   Dorsal bony prominence noted  at the 1st MTPJ.     1st MTPJ range of motion without  crepitus,  trackbound joint.   But with pain at end dorsiflexion.   Approximately 70-80 degrees dorsiflexion unloaded/loaded.    The patient has decided to forego any further conservative treatment and opted for surgical intervention.  Alternative treatments, and benefits of surgery were discussed with the patient.  Risks and complications, including but not limited to: pain, swelling, numbness, infection, failure to achieve the stated goals, recurrence of problem, nerve and blood vessel damage, scar tissue formation, further need of surgery, loss of limb or life, was discussed in detail with the patient.  All questions asked were answered, and written informed consent was sign and received. The patient will undergo right cheilectomy/light tailor's bunionectomy exostectomy/right 5th toe correction.        6/22/2023 RIGHT foot xray  Imaging:  FINDINGS:  No acute fracture, dislocation, or osseous destruction.  Joint spaces appear maintained.  Soft tissues are " unremarkable.

## 2023-08-02 ENCOUNTER — TELEPHONE (OUTPATIENT)
Dept: PODIATRY | Facility: CLINIC | Age: 48
End: 2023-08-02
Payer: COMMERCIAL

## 2023-08-02 ENCOUNTER — TELEPHONE (OUTPATIENT)
Dept: PRIMARY CARE CLINIC | Facility: CLINIC | Age: 48
End: 2023-08-02
Payer: COMMERCIAL

## 2023-08-02 NOTE — TELEPHONE ENCOUNTER
----- Message from Regine Figueredo MA sent at 8/2/2023 11:43 AM CDT -----  Regarding: surgery scheduler  Socorro Huang is scheduled to have a surgery  with Dr. Sanchez  on 8/18.  We request surgical clearance.  The patient has been instructed to contact your office; please assist in scheduling. We request a  EKG, CBC, BMP, and any other testing deemed necessary by your office. When completed please  note chart  stating whether the patient is cleared., along with any results to attention Regine Figueredo.  Please contact us if you have any questions.  Our office number is [phone].    Sincerely,      Regine Figueredo MA  Foot and Ankle Surgery  [facility], Department of Podiatry

## 2023-08-02 NOTE — TELEPHONE ENCOUNTER
Spoke to pt and discussed 8/18 surgery date. Also advised pt to contact their PCP to schedule an appointment to be cleared for surgery. Then advised pt that someone will call them the day before surgery between 3p-5p with the surgery arrival time and instructions. Pt verbalized understanding and call ended.

## 2023-08-03 ENCOUNTER — LAB VISIT (OUTPATIENT)
Dept: LAB | Facility: HOSPITAL | Age: 48
End: 2023-08-03
Attending: NURSE PRACTITIONER
Payer: COMMERCIAL

## 2023-08-03 DIAGNOSIS — M79.674 GREAT TOE PAIN, RIGHT: ICD-10-CM

## 2023-08-03 DIAGNOSIS — M20.5X1 HALLUX LIMITUS OF RIGHT FOOT: ICD-10-CM

## 2023-08-03 DIAGNOSIS — Z01.818 PRE-OP EXAMINATION: ICD-10-CM

## 2023-08-03 LAB
ALBUMIN SERPL BCP-MCNC: 4.1 G/DL (ref 3.5–5.2)
ALP SERPL-CCNC: 66 U/L (ref 55–135)
ALT SERPL W/O P-5'-P-CCNC: 55 U/L (ref 10–44)
ANION GAP SERPL CALC-SCNC: 9 MMOL/L (ref 8–16)
AST SERPL-CCNC: 47 U/L (ref 10–40)
BASOPHILS # BLD AUTO: 0.02 K/UL (ref 0–0.2)
BASOPHILS NFR BLD: 0.5 % (ref 0–1.9)
BILIRUB SERPL-MCNC: 0.3 MG/DL (ref 0.1–1)
BUN SERPL-MCNC: 15 MG/DL (ref 6–20)
CALCIUM SERPL-MCNC: 9.3 MG/DL (ref 8.7–10.5)
CHLORIDE SERPL-SCNC: 103 MMOL/L (ref 95–110)
CO2 SERPL-SCNC: 24 MMOL/L (ref 23–29)
CREAT SERPL-MCNC: 0.7 MG/DL (ref 0.5–1.4)
DIFFERENTIAL METHOD: ABNORMAL
EOSINOPHIL # BLD AUTO: 0.1 K/UL (ref 0–0.5)
EOSINOPHIL NFR BLD: 2.1 % (ref 0–8)
ERYTHROCYTE [DISTWIDTH] IN BLOOD BY AUTOMATED COUNT: 11.5 % (ref 11.5–14.5)
EST. GFR  (NO RACE VARIABLE): >60 ML/MIN/1.73 M^2
GLUCOSE SERPL-MCNC: 126 MG/DL (ref 70–110)
HCT VFR BLD AUTO: 36.2 % (ref 37–48.5)
HGB BLD-MCNC: 12.8 G/DL (ref 12–16)
IMM GRANULOCYTES # BLD AUTO: 0.01 K/UL (ref 0–0.04)
IMM GRANULOCYTES NFR BLD AUTO: 0.3 % (ref 0–0.5)
LYMPHOCYTES # BLD AUTO: 1 K/UL (ref 1–4.8)
LYMPHOCYTES NFR BLD: 27.3 % (ref 18–48)
MCH RBC QN AUTO: 32.9 PG (ref 27–31)
MCHC RBC AUTO-ENTMCNC: 35.4 G/DL (ref 32–36)
MCV RBC AUTO: 93 FL (ref 82–98)
MONOCYTES # BLD AUTO: 0.4 K/UL (ref 0.3–1)
MONOCYTES NFR BLD: 9.3 % (ref 4–15)
NEUTROPHILS # BLD AUTO: 2.3 K/UL (ref 1.8–7.7)
NEUTROPHILS NFR BLD: 60.5 % (ref 38–73)
NRBC BLD-RTO: 0 /100 WBC
PLATELET # BLD AUTO: 232 K/UL (ref 150–450)
PMV BLD AUTO: 8.9 FL (ref 9.2–12.9)
POTASSIUM SERPL-SCNC: 4.4 MMOL/L (ref 3.5–5.1)
PROT SERPL-MCNC: 6.8 G/DL (ref 6–8.4)
RBC # BLD AUTO: 3.89 M/UL (ref 4–5.4)
SODIUM SERPL-SCNC: 136 MMOL/L (ref 136–145)
WBC # BLD AUTO: 3.77 K/UL (ref 3.9–12.7)

## 2023-08-03 PROCEDURE — 36415 COLL VENOUS BLD VENIPUNCTURE: CPT | Performed by: NURSE PRACTITIONER

## 2023-08-03 PROCEDURE — 85025 COMPLETE CBC W/AUTO DIFF WBC: CPT | Performed by: NURSE PRACTITIONER

## 2023-08-03 PROCEDURE — 80053 COMPREHEN METABOLIC PANEL: CPT | Performed by: NURSE PRACTITIONER

## 2023-08-14 ENCOUNTER — PATIENT MESSAGE (OUTPATIENT)
Dept: ADMINISTRATIVE | Facility: HOSPITAL | Age: 48
End: 2023-08-14
Payer: COMMERCIAL

## 2023-08-16 ENCOUNTER — TELEPHONE (OUTPATIENT)
Dept: PODIATRY | Facility: CLINIC | Age: 48
End: 2023-08-16
Payer: COMMERCIAL

## 2023-08-16 DIAGNOSIS — M20.41 HAMMER TOE OF RIGHT FOOT: Primary | ICD-10-CM

## 2023-08-16 NOTE — TELEPHONE ENCOUNTER
Tacho Sanchez DPM Coco, Bridget L., MA  Caller: Unspecified (Today,  7:56 AM)  45-60 min surgery length

## 2023-08-17 ENCOUNTER — ANESTHESIA EVENT (OUTPATIENT)
Dept: SURGERY | Facility: HOSPITAL | Age: 48
End: 2023-08-17
Payer: COMMERCIAL

## 2023-08-17 ENCOUNTER — TELEPHONE (OUTPATIENT)
Dept: PODIATRY | Facility: CLINIC | Age: 48
End: 2023-08-17
Payer: COMMERCIAL

## 2023-08-17 ENCOUNTER — PATIENT MESSAGE (OUTPATIENT)
Dept: PODIATRY | Facility: CLINIC | Age: 48
End: 2023-08-17
Payer: COMMERCIAL

## 2023-08-17 DIAGNOSIS — M79.671 RIGHT FOOT PAIN: Primary | ICD-10-CM

## 2023-08-17 DIAGNOSIS — M20.5X1 HALLUX LIMITUS, ACQUIRED, RIGHT: ICD-10-CM

## 2023-08-17 NOTE — PRE-PROCEDURE INSTRUCTIONS
The following was discussed with pt via phone and sent to pt portal. Pt verbalized understanding.    Dear Socorro ,    You are scheduled for a procedure with Dr. Sanchez on 8/18/2023. Your scheduled arrival time is 6:45am.  This arrival time is roughly 2 hours before your anticipated procedure time to allow sufficient time for pre-op.  Please wear comfortable clothes.  Most patients do not need to change into a gown.  Please do not wear a dress.  This procedure will take place at the [facility] at the corner of [facility] and [facility].  [facility] [place] [facility] to [facility].  The address is:    [address] [place] [address].  [place] [address]    After entering the building, you will proceed to the second floor where you can check in with registration. You should take any medications that you routinely take for blood pressure (other than those listed below), heart medications, thyroid, cholesterol, etc.     If you wear contact lenses, please wear glasses to your procedure.    Your fasting instructions are as follow:  Nothing to eat or drink after midnight tonight. You may have small amounts of water to take medications in the morning. You MUST have a responsible adult to bring you home.      This evening (8/17/2023) and tomorrow morning, please hold the following medications:  -Aspirin and Aspirin-containing products (Goody's powder, Excedrin)  -NSAIDs (Advil, Ibuprofen, Aleve, Diclofenac)  -Vitamins/Supplements  -Herbal remedies/Teas  -Stimulants (Adderall, Vyvanse, Adipex)  -Diabetic medication (Please bring with you day of procedure)  -COQ10 ENZYME  -MAG-OXIDE    -May take Tylenol      This evening (8/17/2023) and tomorrow morning, take a shower using antibacterial soap (ex: Hibiclens or Dial antibacterial soap). DO NOT apply deodorant, lotion, cologne, or anything else to the skin.    If you have any procedure-specific questions, please call your surgeon's office. Any other  questions, don't hesitate to call at [phone].    Thanks,  HEBERT Powers  Pre-Admit Dept [facility]

## 2023-08-18 ENCOUNTER — HOSPITAL ENCOUNTER (OUTPATIENT)
Facility: HOSPITAL | Age: 48
Discharge: HOME | End: 2023-08-18
Attending: PODIATRIST | Admitting: PODIATRIST
Payer: COMMERCIAL

## 2023-08-18 ENCOUNTER — ANESTHESIA (OUTPATIENT)
Dept: SURGERY | Facility: HOSPITAL | Age: 48
End: 2023-08-18
Payer: COMMERCIAL

## 2023-08-18 VITALS
TEMPERATURE: 98 F | HEART RATE: 80 BPM | RESPIRATION RATE: 20 BRPM | DIASTOLIC BLOOD PRESSURE: 72 MMHG | OXYGEN SATURATION: 100 % | SYSTOLIC BLOOD PRESSURE: 123 MMHG

## 2023-08-18 DIAGNOSIS — M77.51 BONE SPUR OF RIGHT FOOT: Primary | ICD-10-CM

## 2023-08-18 DIAGNOSIS — M79.671 RIGHT FOOT PAIN: ICD-10-CM

## 2023-08-18 DIAGNOSIS — M20.5X1 HALLUX LIMITUS, ACQUIRED, RIGHT: ICD-10-CM

## 2023-08-18 DIAGNOSIS — M21.621 TAILOR'S BUNION OF RIGHT FOOT: ICD-10-CM

## 2023-08-18 PROCEDURE — 28110 PART REMOVAL OF METATARSAL: CPT | Mod: 51,T9,, | Performed by: PODIATRIST

## 2023-08-18 PROCEDURE — D9220A PRA ANESTHESIA: Mod: ,,, | Performed by: NURSE ANESTHETIST, CERTIFIED REGISTERED

## 2023-08-18 PROCEDURE — 36000707: Performed by: PODIATRIST

## 2023-08-18 PROCEDURE — 37000009 HC ANESTHESIA EA ADD 15 MINS: Performed by: PODIATRIST

## 2023-08-18 PROCEDURE — 28110 PR PART EXCIS 5TH METATARSAL HEAD: ICD-10-PCS | Mod: 51,T9,, | Performed by: PODIATRIST

## 2023-08-18 PROCEDURE — 28289 PR REPAIR HALLUX RIGIDUS; W/O IMPLANT: ICD-10-PCS | Mod: T5,,, | Performed by: PODIATRIST

## 2023-08-18 PROCEDURE — 37000008 HC ANESTHESIA 1ST 15 MINUTES: Performed by: PODIATRIST

## 2023-08-18 PROCEDURE — 71000033 HC RECOVERY, INTIAL HOUR: Performed by: PODIATRIST

## 2023-08-18 PROCEDURE — 36000706: Performed by: PODIATRIST

## 2023-08-18 PROCEDURE — 63600175 PHARM REV CODE 636 W HCPCS: Performed by: UROLOGY

## 2023-08-18 PROCEDURE — 94761 N-INVAS EAR/PLS OXIMETRY MLT: CPT

## 2023-08-18 PROCEDURE — 25000003 PHARM REV CODE 250: Performed by: PODIATRIST

## 2023-08-18 PROCEDURE — 99900035 HC TECH TIME PER 15 MIN (STAT)

## 2023-08-18 PROCEDURE — 63600175 PHARM REV CODE 636 W HCPCS: Performed by: PODIATRIST

## 2023-08-18 PROCEDURE — 25000003 PHARM REV CODE 250: Performed by: NURSE ANESTHETIST, CERTIFIED REGISTERED

## 2023-08-18 PROCEDURE — 63600175 PHARM REV CODE 636 W HCPCS: Performed by: NURSE ANESTHETIST, CERTIFIED REGISTERED

## 2023-08-18 PROCEDURE — 71000015 HC POSTOP RECOV 1ST HR: Performed by: PODIATRIST

## 2023-08-18 PROCEDURE — 27201423 OPTIME MED/SURG SUP & DEVICES STERILE SUPPLY: Performed by: PODIATRIST

## 2023-08-18 PROCEDURE — D9220A PRA ANESTHESIA: ICD-10-PCS | Mod: ,,, | Performed by: NURSE ANESTHETIST, CERTIFIED REGISTERED

## 2023-08-18 PROCEDURE — 28289 CORRJ HALUX RIGDUS W/O IMPLT: CPT | Mod: T5,,, | Performed by: PODIATRIST

## 2023-08-18 RX ORDER — BUPIVACAINE HYDROCHLORIDE 5 MG/ML
INJECTION, SOLUTION PERINEURAL
Status: DISCONTINUED | OUTPATIENT
Start: 2023-08-18 | End: 2023-08-18 | Stop reason: HOSPADM

## 2023-08-18 RX ORDER — LIDOCAINE HYDROCHLORIDE 10 MG/ML
INJECTION, SOLUTION EPIDURAL; INFILTRATION; INTRACAUDAL; PERINEURAL
Status: DISCONTINUED | OUTPATIENT
Start: 2023-08-18 | End: 2023-08-18 | Stop reason: HOSPADM

## 2023-08-18 RX ORDER — OXYCODONE AND ACETAMINOPHEN 5; 325 MG/1; MG/1
1 TABLET ORAL EVERY 6 HOURS PRN
Qty: 30 TABLET | Refills: 0 | Status: SHIPPED | OUTPATIENT
Start: 2023-08-18 | End: 2023-08-18

## 2023-08-18 RX ORDER — PROPOFOL 10 MG/ML
VIAL (ML) INTRAVENOUS CONTINUOUS PRN
Status: DISCONTINUED | OUTPATIENT
Start: 2023-08-18 | End: 2023-08-18

## 2023-08-18 RX ORDER — PROGESTERONE 200 MG/1
200 CAPSULE ORAL DAILY
COMMUNITY

## 2023-08-18 RX ORDER — MEPERIDINE HYDROCHLORIDE 50 MG/ML
12.5 INJECTION INTRAMUSCULAR; INTRAVENOUS; SUBCUTANEOUS ONCE AS NEEDED
Status: DISCONTINUED | OUTPATIENT
Start: 2023-08-18 | End: 2023-08-18 | Stop reason: HOSPADM

## 2023-08-18 RX ORDER — HYDROMORPHONE HYDROCHLORIDE 1 MG/ML
0.2 INJECTION, SOLUTION INTRAMUSCULAR; INTRAVENOUS; SUBCUTANEOUS EVERY 5 MIN PRN
Status: DISCONTINUED | OUTPATIENT
Start: 2023-08-18 | End: 2023-08-18 | Stop reason: HOSPADM

## 2023-08-18 RX ORDER — FENTANYL CITRATE 50 UG/ML
INJECTION, SOLUTION INTRAMUSCULAR; INTRAVENOUS
Status: DISCONTINUED | OUTPATIENT
Start: 2023-08-18 | End: 2023-08-18

## 2023-08-18 RX ORDER — OXYCODONE AND ACETAMINOPHEN 5; 325 MG/1; MG/1
1 TABLET ORAL EVERY 6 HOURS PRN
Qty: 27 TABLET | Refills: 0 | Status: SHIPPED | OUTPATIENT
Start: 2023-08-18 | End: 2023-08-22

## 2023-08-18 RX ORDER — MIDAZOLAM HYDROCHLORIDE 1 MG/ML
INJECTION, SOLUTION INTRAMUSCULAR; INTRAVENOUS
Status: DISCONTINUED | OUTPATIENT
Start: 2023-08-18 | End: 2023-08-18

## 2023-08-18 RX ORDER — LIDOCAINE HYDROCHLORIDE 20 MG/ML
INJECTION INTRAVENOUS
Status: DISCONTINUED | OUTPATIENT
Start: 2023-08-18 | End: 2023-08-18

## 2023-08-18 RX ORDER — PROCHLORPERAZINE EDISYLATE 5 MG/ML
5 INJECTION INTRAMUSCULAR; INTRAVENOUS EVERY 30 MIN PRN
Status: DISCONTINUED | OUTPATIENT
Start: 2023-08-18 | End: 2023-08-18 | Stop reason: HOSPADM

## 2023-08-18 RX ORDER — HYDROMORPHONE HYDROCHLORIDE 1 MG/ML
0.2 INJECTION, SOLUTION INTRAMUSCULAR; INTRAVENOUS; SUBCUTANEOUS EVERY 5 MIN PRN
Status: DISCONTINUED | OUTPATIENT
Start: 2023-08-18 | End: 2023-08-18 | Stop reason: SDUPTHER

## 2023-08-18 RX ORDER — LIDOCAINE HYDROCHLORIDE 10 MG/ML
1 INJECTION, SOLUTION EPIDURAL; INFILTRATION; INTRACAUDAL; PERINEURAL ONCE AS NEEDED
Status: DISCONTINUED | OUTPATIENT
Start: 2023-08-18 | End: 2023-08-18 | Stop reason: HOSPADM

## 2023-08-18 RX ORDER — PROPOFOL 10 MG/ML
VIAL (ML) INTRAVENOUS
Status: DISCONTINUED | OUTPATIENT
Start: 2023-08-18 | End: 2023-08-18

## 2023-08-18 RX ORDER — SODIUM CHLORIDE 9 MG/ML
INJECTION, SOLUTION INTRAVENOUS CONTINUOUS
Status: DISCONTINUED | OUTPATIENT
Start: 2023-08-18 | End: 2023-08-18 | Stop reason: HOSPADM

## 2023-08-18 RX ORDER — HYDROCODONE BITARTRATE AND ACETAMINOPHEN 5; 325 MG/1; MG/1
1 TABLET ORAL EVERY 4 HOURS PRN
Status: DISCONTINUED | OUTPATIENT
Start: 2023-08-18 | End: 2023-08-18 | Stop reason: HOSPADM

## 2023-08-18 RX ORDER — ONDANSETRON 2 MG/ML
4 INJECTION INTRAMUSCULAR; INTRAVENOUS DAILY PRN
Status: DISCONTINUED | OUTPATIENT
Start: 2023-08-18 | End: 2023-08-18 | Stop reason: HOSPADM

## 2023-08-18 RX ORDER — SODIUM CHLORIDE 0.9 % (FLUSH) 0.9 %
10 SYRINGE (ML) INJECTION
Status: DISCONTINUED | OUTPATIENT
Start: 2023-08-18 | End: 2023-08-18 | Stop reason: HOSPADM

## 2023-08-18 RX ORDER — BUPIVACAINE HYDROCHLORIDE 2.5 MG/ML
INJECTION, SOLUTION EPIDURAL; INFILTRATION; INTRACAUDAL
Status: DISCONTINUED | OUTPATIENT
Start: 2023-08-18 | End: 2023-08-18 | Stop reason: HOSPADM

## 2023-08-18 RX ORDER — OXYCODONE AND ACETAMINOPHEN 5; 325 MG/1; MG/1
1 TABLET ORAL
Status: DISCONTINUED | OUTPATIENT
Start: 2023-08-18 | End: 2023-08-18 | Stop reason: HOSPADM

## 2023-08-18 RX ORDER — OXYCODONE AND ACETAMINOPHEN 5; 325 MG/1; MG/1
1 TABLET ORAL EVERY 6 HOURS PRN
Qty: 30 TABLET | Refills: 0 | Status: SHIPPED | OUTPATIENT
Start: 2023-08-18 | End: 2023-08-18 | Stop reason: HOSPADM

## 2023-08-18 RX ADMIN — HYDROMORPHONE HYDROCHLORIDE 0.2 MG: 1 INJECTION, SOLUTION INTRAMUSCULAR; INTRAVENOUS; SUBCUTANEOUS at 09:08

## 2023-08-18 RX ADMIN — PROPOFOL 70 MG: 10 INJECTION, EMULSION INTRAVENOUS at 08:08

## 2023-08-18 RX ADMIN — LIDOCAINE HYDROCHLORIDE 100 MG: 20 INJECTION INTRAVENOUS at 08:08

## 2023-08-18 RX ADMIN — ONDANSETRON 4 MG: 2 INJECTION INTRAMUSCULAR; INTRAVENOUS at 09:08

## 2023-08-18 RX ADMIN — MIDAZOLAM HYDROCHLORIDE 2 MG: 1 INJECTION, SOLUTION INTRAMUSCULAR; INTRAVENOUS at 08:08

## 2023-08-18 RX ADMIN — FENTANYL CITRATE 50 MCG: 50 INJECTION, SOLUTION INTRAMUSCULAR; INTRAVENOUS at 08:08

## 2023-08-18 RX ADMIN — PROPOFOL 125 MCG/KG/MIN: 10 INJECTION, EMULSION INTRAVENOUS at 08:08

## 2023-08-18 RX ADMIN — SODIUM CHLORIDE: 0.9 INJECTION, SOLUTION INTRAVENOUS at 08:08

## 2023-08-18 NOTE — BRIEF OP NOTE
[facility]  Brief Operative Note    Surgery Date: 8/18/2023     Surgeon(s) and Role:     * Tacho Sanchez DPM - Primary    Assisting Surgeon: Nir Lund DPM    Pre-op Diagnosis:  Right foot pain [M79.671]  Hallux limitus, acquired, right [M20.5X1]  Hammer toe of right foot [M20.41]  Tailor's bunion of right foot [M21.621]    Post-op Diagnosis:  Post-Op Diagnosis Codes:     * Right foot pain [M79.671]     * Hallux limitus, acquired, right [M20.5X1]     * Hammer toe of right foot [M20.41]     * Tailor's bunion of right foot [M21.621]    Procedure(s) (LRB):  CHEILECTOMY (Right)  CORRECTION, HAMMER TOE (Right)    Anesthesia: Local MAC    Operative Findings:   Right 1st met cheilotomy, and 5th met head exostectomy. 5th flexor tenotomy     Estimated Blood Loss:  2ml         Specimens:   Specimen (24h ago, onward)      None              Discharge Note    OUTCOME: Patient tolerated treatment/procedure well without complication and is now ready for discharge.    DISPOSITION: Home or Self Care      FOLLOWUP: In clinic    DISCHARGE INSTRUCTIONS:    POST-OPERATIVE INSTRUCTIONS FOR PODIATRY PATIENTS     ** Keep your dressing dry. Do not remove or change the bandage.     ** Keep your foot elevated to the level of your heart. You should recline as far as possible (When your toes are at eye level you're in the right position).     ** You may apply an ice pack to the top of your foot or back of your knee. Replace as needed. Make sure this does not get the dressing wet.     ** Take your pain medication as instructed for the first 24 hours after surgery, then progress to using them only when you need it.     ** Stay off your foot as much as possible. You may get up to eat, use the bathroom, etc.  No long standing or walking on your  foot. Do not sit with your feet dangling.       ** When you do walk make sure you:         Wear your surgical shoe/boot as provided           ** Eat your regular diet  and drink plenty of fluids.     If any of the following conditions are present, call the Podiatry   Clinic [phone] immediately, or the Advice Nurse at night or on the weekend.      a. fever 101° or higher      b. pain that is not controlled by pain medication      c. red, warm, swollen feet with red streaks going up your legs      d. cold, blue, numb toes, especially if you are wearing a cast       Make sure to follow up with your post-op appointment as scheduled.

## 2023-08-18 NOTE — H&P
PODIATRY NOTE       PATIENT ID:  Socorro Huang is a 47 y.o. female.       CHIEF CONCERN:   No chief complaint on file.       Patient presents with an issue of hallux limitus/painful bone spur right great toe joint.  In addition she has a painful right 5th toe and tailor's bunion.  She had similar procedure to left side which was very successful.  She is attempted conservative treatments including changes in shoe gear/foot pads/topical oral anti-inflammatories as well as over-the-counter insoles without much relief and no resolution of the issue.      Presents for surgery.      MEDICAL DECISION MAKING:          Problem List Items Addressed This Visit    None            I counseled the patient on the patient's conditions, their implications and medical management.    I ordered xrays and reviewed the xrays with the patient.     We discussed non-surgical treatment options, including pharmacologic approach, protective padding which can include a prefabricated insole, shoe recommendations or modifications, and custom foot orthotics.        Shoe and activity modification as needed for relief.  Low-heeled shoe or rocker-soled shoes may be best.     In terms of pharmacologic approach, I advised OTC NSAID as tolerated and patient may also consider a topical non-steroidal anti-inflammatory gel.     Another temporary treatment option may include intra-articular steroid injection.       Discussed surgery options, ie. Cheilectomy.   Patient will consider and call if she would like to proceed.                         HISTORY OF PRESENT ILLNESS:  Socorro Huang is a 47 y.o. female presents to clinic with concerns of pain in the big toe joint, right  foot.   Pt states pain has been present for a couple of months.     Trauma:  none recalled.  Walking makes pain worse.   Treatment tried: ibuprofen at home.  She is generally in athletic shoes.            Patient Active Problem List   Diagnosis    S/P hysterectomy    History of  diverticulitis    Pelvic pain syndrome    Dizziness    Anxiety    Gastroesophageal reflux disease without esophagitis    Hypothyroidism    Acute diverticulitis    Insomnia    Need for influenza vaccination    Lymphopenia    Chronic migraine w/o aura w/o status migrainosus, not intractable    Neck pain           No current facility-administered medications on file prior to encounter.     Current Outpatient Medications on File Prior to Encounter   Medication Sig Dispense Refill    butalbital-acetaminophen-caffeine -40 mg (FIORICET, ESGIC) -40 mg per tablet Take 1 tablet by mouth every 6 (six) hours. 12 tablet 5    co-enzyme Q-10 30 mg capsule Take by mouth.      fluticasone propionate (FLONASE) 50 mcg/actuation nasal spray 1 spray (50 mcg total) by Each Nostril route once daily. 18 g 0    hydrOXYzine HCL (ATARAX) 25 MG tablet Take 1 tab at night as needed for anxiety/sleep (Patient not taking: Reported on 7/14/2023) 30 tablet 0    magnesium oxide (MAG-OX) 400 mg (241.3 mg magnesium) tablet Take 1 tablet (400 mg total) by mouth once daily. 30 tablet 5    methylPREDNISolone (MEDROL DOSEPACK) 4 mg tablet use as directed (Patient not taking: Reported on 7/14/2023) 1 each 0    pantoprazole (PROTONIX) 40 MG tablet Take 1 tablet (40 mg total) by mouth once daily. 30 tablet 11    traZODone (DESYREL) 100 MG tablet TAKE 1 TABLET BY MOUTH EVERY DAY IN THE EVENING 90 tablet 1           Review of patient's allergies indicates:   Allergen Reactions    Celexa [citalopram] Nausea Only     Stomach upset             Review of Systems -   General ROS: negative for - chills, fever or night sweats  Respiratory ROS: no cough, shortness of breath, or wheezing  Cardiovascular ROS: no chest pain or dyspnea on exertion  Musculoskeletal ROS: positive for - joint pain and joint stiffness  negative for - muscle pain or muscular weakness  Neurological ROS: no TIA or stroke symptoms      EXAM:     There were no vitals filed for this  visit.       General:  Alert and Oriented x 3;  No acute distress      Right  Lower extremity exam:    Vascular:   Dorsalis Pedis:  present   Posterior Tibial:  present  Capillary refill time:  3 seconds  Temperature of toes warm to touch  Edema:  Trace       Neurological:     Sharp touch:  normal  Light touch: normal  Tinels Sign:  Absent  Mulders Click:   Absent        Dermatological:   Skin tone, color, turgor is appropriate for age and gender  Open wounds:  absent  Bruising:  absent  Redness:  mild 2/2 shoe irritation.       Musculoskeletal:   Dorsal bony prominence noted  at the 1st MTPJ.     1st MTPJ range of motion without  crepitus,  trackbound joint.   But with pain at end dorsiflexion.   Approximately 70-80 degrees dorsiflexion unloaded/loaded.    The patient has decided to forego any further conservative treatment and opted for surgical intervention.  Alternative treatments, and benefits of surgery were discussed with the patient.  Risks and complications, including but not limited to: pain, swelling, numbness, infection, failure to achieve the stated goals, recurrence of problem, nerve and blood vessel damage, scar tissue formation, further need of surgery, loss of limb or life, was discussed in detail with the patient.  All questions asked were answered, and written informed consent was sign and received. The patient will undergo right cheilectomy/tailor's bunionectomy exostectomy/right 5th toe correction.        6/22/2023 RIGHT foot xray  Imaging:  FINDINGS:  No acute fracture, dislocation, or osseous destruction.  Joint spaces appear maintained.  Soft tissues are unremarkable.

## 2023-08-18 NOTE — PLAN OF CARE
Discharge instructions reviewed with pt, verbalized understanding, IV removed, all belongings with pt. Escorted to POV by nursing staff.

## 2023-08-18 NOTE — TRANSFER OF CARE
Anesthesia Transfer of Care Note    Patient: Socorro Huang    Procedure(s) Performed: Procedure(s) (LRB):  CHEILECTOMY (Right)  CORRECTION, HAMMER TOE (Right)    Patient location: PACU    Anesthesia Type: general    Transport from OR: Transported from OR on room air with adequate spontaneous ventilation    Post pain: adequate analgesia    Post assessment: no apparent anesthetic complications    Post vital signs: stable    Level of consciousness: awake    Nausea/Vomiting: no nausea/vomiting    Complications: none    Transfer of care protocol was followed      Last vitals:   Visit Vitals  /81 (BP Location: Left arm, Patient Position: Lying)   Pulse 76   Temp 36.8 °C (98.2 °F) (Temporal)   Resp 17   LMP 05/05/2013   SpO2 98%   Breastfeeding No

## 2023-08-18 NOTE — PATIENT INSTRUCTIONS
POST-OPERATIVE INSTRUCTIONS FOR PODIATRY PATIENTS     ** Keep your dressing dry. Do not remove or change the bandage.     ** Keep your foot elevated to the level of your heart. You should recline as far as possible (When your toes are at eye level you're in the right position).     ** You may apply an ice pack to the top of your foot or back of your knee. Replace as needed. Make sure this does not get the dressing wet.     ** Take your pain medication as instructed for the first 24 hours after surgery, then progress to using them only when you need it.     ** Stay off your foot as much as possible. You may get up to eat, use the bathroom, etc.  No long standing or walking on your  foot. Do not sit with your feet dangling.       ** When you do walk make sure you:       Wear your surgical shoe/boot as provided           ** Eat your regular diet and drink plenty of fluids.     If any of the following conditions are present, call the Podiatry   Clinic [phone] immediately, or the Advice Nurse at night or on the weekend.      a. fever 101° or higher      b. pain that is not controlled by pain medication      c. red, warm, swollen feet with red streaks going up your legs      d. cold, blue, numb toes, especially if you are wearing a cast       Make sure to follow up with your post-op appointment as scheduled.

## 2023-08-18 NOTE — DISCHARGE SUMMARY
[facility]  Discharge Note  Short Stay    Procedure(s) (LRB):  CHEILECTOMY (Right)  CORRECTION, HAMMER TOE (Right)      OUTCOME: Patient tolerated treatment/procedure well without complication and is now ready for discharge.    DISPOSITION: Home or Self Care    FINAL DIAGNOSIS:  <principal problem not specified>    FOLLOWUP: In clinic    DISCHARGE INSTRUCTIONS:  No discharge procedures on file.     TIME SPENT ON DISCHARGE: 15 minutes

## 2023-08-18 NOTE — ANESTHESIA PREPROCEDURE EVALUATION
08/18/2023  Socorro Huang is a 47 y.o., female with right foot pain    Hx of GA w/o complications  NPO>8  METS>4     Patient Active Problem List   Diagnosis    S/P hysterectomy    History of diverticulitis    Pelvic pain syndrome    Dizziness    Anxiety    Gastroesophageal reflux disease without esophagitis    Hypothyroidism    Acute diverticulitis    Insomnia    Need for influenza vaccination    Lymphopenia    Chronic migraine w/o aura w/o status migrainosus, not intractable    Neck pain       Past Medical History:   Diagnosis Date    Abnormal Pap smear     Anxiety     celexa & prozac didn't help much    Constipation - functional     Headache(784.0)     Hemorrhoid     Leukopenia     benign, evaluated in [facility] y 2000    Menorrhagia     Strain of neck muscle     tx with PT at [facility] in past       ECHO: No results found for this or any previous visit.      There is no height or weight on file to calculate BMI.    Tobacco Use: Low Risk  (8/18/2023)    Patient History     Smoking Tobacco Use: Never     Smokeless Tobacco Use: Never     Passive Exposure: Not on file       Social History     Substance and Sexual Activity   Drug Use No        Alcohol Use: Not on file       Review of patient's allergies indicates:   Allergen Reactions    Celexa [citalopram] Nausea Only     Stomach upset         Airway:  No value filed.         Pre-op Assessment    I have reviewed the Patient Summary Reports.     I have reviewed the Nursing Notes. I have reviewed the NPO Status.   I have reviewed the Medications.     Review of Systems  Anesthesia Hx:  No problems with previous Anesthesia  History of prior surgery of interest to airway management or planning: Denies Family Hx of Anesthesia complications.   Denies Personal Hx of Anesthesia complications.   Social:  Non-Smoker    Hematology/Oncology:  Hematology  Normal   Oncology Normal     EENT/Dental:EENT/Dental Normal   Cardiovascular:  Cardiovascular Normal     Pulmonary:  Pulmonary Normal    Renal/:  Renal/ Normal     Hepatic/GI:   GERD    Musculoskeletal:  Musculoskeletal Normal    Neurological:   Neuromuscular Disease, Headaches Neck pain, chronic migraines   Endocrine:   Hypothyroidism    Dermatological:  Skin Normal    Psych:   anxiety          Physical Exam  General: Well nourished, Cooperative, Alert and Oriented    Airway:  Mallampati: I / I  Mouth Opening: Normal  TM Distance: Normal  Neck ROM: Normal ROM    Dental:  Intact        Anesthesia Plan  Type of Anesthesia, risks & benefits discussed:    Anesthesia Type: Gen Supraglottic Airway, MAC  Intra-op Monitoring Plan: Standard ASA Monitors  Post Op Pain Control Plan: multimodal analgesia and IV/PO Opioids PRN  Induction:  IV  Informed Consent: Informed consent signed with the Patient and all parties understand the risks and agree with anesthesia plan.  All questions answered. Patient consented to blood products? No  ASA Score: 2  Day of Surgery Review of History & Physical: H&P Update referred to the surgeon/provider.    Ready For Surgery From Anesthesia Perspective.     .       Patient Active Problem List   Diagnosis    S/P hysterectomy    History of diverticulitis    Pelvic pain syndrome    Dizziness    Anxiety    Gastroesophageal reflux disease without esophagitis    Hypothyroidism    Acute diverticulitis    Insomnia    Need for influenza vaccination    Lymphopenia    Chronic migraine w/o aura w/o status migrainosus, not intractable    Neck pain       Past Medical History:   Diagnosis Date    Abnormal Pap smear     Anxiety     celexa & prozac didn't help much    Constipation - functional     Headache(784.0)     Hemorrhoid     Leukopenia     benign, [facility] in [facility] 2000    Menorrhagia     Strain of neck muscle     tx with PT at [facility] in past       ECHO: No results found for this  or any previous visit.      There is no height or weight on file to calculate BMI.    Tobacco Use: Low Risk  (8/18/2023)    Patient History     Smoking Tobacco Use: Never     Smokeless Tobacco Use: Never     Passive Exposure: Not on file       Social History     Substance and Sexual Activity   Drug Use No        Alcohol Use: Not on file       Review of patient's allergies indicates:   Allergen Reactions    Celexa [citalopram] Nausea Only     Stomach upset         Airway:  No value filed.   Past Surgical History:   Procedure Laterality Date    ANOSCOPY N/A 07/20/2022    Procedure: ANOSCOPY;  Surgeon: ASHTYN Melo MD;  Location: [facility] OR [address] [place] [address];  Service: Colon and Rectal;  Laterality: N/A;    BREAST RECONSTRUCTION  2018    BREAST SURGERY      COLON SURGERY  2014         COLONOSCOPY N/A 02/03/2022    Procedure: COLONOSCOPY;  Surgeon: ASHTYN Melo MD;  Location: [facility] ENDO;  Service: Endoscopy;  Laterality: N/A;    DILATION AND CURETTAGE OF UTERUS  12/18/2012    complex hyperplasia, no atypia    FLEXIBLE SIGMOIDOSCOPY N/A 07/20/2022    Procedure: SIGMOIDOSCOPY, FLEXIBLE;  Surgeon: ASHTYN Melo MD;  Location: [address];  Service: Colon and Rectal;  Laterality: N/A;    HEMORRHOID SURGERY  2014    HYSTERECTOMY      LAPAROSCOPIC SIGMOIDECTOMY Left 07/20/2022    Procedure: COLECTOMY, SIGMOID, LAPAROSCOPIC, ERAS low;  Surgeon: ASHTYN Melo MD;  Location: [facility] OR [address] [place] [address];  Service: Colon and Rectal;  Laterality: Left;    MOBILIZATION OF SPLENIC FLEXURE N/A 07/20/2022    Procedure: MOBILIZATION, SPLENIC FLEXURE;  Surgeon: ASHTYN Melo MD;  Location: [address];  Service: Colon and Rectal;  Laterality: N/A;    OOPHORECTOMY      PARTIAL HYSTERECTOMY  2013    for atypia    SMALL INTESTINE SURGERY  2017    THYROIDECTOMY, PARTIAL  2013    TONSILLECTOMY      TUBAL LIGATION

## 2023-08-18 NOTE — ANESTHESIA POSTPROCEDURE EVALUATION
Anesthesia Post Evaluation    Patient: Socorro Huang    Procedure(s) Performed: Procedure(s) (LRB):  CHEILECTOMY (Right)  CORRECTION, HAMMER TOE (Right)    Final Anesthesia Type: general      Patient location during evaluation: PACU  Patient participation: Yes- Able to Participate  Level of consciousness: awake and alert and oriented  Post-procedure vital signs: reviewed and stable  Pain management: adequate  Airway patency: patent    PONV status at discharge: No PONV  Anesthetic complications: no      Cardiovascular status: hemodynamically stable  Respiratory status: unassisted  Hydration status: euvolemic  Follow-up not needed.          Vitals Value Taken Time   /73 08/18/23 1002   Temp 36.5 °C (97.7 °F) 08/18/23 0923   Pulse 73 08/18/23 1006   Resp 28 08/18/23 1006   SpO2 99 % 08/18/23 1006   Vitals shown include unvalidated device data.      No case tracking events are documented in the log.      Pain/Delaney Score: Pain Rating Prior to Med Admin: 6 (8/18/2023  9:55 AM)  Delaney Score: 9 (8/18/2023  9:23 AM)

## 2023-08-19 NOTE — OP NOTE
[facility] ([facility])  Operative Note      Date of Procedure: 8/18/2023     Procedure: Procedure(s) (LRB):  CHEILECTOMY (Right)  CORRECTION, HAMMER TOE (Right)     Surgeon(s) and Role:     * Tacho Sanchez DPM - Primary    Assisting Surgeon: Nir Lund DPM    Pre-Operative Diagnosis: Right foot pain [M79.671]  Hallux limitus, acquired, right [M20.5X1]  Hammer toe of right foot [M20.41]  Tailor's bunion of right foot [M21.621]    Post-Operative Diagnosis: Post-Op Diagnosis Codes:     * Right foot pain [M79.671]     * Hallux limitus, acquired, right [M20.5X1]     * Hammer toe of right foot [M20.41]     * Tailor's bunion of right foot [M21.621]    Anesthesia: Local MAC      Description of Technical Procedures:   The patient was brought to the operating room on a stretcher and was transferred to the OR table in supine position. Anesthesia was induced.  A tourniquet was applied to the lright ankle. 20 mL of a 1:1 mixture of 0.5% bupivacaine plain and 1% lidocaine plain was locally infiltrated. The right lower limb was prepped and draped in a sterile manner. Tourniquet(s) inflated to 250 mmHg.      Attention was directed to the right first metatarsophalangeal joint where a linear longitudinal incision was made extending from the neck of the first metatarsal to the head of the base of the proximal phalanx of the hallux. Care was taken throughout dissection to avoid damage to neurovascular or tendinous structures. The incision was deepened via sharp dissection to the level of the capsule. The capsule was incised and visualization of the joint revealed that there was significant degenerative changes of a significant portion of the dorsal aspect of that joint. Utilizing a sagittal saw that area of degenerated cartilage was resected, especially of the dorsal spurring of the metatarsal. Any other prominences were smoothed down with a power rasp and rongeurs. It was noted after performing the  cheilectomy that there was a significantly increased range of motion of the right hallux and no prominences noted.     Attention was then directed to the right lateral fifth metatarsal head where a linear longitudinal incision was made. This incision was deepened through blunt and sharp dissection. Care was taken to avoid damage to neurovascular structures throughout dissection. Hemostasis during dissection was achieved via electrocautery. The incision was carried to the level of the fifth metatarsal head where it was noted the lateral aspect of the fifth metatarsal head appeared to be prominent. This indicated that the patient would benefit from an ostectomy of the metatarsal head. The periosteum was reflected away from the bone and utilizing a sagittal saw, the prominence of the fifth metatarsal head was resected. Any remaining prominent edges noted were smoothed using a power rasp and rongeur. It was noted on intraoperative fluoroscopy, there was significant reduction of deformity. A hammertoe was noted of the 5th toe, and a decision were made to perform a flexor tenotomy using a #15 blade. After the tenotomy was performed it was clear the hammertoe had been corrected and the toe was in a straightened position.     The surgical site was irrigated using saline via bulb syringe. The surgical sites were closed using 3-0 Monocryl and 3-0 nylon. The surgical sites were dressing with xeroform, 4x4 gauze, kerlix, and ace wraps. Tourniquet was deflated.     Estimated Blood Loss (EBL): 2ml             Specimens:   Specimen (24h ago, onward)      None                    Condition: Good    Disposition: PACU - hemodynamically stable.    Attestation: I was present and scrubbed for the entire procedure.    Discharge Note    OUTCOME: Patient tolerated treatment/procedure well without complication and is now ready for discharge.    DISPOSITION: Home or Self Care    FINAL DIAGNOSIS:  <principal problem not specified>    FOLLOWUP:  In clinic    DISCHARGE INSTRUCTIONS:    Discharge Procedure Orders   Diet general       POST-OPERATIVE INSTRUCTIONS FOR PODIATRY PATIENTS     ** Keep your dressing dry. Do not remove or change the bandage.     ** Keep your foot elevated to the level of your heart. You should recline as far as possible (When your toes are at eye level you're in the right position).     ** You may apply an ice pack to the top of your foot or back of your knee. Replace as needed. Make sure this does not get the dressing wet.     ** Take your pain medication as instructed for the first 24 hours after surgery, then progress to using them only when you need it.     ** Stay off your foot as much as possible. You may get up to eat, use the bathroom, etc.  No long standing or walking on your  foot. Do not sit with your feet dangling.       ** When you do walk make sure you:         Wear your surgical shoe/boot as provided           ** Eat your regular diet and drink plenty of fluids.     If any of the following conditions are present, call the Podiatry   Clinic [phone] immediately, or the Advice Nurse at night or on the weekend.      a. fever 101° or higher      b. pain that is not controlled by pain medication      c. red, warm, swollen feet with red streaks going up your legs      d. cold, blue, numb toes, especially if you are wearing a cast       Make sure to follow up with your post-op appointment as scheduled.

## 2023-08-21 ENCOUNTER — PATIENT MESSAGE (OUTPATIENT)
Dept: PODIATRY | Facility: CLINIC | Age: 48
End: 2023-08-21
Payer: COMMERCIAL

## 2023-08-21 DIAGNOSIS — G89.18 POSTOPERATIVE PAIN: Primary | ICD-10-CM

## 2023-08-22 ENCOUNTER — TELEPHONE (OUTPATIENT)
Dept: PODIATRY | Facility: CLINIC | Age: 48
End: 2023-08-22
Payer: COMMERCIAL

## 2023-08-22 ENCOUNTER — PATIENT MESSAGE (OUTPATIENT)
Dept: PODIATRY | Facility: CLINIC | Age: 48
End: 2023-08-22
Payer: COMMERCIAL

## 2023-08-22 RX ORDER — OXYCODONE AND ACETAMINOPHEN 7.5; 325 MG/1; MG/1
1 TABLET ORAL EVERY 4 HOURS PRN
Qty: 30 TABLET | Refills: 0 | Status: SHIPPED | OUTPATIENT
Start: 2023-08-22 | End: 2023-08-23

## 2023-08-22 NOTE — TELEPHONE ENCOUNTER
----- Message from Rosendo Egan sent at 8/22/2023  3:26 PM CDT -----  Regarding: Advisement  Contact: @[phone]  Patient is calling because she would like to get a refill on her oxyCODONE-acetaminophen (PERCOCET) 7.5-325 mg per tablets, Patient also states she is dealing with extreme swelling and her stitches are coming apart. Please call and advise @[phone]

## 2023-08-22 NOTE — TELEPHONE ENCOUNTER
Patient notified Ju will add her on to the schedule in the morning per Dr. Sanchez post/op visit suture pop

## 2023-08-23 ENCOUNTER — OFFICE VISIT (OUTPATIENT)
Dept: PODIATRY | Facility: CLINIC | Age: 48
End: 2023-08-23
Payer: COMMERCIAL

## 2023-08-23 VITALS
SYSTOLIC BLOOD PRESSURE: 136 MMHG | HEART RATE: 80 BPM | DIASTOLIC BLOOD PRESSURE: 85 MMHG | WEIGHT: 156.75 LBS | BODY MASS INDEX: 24.6 KG/M2 | HEIGHT: 67 IN

## 2023-08-23 DIAGNOSIS — G89.18 POSTOPERATIVE PAIN: Primary | ICD-10-CM

## 2023-08-23 PROCEDURE — 99024 POSTOP FOLLOW-UP VISIT: CPT | Mod: S$GLB,,, | Performed by: PODIATRIST

## 2023-08-23 PROCEDURE — 99999 PR PBB SHADOW E&M-EST. PATIENT-LVL III: ICD-10-PCS | Mod: PBBFAC,,, | Performed by: PODIATRIST

## 2023-08-23 PROCEDURE — 99999 PR PBB SHADOW E&M-EST. PATIENT-LVL III: CPT | Mod: PBBFAC,,, | Performed by: PODIATRIST

## 2023-08-23 PROCEDURE — 99024 PR POST-OP FOLLOW-UP VISIT: ICD-10-PCS | Mod: S$GLB,,, | Performed by: PODIATRIST

## 2023-08-23 RX ORDER — SULFAMETHOXAZOLE AND TRIMETHOPRIM 400; 80 MG/1; MG/1
1 TABLET ORAL 2 TIMES DAILY
Qty: 14 TABLET | Refills: 0 | Status: SHIPPED | OUTPATIENT
Start: 2023-08-23 | End: 2023-09-22

## 2023-08-23 RX ORDER — OXYCODONE AND ACETAMINOPHEN 7.5; 325 MG/1; MG/1
1 TABLET ORAL EVERY 4 HOURS PRN
Qty: 30 TABLET | Refills: 0 | Status: SHIPPED | OUTPATIENT
Start: 2023-08-23 | End: 2023-09-06 | Stop reason: ALTCHOICE

## 2023-08-27 NOTE — PROGRESS NOTES
Patient presents approximately 5 days status post cheilectomy/hammertoe correction.  She had an issue with a suture loosening and was concerned due to some bleeding.  Incision is healing well with no signs of infection or significant dehiscence.  Neurovascular status intact.  Steri-Strips applied.  Sterile bandage applied.  Continue postoperative instructions and follow-up in 1 week.

## 2023-08-30 ENCOUNTER — OFFICE VISIT (OUTPATIENT)
Dept: PODIATRY | Facility: CLINIC | Age: 48
End: 2023-08-30
Payer: COMMERCIAL

## 2023-08-30 VITALS — WEIGHT: 156.75 LBS | BODY MASS INDEX: 24.6 KG/M2 | HEIGHT: 67 IN

## 2023-08-30 DIAGNOSIS — G89.18 POSTOPERATIVE PAIN: Primary | ICD-10-CM

## 2023-08-30 PROCEDURE — 99999 PR PBB SHADOW E&M-EST. PATIENT-LVL III: ICD-10-PCS | Mod: PBBFAC,,, | Performed by: PODIATRIST

## 2023-08-30 PROCEDURE — 99024 POSTOP FOLLOW-UP VISIT: CPT | Mod: S$GLB,,, | Performed by: PODIATRIST

## 2023-08-30 PROCEDURE — 99024 PR POST-OP FOLLOW-UP VISIT: ICD-10-PCS | Mod: S$GLB,,, | Performed by: PODIATRIST

## 2023-08-30 PROCEDURE — 99999 PR PBB SHADOW E&M-EST. PATIENT-LVL III: CPT | Mod: PBBFAC,,, | Performed by: PODIATRIST

## 2023-09-06 ENCOUNTER — OFFICE VISIT (OUTPATIENT)
Dept: PODIATRY | Facility: CLINIC | Age: 48
End: 2023-09-06
Payer: COMMERCIAL

## 2023-09-06 ENCOUNTER — TELEPHONE (OUTPATIENT)
Dept: PODIATRY | Facility: CLINIC | Age: 48
End: 2023-09-06
Payer: COMMERCIAL

## 2023-09-06 VITALS
DIASTOLIC BLOOD PRESSURE: 71 MMHG | HEART RATE: 65 BPM | SYSTOLIC BLOOD PRESSURE: 121 MMHG | HEIGHT: 67 IN | BODY MASS INDEX: 24.8 KG/M2 | WEIGHT: 158 LBS

## 2023-09-06 DIAGNOSIS — G89.18 POSTOPERATIVE PAIN: Primary | ICD-10-CM

## 2023-09-06 PROCEDURE — 99024 PR POST-OP FOLLOW-UP VISIT: ICD-10-PCS | Mod: S$GLB,,, | Performed by: PODIATRIST

## 2023-09-06 PROCEDURE — 99999 PR PBB SHADOW E&M-EST. PATIENT-LVL III: CPT | Mod: PBBFAC,,, | Performed by: PODIATRIST

## 2023-09-06 PROCEDURE — 99999 PR PBB SHADOW E&M-EST. PATIENT-LVL III: ICD-10-PCS | Mod: PBBFAC,,, | Performed by: PODIATRIST

## 2023-09-06 PROCEDURE — 99024 POSTOP FOLLOW-UP VISIT: CPT | Mod: S$GLB,,, | Performed by: PODIATRIST

## 2023-09-06 RX ORDER — VALACYCLOVIR HYDROCHLORIDE 1 G/1
4000 TABLET, FILM COATED ORAL ONCE
COMMUNITY
Start: 2023-08-31 | End: 2023-09-22 | Stop reason: ALTCHOICE

## 2023-09-06 RX ORDER — TRAMADOL HYDROCHLORIDE 50 MG/1
50 TABLET ORAL EVERY 8 HOURS PRN
Qty: 30 TABLET | Refills: 0 | Status: SHIPPED | OUTPATIENT
Start: 2023-09-06 | End: 2023-09-16

## 2023-09-06 NOTE — PROGRESS NOTES
Patient presents  status post cheilectomy/hammertoe correction.  .  Incision is healing well with no signs of infection or significant dehiscence.  Neurovascular status intact.  Sterile bandage applied.  Continue postoperative instructions and follow-up in 1 week.

## 2023-09-06 NOTE — TELEPHONE ENCOUNTER
Called [facility] speaking to Aditya to get clarification of medication for Tramadol. She took a verbal for prescription necessity for frequency of medication.

## 2023-09-09 NOTE — PROGRESS NOTES
Patient presents approximately 2 weeks status post cheilectomy/hammertoe correction.  Initially She had an issue with a suture loosening and was concerned due to some bleeding.  Incision is healing well with no signs of infection or significant dehiscence.  Neurovascular status intact.  Sterile bandage applied.  Continue postoperative instructions and follow-up in 1 week.

## 2023-09-13 ENCOUNTER — PATIENT MESSAGE (OUTPATIENT)
Dept: PRIMARY CARE CLINIC | Facility: CLINIC | Age: 48
End: 2023-09-13

## 2023-09-13 ENCOUNTER — OFFICE VISIT (OUTPATIENT)
Dept: PRIMARY CARE CLINIC | Facility: CLINIC | Age: 48
End: 2023-09-13
Payer: COMMERCIAL

## 2023-09-13 VITALS
WEIGHT: 159.81 LBS | BODY MASS INDEX: 25.03 KG/M2 | TEMPERATURE: 99 F | RESPIRATION RATE: 16 BRPM | OXYGEN SATURATION: 98 % | DIASTOLIC BLOOD PRESSURE: 70 MMHG | SYSTOLIC BLOOD PRESSURE: 124 MMHG | HEART RATE: 75 BPM

## 2023-09-13 DIAGNOSIS — R05.1 ACUTE COUGH: ICD-10-CM

## 2023-09-13 DIAGNOSIS — R50.9 FEVER, UNSPECIFIED FEVER CAUSE: ICD-10-CM

## 2023-09-13 DIAGNOSIS — J01.00 ACUTE NON-RECURRENT MAXILLARY SINUSITIS: Primary | ICD-10-CM

## 2023-09-13 DIAGNOSIS — G44.52 NEW DAILY PERSISTENT HEADACHE: ICD-10-CM

## 2023-09-13 LAB
CTP QC/QA: YES
SARS-COV-2 AG RESP QL IA.RAPID: NEGATIVE

## 2023-09-13 PROCEDURE — 99214 OFFICE O/P EST MOD 30 MIN: CPT | Mod: S$GLB,,, | Performed by: NURSE PRACTITIONER

## 2023-09-13 PROCEDURE — 87811 SARS CORONAVIRUS 2 ANTIGEN POCT, MANUAL READ: ICD-10-PCS | Mod: QW,S$GLB,, | Performed by: NURSE PRACTITIONER

## 2023-09-13 PROCEDURE — 99999 PR PBB SHADOW E&M-EST. PATIENT-LVL IV: ICD-10-PCS | Mod: PBBFAC,,, | Performed by: NURSE PRACTITIONER

## 2023-09-13 PROCEDURE — 99214 PR OFFICE/OUTPT VISIT, EST, LEVL IV, 30-39 MIN: ICD-10-PCS | Mod: S$GLB,,, | Performed by: NURSE PRACTITIONER

## 2023-09-13 PROCEDURE — 87811 SARS-COV-2 COVID19 W/OPTIC: CPT | Mod: QW,S$GLB,, | Performed by: NURSE PRACTITIONER

## 2023-09-13 PROCEDURE — 99999 PR PBB SHADOW E&M-EST. PATIENT-LVL IV: CPT | Mod: PBBFAC,,, | Performed by: NURSE PRACTITIONER

## 2023-09-13 RX ORDER — DOXYCYCLINE HYCLATE 100 MG
100 TABLET ORAL 2 TIMES DAILY
Qty: 14 TABLET | Refills: 0 | Status: SHIPPED | OUTPATIENT
Start: 2023-09-13 | End: 2023-09-20

## 2023-09-13 RX ORDER — METHYLPREDNISOLONE 4 MG/1
TABLET ORAL
Qty: 21 EACH | Refills: 0 | Status: SHIPPED | OUTPATIENT
Start: 2023-09-13 | End: 2023-09-22

## 2023-09-13 NOTE — LETTER
September 13, 2023      [facility]  [address]  [place] [address]       Patient: Socorro Huang   YOB: 1975  Date of Visit: 09/13/2023    To Whom It May Concern:    Padmini Huang  was at [facility] on 09/13/2023. The patient may return to work on 9/14/23 with no restrictions. If you have any questions or concerns, or if I can be of further assistance, please do not hesitate to contact me.    Sincerely,        Lalitah Alexander, NP

## 2023-09-13 NOTE — PROGRESS NOTES
[facility] Note    Chief Complaint      Chief Complaint   Patient presents with    Cough    Sore Throat    Hypertension     History of Present Illness      Socorro Huang is a 47 y.o. female patient of Dr. Byrne who presents today for c/o head congestion, HA, sinus pressure, rhinorrhea, sore throat, cough, diarrhea over the past  5 days. patient reports no fever, shortness of breath, chest pain, nausea vomiting diarrhea or myalgias  Patient is COVID negative      Mother passed Aug 18th-cancer  Pt has been experiencing elevated BP lately, mostly in evenings. 165/110, red face, headaches. Today in clinic BP fine. Will have her monitor for about 1-2 weeks. Will also need to recheck liver enzymes in 1 month.       Health Maintenance   Topic Date Due    Hepatitis C Screening  Never done    Mammogram  11/05/2022    TETANUS VACCINE  11/14/2022    Colorectal Cancer Screening  02/03/2027    Lipid Panel  10/03/2027    DEXA Scan  Discontinued       Past Medical History:   Diagnosis Date    Abnormal Pap smear     Anxiety     celexa & prozac didn't help much    Constipation - functional     Headache(784.0)     Hemorrhoid     Leukopenia     benign, evaluated in [facility] y 2000    Menorrhagia     Strain of neck muscle     tx with PT at [facility] in past       Past Surgical History:   Procedure Laterality Date    ANOSCOPY N/A 07/20/2022    Procedure: ANOSCOPY;  Surgeon: ASHTYN Melo MD;  Location: [address];  Service: Colon and Rectal;  Laterality: N/A;    BREAST RECONSTRUCTION  2018    BREAST SURGERY      CHEILECTOMY Right 8/18/2023    Procedure: CHEILECTOMY;  Surgeon: Tacho Sanchez DPM;  Location: [facility] OR;  Service: Podiatry;  Laterality: Right;    COLON SURGERY  2014         COLONOSCOPY N/A 02/03/2022    Procedure: COLONOSCOPY;  Surgeon: ASHTYN Melo MD;  Location: [facility] ENDO;  Service: Endoscopy;  Laterality: N/A;    CORRECTION OF HAMMER TOE Right 8/18/2023    Procedure: CORRECTION,  HAMMER TOE;  Surgeon: Tacho Sanchez DPM;  Location: [facility] OR;  Service: Podiatry;  Laterality: Right;    DILATION AND CURETTAGE OF UTERUS  12/18/2012    complex hyperplasia, no atypia    FLEXIBLE SIGMOIDOSCOPY N/A 07/20/2022    Procedure: SIGMOIDOSCOPY, FLEXIBLE;  Surgeon: ASHTYN Melo MD;  Location: [facility] OR 2ND FLR;  Service: Colon and Rectal;  Laterality: N/A;    HEMORRHOID SURGERY  2014    HYSTERECTOMY      LAPAROSCOPIC SIGMOIDECTOMY Left 07/20/2022    Procedure: COLECTOMY, SIGMOID, LAPAROSCOPIC, ERAS low;  Surgeon: ASHTYN Melo MD;  Location: [facility] OR 2ND FLR;  Service: Colon and Rectal;  Laterality: Left;    MOBILIZATION OF SPLENIC FLEXURE N/A 07/20/2022    Procedure: MOBILIZATION, SPLENIC FLEXURE;  Surgeon: ASHTYN Melo MD;  Location: [facility] OR 2ND FLR;  Service: Colon and Rectal;  Laterality: N/A;    OOPHORECTOMY      PARTIAL HYSTERECTOMY  2013    for atypia    SMALL INTESTINE SURGERY  2017    THYROIDECTOMY, PARTIAL  2013    TONSILLECTOMY      TUBAL LIGATION         family history includes Alcohol abuse in her cousin and maternal uncle; Breast cancer in her maternal grandmother; COPD in her mother; Cancer in her father and mother; Depression in her sister; Diabetes in her mother and paternal grandmother; Drug abuse in her maternal aunt; Emphysema in her mother; Heart disease (age of onset: 50) in her father; Hypertension in her paternal grandmother; Ovarian cancer in her maternal grandmother; Schizophrenia in her paternal aunt; Suicide in her brother.    Social History     Tobacco Use    Smoking status: Never    Smokeless tobacco: Never   Substance Use Topics    Alcohol use: Not Currently     Comment: socially    Drug use: No       Review of Systems   Constitutional:  Positive for malaise/fatigue. Negative for chills and fever.   HENT:  Positive for sore throat.    Eyes:  Negative for blurred vision.   Respiratory:  Positive for cough.    Cardiovascular:  Negative for chest pain and  palpitations.   Gastrointestinal:  Positive for nausea. Negative for vomiting.   Musculoskeletal:  Negative for back pain and myalgias.   Neurological:  Positive for headaches. Negative for dizziness and tingling.        Outpatient Encounter Medications as of 9/13/2023   Medication Sig Note Dispense Refill    butalbital-acetaminophen-caffeine -40 mg (FIORICET, ESGIC) -40 mg per tablet Take 1 tablet by mouth every 6 (six) hours. 8/16/2023: TAKE IF NEEDED   12 tablet 5    co-enzyme Q-10 30 mg capsule Take by mouth. 8/16/2023: HOLD AM OF PROCEDURE        fluticasone propionate (FLONASE) 50 mcg/actuation nasal spray 1 spray (50 mcg total) by Each Nostril route once daily. 7/19/2022: Take as prescribed am of procedure                      18 g 0    pantoprazole (PROTONIX) 40 MG tablet Take 1 tablet (40 mg total) by mouth once daily. 8/16/2023: TAKE AM OF PROCEDURE   30 tablet 11    progesterone (PROMETRIUM) 200 MG capsule Take 200 mg by mouth once daily.       traMADoL (ULTRAM) 50 mg tablet Take 1 tablet (50 mg total) by mouth every 8 (eight) hours as needed for Pain.  30 tablet 0    valACYclovir (VALTREX) 1000 MG tablet Take 4,000 mg by mouth once.       doxycycline (VIBRA-TABS) 100 MG tablet Take 1 tablet (100 mg total) by mouth 2 (two) times daily. for 7 days  14 tablet 0    hydrOXYzine HCL (ATARAX) 25 MG tablet Take 1 tab at night as needed for anxiety/sleep (Patient not taking: Reported on 9/6/2023)  30 tablet 0    methylPREDNISolone (MEDROL DOSEPACK) 4 mg tablet use as directed  21 each 0    sulfamethoxazole-trimethoprim 400-80mg (BACTRIM,SEPTRA) 400-80 mg per tablet Take 1 tablet by mouth 2 (two) times daily. (Patient not taking: Reported on 9/6/2023)  14 tablet 0    traZODone (DESYREL) 100 MG tablet TAKE 1 TABLET BY MOUTH EVERY DAY IN THE EVENING (Patient not taking: Reported on 9/6/2023) 8/16/2023: OK TO TAKE   90 tablet 1    [DISCONTINUED] methylPREDNISolone (MEDROL DOSEPACK) 4 mg tablet use as  directed (Patient not taking: Reported on 9/6/2023)  1 each 0     No facility-administered encounter medications on file as of 9/13/2023.        Review of patient's allergies indicates:  No Known Allergies    Physical Exam      Vital Signs  Temp: 98.8 °F (37.1 °C)  Pulse: 75  Resp: 16  SpO2: 98 %  BP: 124/70  Height and Weight  Weight: 72.5 kg (159 lb 13.3 oz)    Physical Exam  Vitals and nursing note reviewed.   Constitutional:       General: She is not in acute distress.     Appearance: Normal appearance. She is ill-appearing.   HENT:      Head: Normocephalic and atraumatic.      Ears:      Comments: Redness bilateral ear canals with bulging TMs, clear fluid noted     Nose: Rhinorrhea present.      Mouth/Throat:      Mouth: Mucous membranes are moist.      Pharynx: Posterior oropharyngeal erythema present.   Cardiovascular:      Rate and Rhythm: Normal rate and regular rhythm.      Heart sounds: Normal heart sounds.   Pulmonary:      Effort: Pulmonary effort is normal. No respiratory distress.      Breath sounds: Normal breath sounds.      Comments: Bronchial irritation noted  Lymphadenopathy:      Cervical: Cervical adenopathy present.   Skin:     General: Skin is warm and dry.   Neurological:      General: No focal deficit present.      Mental Status: She is alert and oriented to person, place, and time.   Psychiatric:         Mood and Affect: Mood normal.         Behavior: Behavior normal.         Thought Content: Thought content normal.         Judgment: Judgment normal.          Laboratory:  CBC:  Lab Results   Component Value Date    WBC 3.77 (L) 08/03/2023    RBC 3.89 (L) 08/03/2023    HGB 12.8 08/03/2023    HCT 36.2 (L) 08/03/2023     08/03/2023    MCV 93 08/03/2023    MCH 32.9 (H) 08/03/2023    MCHC 35.4 08/03/2023    MCHC 34.6 01/13/2023    MCHC 33.6 10/03/2022     CMP:  Lab Results   Component Value Date     (H) 08/03/2023    CALCIUM 9.3 08/03/2023    ALBUMIN 4.1 08/03/2023    PROT 6.8  "08/03/2023     08/03/2023    K 4.4 08/03/2023    CO2 24 08/03/2023     08/03/2023    BUN 15 08/03/2023    ALKPHOS 66 08/03/2023    ALT 55 (H) 08/03/2023    AST 47 (H) 08/03/2023    BILITOT 0.3 08/03/2023    BILITOT 0.5 10/03/2022    BILITOT 0.4 11/27/2021     URINALYSIS:  Lab Results   Component Value Date    COLORU Colorless (A) 08/09/2022    SPECGRAV 1.010 08/09/2022    PHUR 7.0 08/09/2022    PROTEINUA Negative 08/09/2022    BACTERIA Few (A) 10/08/2013    NITRITE Negative 08/09/2022    LEUKOCYTESUR Negative 08/09/2022    UROBILINOGEN Negative 10/27/2014      LIPIDS:  Lab Results   Component Value Date    TSH 0.897 10/03/2022    TSH 1.659 11/22/2017    TSH 1.846 08/01/2012    HDL 65 10/03/2022    HDL 54 10/27/2014    HDL 55 04/12/2013    CHOL 179 10/03/2022    CHOL 140 10/27/2014    CHOL 135 04/12/2013    TRIG 51 10/03/2022    TRIG 37 10/27/2014    TRIG 50 04/12/2013    LDLCALC 103.8 10/03/2022    LDLCALC 78.6 10/27/2014    LDLCALC 70.0 04/12/2013    CHOLHDL 36.3 10/03/2022    CHOLHDL 38.6 10/27/2014    CHOLHDL 40.7 04/12/2013    NONHDLCHOL 114 10/03/2022    NONHDLCHOL 86 10/27/2014    TOTALCHOLEST 2.8 10/03/2022    TOTALCHOLEST 2.6 10/27/2014    TOTALCHOLEST 2.5 04/12/2013     TSH:  Lab Results   Component Value Date    TSH 0.897 10/03/2022    TSH 1.659 11/22/2017    TSH 1.846 08/01/2012     A1C:  No results found for: "HGBA1C"      Assessment/Plan     Socorro Huang is a 47 y.o.female with:    Acute non-recurrent maxillary sinusitis    Acute cough  -     SARS Coronavirus 2 Antigen, POCT Manual Read  -     methylPREDNISolone (MEDROL DOSEPACK) 4 mg tablet; use as directed  Dispense: 21 each; Refill: 0  -     doxycycline (VIBRA-TABS) 100 MG tablet; Take 1 tablet (100 mg total) by mouth 2 (two) times daily. for 7 days  Dispense: 14 tablet; Refill: 0    New daily persistent headache  -     SARS Coronavirus 2 Antigen, POCT Manual Read  -     methylPREDNISolone (MEDROL DOSEPACK) 4 mg tablet; use as directed "  Dispense: 21 each; Refill: 0  -     doxycycline (VIBRA-TABS) 100 MG tablet; Take 1 tablet (100 mg total) by mouth 2 (two) times daily. for 7 days  Dispense: 14 tablet; Refill: 0    Fever, unspecified fever cause  -     SARS Coronavirus 2 Antigen, POCT Manual Read  -     methylPREDNISolone (MEDROL DOSEPACK) 4 mg tablet; use as directed  Dispense: 21 each; Refill: 0  -     doxycycline (VIBRA-TABS) 100 MG tablet; Take 1 tablet (100 mg total) by mouth 2 (two) times daily. for 7 days  Dispense: 14 tablet; Refill: 0     Want to repeat patient's labs in 1 month due to elevated liver enzymes, want her to monitor her blood pressure, she has had elevated blood pressures in the evenings since her mother's passing.  She does take trazodone for sleep and to relax at night.  Hydrated with water, low-sodium in diet  Take meds as prescribed  Complete antibiotics  Continue Robitussin      Health Maintenance Due   Topic Date Due    Hepatitis C Screening  Never done    HIV Screening  Never done    Hemoglobin A1c (Diabetic Prevention Screening)  Never done    COVID-19 Vaccine (4 - Pfizer series) 11/26/2021    Mammogram  11/05/2022    TETANUS VACCINE  11/14/2022    Influenza Vaccine (1) 09/01/2023        I spent 38 minutes on the day of this encounter for preparing for, evaluating, treating, and managing this patient.      -Continue current medications and maintain follow up with specialists.  Return to clinic as needed for any concerns   No follow-ups on file.       MAYTE Shetlon  [facility]

## 2023-09-20 ENCOUNTER — OFFICE VISIT (OUTPATIENT)
Dept: PODIATRY | Facility: CLINIC | Age: 48
End: 2023-09-20
Payer: COMMERCIAL

## 2023-09-20 VITALS
HEART RATE: 75 BPM | WEIGHT: 159.81 LBS | SYSTOLIC BLOOD PRESSURE: 124 MMHG | DIASTOLIC BLOOD PRESSURE: 70 MMHG | HEIGHT: 67 IN | BODY MASS INDEX: 25.08 KG/M2

## 2023-09-20 DIAGNOSIS — G89.18 POSTOPERATIVE PAIN: Primary | ICD-10-CM

## 2023-09-20 PROCEDURE — 99999 PR PBB SHADOW E&M-EST. PATIENT-LVL III: CPT | Mod: PBBFAC,,, | Performed by: PODIATRIST

## 2023-09-20 PROCEDURE — 99024 PR POST-OP FOLLOW-UP VISIT: ICD-10-PCS | Mod: S$GLB,,, | Performed by: PODIATRIST

## 2023-09-20 PROCEDURE — 99999 PR PBB SHADOW E&M-EST. PATIENT-LVL III: ICD-10-PCS | Mod: PBBFAC,,, | Performed by: PODIATRIST

## 2023-09-20 PROCEDURE — 99024 POSTOP FOLLOW-UP VISIT: CPT | Mod: S$GLB,,, | Performed by: PODIATRIST

## 2023-09-20 NOTE — PROGRESS NOTES
Patient presents approximately 4weeks status post cheilectomy/hammertoe correction.  Initially She had an issue with a suture loosening and was concerned due to some bleeding.  Incision is healing well with no signs of infection or significant dehiscence.  Neurovascular status intact.  Continue working back into normal shoe gear and activity to tolerance.  Follow-up in 4 weeks.  Range of motion exercises discussed.

## 2023-09-21 ENCOUNTER — PROCEDURE VISIT (OUTPATIENT)
Dept: NEUROLOGY | Facility: CLINIC | Age: 48
End: 2023-09-21
Payer: COMMERCIAL

## 2023-09-21 VITALS — DIASTOLIC BLOOD PRESSURE: 72 MMHG | SYSTOLIC BLOOD PRESSURE: 109 MMHG | HEART RATE: 65 BPM

## 2023-09-21 DIAGNOSIS — G43.709 CHRONIC MIGRAINE W/O AURA W/O STATUS MIGRAINOSUS, NOT INTRACTABLE: Primary | ICD-10-CM

## 2023-09-21 PROCEDURE — 64615 PR CHEMODENERVATION OF MUSCLE FOR CHRONIC MIGRAINE: ICD-10-PCS | Mod: S$GLB,,, | Performed by: PHYSICIAN ASSISTANT

## 2023-09-21 PROCEDURE — 64615 CHEMODENERV MUSC MIGRAINE: CPT | Mod: S$GLB,,, | Performed by: PHYSICIAN ASSISTANT

## 2023-09-21 NOTE — PROCEDURES
PROCEDURE NOTE:  BOTOX was performed as an indicated therapy for intractable chronic migraine headaches given that the patient failed more than 2 headache medications     A time out was conducted just before the start of the procedure to verify the correct patient and procedure, procedure location, and all relevant critical information.      Botulinum Toxin Injection Procedure      PROCEDURE PERFORMED: Botulinum toxin injection (68530)  CLINICAL INDICATION: Chronic Migraines  After risks and benefits were explained including bleeding, infection, worsening of pain, damage to the areas being injected, weakness of muscles, loss of muscle control, dysphagia if injecting the head or neck, facial droop if injecting the facial area, painful injection, allergic or other reaction to the medications being injected, and the failure of the procedure to help the problem, a signed consent was obtained.   The patient was placed in a comfortable area and the sites to be treated were identified.The area to be treated was prepped three times with alcohol and the alcohol allowed to dry. Next, a 30 gauge needle was used to inject the medication in the area to be treated.      Total Botox used: 155 Units   Unavoidable waste: 45 Units      Injection sites:    muscle bilaterally ( a total of 10 units divided into 2 sites)   Procerus muscle (5 units)   Frontalis muscle bilaterally (a total of 20 units divided into 4 sites)   Temporalis muscle bilaterally (a total of 40 units divided into 8 sites)   Occipitalis muscle bilaterally (a total of 30 units divided into 6 sites)   Cervical paraspinal muscles (a total of 20 units divided into 4 sites)   Trapezius muscle bilaterally (a total of 30 units divided into 6 sites)   Complications: none   RTC for the next Botox injection: 12 weeks     Problem List Items Addressed This Visit          Neuro    Chronic migraine w/o aura w/o status migrainosus, not intractable - Primary    Relevant  Medications    onabotulinumtoxina injection 200 Units (Completed) (Start on 9/21/2023  8:30 AM)    Other Relevant Orders    Prior authorization Order         [facility] Department of Neurology   [phone]

## 2023-09-22 ENCOUNTER — PATIENT MESSAGE (OUTPATIENT)
Dept: PRIMARY CARE CLINIC | Facility: CLINIC | Age: 48
End: 2023-09-22
Payer: COMMERCIAL

## 2023-09-22 ENCOUNTER — OFFICE VISIT (OUTPATIENT)
Dept: PRIMARY CARE CLINIC | Facility: CLINIC | Age: 48
End: 2023-09-22
Payer: COMMERCIAL

## 2023-09-22 VITALS
SYSTOLIC BLOOD PRESSURE: 118 MMHG | HEART RATE: 58 BPM | DIASTOLIC BLOOD PRESSURE: 72 MMHG | OXYGEN SATURATION: 98 % | RESPIRATION RATE: 16 BRPM | BODY MASS INDEX: 24.92 KG/M2 | HEIGHT: 67 IN | WEIGHT: 158.75 LBS

## 2023-09-22 DIAGNOSIS — M54.2 CERVICALGIA: Primary | ICD-10-CM

## 2023-09-22 DIAGNOSIS — M54.2 NECK PAIN: Primary | ICD-10-CM

## 2023-09-22 DIAGNOSIS — M48.02 SPINAL STENOSIS, CERVICAL REGION: ICD-10-CM

## 2023-09-22 PROCEDURE — 96372 PR INJECTION,THERAP/PROPH/DIAG2ST, IM OR SUBCUT: ICD-10-PCS | Mod: S$GLB,,, | Performed by: INTERNAL MEDICINE

## 2023-09-22 PROCEDURE — 99214 OFFICE O/P EST MOD 30 MIN: CPT | Mod: 25,S$GLB,, | Performed by: INTERNAL MEDICINE

## 2023-09-22 PROCEDURE — 99214 PR OFFICE/OUTPT VISIT, EST, LEVL IV, 30-39 MIN: ICD-10-PCS | Mod: 25,S$GLB,, | Performed by: INTERNAL MEDICINE

## 2023-09-22 PROCEDURE — 96372 THER/PROPH/DIAG INJ SC/IM: CPT | Mod: S$GLB,,, | Performed by: INTERNAL MEDICINE

## 2023-09-22 PROCEDURE — 99999 PR PBB SHADOW E&M-EST. PATIENT-LVL III: CPT | Mod: PBBFAC,,, | Performed by: INTERNAL MEDICINE

## 2023-09-22 PROCEDURE — 99999 PR PBB SHADOW E&M-EST. PATIENT-LVL III: ICD-10-PCS | Mod: PBBFAC,,, | Performed by: INTERNAL MEDICINE

## 2023-09-22 RX ORDER — CYCLOBENZAPRINE HCL 5 MG
5 TABLET ORAL 3 TIMES DAILY PRN
Qty: 90 TABLET | Refills: 0 | Status: SHIPPED | OUTPATIENT
Start: 2023-09-22 | End: 2023-10-22

## 2023-09-22 RX ORDER — KETOROLAC TROMETHAMINE 30 MG/ML
60 INJECTION, SOLUTION INTRAMUSCULAR; INTRAVENOUS
Status: COMPLETED | OUTPATIENT
Start: 2023-09-22 | End: 2023-09-22

## 2023-09-22 RX ORDER — HYDROCODONE BITARTRATE AND ACETAMINOPHEN 7.5; 325 MG/1; MG/1
1 TABLET ORAL EVERY 6 HOURS PRN
Qty: 21 TABLET | Refills: 0 | Status: ON HOLD | OUTPATIENT
Start: 2023-09-22 | End: 2023-10-12 | Stop reason: HOSPADM

## 2023-09-22 RX ORDER — GABAPENTIN 300 MG/1
300 CAPSULE ORAL 3 TIMES DAILY
Qty: 90 CAPSULE | Refills: 1 | Status: ON HOLD | OUTPATIENT
Start: 2023-09-22 | End: 2023-10-12 | Stop reason: HOSPADM

## 2023-09-22 RX ADMIN — KETOROLAC TROMETHAMINE 60 MG: 30 INJECTION, SOLUTION INTRAMUSCULAR; INTRAVENOUS at 03:09

## 2023-09-22 NOTE — PROGRESS NOTES
[facility] Progress Note  9/22/2023          Reason for Visit:      had concerns including Neck Pain.       History of Present Illness:     Pt has long standing neck pain.  Has followed with a secialist at [facility] and has periodically received epidural inejctions.  Has known bulging disc at L4-L5    She recently had flare up of pain in the past 3-4 days.  Severe pain in right side of neck radiating down right arm.  It is shooting.  No numbness or tingling currently, but does get that at times.  No weakness.  She is already scheduled for an epidural Thursday, but the pain has been unbearable this morning and they recommended she come in to discuss with us some treatments to bridge the gap until her procedure.  She just finished steroids for a sinus infection, and they recommended she avoid more steroids before the procedure.  She has been takign ibuprofen at home which isn't helping.  She took tramadol which she has at home but that is also not helping.  PDMP reviewed.  Did have percocet last filled 8/23 (5 days supply) by podiatry for a recent surgery she did with them.      Problem List:     Patient Active Problem List   Diagnosis    S/P hysterectomy    History of diverticulitis    Pelvic pain syndrome    Dizziness    Anxiety    Gastroesophageal reflux disease without esophagitis    Hypothyroidism    Acute diverticulitis    Insomnia    Need for influenza vaccination    Lymphopenia    Chronic migraine w/o aura w/o status migrainosus, not intractable    Neck pain         Medications:       Current Outpatient Medications:     butalbital-acetaminophen-caffeine -40 mg (FIORICET, ESGIC) -40 mg per tablet, Take 1 tablet by mouth every 6 (six) hours., Disp: 12 tablet, Rfl: 5    co-enzyme Q-10 30 mg capsule, Take by mouth., Disp: , Rfl:     pantoprazole (PROTONIX) 40 MG tablet, Take 1 tablet (40 mg total) by mouth once daily., Disp: 30 tablet, Rfl: 11    progesterone (PROMETRIUM) 200 MG  "capsule, Take 200 mg by mouth once daily., Disp: , Rfl:     traZODone (DESYREL) 100 MG tablet, TAKE 1 TABLET BY MOUTH EVERY DAY IN THE EVENING, Disp: 90 tablet, Rfl: 1    cyclobenzaprine (FLEXERIL) 5 MG tablet, Take 1 tablet (5 mg total) by mouth 3 (three) times daily as needed for Muscle spasms., Disp: 90 tablet, Rfl: 0    gabapentin (NEURONTIN) 300 MG capsule, Take 1 capsule (300 mg total) by mouth 3 (three) times daily., Disp: 90 capsule, Rfl: 1    HYDROcodone-acetaminophen (NORCO) 7.5-325 mg per tablet, Take 1 tablet by mouth every 6 (six) hours as needed for Pain., Disp: 21 tablet, Rfl: 0    Current Facility-Administered Medications:     ketorolac injection 60 mg, 60 mg, Intramuscular, 1 time in Clinic/HOD, Paramjit Puri MD        Review of Systems:     Review of Systems   Constitutional:  Negative for chills and fever.   HENT:  Negative for ear pain and sore throat.    Respiratory:  Negative for cough, shortness of breath and wheezing.    Cardiovascular:  Negative for chest pain and palpitations.   Gastrointestinal:  Negative for constipation, diarrhea, nausea and vomiting.   Genitourinary:  Negative for dysuria and hematuria.   Musculoskeletal:  Negative for joint swelling and joint deformity.   Neurological:  Negative for dizziness and weakness.           Physical Exam:     Temp:             Blood Pressure:  118/72        Pulse:   (!) 58     Respirations:  16  Weight:   72 kg (158 lb 11.7 oz)  Height:   5' 7" (1.702 m)  BMI:     Body mass index is 24.86 kg/m².    Physical Exam  Constitutional:       General: She is not in acute distress.  HENT:      Right Ear: Tympanic membrane normal.      Left Ear: Tympanic membrane normal.      Nose: No congestion or rhinorrhea.      Mouth/Throat:      Pharynx: No oropharyngeal exudate or posterior oropharyngeal erythema.   Cardiovascular:      Rate and Rhythm: Normal rate and regular rhythm.   Pulmonary:      Effort: Pulmonary effort is normal. No respiratory " distress.      Breath sounds: No wheezing.   Abdominal:      Palpations: Abdomen is soft.      Tenderness: There is no abdominal tenderness.   Skin:     General: Skin is warm.   Neurological:      General: No focal deficit present.      Mental Status: She is alert and oriented to person, place, and time.       Labs/Imaging/Testing:     Most recent CBC:  Lab Results   Component Value Date    WBC 3.77 (L) 08/03/2023    HGB 12.8 08/03/2023     08/03/2023    MCV 93 08/03/2023       Most recent Lipids:  Lab Results   Component Value Date    CHOL 179 10/03/2022    HDL 65 10/03/2022    LDLCALC 103.8 10/03/2022    TRIG 51 10/03/2022       Most recent Chemistry:  Lab Results   Component Value Date     08/03/2023    K 4.4 08/03/2023     08/03/2023    CO2 24 08/03/2023    BUN 15 08/03/2023    CREATININE 0.7 08/03/2023     (H) 08/03/2023    CALCIUM 9.3 08/03/2023    ALT 55 (H) 08/03/2023    AST 47 (H) 08/03/2023    ALKPHOS 66 08/03/2023    PROT 6.8 08/03/2023    ALBUMIN 4.1 08/03/2023       Other tests:  Lab Results   Component Value Date    TSH 0.897 10/03/2022    FREET4 0.82 10/03/2022    CRP 6.8 11/30/2021    LIPASE 14 11/27/2021             Assessment and Plan:     1. Neck pain  - gabapentin (NEURONTIN) 300 MG capsule; Take 1 capsule (300 mg total) by mouth 3 (three) times daily.  Dispense: 90 capsule; Refill: 1  - cyclobenzaprine (FLEXERIL) 5 MG tablet; Take 1 tablet (5 mg total) by mouth 3 (three) times daily as needed for Muscle spasms.  Dispense: 90 tablet; Refill: 0  - HYDROcodone-acetaminophen (NORCO) 7.5-325 mg per tablet; Take 1 tablet by mouth every 6 (six) hours as needed for Pain.  Dispense: 21 tablet; Refill: 0     Will give a shot of toradol  Gabapentin 300 - start qhs and may Increase to bid and then tid if tolerated  Cyclobenzaprine  Gave small supply norco to use sparingly as needed to bridge until epidural scheduled next Thursday    Follow up if no improvement or worsening        Paramjit Puri MD  9/22/2023    This note was prepared using voice-recognition software.  Although efforts are made to proofread the note, some errors may persist in the final document.

## 2023-09-23 DIAGNOSIS — G47.00 INSOMNIA, UNSPECIFIED TYPE: ICD-10-CM

## 2023-09-23 NOTE — TELEPHONE ENCOUNTER
Care Due:                  Date            Visit Type   Department     Provider  --------------------------------------------------------------------------------                                EP -                              PRIMARY      [facility] PRIMARY  Last Visit: 10-      CARE ([facility])   MEG Adams  Next Visit: None Scheduled  None         None Found                                                            Last  Test          Frequency    Reason                     Performed    Due Date  --------------------------------------------------------------------------------    Office Visit  12 months..  traZODone................  10-   10-    [facility] Embedded Care Due Messages. Reference number: [id number].   9/23/2023 12:59:06 PM CDT

## 2023-09-25 RX ORDER — TRAZODONE HYDROCHLORIDE 100 MG/1
TABLET ORAL
Qty: 90 TABLET | Refills: 0 | Status: SHIPPED | OUTPATIENT
Start: 2023-09-25 | End: 2023-12-19

## 2023-09-25 NOTE — TELEPHONE ENCOUNTER
Provider Staff:  Action required for this patient     Please see care gap opportunities below in Care Due Message.    Thanks!  [facility]     Appointments      Date Provider   Last Visit   10/7/2022 Blake Adams MD   Next Visit   Visit date not found Blake Adams MD      Refill Decision Note   Socorro Huang  is requesting a refill authorization.  Brief Assessment and Rationale for Refill:  Approve     Medication Therapy Plan:         Comments:     Note composed:3:13 AM 09/25/2023

## 2023-10-02 ENCOUNTER — OFFICE VISIT (OUTPATIENT)
Dept: NEUROSURGERY | Facility: CLINIC | Age: 48
End: 2023-10-02
Payer: COMMERCIAL

## 2023-10-02 ENCOUNTER — TELEPHONE (OUTPATIENT)
Dept: NEUROSURGERY | Facility: CLINIC | Age: 48
End: 2023-10-02
Payer: COMMERCIAL

## 2023-10-02 VITALS
DIASTOLIC BLOOD PRESSURE: 82 MMHG | WEIGHT: 160 LBS | HEART RATE: 90 BPM | BODY MASS INDEX: 25.11 KG/M2 | SYSTOLIC BLOOD PRESSURE: 116 MMHG | TEMPERATURE: 98 F | HEIGHT: 67 IN

## 2023-10-02 DIAGNOSIS — M54.12 CERVICAL RADICULOPATHY: ICD-10-CM

## 2023-10-02 DIAGNOSIS — M47.812 CERVICAL SPONDYLOSIS: Primary | ICD-10-CM

## 2023-10-02 DIAGNOSIS — M54.2 NECK PAIN: ICD-10-CM

## 2023-10-02 PROCEDURE — 99204 OFFICE O/P NEW MOD 45 MIN: CPT | Mod: S$GLB,,, | Performed by: NEUROLOGICAL SURGERY

## 2023-10-02 PROCEDURE — 99999 PR PBB SHADOW E&M-EST. PATIENT-LVL III: CPT | Mod: PBBFAC,,, | Performed by: NEUROLOGICAL SURGERY

## 2023-10-02 PROCEDURE — 99204 PR OFFICE/OUTPT VISIT, NEW, LEVL IV, 45-59 MIN: ICD-10-PCS | Mod: S$GLB,,, | Performed by: NEUROLOGICAL SURGERY

## 2023-10-02 PROCEDURE — 99999 PR PBB SHADOW E&M-EST. PATIENT-LVL III: ICD-10-PCS | Mod: PBBFAC,,, | Performed by: NEUROLOGICAL SURGERY

## 2023-10-02 RX ORDER — OXYCODONE AND ACETAMINOPHEN 5; 325 MG/1; MG/1
1 TABLET ORAL EVERY 4 HOURS PRN
Qty: 60 TABLET | Refills: 0 | Status: ON HOLD | OUTPATIENT
Start: 2023-10-02 | End: 2023-10-12 | Stop reason: HOSPADM

## 2023-10-02 NOTE — TELEPHONE ENCOUNTER
Spoke w/ pt to scheduloe EMG at [facility] and fu w/ mickie after EMG. Mickie requested that pt bring disk back to fu appt.

## 2023-10-03 ENCOUNTER — PATIENT MESSAGE (OUTPATIENT)
Dept: NEUROSURGERY | Facility: CLINIC | Age: 48
End: 2023-10-03
Payer: COMMERCIAL

## 2023-10-03 ENCOUNTER — TELEPHONE (OUTPATIENT)
Dept: NEUROSURGERY | Facility: CLINIC | Age: 48
End: 2023-10-03
Payer: COMMERCIAL

## 2023-10-04 ENCOUNTER — TELEPHONE (OUTPATIENT)
Dept: NEUROSURGERY | Facility: CLINIC | Age: 48
End: 2023-10-04
Payer: COMMERCIAL

## 2023-10-04 ENCOUNTER — PROCEDURE VISIT (OUTPATIENT)
Dept: NEUROLOGY | Facility: CLINIC | Age: 48
End: 2023-10-04
Payer: COMMERCIAL

## 2023-10-04 DIAGNOSIS — R20.0 NUMBNESS OF UPPER LIMB: Primary | ICD-10-CM

## 2023-10-04 DIAGNOSIS — M47.812 CERVICAL SPONDYLOSIS: ICD-10-CM

## 2023-10-04 DIAGNOSIS — M54.2 NECK PAIN: ICD-10-CM

## 2023-10-04 PROCEDURE — 95886 PR EMG COMPLETE, W/ NERVE CONDUCTION STUDIES, 5+ MUSCLES: ICD-10-PCS | Mod: S$GLB,,, | Performed by: PSYCHIATRY & NEUROLOGY

## 2023-10-04 PROCEDURE — 99214 OFFICE O/P EST MOD 30 MIN: CPT | Mod: 25,S$GLB,, | Performed by: PSYCHIATRY & NEUROLOGY

## 2023-10-04 PROCEDURE — 99214 PR OFFICE/OUTPT VISIT, EST, LEVL IV, 30-39 MIN: ICD-10-PCS | Mod: 25,S$GLB,, | Performed by: PSYCHIATRY & NEUROLOGY

## 2023-10-04 PROCEDURE — 95910 NRV CNDJ TEST 7-8 STUDIES: CPT | Mod: S$GLB,,, | Performed by: PSYCHIATRY & NEUROLOGY

## 2023-10-04 PROCEDURE — 95886 MUSC TEST DONE W/N TEST COMP: CPT | Mod: S$GLB,,, | Performed by: PSYCHIATRY & NEUROLOGY

## 2023-10-04 PROCEDURE — 95910 PR NERVE CONDUCTION STUDY; 7-8 STUDIES: ICD-10-PCS | Mod: S$GLB,,, | Performed by: PSYCHIATRY & NEUROLOGY

## 2023-10-04 NOTE — TELEPHONE ENCOUNTER
Left VM. Pt has appt scheduled w/ Dr. Corado's colleague Dr. Wood. I was advised to cancel her appt w/ Mickie if she is going to keep her appt w/ [place] per caden.

## 2023-10-04 NOTE — PROCEDURES
EMG W/ ULTRASOUND AND NERVE CONDUCTION TEST 2 Extremities    Date/Time: 10/4/2023 9:00 AM    Performed by: Fernando Jacobsen MD  Authorized by: Lalitha Corado MD                                                                 [facility] Neurology [address]     Subjective:       Patient ID: Socorro Huang is a 48 y.o. female here for a EMG focused evaluation for neck and right arm pain. Previous visits and diagnostic evaluation reviewed.  States approximately 2 weeks ago she began experiencing severe neck pain which radiates to the right arm. Patient has had neck pain several years ago which had subsided.  Symptoms have been progressively worsening and severe.     Review of patient's allergies indicates:  No Known Allergies   There were no vitals filed for this visit.   Chief Complaint: No chief complaint on file.    Past Medical History:   Diagnosis Date    Abnormal Pap smear     Anxiety     celexa & prozac didn't help much    Constipation - functional     Headache(784.0)     Hemorrhoid     Leukopenia     benign, evaluated in [facility] y 2000    Menorrhagia     Strain of neck muscle     tx with PT at [facility] in past      Social History     Socioeconomic History    Marital status:     Number of children: 2   Occupational History     Employer: [employer]   Tobacco Use    Smoking status: Never    Smokeless tobacco: Never   Substance and Sexual Activity    Alcohol use: Not Currently     Comment: socially    Drug use: No    Sexual activity: Yes     Partners: Male     Birth control/protection: See Surgical Hx   Other Topics Concern    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 Fulton , GYN Dr Ruby                  Review of Systems:   No Fever  No SOB  No vomiting  No visual disturbance      Objective:      Physical Exam    Constitutional: Patient appears well-nourished.   Head: Normocephalic and atraumatic.   Mouth/Throat: Oropharynx is clear and moist.   Pulmonary/Chest: Effort normal.   Abdominal: Soft.   Skin: Skin is warm and dry.      General:  Patient is alert and cooperative.  Affect:  Patient is appropriate to surroundings and environment.  Patient is visibly distressed secondary to pain  Language:  Speech is fluent.  HEENT:  There are no outward signs of trauma to head or face.  Cranial Nerves:  Pupils are equal round and reactive to light. Extra-ocular movements are intact. Face, tongue, and palate are symmetrical.  Motor:  Patient exhibits normal strength testing in bilateral proximal and distal upper extremities.  Reflexes:  Symmetrical in bilateral upper extremities.  Gait:  Ambulation is independent without use of cane or walker without signs of ataxia or circumduction.  Cerebellar:  Normal finger to nose testing without dysmetria.  Sensory:  Intact to sensory modalities tested.  Musculoskeletal:  There is no severe tenderness to palpation and manipulation of the cervical spine region.   Assessment:       We reviewed and discussed at length results of EMG of the right upper extremity performed today revealing a chronic right C7 radiculopathy.  As discussed with the patient this test may be falsely negative  for acute denervation which can sometimes take up to 3 weeks to be evident on EMG testing.  These findings are available via media section of chart review.   1. Numbness of upper limb    2. Neck pain    3. Cervical spondylosis              Plan:       We discussed treatment options at length. Recommend patient keep appointment with referring provider.  Patient is scheduled to see Neurosurgery in a few days.        I spent a total of 35 minutes on the day of the visit. This includes face to face time and non-face to face time preparing to see the patient (eg, review of tests), obtaining and/or reviewing separately obtained history, documenting clinical information in the electronic or other health record, independently interpreting results and communicating results to the patient/family/caregiver, or care coordinator.    Fernando Jacobsen MD, FAAN  10/04/2023   9:26 AM     A dictation device was used to produce this document. Use of such devices sometimes results in grammatical errors or replacement of words that sound similarly.

## 2023-10-04 NOTE — TELEPHONE ENCOUNTER
----- Message from Shantel Fredi sent at 10/3/2023 12:24 PM CDT -----  Contact: MESSI ROCHA [[id number]]  Type: Call Back      Who called: MESSI ROCHA [[id number]]      What is the request in detail: Patient is requesting a call back in regard to scheduling a NP appointment for neck pain and right arm pain.   Please advise.     [facility]      Would the patient rather a call back or a response via [facility]? Call back       Best call back number: [phone] (home)       Additional Information: Dr. Cerna

## 2023-10-06 ENCOUNTER — OFFICE VISIT (OUTPATIENT)
Dept: NEUROSURGERY | Facility: CLINIC | Age: 48
End: 2023-10-06
Payer: COMMERCIAL

## 2023-10-06 ENCOUNTER — TELEPHONE (OUTPATIENT)
Dept: PRIMARY CARE CLINIC | Facility: CLINIC | Age: 48
End: 2023-10-06
Payer: COMMERCIAL

## 2023-10-06 ENCOUNTER — PATIENT MESSAGE (OUTPATIENT)
Dept: PRIMARY CARE CLINIC | Facility: CLINIC | Age: 48
End: 2023-10-06
Payer: COMMERCIAL

## 2023-10-06 ENCOUNTER — TELEPHONE (OUTPATIENT)
Dept: NEUROSURGERY | Facility: CLINIC | Age: 48
End: 2023-10-06

## 2023-10-06 VITALS
SYSTOLIC BLOOD PRESSURE: 116 MMHG | BODY MASS INDEX: 25.12 KG/M2 | HEIGHT: 67 IN | DIASTOLIC BLOOD PRESSURE: 82 MMHG | WEIGHT: 160.06 LBS | HEART RATE: 90 BPM

## 2023-10-06 DIAGNOSIS — M54.12 CERVICAL RADICULOPATHY: ICD-10-CM

## 2023-10-06 DIAGNOSIS — M54.12 CERVICAL RADICULOPATHY: Primary | ICD-10-CM

## 2023-10-06 DIAGNOSIS — M50.20 HNP (HERNIATED NUCLEUS PULPOSUS), CERVICAL: ICD-10-CM

## 2023-10-06 DIAGNOSIS — M50.20 CERVICAL DISC HERNIATION: Primary | ICD-10-CM

## 2023-10-06 PROCEDURE — 99215 OFFICE O/P EST HI 40 MIN: CPT | Mod: S$GLB,,, | Performed by: NEUROLOGICAL SURGERY

## 2023-10-06 PROCEDURE — 99215 PR OFFICE/OUTPT VISIT, EST, LEVL V, 40-54 MIN: ICD-10-PCS | Mod: S$GLB,,, | Performed by: NEUROLOGICAL SURGERY

## 2023-10-06 NOTE — PROGRESS NOTES
NEUROSURGICAL OUTPATIENT CONSULTATION NOTE    DATE OF SERVICE:  10/06/2023    ATTENDING PHYSICIAN:  Amador Wood MD    CONSULT REQUESTED BY:  Self, Aaareferral     REASON FOR CONSULT:  Right arm pain    HISTORY OF PRESENT ILLNESS:  This is a very pleasant 48 y.o. female who developed sudden right arm pain progressing to numbness 2 weeks ago.  She had similar symptoms on the left a few years ago.  There is also weakness in the right arm.  She has been using gabapentin 600 mg BID, vicodin, percocet, flexeril without relief.  She is doing PT without relief.  She had JANINA as well with not relief.  She is in intractable pain and unable to sleep.      PAST MEDICAL HISTORY:  Active Ambulatory Problems     Diagnosis Date Noted    S/P hysterectomy 05/06/2013    History of diverticulitis 10/10/2017    Pelvic pain syndrome 10/19/2017    Dizziness 06/19/2020    Anxiety 06/19/2020    Gastroesophageal reflux disease without esophagitis 06/19/2020    Hypothyroidism 06/19/2020    Acute diverticulitis 11/28/2021    Insomnia 01/14/2022    Need for influenza vaccination 10/07/2022    Lymphopenia 11/03/2022    Chronic migraine w/o aura w/o status migrainosus, not intractable 03/10/2023    Neck pain 03/10/2023    Cervical spondylosis 10/02/2023     Resolved Ambulatory Problems     Diagnosis Date Noted    Irregular intermenstrual bleeding 07/24/2012    Moderate major depression 10/04/2012    Endometrial hyperplasia, complex 12/03/2012    Pre-operative clearance 05/06/2013    Dysthymic disorder 05/29/2013    Acute non-recurrent frontal sinusitis 05/26/2022    Annual physical exam 10/07/2022     Past Medical History:   Diagnosis Date    Abnormal Pap smear     Constipation - functional     Headache(784.0)     Hemorrhoid     Leukopenia     Menorrhagia     Strain of neck muscle        PAST SURGICAL HISTORY:  Past Surgical History:   Procedure Laterality Date    ANOSCOPY N/A 07/20/2022    Procedure: ANOSCOPY;  Surgeon: ASHTYN Melo MD;   Location: [facility] OR 2ND FLR;  Service: Colon and Rectal;  Laterality: N/A;    BREAST RECONSTRUCTION  2018    BREAST SURGERY      CHEILECTOMY Right 8/18/2023    Procedure: CHEILECTOMY;  Surgeon: Tacho Sanchez DPM;  Location: [facility] OR;  Service: Podiatry;  Laterality: Right;    COLON SURGERY  2014         COLONOSCOPY N/A 02/03/2022    Procedure: COLONOSCOPY;  Surgeon: ASHTYN Melo MD;  Location: [facility] ENDO;  Service: Endoscopy;  Laterality: N/A;    CORRECTION OF HAMMER TOE Right 8/18/2023    Procedure: CORRECTION, HAMMER TOE;  Surgeon: Tacho Sanchez DPM;  Location: [facility] OR;  Service: Podiatry;  Laterality: Right;    DILATION AND CURETTAGE OF UTERUS  12/18/2012    complex hyperplasia, no atypia    FLEXIBLE SIGMOIDOSCOPY N/A 07/20/2022    Procedure: SIGMOIDOSCOPY, FLEXIBLE;  Surgeon: ASHTYN Melo MD;  Location: [facility] OR 2ND FLR;  Service: Colon and Rectal;  Laterality: N/A;    HEMORRHOID SURGERY  2014    HYSTERECTOMY      LAPAROSCOPIC SIGMOIDECTOMY Left 07/20/2022    Procedure: COLECTOMY, SIGMOID, LAPAROSCOPIC, ERAS low;  Surgeon: ASHTYN Melo MD;  Location: [facility] OR 2ND FLR;  Service: Colon and Rectal;  Laterality: Left;    MOBILIZATION OF SPLENIC FLEXURE N/A 07/20/2022    Procedure: MOBILIZATION, SPLENIC FLEXURE;  Surgeon: ASHTYN Melo MD;  Location: [facility] OR 2ND FLR;  Service: Colon and Rectal;  Laterality: N/A;    OOPHORECTOMY      PARTIAL HYSTERECTOMY  2013    for atypia    SMALL INTESTINE SURGERY  2017    THYROIDECTOMY, PARTIAL  2013    TONSILLECTOMY      TUBAL LIGATION         SOCIAL HISTORY:   Social History     Socioeconomic History    Marital status:     Number of children: 2   Occupational History     Employer: [employer]   Tobacco Use    Smoking status: Never    Smokeless tobacco: Never   Substance and Sexual Activity    Alcohol use: Not Currently     Comment: socially    Drug use: No    Sexual activity: Yes     Partners: Male     Birth control/protection:  See Surgical Hx   Other Topics Concern    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 Fulton , GYN Dr Ruby                   FAMILY HISTORY:  Family History   Problem Relation Age of Onset    Emphysema Mother     Cancer Mother         Lung    COPD Mother     Diabetes Mother     Cancer Father         Lung    Heart disease Father 50    Diabetes Paternal Grandmother     Hypertension Paternal Grandmother     Depression Sister     Suicide Brother     Drug abuse Maternal Aunt     Schizophrenia Paternal Aunt     Alcohol abuse Maternal Uncle     Alcohol abuse Cousin     Breast cancer Maternal Grandmother     Ovarian cancer Maternal Grandmother     ADD / ADHD Neg Hx     Anxiety disorder Neg Hx     Bipolar disorder Neg Hx     Dementia Neg Hx     OCD Neg Hx     Paranoid behavior Neg Hx     Physical abuse Neg Hx     Seizures Neg Hx     Self injury Neg Hx     Sexual abuse Neg Hx        CURRENTS MEDICATIONS:  Current Outpatient Medications on File Prior to Visit   Medication Sig Dispense Refill    butalbital-acetaminophen-caffeine -40 mg (FIORICET, ESGIC) -40 mg per tablet Take 1 tablet by mouth every 6 (six) hours. 12  tablet 5    co-enzyme Q-10 30 mg capsule Take by mouth.      cyclobenzaprine (FLEXERIL) 5 MG tablet Take 1 tablet (5 mg total) by mouth 3 (three) times daily as needed for Muscle spasms. 90 tablet 0    gabapentin (NEURONTIN) 300 MG capsule Take 1 capsule (300 mg total) by mouth 3 (three) times daily. 90 capsule 1    oxyCODONE-acetaminophen (PERCOCET) 5-325 mg per tablet Take 1 tablet by mouth every 4 (four) hours as needed for Pain. 60 tablet 0    progesterone (PROMETRIUM) 200 MG capsule Take 200 mg by mouth once daily.      traZODone (DESYREL) 100 MG tablet TAKE 1 TABLET BY MOUTH EVERY DAY IN THE EVENING 90 tablet 0    HYDROcodone-acetaminophen (NORCO) 7.5-325 mg per tablet Take 1 tablet by mouth every 6 (six) hours as needed for Pain. (Patient not taking: Reported on 10/2/2023) 21 tablet 0    pantoprazole (PROTONIX) 40 MG tablet Take 1 tablet (40 mg total) by mouth once daily. (Patient not taking: Reported on 10/2/2023) 30 tablet 11     No current facility-administered medications on file prior to visit.       ALLERGIES:  Review of patient's allergies indicates:  No Known Allergies    REVIEW OF SYSTEMS:  ROS - per HPI    OBJECTIVE:    PHYSICAL EXAMINATION:   Vitals:    10/06/23 0924   BP: 116/82   Pulse: 90       Motor 5/5 except for left biceps 4+ and triceps 4-  Left C6 and C7 dermatomal hypesthesia to LT  Loss of left triceps reflex  No Rivera's or LE hyperreflexia    DIAGNOSTIC DATA:  I personally interpreted the following imaging:     MRI cervical with left C5/6 disc bulge and severe NF stenosis  Large right C6/7 paramedian disc extrusion with severe NF stenosis    ASSESMENT:  This is a 48 y.o. female with     Problem List Items Addressed This Visit    None  Visit Diagnoses       Cervical disc herniation    -  Primary    Cervical radiculopathy                PLAN:  Diagnosis: above.  Despite multimodal medical therapy combined with physical therapy and epidural steroid injection, she continues to have  intractable pain and significant weakness in the right arm.  I recommend diskectomy at C5-6 and C6-7.  We discussed fusion versus disc arthroplasty.  We discussed risks of bleeding, infection, spinal cord or nerve injury, CSF leak, hoarseness or voice changes, swallowing difficulty, adjacent segment degenerative change or possible need for further procedures.  Answered all questions to the best my ability.  They would like to move forward with disc arthroplasty.    Plan: PAT for disc arthroplasty C5/6 and C6/7    Follow-up: routine postop    Amador Wodo MD, FAANS    Disclaimer: This note was partly generated using dictation software which may occasionally result in transcription errors.

## 2023-10-06 NOTE — TELEPHONE ENCOUNTER
----- Message from Yady Brownmarilu sent at 10/6/2023 10:09 AM CDT -----  Contact: [phone] Patient  Caller is requesting an earlier appointment then we can schedule.  Caller is requesting a message be sent to the provider.  If this is for urgent care symptoms, did you offer other providers at this location, providers at other locations, or [facility]? (yes, no, n/a):  yes  If this is for the patients physical, did you offer to schedule next available and put on wait list, or to see NP or PA for their physical?  (yes, no, n/a):  yes  When is the next available appointment with their provider:  02/2024  Reason for the appointment:  Surgery Approval for 10/12/2023  Patient preference of timeframe to be scheduled:  before 12 th of Oct  Would the patient like a call back, or a response through their [facility] portal?:   call   Comments:

## 2023-10-06 NOTE — H&P (VIEW-ONLY)
NEUROSURGICAL OUTPATIENT CONSULTATION NOTE    DATE OF SERVICE:  10/06/2023    ATTENDING PHYSICIAN:  Amador Wood MD    CONSULT REQUESTED BY:  Self, Aaareferral     REASON FOR CONSULT:  Right arm pain    HISTORY OF PRESENT ILLNESS:  This is a very pleasant 48 y.o. female who developed sudden right arm pain progressing to numbness 2 weeks ago.  She had similar symptoms on the left a few years ago.  There is also weakness in the right arm.  She has been using gabapentin 600 mg BID, vicodin, percocet, flexeril without relief.  She is doing PT without relief.  She had JANINA as well with not relief.  She is in intractable pain and unable to sleep.      PAST MEDICAL HISTORY:  Active Ambulatory Problems     Diagnosis Date Noted    S/P hysterectomy 05/06/2013    History of diverticulitis 10/10/2017    Pelvic pain syndrome 10/19/2017    Dizziness 06/19/2020    Anxiety 06/19/2020    Gastroesophageal reflux disease without esophagitis 06/19/2020    Hypothyroidism 06/19/2020    Acute diverticulitis 11/28/2021    Insomnia 01/14/2022    Need for influenza vaccination 10/07/2022    Lymphopenia 11/03/2022    Chronic migraine w/o aura w/o status migrainosus, not intractable 03/10/2023    Neck pain 03/10/2023    Cervical spondylosis 10/02/2023     Resolved Ambulatory Problems     Diagnosis Date Noted    Irregular intermenstrual bleeding 07/24/2012    Moderate major depression 10/04/2012    Endometrial hyperplasia, complex 12/03/2012    Pre-operative clearance 05/06/2013    Dysthymic disorder 05/29/2013    Acute non-recurrent frontal sinusitis 05/26/2022    Annual physical exam 10/07/2022     Past Medical History:   Diagnosis Date    Abnormal Pap smear     Constipation - functional     Headache(784.0)     Hemorrhoid     Leukopenia     Menorrhagia     Strain of neck muscle        PAST SURGICAL HISTORY:  Past Surgical History:   Procedure Laterality Date    ANOSCOPY N/A 07/20/2022    Procedure: ANOSCOPY;  Surgeon: ASHTYN Melo MD;   Location: [facility] OR 2ND FLR;  Service: Colon and Rectal;  Laterality: N/A;    BREAST RECONSTRUCTION  2018    BREAST SURGERY      CHEILECTOMY Right 8/18/2023    Procedure: CHEILECTOMY;  Surgeon: Tacho Sanchez DPM;  Location: [facility] OR;  Service: Podiatry;  Laterality: Right;    COLON SURGERY  2014         COLONOSCOPY N/A 02/03/2022    Procedure: COLONOSCOPY;  Surgeon: ASHTYN Melo MD;  Location: [facility] END[facility];  Service: Endoscopy;  Laterality: N/A;    CORRECTION OF HAMMER TOE Right 8/18/2023    Procedure: CORRECTION, HAMMER TOE;  Surgeon: Tacho Sanchez DPM;  Location: [facility] OR;  Service: Podiatry;  Laterality: Right;    DILATION AND CURETTAGE OF UTERUS  12/18/2012    complex hyperplasia, no atypia    FLEXIBLE SIGMOIDOSCOPY N/A 07/20/2022    Procedure: SIGMOIDOSCOPY, FLEXIBLE;  Surgeon: ASHTYN Melo MD;  Location: [facility] OR 2ND FLR;  Service: Colon and Rectal;  Laterality: N/A;    HEMORRHOID SURGERY  2014    HYSTERECTOMY      LAPAROSCOPIC SIGMOIDECTOMY Left 07/20/2022    Procedure: COLECTOMY, SIGMOID, LAPAROSCOPIC, ERAS low;  Surgeon: ASHTYN Melo MD;  Location: [facility] OR 2ND FLR;  Service: Colon and Rectal;  Laterality: Left;    MOBILIZATION OF SPLENIC FLEXURE N/A 07/20/2022    Procedure: MOBILIZATION, SPLENIC FLEXURE;  Surgeon: ASHTYN Melo MD;  Location: [facility] OR 2ND FLR;  Service: Colon and Rectal;  Laterality: N/A;    OOPHORECTOMY      PARTIAL HYSTERECTOMY  2013    for atypia    SMALL INTESTINE SURGERY  2017    THYROIDECTOMY, PARTIAL  2013    TONSILLECTOMY      TUBAL LIGATION         SOCIAL HISTORY:   Social History     Socioeconomic History    Marital status:     Number of children: 2   Occupational History     Employer: [employer]   Tobacco Use    Smoking status: Never    Smokeless tobacco: Never   Substance and Sexual Activity    Alcohol use: Not Currently     Comment: socially    Drug use: No    Sexual activity: Yes     Partners: Male     Birth control/protection:  See Surgical Hx   Other Topics Concern    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 for high school, 15 yo step daughter, nonsmoker, one alcoholic beverage per month, exercising with a   Colonoscopy 2011 Dr. Fulton , GYN Dr Ruby                   FAMILY HISTORY:  Family History   Problem Relation Age of Onset    Emphysema Mother     Cancer Mother         Lung    COPD Mother     Diabetes Mother     Cancer Father         Lung    Heart disease Father 50    Diabetes Paternal Grandmother     Hypertension Paternal Grandmother     Depression Sister     Suicide Brother     Drug abuse Maternal Aunt     Schizophrenia Paternal Aunt     Alcohol abuse Maternal Uncle     Alcohol abuse Cousin     Breast cancer Maternal Grandmother     Ovarian cancer Maternal Grandmother     ADD / ADHD Neg Hx     Anxiety disorder Neg Hx     Bipolar disorder Neg Hx     Dementia Neg Hx     OCD Neg Hx     Paranoid behavior Neg Hx     Physical abuse Neg Hx     Seizures Neg Hx     Self injury Neg Hx     Sexual abuse Neg Hx        CURRENTS MEDICATIONS:  Current Outpatient Medications on File Prior to Visit   Medication Sig Dispense Refill    butalbital-acetaminophen-caffeine -40 mg (FIORICET, ESGIC) -40 mg per tablet Take 1 tablet by mouth every 6 (six) hours. 12  tablet 5    co-enzyme Q-10 30 mg capsule Take by mouth.      cyclobenzaprine (FLEXERIL) 5 MG tablet Take 1 tablet (5 mg total) by mouth 3 (three) times daily as needed for Muscle spasms. 90 tablet 0    gabapentin (NEURONTIN) 300 MG capsule Take 1 capsule (300 mg total) by mouth 3 (three) times daily. 90 capsule 1    oxyCODONE-acetaminophen (PERCOCET) 5-325 mg per tablet Take 1 tablet by mouth every 4 (four) hours as needed for Pain. 60 tablet 0    progesterone (PROMETRIUM) 200 MG capsule Take 200 mg by mouth once daily.      traZODone (DESYREL) 100 MG tablet TAKE 1 TABLET BY MOUTH EVERY DAY IN THE EVENING 90 tablet 0    HYDROcodone-acetaminophen (NORCO) 7.5-325 mg per tablet Take 1 tablet by mouth every 6 (six) hours as needed for Pain. (Patient not taking: Reported on 10/2/2023) 21 tablet 0    pantoprazole (PROTONIX) 40 MG tablet Take 1 tablet (40 mg total) by mouth once daily. (Patient not taking: Reported on 10/2/2023) 30 tablet 11     No current facility-administered medications on file prior to visit.       ALLERGIES:  Review of patient's allergies indicates:  No Known Allergies    REVIEW OF SYSTEMS:  ROS - per HPI    OBJECTIVE:    PHYSICAL EXAMINATION:   Vitals:    10/06/23 0924   BP: 116/82   Pulse: 90       Motor 5/5 except for left biceps 4+ and triceps 4-  Left C6 and C7 dermatomal hypesthesia to LT  Loss of left triceps reflex  No Rivera's or LE hyperreflexia    DIAGNOSTIC DATA:  I personally interpreted the following imaging:     MRI cervical with left C5/6 disc bulge and severe NF stenosis  Large right C6/7 paramedian disc extrusion with severe NF stenosis    ASSESMENT:  This is a 48 y.o. female with     Problem List Items Addressed This Visit    None  Visit Diagnoses       Cervical disc herniation    -  Primary    Cervical radiculopathy                PLAN:  Diagnosis: above.  Despite multimodal medical therapy combined with physical therapy and epidural steroid injection, she continues to have  intractable pain and significant weakness in the right arm.  I recommend diskectomy at C5-6 and C6-7.  We discussed fusion versus disc arthroplasty.  We discussed risks of bleeding, infection, spinal cord or nerve injury, CSF leak, hoarseness or voice changes, swallowing difficulty, adjacent segment degenerative change or possible need for further procedures.  Answered all questions to the best my ability.  They would like to move forward with disc arthroplasty.    Plan: PAT for disc arthroplasty C5/6 and C6/7    Follow-up: routine postop    Amador Wood MD, FAANS    Disclaimer: This note was partly generated using dictation software which may occasionally result in transcription errors.

## 2023-10-09 ENCOUNTER — OFFICE VISIT (OUTPATIENT)
Dept: PRIMARY CARE CLINIC | Facility: CLINIC | Age: 48
End: 2023-10-09
Payer: COMMERCIAL

## 2023-10-09 ENCOUNTER — HOSPITAL ENCOUNTER (OUTPATIENT)
Dept: RADIOLOGY | Facility: HOSPITAL | Age: 48
Discharge: HOME | End: 2023-10-09
Attending: INTERNAL MEDICINE
Payer: COMMERCIAL

## 2023-10-09 VITALS
SYSTOLIC BLOOD PRESSURE: 110 MMHG | DIASTOLIC BLOOD PRESSURE: 80 MMHG | HEART RATE: 81 BPM | WEIGHT: 162.5 LBS | HEIGHT: 67 IN | RESPIRATION RATE: 18 BRPM | BODY MASS INDEX: 25.51 KG/M2 | OXYGEN SATURATION: 98 % | TEMPERATURE: 97 F

## 2023-10-09 DIAGNOSIS — Z01.818 PREOP EXAM FOR INTERNAL MEDICINE: ICD-10-CM

## 2023-10-09 DIAGNOSIS — M54.12 CERVICAL RADICULOPATHY: Primary | ICD-10-CM

## 2023-10-09 PROCEDURE — 71046 X-RAY EXAM CHEST 2 VIEWS: CPT | Mod: 26,,, | Performed by: RADIOLOGY

## 2023-10-09 PROCEDURE — 71046 XR CHEST PA AND LATERAL: ICD-10-PCS | Mod: 26,,, | Performed by: RADIOLOGY

## 2023-10-09 PROCEDURE — 93005 ELECTROCARDIOGRAM TRACING: CPT | Mod: S$GLB,,, | Performed by: INTERNAL MEDICINE

## 2023-10-09 PROCEDURE — 99999 PR PBB SHADOW E&M-EST. PATIENT-LVL IV: ICD-10-PCS | Mod: PBBFAC,,, | Performed by: INTERNAL MEDICINE

## 2023-10-09 PROCEDURE — 71046 X-RAY EXAM CHEST 2 VIEWS: CPT | Mod: TC

## 2023-10-09 PROCEDURE — 99214 OFFICE O/P EST MOD 30 MIN: CPT | Mod: S$GLB,,, | Performed by: INTERNAL MEDICINE

## 2023-10-09 PROCEDURE — 99999 PR PBB SHADOW E&M-EST. PATIENT-LVL IV: CPT | Mod: PBBFAC,,, | Performed by: INTERNAL MEDICINE

## 2023-10-09 PROCEDURE — 93005 EKG 12-LEAD: ICD-10-PCS | Mod: S$GLB,,, | Performed by: INTERNAL MEDICINE

## 2023-10-09 PROCEDURE — 99214 PR OFFICE/OUTPT VISIT, EST, LEVL IV, 30-39 MIN: ICD-10-PCS | Mod: S$GLB,,, | Performed by: INTERNAL MEDICINE

## 2023-10-09 PROCEDURE — 93010 EKG 12-LEAD: ICD-10-PCS | Mod: S$GLB,,, | Performed by: INTERNAL MEDICINE

## 2023-10-09 PROCEDURE — 93010 ELECTROCARDIOGRAM REPORT: CPT | Mod: S$GLB,,, | Performed by: INTERNAL MEDICINE

## 2023-10-09 NOTE — PROGRESS NOTES
[facility]    Chief Complaint      Chief Complaint   Patient presents with    Pre-op Exam       History of Present Illness      Socorro Huang is a 48 y.o. female who presents today for   Chief Complaint   Patient presents with    Pre-op Exam   .  Patient comes to appointment here for preop eval for cervical disc replacement procre with Dr Wood . She has good functional capacity, can go up flight of stepesor walk one block with no chest pain or de anda .    Problem List Items Addressed This Visit          Neuro    Cervical radiculopathy - Primary    Overview     For corrective procedure with Dr Wood ns             Other    Preop exam for internal medicine    Overview     Good functional capacity can go up flight of steps with no de anda or chest pain   Cleared for procedure with low risk cardiac complications               Past Medical History:  Past Medical History:   Diagnosis Date    Abnormal Pap smear     Anxiety     celexa & prozac didn't help much    Constipation - functional     Headache(784.0)     Hemorrhoid     Leukopenia     benign, evaluated in [facility] y 2000    Menorrhagia     Strain of neck muscle     tx with PT at [facility] in past       Past Surgical History:  Past Surgical History:   Procedure Laterality Date    ANOSCOPY N/A 07/20/2022    Procedure: ANOSCOPY;  Surgeon: ASHTYN Melo MD;  Location: [address];  Service: Colon and Rectal;  Laterality: N/A;    BREAST RECONSTRUCTION  2018    BREAST SURGERY      CHEILECTOMY Right 8/18/2023    Procedure: CHEILECTOMY;  Surgeon: Tacho Sanchez DPM;  Location: [facility] OR;  Service: Podiatry;  Laterality: Right;    COLON SURGERY  2014         COLONOSCOPY N/A 02/03/2022    Procedure: COLONOSCOPY;  Surgeon: ASHTYN Melo MD;  Location: [facility] ENDO;  Service: Endoscopy;  Laterality: N/A;    CORRECTION OF HAMMER TOE Right 8/18/2023    Procedure: CORRECTION, HAMMER TOE;  Surgeon: Tacho Sanchez DPM;  Location: [facility] OR;   Service: Podiatry;  Laterality: Right;    DILATION AND CURETTAGE OF UTERUS  12/18/2012    complex hyperplasia, no atypia    FLEXIBLE SIGMOIDOSCOPY N/A 07/20/2022    Procedure: SIGMOIDOSCOPY, FLEXIBLE;  Surgeon: ASHTYN Melo MD;  Location: [facility] OR 2ND FLR;  Service: Colon and Rectal;  Laterality: N/A;    HEMORRHOID SURGERY  2014    HYSTERECTOMY      LAPAROSCOPIC SIGMOIDECTOMY Left 07/20/2022    Procedure: COLECTOMY, SIGMOID, LAPAROSCOPIC, ERAS low;  Surgeon: ASHTYN Melo MD;  Location: [facility] OR 2ND FLR;  Service: Colon and Rectal;  Laterality: Left;    MOBILIZATION OF SPLENIC FLEXURE N/A 07/20/2022    Procedure: MOBILIZATION, SPLENIC FLEXURE;  Surgeon: ASHTYN Melo MD;  Location: [facility] OR 2ND FLR;  Service: Colon and Rectal;  Laterality: N/A;    OOPHORECTOMY      PARTIAL HYSTERECTOMY  2013    for atypia    SMALL INTESTINE SURGERY  2017    THYROIDECTOMY, PARTIAL  2013    TONSILLECTOMY      TUBAL LIGATION         Family History:  family history includes Alcohol abuse in her cousin and maternal uncle; Breast cancer in her maternal grandmother; COPD in her mother; Cancer in her father and mother; Depression in her sister; Diabetes in her mother and paternal grandmother; Drug abuse in her maternal aunt; Emphysema in her mother; Heart disease (age of onset: 50) in her father; Hypertension in her paternal grandmother; Ovarian cancer in her maternal grandmother; Schizophrenia in her paternal aunt; Suicide in her brother.    Social History:  Social History     Socioeconomic History    Marital status:     Number of children: 2   Occupational History     Employer: [employer]   Tobacco Use    Smoking status: Never    Smokeless tobacco: Never   Substance and Sexual Activity    Alcohol use: Not Currently     Comment: socially    Drug use: No    Sexual activity: Yes     Partners: Male     Birth control/protection: See Surgical Hx   Other Topics Concern    Financial Status: Employed Yes      Comment: Dental Assistant and Estition    Caffeine Use: Substantial Yes    Firearms: Does patient have access to a firearm? 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 Futlon , GYN Dr Ruby                   Review of Systems:   Review of Systems   Constitutional:  Negative for fever and weight loss.   HENT:  Negative for congestion, hearing loss and sore throat.    Eyes:  Negative for blurred vision.   Respiratory:  Negative for cough and shortness of breath.    Cardiovascular:  Negative for chest pain, palpitations, claudication and leg swelling.   Gastrointestinal:  Negative for abdominal pain, constipation, diarrhea and heartburn.   Genitourinary:  Negative for dysuria.   Musculoskeletal:  Positive for neck pain. Negative for back pain and myalgias.   Skin:  Negative for rash.   Neurological:  Positive for tingling. Negative for focal weakness and headaches.   Psychiatric/Behavioral:  Negative for depression and suicidal ideas. The patient is not nervous/anxious.          Medications:  Outpatient Encounter Medications as of 10/9/2023   Medication Sig Note Dispense Refill    butalbital-acetaminophen-caffeine -40 mg (FIORICET, ESGIC) -40 mg per tablet Take 1 tablet by mouth every 6 (six) hours. 8/16/2023: TAKE IF NEEDED   12 tablet 5    co-enzyme Q-10 30 mg  capsule Take by mouth. 8/16/2023: HOLD AM OF PROCEDURE        cyclobenzaprine (FLEXERIL) 5 MG tablet Take 1 tablet (5 mg total) by mouth 3 (three) times daily as needed for Muscle spasms.  90 tablet 0    gabapentin (NEURONTIN) 300 MG capsule Take 1 capsule (300 mg total) by mouth 3 (three) times daily.  90 capsule 1    HYDROcodone-acetaminophen (NORCO) 7.5-325 mg per tablet Take 1 tablet by mouth every 6 (six) hours as needed for Pain.  21 tablet 0    oxyCODONE-acetaminophen (PERCOCET) 5-325 mg per tablet Take 1 tablet by mouth every 4 (four) hours as needed for Pain.  60 tablet 0    pantoprazole (PROTONIX) 40 MG tablet Take 1 tablet (40 mg total) by mouth once daily. 8/16/2023: TAKE AM OF PROCEDURE   30 tablet 11    progesterone (PROMETRIUM) 200 MG capsule Take 200 mg by mouth once daily.       traZODone (DESYREL) 100 MG tablet TAKE 1 TABLET BY MOUTH EVERY DAY IN THE EVENING  90 tablet 0     No facility-administered encounter medications on file as of 10/9/2023.        Allergies:  Review of patient's allergies indicates:  No Known Allergies      Physical Exam         Vitals:    10/09/23 0851   BP: 110/80   Pulse: 81   Resp: 18   Temp: 97 °F (36.1 °C)         Physical Exam  Constitutional:       Appearance: She is well-developed.   Eyes:      Pupils: Pupils are equal, round, and reactive to light.   Neck:      Thyroid: No thyromegaly.   Cardiovascular:      Rate and Rhythm: Normal rate.      Heart sounds: Normal heart sounds. No murmur heard.     No friction rub. No gallop.   Pulmonary:      Breath sounds: Normal breath sounds.   Abdominal:      General: Bowel sounds are normal.      Palpations: Abdomen is soft.   Musculoskeletal:         General: Normal range of motion.      Cervical back: Normal range of motion.   Lymphadenopathy:      Cervical: No cervical adenopathy.   Skin:     General: Skin is warm.      Findings: No rash.   Neurological:      Mental Status: She is alert and oriented to person, place, and  "time.      Cranial Nerves: No cranial nerve deficit.   Psychiatric:         Behavior: Behavior normal.          Laboratory:  CBC:  Recent Labs   Lab Result Units 08/03/23  1340   WBC K/uL 3.77*   RBC M/uL 3.89*   Hemoglobin g/dL 12.8   Hematocrit % 36.2*   Platelets K/uL 232   MCV fL 93   MCH pg 32.9*   MCHC g/dL 35.4     CMP:  Recent Labs   Lab Result Units 08/03/23  1340   Glucose mg/dL 126*   Calcium mg/dL 9.3   Albumin g/dL 4.1   Total Protein g/dL 6.8   Sodium mmol/L 136   Potassium mmol/L 4.4   CO2 mmol/L 24   Chloride mmol/L 103   BUN mg/dL 15   Alkaline Phosphatase U/L 66   ALT U/L 55*   AST U/L 47*   Total Bilirubin mg/dL 0.3     URINALYSIS:  No results for input(s): "COLORU", "CLARITYU", "SPECGRAV", "PHUR", "PROTEINUA", "GLUCOSEU", "BILIRUBINCON", "BLOODU", "WBCU", "RBCU", "BACTERIA", "MUCUS", "NITRITE", "LEUKOCYTESUR", "UROBILINOGEN", "HYALINECASTS" in the last 2160 hours.   LIPIDS:  No results for input(s): "TSH", "HDL", "CHOL", "TRIG", "LDLCALC", "CHOLHDL", "NONHDLCHOL", "TOTALCHOLEST" in the last 2160 hours.  TSH:  No results for input(s): "TSH" in the last 2160 hours.  A1C:  No results for input(s): "HGBA1C" in the last 2160 hours.    Radiology:        Assessment:     Socorro Huang is a 48 y.o.female with:    Cervical radiculopathy    Preop exam for internal medicine  -     CBC Auto Differential; Future; Expected date: 10/09/2023  -     Comprehensive Metabolic Panel; Future; Expected date: 10/09/2023  -     PROTIME-INR; Future; Expected date: 10/09/2023  -     APTT; Future; Expected date: 10/09/2023  -     X-Ray Chest PA And Lateral; Future; Expected date: 10/09/2023  -     IN OFFICE EKG 12-LEAD (to Muse)  -     Urinalysis; Future; Expected date: 10/09/2023          Plan:     Problem List Items Addressed This Visit          Neuro    Cervical radiculopathy - Primary    Overview     For corrective procedure with Dr Israel bustillos             Other    Preop exam for internal medicine    Overview     Good " functional capacity can go up flight of steps with no de anda or chest pain   Cleared for procedure with low risk cardiac complications             As above, continue current medications and maintain follow up with specialists.  Return to clinic as scheduled       Frederick W Dantagnan [facility]

## 2023-10-11 ENCOUNTER — ANESTHESIA EVENT (OUTPATIENT)
Dept: SURGERY | Facility: HOSPITAL | Age: 48
End: 2023-10-11
Payer: COMMERCIAL

## 2023-10-11 NOTE — PRE-PROCEDURE INSTRUCTIONS
The following was discussed with pt via phone and sent to pt portal. Pt verbalized understanding.    Dear Socorro ,    You are scheduled for a procedure with Dr. Wood on 10/12/2023. Your scheduled arrival time is 5:15am.  This arrival time is roughly 2 hours before your anticipated procedure time to allow sufficient time for pre-op.  Please wear comfortable clothes.  This procedure will take place at the [facility] at the corner of [facility] and [facility].  [facility] [place] [facility] to [facility].  The address is:    [address] [place] [address].  [place] [id number]    After entering the building, you will proceed to the second floor where you can check in with registration. You should take any medications that you routinely take for blood pressure (other than those listed below), heart medications, thyroid, cholesterol, etc.     If you wear contact lenses, please wear glasses to your procedure.    Your fasting instructions are as follow:  Nothing to eat after midnight tonight. You may drink clear liquids (water, apple juice, Gatorade) up until 2 hours prior to your arrival time. You MUST have a responsible adult to bring you home.      This evening (10/11/2023) and tomorrow morning, please hold the following medications:  -Aspirin and Aspirin-containing products (Goody's powder, Excedrin)  -NSAIDs (Advil, Ibuprofen, Aleve, Diclofenac)  -Vitamins/Supplements  -Herbal remedies/Teas  -Stimulants (Adderall, Vyvanse, Adipex)  -Diabetic medication (Please bring with you day of procedure)  -CO Q10 ENZYME  -PROGESTERONE    -May take Tylenol      This evening (10/11/2023) and tomorrow morning, take a shower using antibacterial soap (ex: Hibiclens or Dial antibacterial soap). DO NOT apply deodorant, lotion, cologne, or anything else to the skin. Wear loose, comfortable fitting clothing. Do not wear jewelry or bring any valuables with you. If you wear dentures or contacts, please bring  your case with you or leave them at home. Use and bring any inhalers that you may have.    If you have any procedure-specific questions, please call your surgeon's office. Any other questions, don't hesitate to call at [phone].    Thanks,  HEBERT Powers  Pre-Admit Dept OC

## 2023-10-12 ENCOUNTER — ANESTHESIA (OUTPATIENT)
Dept: SURGERY | Facility: HOSPITAL | Age: 48
End: 2023-10-12
Payer: COMMERCIAL

## 2023-10-12 ENCOUNTER — HOSPITAL ENCOUNTER (OUTPATIENT)
Facility: HOSPITAL | Age: 48
Discharge: HOME | End: 2023-10-12
Attending: NEUROLOGICAL SURGERY | Admitting: NEUROLOGICAL SURGERY
Payer: COMMERCIAL

## 2023-10-12 VITALS
TEMPERATURE: 98 F | HEIGHT: 67 IN | HEART RATE: 80 BPM | DIASTOLIC BLOOD PRESSURE: 83 MMHG | BODY MASS INDEX: 25.43 KG/M2 | WEIGHT: 162 LBS | OXYGEN SATURATION: 97 % | RESPIRATION RATE: 18 BRPM | SYSTOLIC BLOOD PRESSURE: 132 MMHG

## 2023-10-12 DIAGNOSIS — M50.20 CERVICAL DISC HERNIATION: ICD-10-CM

## 2023-10-12 PROCEDURE — 36000711: Performed by: NEUROLOGICAL SURGERY

## 2023-10-12 PROCEDURE — 94761 N-INVAS EAR/PLS OXIMETRY MLT: CPT

## 2023-10-12 PROCEDURE — D9220A PRA ANESTHESIA: Mod: ,,, | Performed by: NURSE ANESTHETIST, CERTIFIED REGISTERED

## 2023-10-12 PROCEDURE — 25000003 PHARM REV CODE 250: Performed by: NEUROLOGICAL SURGERY

## 2023-10-12 PROCEDURE — 71000015 HC POSTOP RECOV 1ST HR: Performed by: NEUROLOGICAL SURGERY

## 2023-10-12 PROCEDURE — 36000710: Performed by: NEUROLOGICAL SURGERY

## 2023-10-12 PROCEDURE — 63600175 PHARM REV CODE 636 W HCPCS: Performed by: NEUROLOGICAL SURGERY

## 2023-10-12 PROCEDURE — 25000003 PHARM REV CODE 250: Performed by: ANESTHESIOLOGY

## 2023-10-12 PROCEDURE — C1713 ANCHOR/SCREW BN/BN,TIS/BN: HCPCS | Performed by: NEUROLOGICAL SURGERY

## 2023-10-12 PROCEDURE — 22858 TOT DISC ARTHRP 2ND LVL CRV: CPT | Mod: ,,, | Performed by: NEUROLOGICAL SURGERY

## 2023-10-12 PROCEDURE — 27201423 OPTIME MED/SURG SUP & DEVICES STERILE SUPPLY: Performed by: NEUROLOGICAL SURGERY

## 2023-10-12 PROCEDURE — 25000003 PHARM REV CODE 250: Performed by: NURSE ANESTHETIST, CERTIFIED REGISTERED

## 2023-10-12 PROCEDURE — 37000008 HC ANESTHESIA 1ST 15 MINUTES: Performed by: NEUROLOGICAL SURGERY

## 2023-10-12 PROCEDURE — D9220A PRA ANESTHESIA: ICD-10-PCS | Mod: ,,, | Performed by: NURSE ANESTHETIST, CERTIFIED REGISTERED

## 2023-10-12 PROCEDURE — 27800903 OPTIME MED/SURG SUP & DEVICES OTHER IMPLANTS: Performed by: NEUROLOGICAL SURGERY

## 2023-10-12 PROCEDURE — 63600175 PHARM REV CODE 636 W HCPCS: Performed by: ANESTHESIOLOGY

## 2023-10-12 PROCEDURE — 71000016 HC POSTOP RECOV ADDL HR: Performed by: NEUROLOGICAL SURGERY

## 2023-10-12 PROCEDURE — 37000009 HC ANESTHESIA EA ADD 15 MINS: Performed by: NEUROLOGICAL SURGERY

## 2023-10-12 PROCEDURE — 22856 PR TOTAL DISC ARTHROPLASTY, CERVICAL, SINGLE: ICD-10-PCS | Mod: ,,, | Performed by: NEUROLOGICAL SURGERY

## 2023-10-12 PROCEDURE — 63600175 PHARM REV CODE 636 W HCPCS: Performed by: NURSE ANESTHETIST, CERTIFIED REGISTERED

## 2023-10-12 PROCEDURE — 99900035 HC TECH TIME PER 15 MIN (STAT)

## 2023-10-12 PROCEDURE — 71000039 HC RECOVERY, EACH ADD'L HOUR: Performed by: NEUROLOGICAL SURGERY

## 2023-10-12 PROCEDURE — 71000033 HC RECOVERY, INTIAL HOUR: Performed by: NEUROLOGICAL SURGERY

## 2023-10-12 PROCEDURE — 22858 PR TOT DISC ARTHROPLSTY INCL DISCECTOMY W/END PLATE PREP SECOND LVL: ICD-10-PCS | Mod: ,,, | Performed by: NEUROLOGICAL SURGERY

## 2023-10-12 PROCEDURE — 22856 TOT DISC ARTHRP 1NTRSPC CRV: CPT | Mod: ,,, | Performed by: NEUROLOGICAL SURGERY

## 2023-10-12 DEVICE — IMPLANTABLE DEVICE: Type: IMPLANTABLE DEVICE | Site: NECK | Status: FUNCTIONAL

## 2023-10-12 RX ORDER — SCOLOPAMINE TRANSDERMAL SYSTEM 1 MG/1
1 PATCH, EXTENDED RELEASE TRANSDERMAL
Status: DISCONTINUED | OUTPATIENT
Start: 2023-10-12 | End: 2023-10-12 | Stop reason: HOSPADM

## 2023-10-12 RX ORDER — ACETAMINOPHEN 10 MG/ML
INJECTION, SOLUTION INTRAVENOUS
Status: DISCONTINUED | OUTPATIENT
Start: 2023-10-12 | End: 2023-10-12

## 2023-10-12 RX ORDER — VANCOMYCIN HYDROCHLORIDE 1 G/20ML
INJECTION, POWDER, LYOPHILIZED, FOR SOLUTION INTRAVENOUS
Status: DISCONTINUED | OUTPATIENT
Start: 2023-10-12 | End: 2023-10-12 | Stop reason: HOSPADM

## 2023-10-12 RX ORDER — DEXAMETHASONE SODIUM PHOSPHATE 4 MG/ML
INJECTION, SOLUTION INTRA-ARTICULAR; INTRALESIONAL; INTRAMUSCULAR; INTRAVENOUS; SOFT TISSUE
Status: DISCONTINUED | OUTPATIENT
Start: 2023-10-12 | End: 2023-10-12

## 2023-10-12 RX ORDER — HYDROMORPHONE HYDROCHLORIDE 1 MG/ML
0.2 INJECTION, SOLUTION INTRAMUSCULAR; INTRAVENOUS; SUBCUTANEOUS EVERY 5 MIN PRN
Status: DISCONTINUED | OUTPATIENT
Start: 2023-10-12 | End: 2023-10-12 | Stop reason: HOSPADM

## 2023-10-12 RX ORDER — OXYCODONE HYDROCHLORIDE 5 MG/1
5 TABLET ORAL
Status: DISCONTINUED | OUTPATIENT
Start: 2023-10-12 | End: 2023-10-12 | Stop reason: HOSPADM

## 2023-10-12 RX ORDER — SODIUM CHLORIDE 0.9 % (FLUSH) 0.9 %
3 SYRINGE (ML) INJECTION
Status: DISCONTINUED | OUTPATIENT
Start: 2023-10-12 | End: 2023-10-12 | Stop reason: HOSPADM

## 2023-10-12 RX ORDER — ROCURONIUM BROMIDE 10 MG/ML
INJECTION, SOLUTION INTRAVENOUS
Status: DISCONTINUED | OUTPATIENT
Start: 2023-10-12 | End: 2023-10-12

## 2023-10-12 RX ORDER — ONDANSETRON 2 MG/ML
INJECTION INTRAMUSCULAR; INTRAVENOUS
Status: DISCONTINUED | OUTPATIENT
Start: 2023-10-12 | End: 2023-10-12

## 2023-10-12 RX ORDER — MIDAZOLAM HYDROCHLORIDE 1 MG/ML
INJECTION INTRAMUSCULAR; INTRAVENOUS
Status: DISCONTINUED | OUTPATIENT
Start: 2023-10-12 | End: 2023-10-12

## 2023-10-12 RX ORDER — CEFAZOLIN SODIUM 1 G/3ML
INJECTION, POWDER, FOR SOLUTION INTRAMUSCULAR; INTRAVENOUS
Status: DISCONTINUED | OUTPATIENT
Start: 2023-10-12 | End: 2023-10-12

## 2023-10-12 RX ORDER — LIDOCAINE HYDROCHLORIDE 10 MG/ML
1 INJECTION, SOLUTION EPIDURAL; INFILTRATION; INTRACAUDAL; PERINEURAL ONCE AS NEEDED
Status: DISCONTINUED | OUTPATIENT
Start: 2023-10-12 | End: 2023-10-12 | Stop reason: HOSPADM

## 2023-10-12 RX ORDER — MEPERIDINE HYDROCHLORIDE 50 MG/ML
12.5 INJECTION INTRAMUSCULAR; INTRAVENOUS; SUBCUTANEOUS ONCE AS NEEDED
Status: DISCONTINUED | OUTPATIENT
Start: 2023-10-12 | End: 2023-10-12 | Stop reason: HOSPADM

## 2023-10-12 RX ORDER — ONDANSETRON 2 MG/ML
4 INJECTION INTRAMUSCULAR; INTRAVENOUS ONCE AS NEEDED
Status: COMPLETED | OUTPATIENT
Start: 2023-10-12 | End: 2023-10-12

## 2023-10-12 RX ORDER — FENTANYL CITRATE 50 UG/ML
INJECTION, SOLUTION INTRAMUSCULAR; INTRAVENOUS
Status: DISCONTINUED | OUTPATIENT
Start: 2023-10-12 | End: 2023-10-12

## 2023-10-12 RX ORDER — OXYCODONE AND ACETAMINOPHEN 5; 325 MG/1; MG/1
1 TABLET ORAL EVERY 6 HOURS PRN
Qty: 28 TABLET | Refills: 0 | Status: SHIPPED | OUTPATIENT
Start: 2023-10-12 | End: 2024-01-02

## 2023-10-12 RX ORDER — LIDOCAINE HYDROCHLORIDE 20 MG/ML
INJECTION INTRAVENOUS
Status: DISCONTINUED | OUTPATIENT
Start: 2023-10-12 | End: 2023-10-12

## 2023-10-12 RX ORDER — KETAMINE HCL IN 0.9 % NACL 50 MG/5 ML
SYRINGE (ML) INTRAVENOUS
Status: DISCONTINUED | OUTPATIENT
Start: 2023-10-12 | End: 2023-10-12

## 2023-10-12 RX ORDER — PROPOFOL 10 MG/ML
VIAL (ML) INTRAVENOUS
Status: DISCONTINUED | OUTPATIENT
Start: 2023-10-12 | End: 2023-10-12

## 2023-10-12 RX ORDER — PROPOFOL 10 MG/ML
VIAL (ML) INTRAVENOUS CONTINUOUS PRN
Status: DISCONTINUED | OUTPATIENT
Start: 2023-10-12 | End: 2023-10-12

## 2023-10-12 RX ORDER — SODIUM CHLORIDE 9 MG/ML
INJECTION, SOLUTION INTRAVENOUS CONTINUOUS
Status: DISCONTINUED | OUTPATIENT
Start: 2023-10-12 | End: 2023-10-12 | Stop reason: HOSPADM

## 2023-10-12 RX ADMIN — HYDROMORPHONE HYDROCHLORIDE 0.2 MG: 1 INJECTION, SOLUTION INTRAMUSCULAR; INTRAVENOUS; SUBCUTANEOUS at 10:10

## 2023-10-12 RX ADMIN — PROPOFOL 100 MCG/KG/MIN: 10 INJECTION, EMULSION INTRAVENOUS at 07:10

## 2023-10-12 RX ADMIN — SUGAMMADEX 200 MG: 100 INJECTION, SOLUTION INTRAVENOUS at 09:10

## 2023-10-12 RX ADMIN — CEFAZOLIN 2 G: 330 INJECTION, POWDER, FOR SOLUTION INTRAMUSCULAR; INTRAVENOUS at 07:10

## 2023-10-12 RX ADMIN — SODIUM CHLORIDE: 0.9 INJECTION, SOLUTION INTRAVENOUS at 06:10

## 2023-10-12 RX ADMIN — SCOPALAMINE 1 PATCH: 1 PATCH, EXTENDED RELEASE TRANSDERMAL at 06:10

## 2023-10-12 RX ADMIN — Medication 25 MG: at 07:10

## 2023-10-12 RX ADMIN — FENTANYL CITRATE 50 MCG: 50 INJECTION, SOLUTION INTRAMUSCULAR; INTRAVENOUS at 09:10

## 2023-10-12 RX ADMIN — ROCURONIUM BROMIDE 50 MG: 10 INJECTION, SOLUTION INTRAVENOUS at 07:10

## 2023-10-12 RX ADMIN — LIDOCAINE HYDROCHLORIDE 100 MG: 20 INJECTION INTRAVENOUS at 07:10

## 2023-10-12 RX ADMIN — ONDANSETRON 4 MG: 2 INJECTION INTRAMUSCULAR; INTRAVENOUS at 07:10

## 2023-10-12 RX ADMIN — REMIFENTANIL HYDROCHLORIDE 0.2 MCG/KG/MIN: 1 INJECTION, POWDER, LYOPHILIZED, FOR SOLUTION INTRAVENOUS at 07:10

## 2023-10-12 RX ADMIN — ACETAMINOPHEN 1000 MG: 10 INJECTION INTRAVENOUS at 08:10

## 2023-10-12 RX ADMIN — Medication 10 MG: at 08:10

## 2023-10-12 RX ADMIN — PROPOFOL 200 MG: 10 INJECTION, EMULSION INTRAVENOUS at 07:10

## 2023-10-12 RX ADMIN — ONDANSETRON 4 MG: 2 INJECTION INTRAMUSCULAR; INTRAVENOUS at 10:10

## 2023-10-12 RX ADMIN — HYDROMORPHONE HYDROCHLORIDE 0.2 MG: 1 INJECTION, SOLUTION INTRAMUSCULAR; INTRAVENOUS; SUBCUTANEOUS at 09:10

## 2023-10-12 RX ADMIN — OXYCODONE 5 MG: 5 TABLET ORAL at 10:10

## 2023-10-12 RX ADMIN — MIDAZOLAM HYDROCHLORIDE 2 MG: 1 INJECTION INTRAMUSCULAR; INTRAVENOUS at 06:10

## 2023-10-12 RX ADMIN — DEXAMETHASONE SODIUM PHOSPHATE 8 MG: 4 INJECTION INTRA-ARTICULAR; INTRALESIONAL; INTRAMUSCULAR; INTRAVENOUS; SOFT TISSUE at 07:10

## 2023-10-12 RX ADMIN — FENTANYL CITRATE 50 MCG: 50 INJECTION, SOLUTION INTRAMUSCULAR; INTRAVENOUS at 07:10

## 2023-10-12 NOTE — ANESTHESIA PROCEDURE NOTES
Intubation    Date/Time: 10/12/2023 7:10 AM    Performed by: Estephanie Goncalves CRNA  Authorized by: Estephanie Goncalves CRNA    Intubation:     Induction:  Intravenous    Intubated:  Postinduction    Mask Ventilation:  Easy mask    Attempts:  1    Attempted By:  CRNA    Method of Intubation:  Video laryngoscopy    Blade:  Gomez 3    Laryngeal View Grade: Grade I - full view of cords      Difficult Airway Encountered?: No      Complications:  None    Airway Device:  Oral endotracheal tube    Airway Device Size:  7.0    Style/Cuff Inflation:  Cuffed (inflated to minimal occlusive pressure)    Tube secured:  21    Secured at:  The lips    Placement Verified By:  Capnometry    Complicating Factors:  None    Findings Post-Intubation:  BS equal bilateral and atraumatic/condition of teeth unchanged

## 2023-10-12 NOTE — ANESTHESIA PREPROCEDURE EVALUATION
10/12/2023  Socorro Huang is a 48 y.o., female.      Pre-op Assessment    I have reviewed the Patient Summary Reports.     I have reviewed the Nursing Notes.    I have reviewed the Medications.     Review of Systems  Anesthesia Hx:  Denies Family Hx of Anesthesia complications.  Personal Hx of Anesthesia complications, Post-Operative Nausea/Vomiting        Patient Active Problem List   Diagnosis    S/P hysterectomy    History of diverticulitis    Pelvic pain syndrome    Dizziness    Anxiety    Gastroesophageal reflux disease without esophagitis    Hypothyroidism    Acute diverticulitis    Insomnia    Preop exam for internal medicine    Need for influenza vaccination    Lymphopenia    Chronic migraine w/o aura w/o status migrainosus, not intractable    Neck pain    Cervical spondylosis    Cervical radiculopathy       Past Medical History:   Diagnosis Date    Abnormal Pap smear     Anxiety     celexa & prozac didn't help much    Constipation - functional     Headache(784.0)     Hemorrhoid     Leukopenia     benign, evaluated in [facility] y 2000    Menorrhagia     PONV (postoperative nausea and vomiting)     Strain of neck muscle     tx with PT at [facility] in past       ECHO: No results found for this or any previous visit.      Body mass index is 25.37 kg/m².    Tobacco Use: Low Risk  (10/12/2023)    Patient History     Smoking Tobacco Use: Never     Smokeless Tobacco Use: Never     Passive Exposure: Not on file       Social History     Substance and Sexual Activity   Drug Use No        Alcohol Use: Not on file       Review of patient's allergies indicates:  No Known Allergies      Airway:  No value filed.     Physical Exam  General: Well nourished, Cooperative, Alert and Oriented    Airway:  Mallampati: I / I  Mouth Opening: Normal  TM Distance: Normal  Neck ROM: Normal  ROM    Dental:  Intact        Anesthesia Plan  Type of Anesthesia, risks & benefits discussed:    Anesthesia Type: Gen ETT  Intra-op Monitoring Plan: Standard ASA Monitors  Post Op Pain Control Plan: multimodal analgesia  Induction:  IV  Airway Plan: Video  Informed Consent: Informed consent signed with the Patient and all parties understand the risks and agree with anesthesia plan.  All questions answered.   ASA Score: 2  Day of Surgery Review of History & Physical: H&P Update referred to the surgeon/provider.    Ready For Surgery From Anesthesia Perspective.     .

## 2023-10-12 NOTE — DISCHARGE SUMMARY
[facility]  Discharge Note  Short Stay    Procedure(s) (LRB):  ARTHROPLASTY,SPINE,CERVICAL (N/A)      OUTCOME: Patient tolerated treatment/procedure well without complication and is now ready for discharge.    DISPOSITION: Home or Self Care    FINAL DIAGNOSIS:  cervical disc herniation with radiculopathy    FOLLOWUP: In clinic    DISCHARGE INSTRUCTIONS:  See instructions     TIME SPENT ON DISCHARGE: 20 minutes

## 2023-10-12 NOTE — OP NOTE
Date of Procedure: 10/12/2023       Pre-Operative Diagnosis: Cervical radiculopathy [M54.12]  HNP (herniated nucleus pulposus), cervical [M50.20]    Post-Operative Diagnosis: Post-Op Diagnosis Codes:     * Cervical radiculopathy [M54.12]     * HNP (herniated nucleus pulposus), cervical [M50.20]    Anesthesia: General    Procedures performed:  Total disc arthroplasty C5/6  Total disc arthroplasty C6/7     Surgeon: Amador Wood MD    Assistant:: none    Indication for Procedure: 49 yo woman with intractable pain related to right C6/7 HNP, refractory to conservative treatment.  Additionally, she had a history of C5/6 disc herniation and radiculopathy with significant residual chronic disc material in the left C5/6 neuroforamen.     Operative Note:    After informed consent was obtained and placed in the medical record, the patient was taken to the operating room.  General anesthesia was established.  Patient was positioned supine, with traction on the shoulders to facilitate visualization of the cervical spine.  A skin incision was marked over the C6/7 disc space, then a sterile prep and drape was performed in the usual fashion.  The skin was infiltrated with local anesthetic.  An incision was made with a 10 blade knife.  Bleeding was controlled with bipolar electrocautery.  Blunt dissection was used in the subdermal layer to expose the platysma.  The medial border of the sternocleidomastoid was palpated.  The platysma was divided longitudinally over this medial border.  A combination of blunt and sharp dissection was used to develop a plane between the carotid sheath and the tracheoesophageal structures.  Carotid pulse was palpated during the dissection and kept lateral.  Once the plane was fully developed, hand-held retractors were used to keep the tracheoesophageal structures protected.  The prevertebral fascia was divided sharply over the cervical spine.  We used a Kittner to bluntly dissect the anterior spine  and palpate for the disc space.  The disc space was cannulated with a spinal needle, then fluoroscopy was used to confirm the C5/6 space.  We used bipolar on the medial border of the longus colli, then sharply divided the attachments and reflected the longus colli off the disc space.  Self-retaining retractors were used to keep these muscles lateral and protected.  We then brought in the operating microscope and used a 15 blade to sharply incise the annulus of the disc.  Anterior osteophytes were removed Leksell and Kerrison rongeurs.  Curettes were used to strip the bony endplates of the soft disc, and pituitary rongeur were used to remove disc fragments.  A drill was used to contour the medial uncovertebral joints and posterior osteophytes to accomadate the implant.  Curettes and nerve hooks were used to strip the posterior longitudinal ligament from the vertebral body and exposed the epidural space.  Kerrison punches were used to remove the posterior longitudinal ligament and posterior bony osteophytes from the spinal canal.  A nerve hook was used to sweep in the epidural space for free disc fragments and these fragments were removed with pituitary rongeurs.  The bilateral neuroforamen were palpated with a nerve hook and cleaned of bony osteophytes with Kerrison punch.  We then sized for an implant, and decided on [id number] Mobi-C, and placed this under fluoro without difficulty.  We turned our attention to the next caudal space and replaced the retractors.  The discectomy was performed in an identical fashion.  There was a large amount of disc material delivered from the right C6/7 foramen.  The bones were prepped for the implant and the same size Mobi-C implant was placed without difficulty under fluoro.  AP and lateral films were obtained to confirm the placement of the hardware.  The incision was then irrigated with antibiotic irrigation.  We inspected for hemostasis which was obtained meticulously with  bipolar.  The self-retaining retractors were removed, and the platysma was reapproximated with 3-0 Vicryl sutures.  The dermal layer was closed with 3-0 Vicryl sutures.  The skin was closed with a running subcuticular Monocryl 4-0.  The incision was covered with Dermabond and a sterile dressing.  All sponge, needles and instrument counts were correct at the end of the procedure.        EBL: 25 ml

## 2023-10-12 NOTE — PLAN OF CARE
Patient up and ambulated to restroom with difficulty.  She reports no numbness or tingling of arms.  She states that she feels stronger than she thought she would.  Pain is tolerable.  Returned to PACU and up in chair in [place] [address].    Will continue to monitor.  Discharge time 1345.

## 2023-10-12 NOTE — ANESTHESIA POSTPROCEDURE EVALUATION
Anesthesia Post Evaluation    Patient: Socorro Huang    Procedure(s) Performed: Procedure(s) (LRB):  ARTHROPLASTY,SPINE,CERVICAL (N/A)    Final Anesthesia Type: general      Patient location during evaluation: PACU  Patient participation: Yes- Able to Participate  Level of consciousness: awake and alert  Post-procedure vital signs: reviewed and stable  Pain management: adequate  Airway patency: patent    PONV status at discharge: No PONV  Anesthetic complications: no      Cardiovascular status: stable  Respiratory status: spontaneous ventilation  Hydration status: euvolemic  Follow-up not needed.          Vitals Value Taken Time   /79 10/12/23 1231   Temp 36.7 °C (98.1 °F) 10/12/23 0946   Pulse 82 10/12/23 1242   Resp 18 10/12/23 1242   SpO2 97 % 10/12/23 1242   Vitals shown include unvalidated device data.      Event Time   Out of Recovery 10:54:00         Pain/Delaney Score: Pain Rating Prior to Med Admin: 6 (10/12/2023 10:54 AM)  Delaney Score: 10 (10/12/2023 11:35 AM)

## 2023-10-12 NOTE — DISCHARGE INSTRUCTIONS
Please follow ONLY the instructions that are checked below.    Activity Restrictions:  [x]  Return to work will be determined on an individual basis.  [x]  No lifting greater than 10 pounds.  [x]  Avoid bending and twisting the area of your surgery more than 45 degrees from neutral position in any direction.  [x]  No driving or operating machinery:  [x]  until cleared by your surgeon.  [x]  while taking narcotic pain medications or muscle relaxants.    [x]  No brace/collar required    [x]  Increase ambulation over the next 2 weeks so that you are walking 2 miles per day at 2 weeks post-operatively.  [x]  Make sure to take two dedicated 5-10 minute walks per day, along with short walks every 1-2 hours, even if just to walk to the restroom and back.   [x]  Walk on paved surfaces only. It is okay to walk up and down stairs while holding onto a side rail.  [x]  No sexual activity for 2-3 weeks.    Discharge Medication/Follow-up:  [x]  Please refer to discharge medication reconciliation form.  [x]  For the first week after surgery: Check your blood pressure before taking any blood pressure medications at home. If BP is below 120/80, do not take your blood pressure medication.   [x]  Do not take ANY non-steroidal anti-inflammatory drugs (NSAIDS), including the following: ibuprofen, naprosyn, Aleve, Advil, Indocin, Mobic, or Celebrex for:  [x]  1 weeks  [x]  Prescriptions for appropriate medication will be given upon discharge.   [x]  Pain control: Percocet for severe pain. Over the counter tylenol for mild pain   [x]  Muscle relaxer: flexeril for muscle spasms. Can take every 8 hours if needed, but most helpful at bedtime.    [x]  Take docusate (Colace 100 mg) and miralax daily until bowel activity returns to senait. You can get this over the counter.  [x]  If you have not had a bowel movement by day 3 after surgery, try a fleet's enema or suppository, both over the counter. Call neurosurgery if you have not had a bowel  movement by day 5 after surgery.   [x]  Follow-up appointment:  [x]  10-14 days post-op for wound check by PA/nurse  [x]  4-6 weeks with MD    Wound Care:  [x]  Remove dressing or bandaid in  3  days.  [x]  No bandage required once removed. Keep your incision open to the air.  [x]  You may shower on the 3rd day after your surgery. Have the force of water hit you opposite from the incision. Pat the incision dry after your shower; do not scrub the incision. Do not use Hibiclens after surgery.  [x]  You wound is closed with one of the following: skin glue, steri strips, OR staples. Allow skin glue/steri strips to fall off on their own over time (if applicable). If you have staples, these will be removed at your clinic appt in 2 weeks.   [x]  You cannot take a bath until 8 weeks after surgery.    Call your doctor or go to the Emergency Room for any signs of infection, including: increased redness, drainage, pain, or fever (temperature ?101.5 for 24 hours). Call your doctor or go to the Emergency Room if there are any localized neurological changes; problems with speech, vision, numbness, tingling, weakness, or severe headache; or for other concerns.    Special Instructions:  [x]  No use of tobacco products.  [x]  Diet: Please eat a regular diet as tolerated.  []  Other diet:              Specific physician instructions:           Physicians need 3 days' notice for pain medicine to be refilled. Pain medicine will only be refilled between 8 AM and 5 PM, Monday through Friday, due to Food and Drug Administration regulation of documentation.    If you have any questions about this form, please call [phone].

## 2023-10-12 NOTE — PLAN OF CARE
Chart reviewed. Preop nursing care completed per orders. Safe surgery checklist complete. Pt denies any open wounds cuts or sores.Pt denies any metal in body.Family at bedside and belongings in locker 4. Waiting for H&P update, anesthesia consent, admission order prior to surgery. Pt AAOX3, VSS on room air. Bed locked in lowest position, Call light within reach. Pt denies any needs at this time. Will continue to monitor.

## 2023-10-12 NOTE — PLAN OF CARE
Patient received from OR via stretcher.  AAOx3. ROSALBA x 4 without difficulty.  Hand strength and grasp equal bilaterally.  No airway issues.  Reports increasing pain from incision and into posterior neck.  See MAR for administration/

## 2023-10-12 NOTE — PLAN OF CARE
Dr. Kemp by to see patient.  OK to elevate HOB, ok to sit in chair.  Patient will be held for 4 hours for observation.

## 2023-10-12 NOTE — TRANSFER OF CARE
"Anesthesia Transfer of Care Note    Patient: Socorro Huang    Procedure(s) Performed: Procedure(s) (LRB):  ARTHROPLASTY,SPINE,CERVICAL (N/A)    Patient location: PACU    Anesthesia Type: general    Transport from OR: Transported from OR on 6-10 L/min O2 by face mask with adequate spontaneous ventilation    Post pain: adequate analgesia    Post assessment: no apparent anesthetic complications and tolerated procedure well    Post vital signs: stable    Level of consciousness: alert and awake    Nausea/Vomiting: no nausea/vomiting    Complications: none    Transfer of care protocol was followed      Last vitals:   Visit Vitals  /68 (BP Location: Left arm, Patient Position: Lying)   Pulse 78   Temp 36.5 °C (97.7 °F) (Temporal)   Resp 16   Ht 5' 7" (1.702 m)   Wt 73.5 kg (162 lb)   LMP 05/05/2013   SpO2 98%   Breastfeeding No   BMI 25.37 kg/m²     "

## 2023-10-12 NOTE — PLAN OF CARE
Discharge instructions reviewed with patient and family member.  Plan of care reviewed along with discharge instructions.  Pt verbalizes understanding. Questions and concerns addressed. No deficits noted   PIV removed with catheter intact.  Patient denies any acute pain or discomfort. Escorted to vehicle via wheelchair.

## 2023-10-17 ENCOUNTER — PATIENT MESSAGE (OUTPATIENT)
Dept: NEUROSURGERY | Facility: CLINIC | Age: 48
End: 2023-10-17
Payer: COMMERCIAL

## 2023-10-17 DIAGNOSIS — Z98.890 S/P CERVICAL DISC REPLACEMENT: Primary | ICD-10-CM

## 2023-10-17 RX ORDER — METHYLPREDNISOLONE 4 MG/1
TABLET ORAL
Qty: 21 EACH | Refills: 0 | Status: SHIPPED | OUTPATIENT
Start: 2023-10-17 | End: 2023-11-07

## 2023-10-23 ENCOUNTER — PATIENT MESSAGE (OUTPATIENT)
Dept: NEUROSURGERY | Facility: CLINIC | Age: 48
End: 2023-10-23
Payer: COMMERCIAL

## 2023-10-25 ENCOUNTER — CLINICAL SUPPORT (OUTPATIENT)
Dept: REHABILITATION | Facility: HOSPITAL | Age: 48
End: 2023-10-25
Attending: NEUROLOGICAL SURGERY
Payer: COMMERCIAL

## 2023-10-25 DIAGNOSIS — R13.12 OROPHARYNGEAL DYSPHAGIA: ICD-10-CM

## 2023-10-25 DIAGNOSIS — Z98.890 S/P CERVICAL DISC REPLACEMENT: Primary | ICD-10-CM

## 2023-10-25 PROCEDURE — 92610 EVALUATE SWALLOWING FUNCTION: CPT

## 2023-10-25 NOTE — Clinical Note
Oropharyngeal dysphagia suspected based on clinical bedside evaluation, likely Acute related to status post anterior cervical spine surgery.  An instrumental swallow study via Fiberoptic Endoscopic Evaluation of the Swallow (FEES)  recommended to visualize oropharyngeal swallow function, determine least restrictive diet and appropriate treatment plan. Given prolonged wait time for outpatient instrumental studies, patient should continue current diet prior to instrumental study.

## 2023-10-27 ENCOUNTER — HOSPITAL ENCOUNTER (OUTPATIENT)
Dept: RADIOLOGY | Facility: HOSPITAL | Age: 48
Discharge: HOME | End: 2023-10-27
Attending: NEUROLOGICAL SURGERY
Payer: COMMERCIAL

## 2023-10-27 ENCOUNTER — OFFICE VISIT (OUTPATIENT)
Dept: NEUROSURGERY | Facility: CLINIC | Age: 48
End: 2023-10-27
Payer: COMMERCIAL

## 2023-10-27 VITALS
SYSTOLIC BLOOD PRESSURE: 132 MMHG | BODY MASS INDEX: 25.44 KG/M2 | DIASTOLIC BLOOD PRESSURE: 83 MMHG | HEART RATE: 80 BPM | HEIGHT: 67 IN | WEIGHT: 162.06 LBS

## 2023-10-27 DIAGNOSIS — M50.20 HNP (HERNIATED NUCLEUS PULPOSUS), CERVICAL: ICD-10-CM

## 2023-10-27 DIAGNOSIS — M54.12 CERVICAL RADICULOPATHY: ICD-10-CM

## 2023-10-27 DIAGNOSIS — Z98.890 S/P CERVICAL DISC REPLACEMENT: Primary | ICD-10-CM

## 2023-10-27 PROCEDURE — 72040 X-RAY EXAM NECK SPINE 2-3 VW: CPT | Mod: 26,,, | Performed by: RADIOLOGY

## 2023-10-27 PROCEDURE — 72040 X-RAY EXAM NECK SPINE 2-3 VW: CPT | Mod: TC,FY

## 2023-10-27 PROCEDURE — 99024 PR POST-OP FOLLOW-UP VISIT: ICD-10-PCS | Mod: S$GLB,,, | Performed by: NEUROLOGICAL SURGERY

## 2023-10-27 PROCEDURE — 99024 POSTOP FOLLOW-UP VISIT: CPT | Mod: S$GLB,,, | Performed by: NEUROLOGICAL SURGERY

## 2023-10-27 PROCEDURE — 72040 XR CERVICAL SPINE AP LATERAL: ICD-10-PCS | Mod: 26,,, | Performed by: RADIOLOGY

## 2023-10-27 NOTE — PATIENT INSTRUCTIONS
Aspiration Precautions    Aspiration means food or liquid goes into the airway instead of the stomach. This can lead to trouble breathing or lung infections such as pneumonia. Aspiration precautions are practices that help prevent these problem.    To ensure safe swallowing and prevent aspiration follow these steps:    1. SMALL BITES  2. SMALL SIPS  3. Alternate liquid and solid swallows  4. Ensure oral clearance after each bite  5. Sit upright (90 degrees) when eating and drinking and for 30 minutes after eating  6. Multiple swallows per bite/sip  7. Small, frequent meals throughout the day  8. Consume one pill at a time  9. Eat in a chair or sit upright while you eat. This will help prevent choking. Stay upright for 45 minutes to 1 hour after you eat or drink.  10. Do not eat or drink with a straw. Take small bites and chew well before you swallow.  11. Avoid distractions while you eat. Keep the radio and TV turned off during meals. Do not try to talk to others while you eat.  12. Make sure your dentures fit correctly. This will help you chew food into pieces that are easier to swallow.  13. Practice excellent and frequent oral care

## 2023-10-27 NOTE — PLAN OF CARE
"[facility]  Speech Therapy Evaluation -Dysphagia    Date: 10/25/2023     Name: Socorro Huang   MRN: [id number]    Therapy Diagnosis:   Encounter Diagnoses   Name Primary?    S/P cervical disc replacement Yes    Oropharyngeal dysphagia      Physician: Amador Wood MD  Physician Orders: Ambulatory Referral to Speech Therapy   Medical Diagnosis: S/P cervical disc replacement [Z98.890]    Visit # / Visits Authorized:  1 / 1   Date of Evaluation:  10/25/2023   Insurance Authorization Period: 10/17/2023 to 12/31/2023  Plan of Care Certification:    10/25/2023 to 12/9/2023      Time In:4:15   Time Out: 4:47   Total time: 32    Procedure   Swallow and Oral Function Evaluation      Precautions: Standard and Status post S/P cervical disc replacement  Subjective   Date of Onset: October 12, 2023; occurred immediately after cervical spine surgery  History of Current Condition:  Socorro Huang is a 48 y.o. female who presents to [facility] Out[facility] Speech Therapy for evaluation secondary to S/P cervical disc replacement [Z98.890]. Patient was referred to therapy by Amador Wood MD , which is the patient's neurosurgeon. Patient reports since her surgery she has difficulty swallowing all food consistencies, as well as medications and vitamins. She has become more mindful of her swallow, causing her stress due to fear of choking from her food "getting stuck". Patient noted that she has independently attempted various postural changes/strategies (e.g., head turn, chin-tuck, etc.) to see if it would help her the efficiency of her swallow, but reports that none have helped. Patient does have a history of reflux, but has been off of PPIs for 2 months (as recommended by doctor). Patient noted to have no swallowing difficulties prior to surgery.    Past Medical History: Socorro Huang  has a past medical history of Abnormal Pap smear, Anxiety, Constipation - functional, Headache(784.0), " "Hemorrhoid, Leukopenia, Menorrhagia, PONV (postoperative nausea and vomiting), and Strain of neck muscle.  Socorro Huang  has a past surgical history that includes Dilation and curettage of uterus (12/18/2012); Tonsillectomy; Partial hysterectomy (2013); Tubal ligation; Thyroidectomy, partial (2013); Colon surgery (2014); Hemorrhoid surgery (2014); Hysterectomy; Oophorectomy; Breast surgery; Breast reconstruction (2018); Colonoscopy (N/A, 02/03/2022); Laparoscopic sigmoidectomy (Left, 07/20/2022); Flexible sigmoidoscopy (N/A, 07/20/2022); Anoscopy (N/A, 07/20/2022); Mobilization of splenic flexure (N/A, 07/20/2022); Small intestine surgery (2017); Cheilectomy (Right, 8/18/2023); Correction of hammer toe (Right, 8/18/2023); and arthroplasty,spine,cervical (N/A, 10/12/2023).  Medical Hx and Allergies: Socorro has a current medication list which includes the following prescription(s): butalbital-acetaminophen-caffeine -40 mg, co-enzyme q-10, methylprednisolone, oxycodone-acetaminophen, pantoprazole, progesterone, semaglutide, and trazodone. Review of patient's allergies indicates:  No Known Allergies    Imaging:   X-Ray Chest PA and Lateral: 10/9/2023  "Impression:Stable normal chest.     Prior Therapy:  None  Occupation:  Dental office  Prior Level of Function: Within functional/normal limits   Current Level of Function: Difficulty swallowing solid foods- feels like food gets "stuck" in her throat when she swallows  Pain Scale: no pain indicated throughout session  Patient's Therapy Goals:  Make swallowing less stressful  Objective   Formal Assessment:  Clinical Swallow Exam/ Cranial Nerve Exam:  Cranial Nerve Examination    Cranial Nerve 5: Trigeminal Nerve  Motor Jaw Posture at rest: Closed  Mandible Elevation/Depression: WFL  Mandible lateralization: WFL  Abnormal movement: absent Interpretation: Intact bilaterally    Sensory Forehead: WFL  Cheek: WFL  Jaw: WFL  Facial Pain: None noted Interpretation: Intact " bilaterally      Cranial Nerve 7: Facial Nerve  Motor Facial Symmetry: WNL  Wrinkle Forehead: WFL  Close eyes tightly: WFL  Labial Protrusion: WFL  Labial Retraction: WFL  Buccal Strength with Labial Seal: WFL  Abnormal movement: absent Interpretation: Intact bilaterally    Sensory Formal testing not completed.       Cranial Nerves IX and X: Glossopharyngeal and Vagus Nerves  Motor Palatal Symmetry (Rest): WNL  Palatal Symmetry (Movement): discoordinated movement bilaterally  Cough: Perceptually strong  Voice Prior to PO intake: Clear  Resonance: Normal  Abnormal movement: present Interpretation:   Cannot rule our possible cranial nerve involvement     Cranial Nerve XII: Hypoglossal Nerve  Motor Tongue at rest: WNL  Lingual Protrusion: WNL  Lingual Protrusion against Resistance: WNL  Lingual Lateralization: WNL  Abnormal movement: absent Interpretation: Intact bilaterally      Other information:  Volitional Swallow: Able to palpate laryngeal rise  Mucosal Quality: No abnormal findings  Secretion Management: no overt deficits noted/observed  Dentition: Good condition for speech and mastication      In consideration of the interrelationships between body systems and swallowing, History was provided by patient and/or taken from chart review. The following are medical conditions present in the patient's history which can result in or be attributed to dysphagia:  Respiratory None noted in this category  Chronic cough 3/24/2023   Cardiovascular None noted in this category   Digestive GERD  History of diverticulitis   Infections  None noted in this category   Urinary None noted in this category   Endocrine Hypothyroidism   Nervous None noted in this category   Skeletal cervical spondylosis  Cervical spondylosis   Immune None noted in this category   Cancer Lymphopenia   Psychiatric  None noted in this category   Congenital  None noted in this category   Other None noted in this category     Laryngeal Function  Examination:  -Secretions: WNL  -Vocal Quality: clear    EAT-10 Score:    Eating Assessment Tool (EAT-10) is a questionnaire given to the patient to  her swallowing function.     Key: 0= no problem; 4= severe problem  1. My swallowing problem has caused me to lose weight. - 0  2. My swallowing problem interferes with my ability to go out for meals. - 0  3. Swallowing liquids takes extra effort. - 2  4. Swallowing solids takes extra effort. - 4  5. Swallowing pills takes extra effort. - 4  6. Swallowing is painful. - 4  7. The pleasure of eating is affected by my swallowing. - 3  8. When I swallow food sticks in my throat. - 4  9. I cough when I eat. -0  10. Swallowing is stressful. -4    Socorro ranked her swallowing function as a 25/40     Interpretation: Score of greater than 3 is indicative of a swallowing disorder (Jarad et al., 2008); higher scores correlate with increased penetration-aspiration  scale scores, and scores greater than 15 results in the patient being 2.2 times more likely to aspirate (Renetta et al., 2015)    References:   YASMIN Abraham., JOSH Neely., NENITA Cedillo., LUZ Acevedo., Efra, G. N., LUZ Rodriguez, & Lopez, SLY LAWS. (2008). Validity and reliability of the Eating Assessment Tool (EAT-10). Annals of Otology, Rhinology & Laryngology, 117(12), 919-924. https://doi.org/10.1177/[email or web address]  JOSH Jackson., ALEXI Abebe., LUZ Hernandez., Corky, MXiomara STEPHENSON., & Jarad, P. C. (2015). The ability of the 10-item eating assessment tool (EAT-10) to predict aspiration risk in persons with dysphagia. Annals of Otology, Rhinology & Laryngology, 124(5), 351-354. https://doi.org/10.1177/6117724034120749    PILL-5: (Melinda-Gertrudis et al., 2019)    Key: 0= never; 1= almost never; 2=sometimes; 3= almost always; 4=always  1. Pills stick in my throat- 4  2. Pills stick in my chest- 0  3. I have a fear of swallowing pills- 0  4. My problem swallowing pills interferes with my ability to take my medicine-  4  5. I cant take my pills without crushing, coating, or using other forms of assistance- 4  Total score: 12/20    Interpretation*: less than 6 results in minimal or no pill dysphagia; greater than or equal to 6 but less than 11 results in mild to moderate, may manage with pill lubricants*; and greater than or equal to 11 results in moderate to severe pill dysphagia, may require an altered formulation of medication    *Referral to a swallowing specialist is warranted in individuals with a PILL-5 is greater than or  equal to 6 to rule out obstructing pathology such an esophageal or cricopharyngeal stricture or web that may be easily treatable.    Reference: HANNA Gregory., SACHIN Sol., FARRAH Davis., SANTOS Clifford., JENNIFER Mathur, Corky, MXiomara STEPHENSON., & Jarad, P. C. (2019). Validation of the PILL-5: a 5-item patient reported outcome measure for pill dysphagia. Frontiers in surgery, 6, 43.https://doi.org/10.3389/fsurg.2019.61086    Reflux Severity Index:  The Reflux Severity Index (RSI) was completed to assess the possible presence and/or severity of laryngopharyngeal reflux symptoms and any relationship between this condition and the patient's dysphagia. A score of greater than or equal to 15 may indicate laryngopharyngeal reflux. Score 0-5 (0=no problem, 1=very mild, 2=moderate or slight, 3=moderate, 4=severe, & 5=problem as bad as it can be)  1. Hoarseness or a problem with your voice. 3  2. Clearing your throat. 5  3. Excess throat or mucous post-nasal drip. 4  4. Difficulty swallowing food, liquids or pills. 5  5. Coughing after you ate or after lying down. 0  6. Breathing difficulties or choking episodes. 0  7. Troublesome or annoying cough. 0  8. Sensations of something sticking in your throat. 5  9. Heartburn, chest pain, indigestion, or stomach acid coming up. 0    Patient completed with a result of  22/45      [place] Swallow Protocol:  The [place] Swallow Protocol was administered. The patient was alert and provided the  instructions prior to the beginning of the protocol. The patient consumed 3 oz before putting the cup down. Patient drank with consecutive swallows. Patient with no cough, no throat clear, no wet vocal quality. Patient without overt signs or symptoms of penetration/aspiration. Patient  passed the screener.    [place] Swallow Protocol dictates patient remain NPO if fail screener; (Beckier et al. 2014) however, as objective swallow assessment is not available for greater than a week, patient will remain on current diet until objective assessment is completed unless otherwise indicated.     PO Trials:   Patient presented with:   THIN:-3 oz water test, self regulated thin liquid via cup x 3  PUREE:- tsp bites of applesauce x2  DENTAL SOFT:- tsp bites of peaches x2  SOLID: -bite of davide cracker x2    *Thicken liquids were not used in this assessment. Janette (2018) reported that thickened liquids have no sound evidence as reducing risk of pneumonia in patients with dysphagia and can cause harm by increasing risk of dehydration. It also presents an increased risk of UTI, electrolyte imbalance, constipation, fecal impaction, cognitive impairment, functional decline, and even death (Langmore, 2002; Pb, 2016).  This supports the assertion that we should confirm a patient requires thickened liquids with an instrumental swallow study prior to recommending them.    Interpretation: The patient had no anterior loss of the bolus with adequate closure of the lips around the spoon. No residue remained in the oral cavity following the swallow.  Patient without overt clinical signs/symptoms of aspiration on any PO trials given. Patient presents with a possibly inefficient swallow as indicated by food getting stuck and hesitation when swallowing solids. Patient reported globus sensation on trials of food (all consistencies), but cleared with liquid wash. Contributing risk factors for dysphagia include status post anterior cervical  spine surgery. Patient with increased risk for silent aspiration given potential sensory deficits related to anterior cervical spinal surgery.    Oropharyngeal dysphagia suspected based on clinical bedside evaluation, likely Acute related to status post anterior cervical spine surgery.  An instrumental swallow study via Fiberoptic Endoscopic Evaluation of the Swallow (FEES)  recommended to visualize oropharyngeal swallow function, determine least restrictive diet and appropriate treatment plan. Given prolonged wait time for outpatient instrumental studies, patient should continue current diet prior to instrumental study.    Reference:   American Speech-Language-Hearing Association. (n.d.b). Adult dysphagia. (Practice Portal). https://www.mel.org/practice-portal/clinical-topics/adult-dysphagia/  FELIX Savage. (2018). Use of modified diets to prevent aspiration in oropharyngeal dysphagia: Is current practice justified?. BMC Geriatrics, 18(1), 1-10.  FELIX Cintron., JOSE Davila, YASMIN Mackenzie, & BRENT Jarrett. (2002). Predictors of aspiration pneumonia in nursing home residents.  Dysphagia, 17(4), 298-307.  JOSE Ambriz (2016). Best practices for dehydration prevention. Perspectives of the MEL Special Interest Groups - SIG 13, 1(2), 72- 80.    Functional Oral Intake Scale (FOIS)  The Functional Oral Intake Scale (FOIS) is an ordinal scale that is used to assess the current status and meaningful change in the oral intake. FOIS levels include:    TUBE DEPENDENT (levels 1-3) 1. No oral intake  2. Tube dependent with minimal/inconsistent oral intake  3. Tube supplements with consistent oral intake      TOTAL ORAL INTAKE (levels 4-7) 4. Total oral intake of a single consistency  5. Total oral intake of multiple consistencies requiring special preparation  6. Total oral intake with no special preparation, but must avoid specific foods or liquid items  7. Total oral intake with no restrictions     Patient is currently judged to be  at [facility] level 7.         Treatment   Total Treatment Time Separate from Evaluation: not applicable   No treatment performed secondary to time to complete evaluation.     Education provided:   -role of Speech Therapy, goals/plan of care, scheduling/cancellations, insurance limitations with patient  -Additional Education provided:   Aspiration precautions  Results of swallow study  Swallow physiology  Swallow exercises  Reflux precautions    Patient  expressed understanding.     Home Program: Not yet established.  Assessment     Socorro presents to [facility] status post medical diagnosis of S/P cervical disc replacement [Z98.890].     Interpretation of objective assessment:   The patient had no anterior loss of the bolus with adequate closure of the lips around the spoon. No residue remained in the oral cavity following the swallow.  Patient without overt clinical signs/symptoms of aspiration on any PO trials given. Patient presents with a possibly inefficient swallow as indicated by food getting stuck and hesitation when swallowing solids. Patient reported globus sensation on trials of food (all consistencies), but cleared with liquid wash. Contributing risk factors for dysphagia include status post anterior cervical spine surgery. Patient with increased risk for silent aspiration given potential sensory deficits related to anterior cervical spinal surgery.   Oropharyngeal dysphagia suspected based on clinical bedside evaluation, likely Acute related to status post anterior cervical spine surgery.  An instrumental swallow study via Fiberoptic Endoscopic Evaluation of the Swallow (FEES)  recommended to visualize oropharyngeal swallow function, determine least restrictive diet and appropriate treatment plan. Given prolonged wait time for outpatient instrumental studies, patient should continue current diet prior to instrumental study.    Demonstrates impairments including limitations as described in  the problem list.     Positive prognostic factors: Motivation  Negative prognostic factors: None  Barriers to therapy: No barriers to therapy identified.     Patient's spiritual, cultural, and educational needs considered and patient agreeable to plan of care and goals.    Patient will benefit from skilled therapy depending on results of Modified [place] Swallow Study or Fiberoptic Endoscopic Evaluation of Swallowing and a Voice evaluation to address his vocal quality.    Rehab Potential: good    Short Term Goals: (4 weeks) Current Progress:   Patient will participate in a Fiberoptic Endoscopic Evaluation of Swallowing. Specific goals will depend on results of swallowing test.    Progressing/ Not Met 10/25/2023   Established this date       Long Term Goals: (6 weeks) Current Progress:   Patient will maintain adequate hydration/nutrition with optimum safety and efficiency of swallowing function on PO intake without overt signs and symptoms of aspiration for regular diet level.    Established this date     2. Patient  will utilize compensatory strategies with optimum safety and efficiency of swallowing function on PO intake without overt signs and symptoms of aspiration for regular diet level.       Established this date         Plan     Recommended Treatment Plan:  Patient will participate in the [facility] program for speech therapy 1 times per week for 6 weeks to address her Swallow deficits, to educate patient and their family, and to participate in a home exercise program.    Other Recommendations:   Fiberoptic Endoscopic Evaluation of the Swallow (FEES)    Therapist's Name:   Liat Plascencia M.S., CF-SLP  Speech-Language Pathologist Resident  10/25/2023

## 2023-10-27 NOTE — PROGRESS NOTES
"  POV    2 weeks s/p cervical disc arthroplasty    She developed some "sticking" of food, was prescribed medrol, which has improved slightly.  She is seeing ST for this as well.  Denies any further radicular pains.  Voice is normal.  No swelling or trouble breathing    Incision is well healed  Motor 5/5    XR with stable implant appearance.     A/P:  Continue steroid and ST - anticipate continued improvement.  Keep scheduled postop appointments.   Not taking pain medication  "

## 2023-11-14 ENCOUNTER — PATIENT MESSAGE (OUTPATIENT)
Dept: ADMINISTRATIVE | Facility: HOSPITAL | Age: 48
End: 2023-11-14
Payer: COMMERCIAL

## 2023-11-14 NOTE — PROGRESS NOTES
Neurosurgery  History & Physical    SUBJECTIVE:     Chief Complaint:  Right arm pain.    History of Present Illness:  Ms. Alvarado is a 48-year-old female who is referred to me by Dr. Blake Adams.  Her past medical history is significant for anxiety, constipation, headache, hemorrhoids, leukopenia, menorrhagia, and neck muscle strain.  She complains of an approximately 2 week history of severe right arm pain.  This started when she woke up with a stiff neck.  A couple of days later this progressed to pain down her right arm into the 4th and 5th digits of her right hand.  She also complains of numbness down the same distribution of her right arm.  She denies any pain or numbness in her left arm.  She does have some weakness in her right arm.  Gabapentin and Flexeril have not given the patient any relief of her pain.  Any type of neck extension makes the pain worse.  Nothing makes the pain better.  She is not tried physical therapy.  She did have a C7-T1 interlaminar epidural steroid injection which did not significantly help her pain.       Review of patient's allergies indicates:  No Known Allergies    Current Outpatient Medications   Medication Sig Dispense Refill    butalbital-acetaminophen-caffeine -40 mg (FIORICET, ESGIC) -40 mg per tablet Take 1 tablet by mouth every 6 (six) hours. 12 tablet 5    co-enzyme Q-10 30 mg capsule Take by mouth.      progesterone (PROMETRIUM) 200 MG capsule Take 200 mg by mouth once daily.      traZODone (DESYREL) 100 MG tablet TAKE 1 TABLET BY MOUTH EVERY DAY IN THE EVENING 90 tablet 0    oxyCODONE-acetaminophen (PERCOCET) 5-325 mg per tablet Take 1 tablet by mouth every 6 (six) hours as needed for Pain. 28 tablet 0    pantoprazole (PROTONIX) 40 MG tablet Take 1 tablet (40 mg total) by mouth once daily. 30 tablet 11    SEMAGLUTIDE SUBQ Inject 0.25 mg into the skin once.       No current facility-administered medications for this visit.       Past Medical History:    Diagnosis Date    Abnormal Pap smear     Anxiety     celexa & prozac didn't help much    Constipation - functional     Headache(784.0)     Hemorrhoid     Leukopenia     benign, evaluated in [facility] y 2000    Menorrhagia     PONV (postoperative nausea and vomiting)     Strain of neck muscle     tx with PT at [facility] in past     Past Surgical History:   Procedure Laterality Date    ANOSCOPY N/A 07/20/2022    Procedure: ANOSCOPY;  Surgeon: ASHTYN Melo MD;  [facility];  Service: Colon and Rectal;  Laterality: N/A;    ARTHROPLASTY,SPINE,CERVICAL N/A 10/12/2023    Procedure: ARTHROPLASTY,SPINE,CERVICAL;  Surgeon: Amador Wood MD;  Location: [facility] OR;  Service: Neurosurgery;  Laterality: N/A;  Anterior Cervical Discectomy C5/6, C6/7 with artifical disc, possible fusion        BREAST RECONSTRUCTION  2018    BREAST SURGERY      CHEILECTOMY Right 8/18/2023    Procedure: CHEILECTOMY;  Surgeon: Tacho Sanchez DPM;  Location: [facility] OR;  Service: Podiatry;  Laterality: Right;    COLON SURGERY  2014         COLONOSCOPY N/A 02/03/2022    Procedure: COLONOSCOPY;  Surgeon: ASHTYN Melo MD;  Location: [facility] ENDO;  Service: Endoscopy;  Laterality: N/A;    CORRECTION OF HAMMER TOE Right 8/18/2023    Procedure: CORRECTION, HAMMER TOE;  Surgeon: Tacho Sanchez DPM;  Location: [facility] OR;  Service: Podiatry;  Laterality: Right;    DILATION AND CURETTAGE OF UTERUS  12/18/2012    complex hyperplasia, no atypia    FLEXIBLE SIGMOIDOSCOPY N/A 07/20/2022    Procedure: SIGMOIDOSCOPY, FLEXIBLE;  Surgeon: ASHTYN Melo MD;  Location: [facility] OR 2ND FLR;  Service: Colon and Rectal;  Laterality: N/A;    HEMORRHOID SURGERY  2014    HYSTERECTOMY      LAPAROSCOPIC SIGMOIDECTOMY Left 07/20/2022    Procedure: COLECTOMY, SIGMOID, LAPAROSCOPIC, ERAS low;  Surgeon: ASHTYN Melo MD;  Location: [facility] OR 2ND FLR;  Service: Colon and Rectal;  Laterality: Left;    MOBILIZATION OF SPLENIC FLEXURE N/A 07/20/2022     Procedure: MOBILIZATION, SPLENIC FLEXURE;  Surgeon: ASHTYN Melo MD;  Location: [facility] OR 2ND FLR;  Service: Colon and Rectal;  Laterality: N/A;    OOPHORECTOMY      PARTIAL HYSTERECTOMY  2013    for atypia    SMALL INTESTINE SURGERY  2017    THYROIDECTOMY, PARTIAL  2013    TONSILLECTOMY      TUBAL LIGATION       Family History       Problem Relation (Age of Onset)    Alcohol abuse Maternal Uncle, Cousin    Breast cancer Maternal Grandmother    COPD Mother    Cancer Mother, Father    Depression Sister    Diabetes Mother, Paternal Grandmother    Drug abuse Maternal Aunt    Emphysema Mother    Heart disease Father (50)    Hypertension Paternal Grandmother    Ovarian cancer Maternal Grandmother    Schizophrenia Paternal Aunt    Suicide Brother          Social History     Socioeconomic History    Marital status:     Number of children: 2   Occupational History     Employer: [employer]   Tobacco Use    Smoking status: Never    Smokeless tobacco: Never   Substance and Sexual Activity    Alcohol use: Not Currently     Comment: socially    Drug use: No    Sexual activity: Yes     Partners: Male     Birth control/protection: See Surgical Hx   Other Topics Concern    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 Fulton , GYN Dr Ruby                   Review of Systems   Constitutional:  Positive for unexpected weight change. Negative for activity change, diaphoresis, fatigue and fever.   HENT:  Negative for hearing loss, nosebleeds, tinnitus and trouble swallowing.    Eyes:  Negative for visual disturbance.   Respiratory:  Negative for cough, shortness of breath and wheezing.    Cardiovascular:  Negative for chest pain and palpitations.   Gastrointestinal:  Negative for abdominal distention, abdominal pain, blood in stool, constipation, diarrhea, nausea and vomiting.   Endocrine: Positive for polydipsia. Negative for cold intolerance and heat intolerance.   Genitourinary:  Negative for difficulty urinating, dysuria, frequency and urgency.   Musculoskeletal:  Positive for neck pain. Negative for back pain, gait problem, joint swelling, myalgias and neck stiffness.   Skin:  Negative for color change, rash and wound.   Allergic/Immunologic: Negative for environmental allergies and food allergies.   Neurological:  Positive for numbness. Negative for dizziness, seizures, facial asymmetry, speech difficulty, weakness, light-headedness and headaches.   Hematological:  Does not bruise/bleed easily.   Psychiatric/Behavioral:  Negative for agitation, behavioral problems, dysphoric mood and hallucinations. The patient is nervous/anxious.        OBJECTIVE:     Vital Signs  Temp: 98.1 °F (36.7 °C)  Pulse: 90  BP: 116/82  Pain Score: 10-Worst pain ever  Height: 5' 7" (170.2 cm)  Weight: 72.6 kg (160 lb) (per pt)  Body mass index is 25.06 kg/m².      Physical Exam:  Vitals reviewed.    Constitutional: She appears well-developed and well-nourished. No distress.     Eyes: Pupils are equal, round, and reactive to light. Conjunctivae and EOM are normal.     Cardiovascular: Normal rate, regular rhythm, normal pulses and no edema. "     Abdominal: Soft. Bowel sounds are normal.     Skin: Skin displays no rash on trunk and no rash on extremities. Skin displays no lesions on trunk and no lesions on extremities.     Psych/Behavior: She is alert. She is oriented to person, place, and time. She has a normal mood and affect.     Musculoskeletal: Gait is normal.        Neck: Range of motion is full. There is no tenderness. Muscle strength is 5/5. Tone is normal.        Back: Range of motion is full. There is no tenderness. Muscle strength is 5/5. Tone is normal.        Right Upper Extremities: Range of motion is full. There is no tenderness. Muscle strength is 5/5. Tone is normal.        Left Upper Extremities: Range of motion is full. There is no tenderness. Muscle strength is 4/5. Tone is normal.       Right Lower Extremities: Range of motion is full. There is no tenderness. Muscle strength is 5/5. Tone is normal.        Left Lower Extremities: Range of motion is full. There is no tenderness. Muscle strength is 5/5. Tone is normal.     Neurological:        Coordination: She has a normal Romberg Test, normal finger to nose coordination and normal tandem walking coordination.        Sensory: There is no sensory deficit in the trunk. There is no sensory deficit in the extremities.        DTRs: DTRs are DTRS NORMAL AND SYMMETRICnormal and symmetric. She displays no Babinski's sign on the right side. She displays no Babinski's sign on the left side.        Cranial nerves: Cranial nerve(s) II, III, IV, V, VI, VII, VIII, IX, X, XI and XII are intact.         Diagnostic Results:  She is an outside MRI available for review which I personally reviewed.  I returned the disc to the patient.  At C4-5, there is a mild disc bulge.  There is no significant central canal stenosis and minimal at best left neural foraminal narrowing.  At C5-6, there is a disc herniation eccentric to the left causing moderate to severe left C5-6 neural foraminal narrowing.  There is no  right neural foraminal narrowing.  At C6-7, there is a right paracentral disc herniation which causes some flattening of the cervical cord as well as narrowing of the right C6-7 neural foramen.    ASSESSMENT/PLAN:     Ms. Alvarado is a 48-year-old female who complains of a 2 week history of neck pain and right upper extremity radiating pain as described above.  She does have disc herniations at C5-6 and C6-7.  The disc at C5-6 is eccentric to the left with the disc at C6-7 as eccentric to the right.  She has not fully failed conservative therapy.  Furthermore, in order to prevent a larger surgery, I would like to better localize where her pain is coming from with an EMG.  We will get the EMG and possibly consider a localized transforaminal steroid injection after the EMG.  I will see her back once the EMG is completed we will make further treatment plan at that time.  I will give her a 1 time prescription for pain medication today.  She knows she can call with any further questions or concerns in the meantime.        Note dictated with voice recognition software, please excuse any grammatical errors.

## 2023-11-15 ENCOUNTER — PATIENT MESSAGE (OUTPATIENT)
Dept: SURGERY | Facility: CLINIC | Age: 48
End: 2023-11-15
Payer: COMMERCIAL

## 2023-11-21 ENCOUNTER — TELEPHONE (OUTPATIENT)
Dept: SURGERY | Facility: CLINIC | Age: 48
End: 2023-11-21
Payer: COMMERCIAL

## 2023-11-28 ENCOUNTER — OFFICE VISIT (OUTPATIENT)
Dept: NEUROSURGERY | Facility: CLINIC | Age: 48
End: 2023-11-28
Payer: COMMERCIAL

## 2023-11-28 ENCOUNTER — HOSPITAL ENCOUNTER (OUTPATIENT)
Dept: RADIOLOGY | Facility: HOSPITAL | Age: 48
Discharge: HOME | End: 2023-11-28
Attending: NEUROLOGICAL SURGERY
Payer: COMMERCIAL

## 2023-11-28 ENCOUNTER — TELEPHONE (OUTPATIENT)
Dept: NEUROSURGERY | Facility: CLINIC | Age: 48
End: 2023-11-28
Payer: COMMERCIAL

## 2023-11-28 VITALS
SYSTOLIC BLOOD PRESSURE: 103 MMHG | WEIGHT: 166.44 LBS | BODY MASS INDEX: 26.12 KG/M2 | HEIGHT: 67 IN | DIASTOLIC BLOOD PRESSURE: 65 MMHG | HEART RATE: 74 BPM

## 2023-11-28 DIAGNOSIS — M50.20 HNP (HERNIATED NUCLEUS PULPOSUS), CERVICAL: ICD-10-CM

## 2023-11-28 DIAGNOSIS — M43.22 CERVICAL VERTEBRAL FUSION: Primary | ICD-10-CM

## 2023-11-28 DIAGNOSIS — M54.12 CERVICAL RADICULOPATHY: ICD-10-CM

## 2023-11-28 DIAGNOSIS — Z98.890 S/P CERVICAL DISC REPLACEMENT: Primary | ICD-10-CM

## 2023-11-28 PROCEDURE — 99999 PR PBB SHADOW E&M-EST. PATIENT-LVL IV: ICD-10-PCS | Mod: PBBFAC,,, | Performed by: PHYSICIAN ASSISTANT

## 2023-11-28 PROCEDURE — 99999 PR PBB SHADOW E&M-EST. PATIENT-LVL IV: CPT | Mod: PBBFAC,,, | Performed by: PHYSICIAN ASSISTANT

## 2023-11-28 PROCEDURE — 99024 POSTOP FOLLOW-UP VISIT: CPT | Mod: S$GLB,,, | Performed by: PHYSICIAN ASSISTANT

## 2023-11-28 PROCEDURE — 72040 XR CERVICAL SPINE AP LATERAL: ICD-10-PCS | Mod: 26,,, | Performed by: INTERNAL MEDICINE

## 2023-11-28 PROCEDURE — 72040 X-RAY EXAM NECK SPINE 2-3 VW: CPT | Mod: 26,,, | Performed by: INTERNAL MEDICINE

## 2023-11-28 PROCEDURE — 99024 PR POST-OP FOLLOW-UP VISIT: ICD-10-PCS | Mod: S$GLB,,, | Performed by: PHYSICIAN ASSISTANT

## 2023-11-28 PROCEDURE — 72040 X-RAY EXAM NECK SPINE 2-3 VW: CPT | Mod: TC,FY

## 2023-11-28 NOTE — PROGRESS NOTES
"Subjective:     Patient ID:  Socorro Huang is a 48 y.o. female.    Eric    Chief Complaint: 6 week po fu    Date of Procedure: 10/12/2023         Pre-Operative Diagnosis: Cervical radiculopathy [M54.12]  HNP (herniated nucleus pulposus), cervical [M50.20]     Post-Operative Diagnosis: Post-Op Diagnosis Codes:     * Cervical radiculopathy [M54.12]     * HNP (herniated nucleus pulposus), cervical [M50.20]     Anesthesia: General     Procedures performed:  Total disc arthroplasty C5/6  Total disc arthroplasty C6/7      Surgeon: Amador Wood MD     Assistant:: none            HPI    Socorro Huang is a 48 y.o. female who presents for follow up.  Patient states overall she is doing well.  She has some neck stiffness on and off.  She denies any arm pain or paresthesias.  She states that the swallowing issue has resolved and denies any issue with that.    Patient denies any recent accidents or trauma, no saddle anesthesias, and no bowel or bladder incontinence.      Review of Systems:  Constitution: Negative for chills, fever, night sweats and weight loss.   Musculoskeletal: Negative for falls.   Gastrointestinal: Negative for bowel incontinence, nausea and vomiting.   Genitourinary: Negative for bladder incontinence.   Neurological: Negative for disturbances in coordination and loss of balance.      Objective:      Vitals:    11/28/23 0815   BP: 103/65   Pulse: 74   Weight: 75.5 kg (166 lb 7.2 oz)   Height: 5' 7" (1.702 m)   PainSc: 0-No pain   PainLoc: Neck         Physical Exam:      General:  Socorro Huang is well-developed, well-nourished, appears stated age, in no acute distress, alert and oriented to person, place, and time.    Pulmonary/Chest:  Respiratory effort normal  Abdominal: Exhibits no distension  Psychiatric:  Normal mood and affect.  Behavior is normal.  Judgement and thought content normal      Musculoskeletal:    Patient is able to walk heel to toe without difficulty.    Cervical ROM:   No pain " in cervical spine flexion, extension, left rotation, and right rotation, left lateral bending, and right lateral bending.    Cervical Spine Inspection:  Healed surgical scars with no visible rashes.    Neurological:    Muscle strength against resistance:     Right Left   Deltoid  5 / 5 5 / 5   Biceps  5 / 5 5 / 5   Triceps 5 / 5 5 / 5   Wrist flexion  5 / 5 5 / 5   Wrist extension 5 / 5 5 / 5   Finger abduction 5 / 5 5 / 5   Thumb opposition 5 / 5 5 / 5   Handgrip 5 / 5 5 / 5   Hip flexion  5 / 5 5 / 5   Hip extension 5 / 5 5 / 5   Hip abduction 5 / 5 5 / 5   Hip adduction 5/ 5 5 / 5   Knee extension  5 / 5 5 / 5   Knee flexion  5 / 5 5 / 5   Dorsiflexion  5 / 5 5 / 5   EHL  5 / 5 5 / 5   Plantar flexion  5 / 5 5 / 5   Inversion of the feet 5 / 5 5 / 5   Eversion of the feet 5 / 5 5 / 5       Reflexes:     Right Left   Triceps 2+ 2+   Biceps 2+ 2+   Brachioradialis 2+ 2+   Patellar 2+ 2+   Achilles 2+ 2+     Babinski: Negative bilaterally  Clonus:  Negative bilaterally  Rivera: Negative bilaterally    On gross examination of the bilateral lower extremities, patient has full painfree ROM with no signs of clubbing, cyanosis, edema, and weakness.      XRAY Interpretation:     Cervical spine xrays were personally reviewed today.  No fractures.  Hardware intact.      Assessment:          1. S/P cervical disc replacement            Plan:               Patient doing well 6 weeks postop cervical disc arthroplasty.  Okay to increase lifting to 2025 lb.  Would still not recommend intense exercising or dancing.    Follow-up with Dr. Reyes in 6 weeks with C-spine AP lateral flexion-extension x-rays for 3 month postop follow-up    Follow-Up:  Follow up in about 6 weeks (around 1/9/2024). If there are any questions prior to this, the patient was instructed to contact the office.       Mame Connolly [facility], PA-C  Neurosurgery  [facility]  11/28/2023

## 2023-12-14 ENCOUNTER — PROCEDURE VISIT (OUTPATIENT)
Dept: NEUROLOGY | Facility: CLINIC | Age: 48
End: 2023-12-14
Payer: COMMERCIAL

## 2023-12-14 VITALS
BODY MASS INDEX: 25.9 KG/M2 | WEIGHT: 165.38 LBS | SYSTOLIC BLOOD PRESSURE: 107 MMHG | HEART RATE: 76 BPM | DIASTOLIC BLOOD PRESSURE: 69 MMHG

## 2023-12-14 DIAGNOSIS — G43.709 CHRONIC MIGRAINE W/O AURA W/O STATUS MIGRAINOSUS, NOT INTRACTABLE: Primary | ICD-10-CM

## 2023-12-14 PROCEDURE — 64615 CHEMODENERV MUSC MIGRAINE: CPT | Mod: S$GLB,,, | Performed by: PHYSICIAN ASSISTANT

## 2023-12-14 PROCEDURE — 64615 PR CHEMODENERVATION OF MUSCLE FOR CHRONIC MIGRAINE: ICD-10-PCS | Mod: S$GLB,,, | Performed by: PHYSICIAN ASSISTANT

## 2023-12-14 NOTE — PROCEDURES
PROCEDURE NOTE:  BOTOX was performed as an indicated therapy for intractable chronic migraine headaches given that the patient failed more than 2 headache medications     A time out was conducted just before the start of the procedure to verify the correct patient and procedure, procedure location, and all relevant critical information.      Botulinum Toxin Injection Procedure      PROCEDURE PERFORMED: Botulinum toxin injection (98078)  CLINICAL INDICATION: Chronic Migraines  After risks and benefits were explained including bleeding, infection, worsening of pain, damage to the areas being injected, weakness of muscles, loss of muscle control, dysphagia if injecting the head or neck, facial droop if injecting the facial area, painful injection, allergic or other reaction to the medications being injected, and the failure of the procedure to help the problem, a signed consent was obtained.   The patient was placed in a comfortable area and the sites to be treated were identified.The area to be treated was prepped three times with alcohol and the alcohol allowed to dry. Next, a 30 gauge needle was used to inject the medication in the area to be treated.      Total Botox used: 155 Units   Unavoidable waste: 45 Units      Injection sites:    muscle bilaterally ( a total of 10 units divided into 2 sites)   Procerus muscle (5 units)   Frontalis muscle bilaterally (a total of 20 units divided into 4 sites)   Temporalis muscle bilaterally (a total of 40 units divided into 8 sites)   Occipitalis muscle bilaterally (a total of 30 units divided into 6 sites)   Cervical paraspinal muscles (a total of 20 units divided into 4 sites)   Trapezius muscle bilaterally (a total of 30 units divided into 6 sites)   Complications: none   RTC for the next Botox injection: 12 weeks     Problem List Items Addressed This Visit          Neuro    Chronic migraine w/o aura w/o status migrainosus, not intractable - Primary    Relevant  Medications    onabotulinumtoxina injection 200 Units (Completed) (Start on 12/14/2023  8:30 AM)         [facility] Department of Neurology   [phone]

## 2023-12-19 DIAGNOSIS — G47.00 INSOMNIA, UNSPECIFIED TYPE: ICD-10-CM

## 2023-12-19 DIAGNOSIS — G43.109 MIGRAINE WITH AURA AND WITHOUT STATUS MIGRAINOSUS, NOT INTRACTABLE: ICD-10-CM

## 2023-12-19 RX ORDER — BUTALBITAL, ACETAMINOPHEN AND CAFFEINE 50; 325; 40 MG/1; MG/1; MG/1
1 TABLET ORAL EVERY 6 HOURS PRN
Qty: 12 TABLET | Refills: 5 | Status: SHIPPED | OUTPATIENT
Start: 2023-12-19

## 2023-12-19 RX ORDER — TRAZODONE HYDROCHLORIDE 100 MG/1
TABLET ORAL
Qty: 90 TABLET | Refills: 3 | Status: SHIPPED | OUTPATIENT
Start: 2023-12-19

## 2023-12-19 NOTE — TELEPHONE ENCOUNTER
Refill Decision Note   Socorro Huang  is requesting a refill authorization.  Brief Assessment and Rationale for Refill:  Approve     Medication Therapy Plan:         Comments:     Note composed:10:35 AM 12/19/2023

## 2023-12-19 NOTE — TELEPHONE ENCOUNTER
No care due was identified.  [facility] Embedded Care Due Messages. Reference number: [id number].   12/19/2023 10:26:45 AM CST

## 2024-01-02 ENCOUNTER — PATIENT MESSAGE (OUTPATIENT)
Dept: PRIMARY CARE CLINIC | Facility: CLINIC | Age: 49
End: 2024-01-02
Payer: COMMERCIAL

## 2024-01-02 ENCOUNTER — E-VISIT (OUTPATIENT)
Dept: PRIMARY CARE CLINIC | Facility: CLINIC | Age: 49
End: 2024-01-02
Payer: COMMERCIAL

## 2024-01-02 DIAGNOSIS — R05.1 ACUTE COUGH: ICD-10-CM

## 2024-01-02 DIAGNOSIS — J06.9 URTI (ACUTE UPPER RESPIRATORY INFECTION): Primary | ICD-10-CM

## 2024-01-02 PROCEDURE — 99421 OL DIG E/M SVC 5-10 MIN: CPT | Mod: ,,, | Performed by: NURSE PRACTITIONER

## 2024-01-02 RX ORDER — AZITHROMYCIN 250 MG/1
TABLET, FILM COATED ORAL
Qty: 6 TABLET | Refills: 0 | Status: SHIPPED | OUTPATIENT
Start: 2024-01-02 | End: 2024-01-07

## 2024-01-02 RX ORDER — METHYLPREDNISOLONE 4 MG/1
TABLET ORAL
Qty: 21 EACH | Refills: 0 | Status: SHIPPED | OUTPATIENT
Start: 2024-01-02 | End: 2024-01-23

## 2024-01-03 NOTE — PROGRESS NOTES
Patient ID: Socorro Huang is a 48 y.o. female.    Chief Complaint: URI (Entered automatically based on patient selection in Patient Portal.) and Cough    The patient initiated a request through TMS on 1/2/2024 for evaluation and management with a chief complaint of URI (Entered automatically based on patient selection in Patient Portal.) and Cough     I evaluated the questionnaire submission on 1/2/23.    [facility] Peq Evisit Upper Respitatory/Cough Questionnaire    1/2/2024  3:41 PM CST - Filed by Patient   Do you agree to participate in an E-Visit? Yes   If you have any of the following symptoms, please present to your local ER or call 911:  I acknowledge   What is the main issue that you would like for your doctor to address today? Sinus pressure and pain   Are you able to take your vital signs? Yes   Systolic Blood Pressure: 117   Diastolic Blood Pressure: 62   Weight: 162   Height: 67   Pulse: 77   Temperature: 98.7   Respiration rate: 74   Pulse Oxygen: 99   Are you currently pregnant, could you be pregnant, or are you breast feeding? None of the above   What symptoms do you currently have?  Headache;  Nasal Congestion;  Sore throat   Have you had any of the following? None of the above   Have you ever smoked? I have never smoked   Have you had a fever? No   When did your symptoms first appear? 12/20/2023   In the last two weeks, have you been in close contact with someone who has COVID-19 or the Flu? No   In the last two weeks, have you worked or volunteered in a healthcare facility or as a ? Healthcare facilities include a hospital, medical or dental clinic, long-term care facility, or nursing home No   Do you live in a long-term care facility, nursing home, group home, or homeless shelter? No   List what you have done or taken to help your symptoms. Mucinex-d, claritin, tylenols sinus   How severe are your symptoms? Moderate   Have your symptoms improved since they first appeared? No change    Have you taken an at home Covid test? No   Have you taken a Flu test? No   Have you been fully vaccinated for COVID? (2 Pfizer, 2 Moderna or 1 En & En vaccine injections) Yes   Have you received a booster? Yes   Have you recieved a Flu shot? No   Do you have transportation to get tested for COVID if it is indicated and ordered for you at an [facility] location? Yes   Provide any information you feel is important to your history not asked above I had fever on 12/20  and 12/21 but no more since the 21st   Please attach any relevant images or files          Recent Labs Obtained:  No visits with results within 7 Day(s) from this visit.   Latest known visit with results is:   Lab Visit on 10/09/2023   Component Date Value Ref Range Status    WBC 10/09/2023 4.13  3.90 - 12.70 K/uL Final    RBC 10/09/2023 4.42  4.00 - 5.40 M/uL Final    Hemoglobin 10/09/2023 14.1  12.0 - 16.0 g/dL Final    Hematocrit 10/09/2023 42.7  37.0 - 48.5 % Final    MCV 10/09/2023 97  82 - 98 fL Final    MCH 10/09/2023 31.9 (H)  27.0 - 31.0 pg Final    MCHC 10/09/2023 33.0  32.0 - 36.0 g/dL Final    RDW 10/09/2023 11.4 (L)  11.5 - 14.5 % Final    Platelets 10/09/2023 251  150 - 450 K/uL Final    MPV 10/09/2023 9.1 (L)  9.2 - 12.9 fL Final    Immature Granulocytes 10/09/2023 0.5  0.0 - 0.5 % Final    Gran # (ANC) 10/09/2023 2.5  1.8 - 7.7 K/uL Final    Immature Grans (Abs) 10/09/2023 0.02  0.00 - 0.04 K/uL Final    Comment: Mild elevation in immature granulocytes is non specific and   can be seen in a variety of conditions including stress response,   acute inflammation, trauma and pregnancy. Correlation with other   laboratory and clinical findings is essential.      Lymph # 10/09/2023 1.1  1.0 - 4.8 K/uL Final    Mono # 10/09/2023 0.4  0.3 - 1.0 K/uL Final    Eos # 10/09/2023 0.1  0.0 - 0.5 K/uL Final    Baso # 10/09/2023 0.03  0.00 - 0.20 K/uL Final    nRBC 10/09/2023 0  0 /100 WBC Final    Gran % 10/09/2023 61.4  38.0 - 73.0 % Final     Lymph % 10/09/2023 25.7  18.0 - 48.0 % Final    Mono % 10/09/2023 10.2  4.0 - 15.0 % Final    Eosinophil % 10/09/2023 1.5  0.0 - 8.0 % Final    Basophil % 10/09/2023 0.7  0.0 - 1.9 % Final    Differential Method 10/09/2023 Automated   Final    Sodium 10/09/2023 138  136 - 145 mmol/L Final    Potassium 10/09/2023 4.0  3.5 - 5.1 mmol/L Final    Chloride 10/09/2023 104  95 - 110 mmol/L Final    CO2 10/09/2023 25  23 - 29 mmol/L Final    Glucose 10/09/2023 95  70 - 110 mg/dL Final    BUN 10/09/2023 20  6 - 20 mg/dL Final    Creatinine 10/09/2023 0.7  0.5 - 1.4 mg/dL Final    Calcium 10/09/2023 9.4  8.7 - 10.5 mg/dL Final    Total Protein 10/09/2023 7.3  6.0 - 8.4 g/dL Final    Albumin 10/09/2023 4.2  3.5 - 5.2 g/dL Final    Total Bilirubin 10/09/2023 0.4  0.1 - 1.0 mg/dL Final    Comment: For infants and newborns, interpretation of results should be based  on gestational age, weight and in agreement with clinical  observations.    Premature Infant recommended reference ranges:  Up to 24 hours.............<8.0 mg/dL  Up to 48 hours............<12.0 mg/dL  3-5 days..................<15.0 mg/dL  6-29 days.................<15.0 mg/dL      Alkaline Phosphatase 10/09/2023 79  55 - 135 U/L Final    AST 10/09/2023 48 (H)  10 - 40 U/L Final    ALT 10/09/2023 126 (H)  10 - 44 U/L Final    eGFR 10/09/2023 >60.0  >60 mL/min/1.73 m^2 Final    Anion Gap 10/09/2023 9  8 - 16 mmol/L Final    Prothrombin Time 10/09/2023 10.7  9.0 - 12.5 sec Final    INR 10/09/2023 1.0  0.8 - 1.2 Final    Comment: Coumadin Therapy:  2.0 - 3.0 for INR for all indicators except mechanical heart valves  and antiphospholipid syndromes which should use 2.5 - 3.5.      aPTT 10/09/2023 27.9  21.0 - 32.0 sec Final    Comment: Refer to local heparin nomogram for intensity/dose specific   therapeutic   range.      Specimen UA 10/09/2023 Urine, Clean Catch   Final    Color, UA 10/09/2023 Yellow  Yellow, Straw, [place] Final    Appearance, UA 10/09/2023 Clear  Clear  Final    pH, UA 10/09/2023 7.0  5.0 - 8.0 Final    Specific Gravity, UA 10/09/2023 >1.030 (A)  1.005 - 1.030 Final    Protein, UA 10/09/2023 Trace (A)  Negative Final    Comment: Recommend a 24 hour urine protein or a urine   protein/creatinine ratio if globulin induced proteinuria is  clinically suspected.      Glucose, UA 10/09/2023 Negative  Negative Final    Ketones, UA 10/09/2023 Negative  Negative Final    Bilirubin (UA) 10/09/2023 Negative  Negative Final    Occult Blood UA 10/09/2023 Negative  Negative Final    Nitrite, UA 10/09/2023 Negative  Negative Final    Leukocytes, UA 10/09/2023 Negative  Negative Final       Encounter Diagnoses   Name Primary?    URTI (acute upper respiratory infection) Yes    Acute cough         No orders of the defined types were placed in this encounter.     Medications Ordered This Encounter   Medications    azithromycin (Z-SYL) 250 MG tablet     Sig: Take 2 tablets by mouth on day 1; Take 1 tablet by mouth on days 2-5     Dispense:  6 tablet     Refill:  0    methylPREDNISolone (MEDROL DOSEPACK) 4 mg tablet     Sig: use as directed     Dispense:  21 each     Refill:  0        No follow-ups on file.      E-Visit Time Tracking:    Day 1 Time (in minutes): 10     Total Time (in minutes): 10

## 2024-01-05 ENCOUNTER — HOSPITAL ENCOUNTER (OUTPATIENT)
Dept: RADIOLOGY | Facility: HOSPITAL | Age: 49
Discharge: HOME | End: 2024-01-05
Attending: INTERNAL MEDICINE
Payer: COMMERCIAL

## 2024-01-05 VITALS — WEIGHT: 165 LBS | HEIGHT: 67 IN | BODY MASS INDEX: 25.9 KG/M2

## 2024-01-05 DIAGNOSIS — Z12.31 ENCOUNTER FOR SCREENING MAMMOGRAM FOR BREAST CANCER: ICD-10-CM

## 2024-01-05 PROCEDURE — 77067 SCR MAMMO BI INCL CAD: CPT | Mod: 26,,, | Performed by: RADIOLOGY

## 2024-01-05 PROCEDURE — 77063 BREAST TOMOSYNTHESIS BI: CPT | Mod: 26,,, | Performed by: RADIOLOGY

## 2024-01-05 PROCEDURE — 77067 SCR MAMMO BI INCL CAD: CPT | Mod: TC

## 2024-01-19 ENCOUNTER — HOSPITAL ENCOUNTER (OUTPATIENT)
Dept: RADIOLOGY | Facility: HOSPITAL | Age: 49
Discharge: HOME | End: 2024-01-19
Attending: NEUROLOGICAL SURGERY
Payer: COMMERCIAL

## 2024-01-19 DIAGNOSIS — M54.12 CERVICAL RADICULOPATHY: ICD-10-CM

## 2024-01-19 DIAGNOSIS — M50.20 HNP (HERNIATED NUCLEUS PULPOSUS), CERVICAL: ICD-10-CM

## 2024-01-19 PROCEDURE — 72040 X-RAY EXAM NECK SPINE 2-3 VW: CPT | Mod: 26,,, | Performed by: RADIOLOGY

## 2024-01-19 PROCEDURE — 72040 X-RAY EXAM NECK SPINE 2-3 VW: CPT | Mod: TC,FY

## 2024-01-25 ENCOUNTER — PATIENT MESSAGE (OUTPATIENT)
Dept: NEUROSURGERY | Facility: CLINIC | Age: 49
End: 2024-01-25
Payer: COMMERCIAL

## 2024-01-25 ENCOUNTER — TELEPHONE (OUTPATIENT)
Dept: NEUROSURGERY | Facility: CLINIC | Age: 49
End: 2024-01-25
Payer: COMMERCIAL

## 2024-01-30 ENCOUNTER — OFFICE VISIT (OUTPATIENT)
Dept: PRIMARY CARE CLINIC | Facility: CLINIC | Age: 49
End: 2024-01-30
Payer: COMMERCIAL

## 2024-01-30 ENCOUNTER — HOSPITAL ENCOUNTER (OUTPATIENT)
Dept: RADIOLOGY | Facility: HOSPITAL | Age: 49
Discharge: HOME | End: 2024-01-30
Attending: STUDENT IN AN ORGANIZED HEALTH CARE EDUCATION/TRAINING PROGRAM
Payer: COMMERCIAL

## 2024-01-30 VITALS
OXYGEN SATURATION: 98 % | WEIGHT: 166.88 LBS | BODY MASS INDEX: 26.14 KG/M2 | SYSTOLIC BLOOD PRESSURE: 102 MMHG | DIASTOLIC BLOOD PRESSURE: 68 MMHG | HEART RATE: 88 BPM

## 2024-01-30 DIAGNOSIS — J01.90 ACUTE BACTERIAL SINUSITIS: ICD-10-CM

## 2024-01-30 DIAGNOSIS — B96.89 ACUTE BACTERIAL SINUSITIS: Primary | ICD-10-CM

## 2024-01-30 DIAGNOSIS — J01.90 ACUTE BACTERIAL SINUSITIS: Primary | ICD-10-CM

## 2024-01-30 DIAGNOSIS — B96.89 ACUTE BACTERIAL SINUSITIS: ICD-10-CM

## 2024-01-30 DIAGNOSIS — J06.9 URI WITH COUGH AND CONGESTION: ICD-10-CM

## 2024-01-30 PROCEDURE — 99999 PR PBB SHADOW E&M-EST. PATIENT-LVL III: CPT | Mod: PBBFAC,,, | Performed by: STUDENT IN AN ORGANIZED HEALTH CARE EDUCATION/TRAINING PROGRAM

## 2024-01-30 PROCEDURE — 99214 OFFICE O/P EST MOD 30 MIN: CPT | Mod: S$GLB,,, | Performed by: STUDENT IN AN ORGANIZED HEALTH CARE EDUCATION/TRAINING PROGRAM

## 2024-01-30 PROCEDURE — 71046 X-RAY EXAM CHEST 2 VIEWS: CPT | Mod: TC

## 2024-01-30 PROCEDURE — 71046 X-RAY EXAM CHEST 2 VIEWS: CPT | Mod: 26,,, | Performed by: STUDENT IN AN ORGANIZED HEALTH CARE EDUCATION/TRAINING PROGRAM

## 2024-01-30 RX ORDER — ALBUTEROL SULFATE 90 UG/1
2 AEROSOL, METERED RESPIRATORY (INHALATION) EVERY 6 HOURS PRN
Qty: 18 G | Refills: 0 | Status: SHIPPED | OUTPATIENT
Start: 2024-01-30 | End: 2024-01-31

## 2024-01-30 RX ORDER — PROMETHAZINE HYDROCHLORIDE AND DEXTROMETHORPHAN HYDROBROMIDE 6.25; 15 MG/5ML; MG/5ML
5 SYRUP ORAL NIGHTLY PRN
Qty: 240 ML | Refills: 0 | Status: SHIPPED | OUTPATIENT
Start: 2024-01-30 | End: 2024-04-29

## 2024-01-30 RX ORDER — AMOXICILLIN AND CLAVULANATE POTASSIUM 875; 125 MG/1; MG/1
1 TABLET, FILM COATED ORAL EVERY 12 HOURS
Qty: 20 TABLET | Refills: 0 | Status: SHIPPED | OUTPATIENT
Start: 2024-01-30 | End: 2024-02-09

## 2024-01-31 ENCOUNTER — PATIENT MESSAGE (OUTPATIENT)
Dept: PRIMARY CARE CLINIC | Facility: CLINIC | Age: 49
End: 2024-01-31
Payer: COMMERCIAL

## 2024-01-31 DIAGNOSIS — Z87.898 HISTORY OF MOTION SICKNESS: ICD-10-CM

## 2024-01-31 DIAGNOSIS — R05.1 ACUTE COUGH: Primary | ICD-10-CM

## 2024-01-31 RX ORDER — ALBUTEROL SULFATE 90 UG/1
2 AEROSOL, METERED RESPIRATORY (INHALATION) EVERY 6 HOURS PRN
Qty: 18 G | Refills: 0 | Status: SHIPPED | OUTPATIENT
Start: 2024-01-31 | End: 2024-04-29

## 2024-01-31 RX ORDER — SCOLOPAMINE TRANSDERMAL SYSTEM 1 MG/1
1 PATCH, EXTENDED RELEASE TRANSDERMAL
Qty: 4 PATCH | Refills: 0 | Status: SHIPPED | OUTPATIENT
Start: 2024-01-31 | End: 2024-04-29

## 2024-01-31 NOTE — PROGRESS NOTES
INTERNAL MEDICINE SAME DAY PRIMARY CARE VISIT NOTE    Subjective:     Chief Complaint: Headache and Sinus Problem       Patient ID: Socorro Huang is a 48 y.o. female, here today for focused same-day primary care visit.    Today, patient with complaint of sinus pressure.  Patient's symptoms originally started about 1 month ago with cough, congestion.  At that time, she was treated with azithromycin and a Medrol Dosepak.  She had minor improvement in symptoms, but over the past week, she has noticed her cough is worsening.  Also notices maxillary sinus pain and tenderness.  No current fevers.  She is taking Mucinex D and using her Flonase regularly.  No sore throat.  No chest pain or shortness a breath.    Past Medical History:  Past Medical History:   Diagnosis Date    Abnormal Pap smear     Anxiety     celexa & prozac didn't help much    Constipation - functional     Headache(784.0)     Hemorrhoid     Leukopenia     benign, evaluated in [facility] y 2000    Menorrhagia     PONV (postoperative nausea and vomiting)     Strain of neck muscle     tx with PT at [facility] in past       Home Medications:  Prior to Admission medications    Medication Sig Start Date End Date Taking? Authorizing Provider   butalbital-acetaminophen-caffeine -40 mg (FIORICET, ESGIC) -40 mg per tablet TAKE 1 TABLET BY MOUTH EVERY 6 HOURS AS NEEDED 12/19/23  Yes Agus Montes MD   co-enzyme Q-10 30 mg capsule Take by mouth.   Yes Provider, Historical   pantoprazole (PROTONIX) 40 MG tablet Take 1 tablet (40 mg total) by mouth once daily. 2/3/23 2/3/24 Yes Lalitha Alexander NP   progesterone (PROMETRIUM) 200 MG capsule Take 200 mg by mouth once daily.   Yes Provider, Historical   traZODone (DESYREL) 100 MG tablet TAKE 1 TABLET BY MOUTH EVERY DAY IN THE EVENING 12/19/23  Yes Blake Adams MD   albuterol (VENTOLIN HFA) 90 mcg/actuation inhaler Inhale 2 puffs into the lungs every 6 (six) hours as needed for Wheezing. Rescue 1/30/24  1/29/25  Danika Marin MD   amoxicillin-clavulanate 875-125mg (AUGMENTIN) 875-125 mg per tablet Take 1 tablet by mouth every 12 (twelve) hours. for 10 days 1/30/24 2/9/24  Danika Marin MD   promethazine-dextromethorphan (PROMETHAZINE-DM) 6.25-15 mg/5 mL Syrp Take 5 mLs by mouth nightly as needed (cough). 1/30/24   Danika Marin MD       Allergies:  Review of patient's allergies indicates:  No Known Allergies    Social History:  Social History     Tobacco Use    Smoking status: Never    Smokeless tobacco: Never   Substance Use Topics    Alcohol use: Not Currently     Comment: socially    Drug use: Never         Review of Systems   Constitutional:  Positive for fatigue. Negative for diaphoresis and fever.   HENT:  Positive for congestion and rhinorrhea. Negative for ear pain, sinus pain, sneezing, sore throat and voice change.    Eyes:  Negative for discharge, redness and itching.   Respiratory:  Positive for cough. Negative for shortness of breath and wheezing.    Cardiovascular:  Negative for chest pain.   Gastrointestinal:  Negative for abdominal pain.   Skin:  Negative for rash.   Neurological:  Negative for weakness.           Objective:   /68 (BP Location: Left arm)   Pulse 88   Wt 75.7 kg (166 lb 14.2 oz)   LMP 05/05/2013   SpO2 98%   BMI 26.14 kg/m²        General: AAO x3, no apparent distress  HEENT: PERRL, OP clear  CV: RRR, no m/r/g  Pulm: Lungs CTAB, no crackles, no wheezes  Abd: s/NT/ND +BS  Extremities: no c/c/e    Labs:         Assessment/Plan     Socorro was seen today for headache and sinus problem.    Diagnoses and all orders for this visit:    Acute bacterial sinusitis  -     X-Ray Chest PA And Lateral; Future  -     amoxicillin-clavulanate 875-125mg (AUGMENTIN) 875-125 mg per tablet; Take 1 tablet by mouth every 12 (twelve) hours. for 10 days    URI with cough and congestion  -     albuterol (VENTOLIN HFA) 90 mcg/actuation inhaler; Inhale 2 puffs into the lungs every  6 (six) hours as needed for Wheezing. Rescue    Other orders  -     promethazine-dextromethorphan (PROMETHAZINE-DM) 6.25-15 mg/5 mL Syrp; Take 5 mLs by mouth nightly as needed (cough).    - discussed etiology of symptoms and treatment:  Recommend low dose intranasal steroids and Antihistamine, and nasal saline rinse. OTC cough suppressants and lozenges prn. Pt instructed to notify clinic if fever develops, or no improvement.  Follow-up chest x-ray  Start Augmentin 875 b.i.d. for 10 days.    RTC prn and with PCP as per routine.    Danika Marin MD  Department of Internal Medicine - [facility]  01/30/2024

## 2024-03-07 ENCOUNTER — PROCEDURE VISIT (OUTPATIENT)
Dept: NEUROLOGY | Facility: CLINIC | Age: 49
End: 2024-03-07
Payer: COMMERCIAL

## 2024-03-07 VITALS
SYSTOLIC BLOOD PRESSURE: 106 MMHG | HEART RATE: 81 BPM | BODY MASS INDEX: 25.9 KG/M2 | DIASTOLIC BLOOD PRESSURE: 69 MMHG | WEIGHT: 165.38 LBS

## 2024-03-07 DIAGNOSIS — G43.709 CHRONIC MIGRAINE W/O AURA W/O STATUS MIGRAINOSUS, NOT INTRACTABLE: Primary | ICD-10-CM

## 2024-03-07 PROCEDURE — 64615 CHEMODENERV MUSC MIGRAINE: CPT | Mod: S$GLB,,, | Performed by: PHYSICIAN ASSISTANT

## 2024-03-07 NOTE — PROCEDURES
PROCEDURE NOTE:  BOTOX was performed as an indicated therapy for intractable chronic migraine headaches given that the patient failed more than 2 headache medications     A time out was conducted just before the start of the procedure to verify the correct patient and procedure, procedure location, and all relevant critical information.      Botulinum Toxin Injection Procedure      PROCEDURE PERFORMED: Botulinum toxin injection (43806)  CLINICAL INDICATION: Chronic Migraines  After risks and benefits were explained including bleeding, infection, worsening of pain, damage to the areas being injected, weakness of muscles, loss of muscle control, dysphagia if injecting the head or neck, facial droop if injecting the facial area, painful injection, allergic or other reaction to the medications being injected, and the failure of the procedure to help the problem, a signed consent was obtained.   The patient was placed in a comfortable area and the sites to be treated were identified.The area to be treated was prepped three times with alcohol and the alcohol allowed to dry. Next, a 30 gauge needle was used to inject the medication in the area to be treated.      Total Botox used: 155 Units   Unavoidable waste: 45 Units      Injection sites:    muscle bilaterally ( a total of 10 units divided into 2 sites)   Procerus muscle (5 units)   Frontalis muscle bilaterally (a total of 20 units divided into 4 sites)   Temporalis muscle bilaterally (a total of 40 units divided into 8 sites)   Occipitalis muscle bilaterally (a total of 30 units divided into 6 sites)   Cervical paraspinal muscles (a total of 20 units divided into 4 sites)   Trapezius muscle bilaterally (a total of 30 units divided into 6 sites)   Complications: none   RTC for the next Botox injection: 12 weeks     Problem List Items Addressed This Visit          Neuro    Chronic migraine w/o aura w/o status migrainosus, not intractable - Primary    Relevant  Medications    onabotulinumtoxina injection 200 Units (Completed) (Start on 3/7/2024  9:00 AM)    Other Relevant Orders    Prior authorization Order         [facility] Department of Neurology   [phone]

## 2024-03-11 ENCOUNTER — PATIENT MESSAGE (OUTPATIENT)
Dept: NEUROLOGY | Facility: CLINIC | Age: 49
End: 2024-03-11
Payer: COMMERCIAL

## 2024-03-11 ENCOUNTER — PATIENT MESSAGE (OUTPATIENT)
Dept: PRIMARY CARE CLINIC | Facility: CLINIC | Age: 49
End: 2024-03-11
Payer: COMMERCIAL

## 2024-03-12 NOTE — TELEPHONE ENCOUNTER
Called pt. Pt denies any changes at the site such as redness, warmth, drainage, fevers. She states it is just sore/sensitive in the area. Discussed w/ pt. Discussed also w/ pt expectations after treatments w/ botox. Advised ice. Advised pcp. Advised urgent care if worsens or new symptoms occur. No further questions/concerns.
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
## 2024-04-12 ENCOUNTER — HOSPITAL ENCOUNTER (OUTPATIENT)
Dept: RADIOLOGY | Facility: HOSPITAL | Age: 49
Discharge: HOME | End: 2024-04-12
Attending: PHYSICIAN ASSISTANT
Payer: COMMERCIAL

## 2024-04-12 ENCOUNTER — OFFICE VISIT (OUTPATIENT)
Dept: NEUROSURGERY | Facility: CLINIC | Age: 49
End: 2024-04-12
Payer: COMMERCIAL

## 2024-04-12 VITALS
DIASTOLIC BLOOD PRESSURE: 77 MMHG | SYSTOLIC BLOOD PRESSURE: 106 MMHG | HEIGHT: 67 IN | BODY MASS INDEX: 25.96 KG/M2 | HEART RATE: 70 BPM | WEIGHT: 165.38 LBS

## 2024-04-12 DIAGNOSIS — M54.2 NECK PAIN: ICD-10-CM

## 2024-04-12 DIAGNOSIS — Z98.890 S/P CERVICAL DISC REPLACEMENT: Primary | ICD-10-CM

## 2024-04-12 DIAGNOSIS — S16.1XXA STRAIN OF NECK MUSCLE, INITIAL ENCOUNTER: ICD-10-CM

## 2024-04-12 DIAGNOSIS — M43.22 CERVICAL VERTEBRAL FUSION: ICD-10-CM

## 2024-04-12 PROCEDURE — 99214 OFFICE O/P EST MOD 30 MIN: CPT | Mod: S$GLB,,, | Performed by: PHYSICIAN ASSISTANT

## 2024-04-12 PROCEDURE — 72050 X-RAY EXAM NECK SPINE 4/5VWS: CPT | Mod: 26,,, | Performed by: RADIOLOGY

## 2024-04-12 PROCEDURE — 72050 X-RAY EXAM NECK SPINE 4/5VWS: CPT | Mod: TC,FY

## 2024-04-12 PROCEDURE — 99999 PR PBB SHADOW E&M-EST. PATIENT-LVL III: CPT | Mod: PBBFAC,,, | Performed by: PHYSICIAN ASSISTANT

## 2024-04-12 NOTE — PROGRESS NOTES
"  Subjective:     Patient ID:  Socorro Huang is a 48 y.o. female.    Eric    Chief Complaint: Right sided neck pain    Date of Procedure: 10/12/2023         Pre-Operative Diagnosis: Cervical radiculopathy [M54.12]  HNP (herniated nucleus pulposus), cervical [M50.20]     Post-Operative Diagnosis: Post-Op Diagnosis Codes:     * Cervical radiculopathy [M54.12]     * HNP (herniated nucleus pulposus), cervical [M50.20]     Anesthesia: General     Procedures performed:  Total disc arthroplasty C5/6  Total disc arthroplasty C6/7      Surgeon: Amador Wood MD     Assistant:: none          HPI    Socorro Huang is a 48 y.o. female who presents for follow up.  Patient states about 1.5 weeks ago she was doing increased activity while doing some renovation projects around her house.  She started having pain in the right side of the neck to the shoulder for 2 days constant then has come and gone since then.  It is a better now.  She was taking Tylenol and Motrin at the time that it was worse.  No radiating arm pain or paresthesias.    Patient denies any recent accidents or trauma, no saddle anesthesias, and no bowel or bladder incontinence.    Patient denies any difficulty with balance or gait, no difficulty tying shoes or buttoning clothes, is not dropping things, does not have difficulty opening containers, and has had no change in handwriting.    Review of Systems:  Constitution: Negative for chills, fever, night sweats and weight loss.   Musculoskeletal: Negative for falls.   Gastrointestinal: Negative for bowel incontinence, nausea and vomiting.   Genitourinary: Negative for bladder incontinence.   Neurological: Negative for disturbances in coordination and loss of balance.      Objective:      Vitals:    04/12/24 1051   BP: 106/77   Pulse: 70   Weight: 75 kg (165 lb 5.5 oz)   Height: 5' 7" (1.702 m)   PainSc: 0-No pain         Physical Exam:      General:  Socorro Huang is well-developed, well-nourished, appears " stated age, in no acute distress, alert and oriented to person, place, and time.    Pulmonary/Chest:  Respiratory effort normal  Abdominal: Exhibits no distension  Psychiatric:  Normal mood and affect.  Behavior is normal.  Judgement and thought content normal      Musculoskeletal:    Patient is able to walk heel to toe without difficulty.    Cervical ROM:   No pain in cervical spine flexion, extension, left rotation, and right rotation, left lateral bending, and right lateral bending.    Cervical Spine Inspection:  Healed surgical scar.    Neurological:    Muscle strength against resistance:     Right Left   Deltoid  5 / 5 5 / 5   Biceps  5 / 5 5 / 5   Triceps 5 / 5 5 / 5   Wrist flexion  5 / 5 5 / 5   Wrist extension 5 / 5 5 / 5   Finger abduction 5 / 5 5 / 5   Thumb opposition 5 / 5 5 / 5   Handgrip 5 / 5 5 / 5   Hip flexion  5 / 5 5 / 5   Hip extension 5 / 5 5 / 5   Hip abduction 5 / 5 5 / 5   Hip adduction 5/ 5 5 / 5   Knee extension  5 / 5 5 / 5   Knee flexion  5 / 5 5 / 5   Dorsiflexion  5 / 5 5 / 5   EHL  5 / 5 5 / 5   Plantar flexion  5 / 5 5 / 5   Inversion of the feet 5 / 5 5 / 5   Eversion of the feet 5 / 5 5 / 5       Reflexes:     Right Left   Triceps 2+ 2+   Biceps 2+ 2+   Brachioradialis 2+ 2+   Patellar 2+ 2+   Achilles 2+ 2+       Clonus:  Negative bilaterally  Rivera: Negative bilaterally    On gross examination of the bilateral lower extremities, patient has full painfree ROM with no signs of clubbing, cyanosis, edema, and weakness.      XRAY Interpretation:     Cervical spine xrays were personally reviewed today.  No fractures.  No movement on flexion and extension.  Hardware intact.      Assessment:          1. S/P cervical disc replacement    2. Neck pain    3. Strain of neck muscle, initial encounter            Plan:          Orders Placed This Encounter    MRI Cervical Spine Without Contrast     Most likely just a cervical strain.      Continue to monitor for now.  If pain worsens or  becomes constant will get MRI cervical spine.  She will message through my [facility] as needed.      Follow-Up:  Follow up if symptoms worsen or fail to improve. If there are any questions prior to this, the patient was instructed to contact the office.       EDWARD Lucas PA-C  Neurosurgery  [facility]  04/18/2024    Addendum:   04/18/2024     MRI cspine and PT cspine ordered.    FU after MRI.    EDWARD Lucas PA-C  Neurosurgery  [facility]

## 2024-04-16 ENCOUNTER — PATIENT MESSAGE (OUTPATIENT)
Dept: NEUROSURGERY | Facility: CLINIC | Age: 49
End: 2024-04-16
Payer: COMMERCIAL

## 2024-04-18 NOTE — TELEPHONE ENCOUNTER
MRI cspine ordered.  Please schedule.    PT ordered.    FU after MRI.    Please let patient know.    EDWARD Lucas, PA-C  Neurosurgery  [facility]  04/18/2024

## 2024-04-22 ENCOUNTER — PATIENT MESSAGE (OUTPATIENT)
Dept: SURGERY | Facility: CLINIC | Age: 49
End: 2024-04-22
Payer: COMMERCIAL

## 2024-04-24 ENCOUNTER — PATIENT MESSAGE (OUTPATIENT)
Dept: NEUROLOGY | Facility: CLINIC | Age: 49
End: 2024-04-24
Payer: COMMERCIAL

## 2024-04-29 ENCOUNTER — OFFICE VISIT (OUTPATIENT)
Dept: INTERNAL MEDICINE | Facility: CLINIC | Age: 49
End: 2024-04-29
Payer: COMMERCIAL

## 2024-04-29 ENCOUNTER — PATIENT MESSAGE (OUTPATIENT)
Dept: SURGERY | Facility: CLINIC | Age: 49
End: 2024-04-29
Payer: COMMERCIAL

## 2024-04-29 ENCOUNTER — HOSPITAL ENCOUNTER (OUTPATIENT)
Dept: RADIOLOGY | Facility: HOSPITAL | Age: 49
Discharge: HOME | End: 2024-04-29
Attending: INTERNAL MEDICINE
Payer: COMMERCIAL

## 2024-04-29 VITALS
SYSTOLIC BLOOD PRESSURE: 100 MMHG | HEART RATE: 70 BPM | WEIGHT: 163.81 LBS | DIASTOLIC BLOOD PRESSURE: 70 MMHG | BODY MASS INDEX: 25.71 KG/M2 | HEIGHT: 67 IN | OXYGEN SATURATION: 99 %

## 2024-04-29 DIAGNOSIS — Z87.19 HISTORY OF DIVERTICULITIS: ICD-10-CM

## 2024-04-29 DIAGNOSIS — R10.9 ABDOMINAL PAIN, UNSPECIFIED ABDOMINAL LOCATION: ICD-10-CM

## 2024-04-29 DIAGNOSIS — R10.9 ABDOMINAL PAIN, UNSPECIFIED ABDOMINAL LOCATION: Primary | ICD-10-CM

## 2024-04-29 DIAGNOSIS — R74.8 ELEVATED LIVER ENZYMES: ICD-10-CM

## 2024-04-29 PROBLEM — K57.92 ACUTE DIVERTICULITIS: Status: RESOLVED | Noted: 2021-11-28 | Resolved: 2024-04-29

## 2024-04-29 PROBLEM — Z01.818 PREOP EXAM FOR INTERNAL MEDICINE: Status: RESOLVED | Noted: 2022-10-07 | Resolved: 2024-04-29

## 2024-04-29 PROBLEM — R42 DIZZINESS: Status: RESOLVED | Noted: 2020-06-19 | Resolved: 2024-04-29

## 2024-04-29 PROBLEM — M25.476 SWELLING OF FIRST METATARSOPHALANGEAL (MTP) JOINT: Status: ACTIVE | Noted: 2024-04-29

## 2024-04-29 PROBLEM — K62.82 DYSPLASIA OF ANUS: Status: ACTIVE | Noted: 2024-04-29

## 2024-04-29 PROBLEM — Z23 NEED FOR INFLUENZA VACCINATION: Status: RESOLVED | Noted: 2022-10-07 | Resolved: 2024-04-29

## 2024-04-29 PROBLEM — M54.2 NECK PAIN: Status: RESOLVED | Noted: 2023-03-10 | Resolved: 2024-04-29

## 2024-04-29 LAB
BILIRUB SERPL-MCNC: NEGATIVE MG/DL
BILIRUB UR QL STRIP: NEGATIVE
BLOOD URINE, POC: NEGATIVE
CLARITY UR REFRACT.AUTO: CLEAR
CLARITY, POC UA: CLEAR
COLOR UR AUTO: YELLOW
COLOR, POC UA: YELLOW
GLUCOSE UR QL STRIP: NEGATIVE
GLUCOSE UR QL STRIP: NEGATIVE
HGB UR QL STRIP: NEGATIVE
KETONES UR QL STRIP: NEGATIVE
KETONES UR QL STRIP: NEGATIVE
LEUKOCYTE ESTERASE UR QL STRIP: NEGATIVE
LEUKOCYTE ESTERASE URINE, POC: NEGATIVE
NITRITE UR QL STRIP: NEGATIVE
NITRITE, POC UA: NEGATIVE
PH UR STRIP: 6 [PH] (ref 5–8)
PH, POC UA: 5.5
PROT UR QL STRIP: NEGATIVE
PROTEIN, POC: NEGATIVE
SP GR UR STRIP: 1.03 (ref 1–1.03)
SPECIFIC GRAVITY, POC UA: 1.03
URN SPEC COLLECT METH UR: NORMAL
UROBILINOGEN, POC UA: 0.2

## 2024-04-29 PROCEDURE — 74177 CT ABD & PELVIS W/CONTRAST: CPT | Mod: TC

## 2024-04-29 PROCEDURE — A9698 NON-RAD CONTRAST MATERIALNOC: HCPCS | Performed by: INTERNAL MEDICINE

## 2024-04-29 PROCEDURE — 81002 URINALYSIS NONAUTO W/O SCOPE: CPT | Mod: S$GLB,,, | Performed by: INTERNAL MEDICINE

## 2024-04-29 PROCEDURE — 99214 OFFICE O/P EST MOD 30 MIN: CPT | Mod: 25,S$GLB,, | Performed by: INTERNAL MEDICINE

## 2024-04-29 PROCEDURE — 99999 PR PBB SHADOW E&M-EST. PATIENT-LVL IV: CPT | Mod: PBBFAC,,, | Performed by: INTERNAL MEDICINE

## 2024-04-29 PROCEDURE — 25500020 PHARM REV CODE 255: Performed by: INTERNAL MEDICINE

## 2024-04-29 PROCEDURE — 81003 URINALYSIS AUTO W/O SCOPE: CPT | Performed by: INTERNAL MEDICINE

## 2024-04-29 PROCEDURE — 74177 CT ABD & PELVIS W/CONTRAST: CPT | Mod: 26,,, | Performed by: STUDENT IN AN ORGANIZED HEALTH CARE EDUCATION/TRAINING PROGRAM

## 2024-04-29 RX ORDER — AMOXICILLIN AND CLAVULANATE POTASSIUM 875; 125 MG/1; MG/1
1 TABLET, FILM COATED ORAL 2 TIMES DAILY
Qty: 20 TABLET | Refills: 0 | Status: SHIPPED | OUTPATIENT
Start: 2024-04-29

## 2024-04-29 RX ADMIN — BARIUM SULFATE 450 ML: 20 SUSPENSION ORAL at 07:04

## 2024-04-29 RX ADMIN — IOHEXOL 100 ML: 350 INJECTION, SOLUTION INTRAVENOUS at 07:04

## 2024-04-29 NOTE — PROGRESS NOTES
Patient ID: Socorro Huang is a 48 y.o. female.    Chief Complaint: Abdominal Pain      Assessment:       1. Abdominal pain, unspecified abdominal location    2. History of diverticulitis    3. Elevated liver enzymes          Plan:         1. Abdominal pain, unspecified abdominal location  -     POCT URINE DIPSTICK WITHOUT MICROSCOPE  -     amoxicillin-clavulanate 875-125mg (AUGMENTIN) 875-125 mg per tablet; Take 1 tablet by mouth 2 (two) times daily.  Dispense: 20 tablet; Refill: 0  -     CBC Auto Differential; Future; Expected date: 04/29/2024  -     Comprehensive Metabolic Panel; Future; Expected date: 04/29/2024  -     CT Abdomen Pelvis With IV Contrast Routine Oral Contrast; Future; Expected date: 04/29/2024  -     Urinalysis, Reflex to Urine Culture Urine, Clean Catch    2. History of diverticulitis  Overview:  Back to baseline is feeling great     Orders:  -     amoxicillin-clavulanate 875-125mg (AUGMENTIN) 875-125 mg per tablet; Take 1 tablet by mouth 2 (two) times daily.  Dispense: 20 tablet; Refill: 0  -     CT Abdomen Pelvis With IV Contrast Routine Oral Contrast; Future; Expected date: 04/29/2024  -     Urinalysis, Reflex to Urine Culture Urine, Clean Catch    3. Elevated liver enzymes  -     Hepatitis Panel, Acute; Future; Expected date: 04/29/2024        Natural history and alarm symptoms with regard to diverticulitis reviewed, she is very familiar with this   Urine in clinic was acceptable, no abnormalities   Labs, CT ASAP   Urine culture   She has an appointment tomorrow with a CRS department, she will keep that appointment.  Should symptoms worsen or change prior to that time, she knows to go to the closest emergency room by whatever means necessary    Subjective:   [facility] appt    Dr Adams patient    Episode of diverticulitis in 2022    Woke up with abdominal pain, L side.  She has cut back on dairy.     This reminds her of her prior episodes of diverticulitis.    Not eating anything very  different,  does not typically eat a lot of nuts, seeds or tiny particles.    Slight nausea, no vomiting.  No fever.    No urinary symptoms.  Status post hysterectomy.    Liver labs out of range a few months ago, she states she was told they would be followed at a later point.  She drinks very little alcohol.    Patient Active Problem List:     S/P hysterectomy     History of diverticulitis     Pelvic pain syndrome     Anxiety     Gastroesophageal reflux disease without esophagitis     Hypothyroidism     Insomnia     Lymphopenia     Chronic migraine w/o aura w/o status migrainosus, not intractable     Cervical spondylosis     Cervical radiculopathy     S/P cervical disc replacement     Dysplasia of anus     Swelling of first metatarsophalangeal (MTP) joint             Review of Systems   Constitutional:  Negative for fever.   Respiratory:  Negative for cough and shortness of breath.    Cardiovascular:  Negative for chest pain.   Gastrointestinal:  Positive for abdominal distention and abdominal pain. Negative for constipation, diarrhea and nausea.          See HPI   Genitourinary:  Negative for frequency and hematuria.         Objective:      Physical Exam  Vitals and nursing note reviewed.   Constitutional:       Appearance: She is well-developed.   HENT:      Head: Normocephalic and atraumatic.      Right Ear: External ear normal.      Left Ear: External ear normal.      Nose: Nose normal.      Mouth/Throat:      Pharynx: No oropharyngeal exudate.   Eyes:      General: No scleral icterus.     Extraocular Movements: Extraocular movements intact.      Conjunctiva/sclera: Conjunctivae normal.   Neck:      Thyroid: No thyromegaly.      Vascular: No JVD.   Cardiovascular:      Rate and Rhythm: Normal rate and regular rhythm.      Heart sounds: Normal heart sounds. No murmur heard.     No gallop.   Pulmonary:      Effort: Pulmonary effort is normal. No respiratory distress.      Breath sounds: Normal breath sounds. No  wheezing.   Abdominal:      General: Bowel sounds are normal. There is no distension.      Palpations: Abdomen is soft. There is no mass.      Tenderness: There is no abdominal tenderness. There is no guarding or rebound.      Comments:  Tenderness left lower quadrant, no rebound or guarding   Musculoskeletal:         General: No tenderness. Normal range of motion.      Cervical back: Normal range of motion and neck supple.   Lymphadenopathy:      Cervical: No cervical adenopathy.   Skin:     General: Skin is warm.      Findings: No erythema or rash.   Neurological:      General: No focal deficit present.      Mental Status: She is alert and oriented to person, place, and time.      Cranial Nerves: No cranial nerve deficit.      Coordination: Coordination normal.   Psychiatric:         Behavior: Behavior normal.         Thought Content: Thought content normal.         Judgment: Judgment normal.             Health Maintenance Due   Topic Date Due    Hepatitis C Screening  Never done    HIV Screening  Never done    Hemoglobin A1c (Diabetic Prevention Screening)  Never done    TETANUS VACCINE  11/14/2022    Influenza Vaccine (1) 09/01/2023    COVID-19 Vaccine (4 - 2023-24 season) 09/01/2023

## 2024-04-29 NOTE — PROGRESS NOTES
"  CRS Office follow-up Visit    SUBJECTIVE:     Chief Complaint:  Abdominal pain    History:  history of anxiety, history of anal dysplasia, and previous laparoscopic sigmoid colectomy for diverticulitis (2018 by Dr. Alonso) was admitted 11/27/21 to 11/30/21 for worsening diverticulitis despite outpatient antibiotic management with augmentin.     She had an aunt with colon cancer in her 60s but denies any family history of inflammatory bowel disease.     Repeat CT on 12/9/21 performed due to ongoing abdominal pain and inflammation was improved.     She recovered well after her episode of diverticulitis in November 2021 but then had recurrence in June of 2022.  She therefore wished to proceed with redo resection.     7/20/22:  Laparoscopic redo segmental colectomy for diverticulitis  Pathology:   A.   COLON, LEFT, RESECTION:          -Diverticulitis.          -Negative for malignancy.   B.   DESIGNATED "PROXIMAL AND DISTAL ANASTOMOTIC DONUTS":          -Benign colon with focal mucosal hemorrhage and submucosal vascular   congestion.          -Negative for malignancy.     Last Colonoscopy:  2/3/22:  Impression:            - Patent end-to-end colo-rectal anastomosis,                          characterized by healthy appearing mucosa.                          - The examined portion of the ileum was normal.                          - Stool in the entire examined colon.                          - No specimens collected.      Current Status:   4/30/24:  Presents for evaluation for left-sided abdominal pain.  This occasionally occurs at night causing her to double over.  His occurred 3 times over the past several months.  Pain does improve after passing gas or bowel movement.  She intermittently has crampy abdominal pain throughout the day as well.  She is having daily bowel movements while taking MiraLax.  She previously took Linzess but this was too powerful and resulted in diarrhea.  She was seen by her primary care " provider yesterday and underwent blood work as well as a CT scan that demonstrated no obvious abnormalities.  Weight has been stable.  She did start on a PPI 1 month ago.    Review of Systems:  Review of Systems   Constitutional:  Negative for chills, diaphoresis, fever, malaise/fatigue and weight loss.   HENT:  Negative for congestion.    Respiratory:  Negative for shortness of breath.    Cardiovascular:  Negative for chest pain and leg swelling.   Gastrointestinal:  Positive for abdominal pain. Negative for blood in stool, constipation, diarrhea, nausea and vomiting.   Genitourinary:  Negative for dysuria, frequency and urgency.   Musculoskeletal:  Negative for back pain and myalgias.   Skin:  Negative for rash.   Neurological:  Negative for dizziness and weakness.   Endo/Heme/Allergies:  Does not bruise/bleed easily.   Psychiatric/Behavioral:  Negative for depression.        OBJECTIVE:     Vital Signs (Most Recent)  /77   Pulse 71   Wt 73 kg (160 lb 15 oz)   LMP 05/05/2013   BMI 25.21 kg/m²       Physical Exam:  General: White female in no distress   Neuro: alert and oriented x 4.  Moves all extremities.     HEENT: no icterus.  Trachea midline  Respiratory: respirations are even and unlabored  Cardiac: regular rate  Abdomen:  Incisions well healed.  Nondistended.  Nontender.  Extremities: Warm dry and intact  Skin: no rashes  Anorectal:  Deferred    Imaging:  CT abdomen pelvis 04/29/2024:  Personally reviewed.  Well mobilized colon with the majority of the colon on the right side of the abdomen.  Fecalized stool throughout the colon.  Anastomosis widely patent.  Remainder of the abdomen appears normal.    Labs:  No anemia.  Normal renal function.  Normal albumin    ASSESSMENT/PLAN:     Diagnoses and all orders for this visit:    Left lower quadrant abdominal pain          48 y.o. female with a history of diverticulitis status post initial sigmoid resection at outside hospital in 2018 and developed  recurrent diverticular disease and underwent redo laparoscopic segmental colectomy on 07/20/2022.      She presents for evaluation for left-sided abdominal pain.  CT scan with no obvious correlating findings.  The only abnormality on her CT scan on my review is moderate stool burden of the remaining colon.  She does take MiraLax once daily.  Recommended increasing the MiraLax to twice per day.  Pain consistent with intermittent obstructive symptoms from constipation.  Recommended continuing her PPI.  If no improvement, would then proceed with repeat colonoscopy.      ASHTYN Melo MD, FACS  Staff Surgeon  Colon & Rectal Surgery

## 2024-04-30 ENCOUNTER — PATIENT MESSAGE (OUTPATIENT)
Dept: INTERNAL MEDICINE | Facility: CLINIC | Age: 49
End: 2024-04-30
Payer: COMMERCIAL

## 2024-04-30 ENCOUNTER — OFFICE VISIT (OUTPATIENT)
Dept: SURGERY | Facility: CLINIC | Age: 49
End: 2024-04-30
Payer: COMMERCIAL

## 2024-04-30 VITALS
HEART RATE: 71 BPM | BODY MASS INDEX: 25.21 KG/M2 | SYSTOLIC BLOOD PRESSURE: 109 MMHG | WEIGHT: 160.94 LBS | DIASTOLIC BLOOD PRESSURE: 77 MMHG

## 2024-04-30 DIAGNOSIS — R10.32 LEFT LOWER QUADRANT ABDOMINAL PAIN: Primary | ICD-10-CM

## 2024-04-30 PROCEDURE — 99214 OFFICE O/P EST MOD 30 MIN: CPT | Mod: S$GLB,,, | Performed by: COLON & RECTAL SURGERY

## 2024-04-30 PROCEDURE — 99999 PR PBB SHADOW E&M-EST. PATIENT-LVL III: CPT | Mod: PBBFAC,,, | Performed by: COLON & RECTAL SURGERY

## 2024-05-01 RX ORDER — BUTALBITAL, ACETAMINOPHEN AND CAFFEINE 50; 325; 40 MG/1; MG/1; MG/1
1 TABLET ORAL EVERY 6 HOURS PRN
Qty: 12 TABLET | Refills: 0 | Status: SHIPPED | OUTPATIENT
Start: 2024-05-01 | End: 2024-05-07 | Stop reason: SDUPTHER

## 2024-05-01 RX ORDER — METHYLPREDNISOLONE 4 MG/1
TABLET ORAL
Qty: 1 EACH | Refills: 0 | Status: SHIPPED | OUTPATIENT
Start: 2024-05-01

## 2024-05-07 RX ORDER — BUTALBITAL, ACETAMINOPHEN AND CAFFEINE 50; 325; 40 MG/1; MG/1; MG/1
1 TABLET ORAL EVERY 6 HOURS PRN
Qty: 12 TABLET | Refills: 0 | Status: SHIPPED | OUTPATIENT
Start: 2024-05-07 | End: 2024-06-06

## 2024-05-10 ENCOUNTER — PATIENT MESSAGE (OUTPATIENT)
Dept: PRIMARY CARE CLINIC | Facility: CLINIC | Age: 49
End: 2024-05-10

## 2024-05-10 ENCOUNTER — OFFICE VISIT (OUTPATIENT)
Dept: PRIMARY CARE CLINIC | Facility: CLINIC | Age: 49
End: 2024-05-10
Payer: COMMERCIAL

## 2024-05-10 VITALS
HEART RATE: 69 BPM | RESPIRATION RATE: 16 BRPM | SYSTOLIC BLOOD PRESSURE: 110 MMHG | OXYGEN SATURATION: 98 % | DIASTOLIC BLOOD PRESSURE: 60 MMHG | HEIGHT: 67 IN | BODY MASS INDEX: 25.74 KG/M2 | WEIGHT: 164 LBS

## 2024-05-10 DIAGNOSIS — R10.10 UPPER ABDOMINAL PAIN: ICD-10-CM

## 2024-05-10 DIAGNOSIS — R05.3 CHRONIC COUGH: Primary | ICD-10-CM

## 2024-05-10 DIAGNOSIS — K21.9 GASTROESOPHAGEAL REFLUX DISEASE WITHOUT ESOPHAGITIS: ICD-10-CM

## 2024-05-10 DIAGNOSIS — R05.9 COUGH, UNSPECIFIED TYPE: ICD-10-CM

## 2024-05-10 PROCEDURE — 99999 PR PBB SHADOW E&M-EST. PATIENT-LVL III: CPT | Mod: PBBFAC,,, | Performed by: NURSE PRACTITIONER

## 2024-05-10 PROCEDURE — 99214 OFFICE O/P EST MOD 30 MIN: CPT | Mod: S$GLB,,, | Performed by: NURSE PRACTITIONER

## 2024-05-10 RX ORDER — CODEINE PHOSPHATE AND GUAIFENESIN 10; 100 MG/5ML; MG/5ML
5 SOLUTION ORAL 3 TIMES DAILY PRN
Qty: 118 ML | Refills: 0 | Status: SHIPPED | OUTPATIENT
Start: 2024-05-10 | End: 2024-05-20

## 2024-05-10 NOTE — PROGRESS NOTES
[facility] Note    Chief Complaint      Chief Complaint   Patient presents with    Cough     History of Present Illness      Socorro Huang is a 48 y.o. female patient of Dr. Byrne  who presents today for liver enzymes f/u and chronic cough, that she has had for months, worsened afternoon, causes sore throat, does get some phlegm, cough is causes her not to take a deep breath at times, does have history of pleurisy, does have developed cramps to right flank    Saw Dr. Beck for abd pain, elevated liver enzymes, was given augmentin  Appt with CRS  Increase miralax BID, continue PPI      Health Maintenance   Topic Date Due    TETANUS VACCINE  11/14/2022    Mammogram  01/05/2025    Colorectal Cancer Screening  02/03/2027    Lipid Panel  10/03/2027    Hepatitis C Screening  Completed    DEXA Scan  Discontinued       Past Medical History:   Diagnosis Date    Abnormal Pap smear     Anxiety     celexa & prozac didn't help much    Constipation - functional     Headache(784.0)     Hemorrhoid     Leukopenia     benign, evaluated in [facility] y 2000    Menorrhagia     PONV (postoperative nausea and vomiting)     Strain of neck muscle     tx with PT at [facility] in past       Past Surgical History:   Procedure Laterality Date    ANOSCOPY N/A 07/20/2022    Procedure: ANOSCOPY;  Surgeon: ASHTYN Melo MD;  Location: [address];  Service: Colon and Rectal;  Laterality: N/A;    ARTHROPLASTY,SPINE,CERVICAL N/A 10/12/2023    Procedure: ARTHROPLASTY,SPINE,CERVICAL;  Surgeon: Amador Wood MD;  Location: [facility] OR;  Service: Neurosurgery;  Laterality: N/A;  Anterior Cervical Discectomy C5/6, C6/7 with artifical disc, possible fusion        BREAST RECONSTRUCTION  2018    implant removal and lift    BREAST SURGERY      CHEILECTOMY Right 08/18/2023    Procedure: CHEILECTOMY;  Surgeon: Tacho Sanchez DPM;  Location: [facility] OR;  Service: Podiatry;  Laterality: Right;    COLON SURGERY  2014         COLONOSCOPY  N/A 02/03/2022    Procedure: COLONOSCOPY;  Surgeon: ASHTYN Melo MD;  Location: [facility] ENDO;  Service: Endoscopy;  Laterality: N/A;    CORRECTION OF HAMMER TOE Right 08/18/2023    Procedure: CORRECTION, HAMMER TOE;  Surgeon: Tacho Sanchez DPM;  Location: [facility] OR;  Service: Podiatry;  Laterality: Right;    DILATION AND CURETTAGE OF UTERUS  12/18/2012    complex hyperplasia, no atypia    FLEXIBLE SIGMOIDOSCOPY N/A 07/20/2022    Procedure: SIGMOIDOSCOPY, FLEXIBLE;  Surgeon: ASHTYN Melo MD;  Location: [facility] OR 2ND FLR;  Service: Colon and Rectal;  Laterality: N/A;    HEMORRHOID SURGERY  2014    HYSTERECTOMY      LAPAROSCOPIC SIGMOIDECTOMY Left 07/20/2022    Procedure: COLECTOMY, SIGMOID, LAPAROSCOPIC, ERAS low;  Surgeon: ASHTYN Melo MD;  Location: [facility] OR 2ND FLR;  Service: Colon and Rectal;  Laterality: Left;    MOBILIZATION OF SPLENIC FLEXURE N/A 07/20/2022    Procedure: MOBILIZATION, SPLENIC FLEXURE;  Surgeon: ASHTYN Melo MD;  Location: [facility] OR 2ND FLR;  Service: Colon and Rectal;  Laterality: N/A;    OOPHORECTOMY      PARTIAL HYSTERECTOMY  2013    for atypia    SMALL INTESTINE SURGERY  2017    THYROIDECTOMY, PARTIAL  2013    TONSILLECTOMY      TUBAL LIGATION         family history includes Alcohol abuse in her cousin and maternal uncle; Breast cancer in her maternal grandmother; COPD in her mother; Cancer in her father and mother; Depression in her sister; Diabetes in her mother and paternal grandmother; Drug abuse in her maternal aunt; Emphysema in her mother; Heart disease (age of onset: 50) in her father; Hypertension in her paternal grandmother; Mental illness in her brother and sister; Ovarian cancer in her maternal grandmother; Schizophrenia in her paternal aunt; Suicide in her brother.    Social History     Tobacco Use    Smoking status: Never    Smokeless tobacco: Never   Substance Use Topics    Alcohol use: Not Currently     Comment: socially    Drug use: Never        Review of Systems   Constitutional:  Negative for chills and fever.   HENT:  Negative for congestion and hearing loss.    Eyes:  Negative for blurred vision and discharge.   Respiratory:  Positive for cough, sputum production and shortness of breath. Negative for wheezing.    Cardiovascular:  Negative for chest pain and palpitations.   Gastrointestinal:  Positive for abdominal pain. Negative for blood in stool, constipation, diarrhea and vomiting.   Genitourinary:  Negative for dysuria and hematuria.   Musculoskeletal:  Positive for back pain. Negative for neck pain.   Neurological:  Negative for weakness and headaches.   Endo/Heme/Allergies:  Negative for polydipsia.   Psychiatric/Behavioral:  Negative for depression. The patient is not nervous/anxious.         Outpatient Encounter Medications as of 5/10/2024   Medication Sig Note Dispense Refill    butalbital-acetaminophen-caffeine -40 mg (FIORICET, ESGIC) -40 mg per tablet Take 1 tablet by mouth every 6 (six) hours as needed for Headaches (Do not exceed 3 doses per week).  12 tablet 0    co-enzyme Q-10 30 mg capsule Take by mouth. 2023: HOLD AM OF PROCEDURE        amoxicillin-clavulanate 875-125mg (AUGMENTIN) 875-125 mg per tablet Take 1 tablet by mouth 2 (two) times daily. (Patient not taking: Reported on 5/10/2024)  20 tablet 0    [] guaiFENesin-codeine 100-10 mg/5 ml (TUSSI-ORGANIDIN NR)  mg/5 mL syrup Take 5 mLs by mouth 3 (three) times daily as needed for Cough.  118 mL 0    methylPREDNISolone (MEDROL DOSEPACK) 4 mg tablet use as directed (Patient not taking: Reported on 5/10/2024)  1 each 0    pantoprazole (PROTONIX) 40 MG tablet Take 1 tablet (40 mg total) by mouth once daily. 2023: TAKE AM OF PROCEDURE   30 tablet 11    [DISCONTINUED] butalbital-acetaminophen-caffeine -40 mg (FIORICET, ESGIC) -40 mg per tablet Take 1 tablet by mouth every 6 (six) hours as needed for Headaches (Do not exceed 3 doses per  "week).  12 tablet 0     No facility-administered encounter medications on file as of 5/10/2024.        Review of patient's allergies indicates:  No Known Allergies    Physical Exam      Vital Signs  Pulse: 69  Resp: 16  SpO2: 98 %  BP: 110/60  BP Location: Right arm  Patient Position: Sitting  Height and Weight  Height: 5' 7" (170.2 cm)  Weight: 74.4 kg (164 lb 0.4 oz)  BSA (Calculated - sq m): 1.88 sq meters  BMI (Calculated): 25.7  Weight in (lb) to have BMI = 25: 159.3    Physical Exam  Vitals and nursing note reviewed.   Constitutional:       General: She is not in acute distress.     Appearance: Normal appearance. She is ill-appearing.   HENT:      Head: Normocephalic and atraumatic.      Mouth/Throat:      Mouth: Mucous membranes are moist.   Cardiovascular:      Rate and Rhythm: Normal rate and regular rhythm.      Heart sounds: Normal heart sounds.   Pulmonary:      Effort: Pulmonary effort is normal. No respiratory distress.      Comments: Deep cough sounds dry with bronchial irritation noted  Musculoskeletal:         General: Normal range of motion.   Skin:     General: Skin is warm and dry.   Neurological:      General: No focal deficit present.      Mental Status: She is alert and oriented to person, place, and time.   Psychiatric:         Mood and Affect: Mood normal.         Behavior: Behavior normal.         Thought Content: Thought content normal.         Judgment: Judgment normal.          Laboratory:  CBC:  Lab Results   Component Value Date    WBC 4.99 04/29/2024    RBC 4.05 04/29/2024    HGB 13.5 04/29/2024    HCT 39.2 04/29/2024     04/29/2024    MCV 97 04/29/2024    MCH 33.3 (H) 04/29/2024    MCHC 34.4 04/29/2024    MCHC 33.0 10/09/2023    MCHC 35.4 08/03/2023     CMP:  Lab Results   Component Value Date    GLU 90 04/29/2024    CALCIUM 9.4 04/29/2024    ALBUMIN 4.1 04/29/2024    PROT 6.9 04/29/2024     04/29/2024    K 3.8 04/29/2024    CO2 24 04/29/2024     04/29/2024    BUN " "22 (H) 04/29/2024    ALKPHOS 54 (L) 04/29/2024    ALT 28 04/29/2024    AST 17 04/29/2024    BILITOT 0.4 04/29/2024    BILITOT 0.4 10/09/2023    BILITOT 0.3 08/03/2023     URINALYSIS:  Lab Results   Component Value Date    COLORU Yellow 04/29/2024    CLARITYU Clear 04/29/2024    SPECGRAV 1.030 04/29/2024    PHUR 5.5 04/29/2024    PROTEINUA Negative 04/29/2024    BACTERIA Few (A) 10/08/2013    NITRITE negative 04/29/2024    LEUKOCYTESUR Negative 04/29/2024    UROBILINOGEN 0.2 04/29/2024      LIPIDS:  Lab Results   Component Value Date    TSH 0.897 10/03/2022    TSH 1.659 11/22/2017    TSH 1.846 08/01/2012    HDL 65 10/03/2022    HDL 54 10/27/2014    HDL 55 04/12/2013    CHOL 179 10/03/2022    CHOL 140 10/27/2014    CHOL 135 04/12/2013    TRIG 51 10/03/2022    TRIG 37 10/27/2014    TRIG 50 04/12/2013    LDLCALC 103.8 10/03/2022    LDLCALC 78.6 10/27/2014    LDLCALC 70.0 04/12/2013    CHOLHDL 36.3 10/03/2022    CHOLHDL 38.6 10/27/2014    CHOLHDL 40.7 04/12/2013    NONHDLCHOL 114 10/03/2022    NONHDLCHOL 86 10/27/2014    TOTALCHOLEST 2.8 10/03/2022    TOTALCHOLEST 2.6 10/27/2014    TOTALCHOLEST 2.5 04/12/2013     TSH:  Lab Results   Component Value Date    TSH 0.897 10/03/2022    TSH 1.659 11/22/2017    TSH 1.846 08/01/2012     A1C:  No results found for: "HGBA1C"      Assessment/Plan     Socorro Huang is a 48 y.o.female with:    Chronic cough  -     guaiFENesin-codeine 100-10 mg/5 ml (TUSSI-ORGANIDIN NR)  mg/5 mL syrup; Take 5 mLs by mouth 3 (three) times daily as needed for Cough.  Dispense: 118 mL; Refill: 0  -     Complete PFT w/ bronchodilator; Future  -     CT Chest Without Contrast; Future; Expected date: 05/10/2024    Cough, unspecified type  -     Complete PFT w/ bronchodilator; Future  -     CT Chest Without Contrast; Future; Expected date: 05/10/2024    Upper abdominal pain  -     Complete PFT w/ bronchodilator; Future  -     CT Chest Without Contrast; Future; Expected date: " 05/10/2024    Gastroesophageal reflux disease without esophagitis          Health Maintenance Due   Topic Date Due    HIV Screening  Never done    Hemoglobin A1c (Diabetic Prevention Screening)  Never done    TETANUS VACCINE  11/14/2022    COVID-19 Vaccine (4 - 2023-24 season) 09/01/2023        I spent 45 minutes on the day of this encounter for preparing for, evaluating, treating, and managing this patient.      -Continue current medications and maintain follow up with specialists.  Return to clinic as needed for any concerns.    No follow-ups on file.       AGUSTIN SheltonC  [facility]

## 2024-05-15 ENCOUNTER — HOSPITAL ENCOUNTER (OUTPATIENT)
Dept: PULMONOLOGY | Facility: HOSPITAL | Age: 49
Discharge: HOME | End: 2024-05-15
Attending: NURSE PRACTITIONER
Payer: COMMERCIAL

## 2024-05-15 ENCOUNTER — HOSPITAL ENCOUNTER (OUTPATIENT)
Dept: RADIOLOGY | Facility: HOSPITAL | Age: 49
Discharge: HOME | End: 2024-05-15
Attending: NURSE PRACTITIONER
Payer: COMMERCIAL

## 2024-05-15 DIAGNOSIS — R05.3 CHRONIC COUGH: ICD-10-CM

## 2024-05-15 DIAGNOSIS — R10.10 UPPER ABDOMINAL PAIN: ICD-10-CM

## 2024-05-15 DIAGNOSIS — R05.9 COUGH, UNSPECIFIED TYPE: ICD-10-CM

## 2024-05-15 PROCEDURE — 71250 CT THORAX DX C-: CPT | Mod: 26,,, | Performed by: RADIOLOGY

## 2024-05-15 PROCEDURE — 71250 CT THORAX DX C-: CPT | Mod: TC

## 2024-05-20 LAB
DLCO ADJ PRE: 20.41 ML/(MIN*MMHG) (ref 20.48–31.95)
DLCO SINGLE BREATH LLN: 20.48
DLCO SINGLE BREATH PRE REF: 78.1 %
DLCO SINGLE BREATH REF: 26.22
DLCOC SBVA LLN: 3.45
DLCOC SBVA PRE REF: 84 %
DLCOC SBVA REF: 4.82
DLCOC SINGLE BREATH LLN: 20.48
DLCOC SINGLE BREATH PRE REF: 77.9 %
DLCOC SINGLE BREATH REF: 26.22
DLCOCSBVAULN: 6.19
DLCOCSINGLEBREATHULN: 31.95
DLCOSINGLEBREATHULN: 31.95
DLCOVA LLN: 3.45
DLCOVA PRE REF: 84.2 %
DLCOVA PRE: 4.06 ML/(MIN*MMHG*L) (ref 3.45–6.19)
DLCOVA REF: 4.82
DLCOVAULN: 6.19
DLVAADJ PRE: 4.05 ML/(MIN*MMHG*L) (ref 3.45–6.19)
ERV LLN: -16449
ERV PRE REF: 108.7 %
ERV REF: 1
ERVULN: ABNORMAL
FEF 25 75 LLN: 1.65
FEF 25 75 PRE REF: 77.9 %
FEF 25 75 REF: 2.93
FEV05 LLN: 1.29
FEV05 REF: 2.14
FEV1 FVC LLN: 69
FEV1 FVC PRE REF: 94.3 %
FEV1 FVC REF: 80
FEV1 LLN: 2.39
FEV1 PRE REF: 90.3 %
FEV1 REF: 3.06
FRCPLETH LLN: 2.04
FRCPLETH PREREF: 93 %
FRCPLETH REF: 2.86
FRCPLETHULN: 3.68
FVC LLN: 3.01
FVC PRE REF: 95.1 %
FVC REF: 3.84
IVC PRE: 3.6 L (ref 3.01–4.71)
IVC SINGLE BREATH LLN: 3.01
IVC SINGLE BREATH PRE REF: 93.7 %
IVC SINGLE BREATH REF: 3.84
IVCSINGLEBREATHULN: 4.71
LLN IC: -16447.52
PEF LLN: 5.36
PEF PRE REF: 74.8 %
PEF REF: 7.24
PHYSICIAN COMMENT: ABNORMAL
PRE DLCO: 20.47 ML/(MIN*MMHG) (ref 20.48–31.95)
PRE ERV: 1.08 L (ref -16449–16451)
PRE FEF 25 75: 2.28 L/S (ref 1.65–4.55)
PRE FET 100: 7.19 SEC
PRE FEV05 REF: 96 %
PRE FEV1 FVC: 75.63 % (ref 69.12–89.5)
PRE FEV1: 2.76 L (ref 2.39–3.71)
PRE FEV5: 2.06 L (ref 1.29–3)
PRE FRC PL: 2.66 L (ref 2.04–3.68)
PRE FVC: 3.65 L (ref 3.01–4.71)
PRE IC: 2.57 L (ref -16447.52–16452.48)
PRE PEF: 5.41 L/S (ref 5.36–9.12)
PRE REF IC: 103.7 %
PRE RV: 1.58 L (ref 1.29–2.44)
PRE TLC: 5.23 L (ref 4.45–6.43)
RAW PRE REF: 131.7 %
RAW PRE: 4.03 CMH2O*S/L (ref 3.06–3.06)
RAW REF: 3.06
REF IC: 2.48
RV LLN: 1.29
RV PRE REF: 84.6 %
RV REF: 1.86
RVTLC LLN: 26
RVTLC PRE REF: 84.7 %
RVTLC PRE: 30.16 % (ref 26.03–45.21)
RVTLC REF: 36
RVTLCULN: 45
RVULN: 2.44
SGAW PRE REF: 83 %
SGAW PRE: 0.08 1/(CMH2O*S) (ref 0.1–0.1)
SGAW REF: 0.1
TLC LLN: 4.45
TLC PRE REF: 96.1 %
TLC REF: 5.44
TLC ULN: 6.43
ULN IC: ABNORMAL
VA PRE: 5.05 L (ref 5.29–5.29)
VA SINGLE BREATH LLN: 5.29
VA SINGLE BREATH PRE REF: 95.3 %
VA SINGLE BREATH REF: 5.29
VASINGLEBREATHULN: 5.29
VC LLN: 3.01
VC PRE REF: 95.1 %
VC PRE: 3.65 L (ref 3.01–4.71)
VC REF: 3.84
VC ULN: 4.71

## 2024-05-20 PROCEDURE — 94726 PLETHYSMOGRAPHY LUNG VOLUMES: CPT | Mod: 26,S$GLB,, | Performed by: INTERNAL MEDICINE

## 2024-05-20 PROCEDURE — 94010 BREATHING CAPACITY TEST: CPT | Mod: 26,S$GLB,, | Performed by: INTERNAL MEDICINE

## 2024-05-20 PROCEDURE — 94729 DIFFUSING CAPACITY: CPT | Mod: 26,S$GLB,, | Performed by: INTERNAL MEDICINE

## 2024-05-21 ENCOUNTER — PATIENT MESSAGE (OUTPATIENT)
Dept: PRIMARY CARE CLINIC | Facility: CLINIC | Age: 49
End: 2024-05-21
Payer: COMMERCIAL

## 2024-05-21 NOTE — TELEPHONE ENCOUNTER
Pt was seen on 05/10 for chronic cough, pt states she still has the cough. It get so bad it's making her throat sore. Pt states her most recent blood work looks good but it is still unexplained about the cough

## 2024-05-22 ENCOUNTER — PATIENT MESSAGE (OUTPATIENT)
Dept: NEUROLOGY | Facility: CLINIC | Age: 49
End: 2024-05-22
Payer: COMMERCIAL

## 2024-05-29 ENCOUNTER — OFFICE VISIT (OUTPATIENT)
Dept: GASTROENTEROLOGY | Facility: CLINIC | Age: 49
End: 2024-05-29
Payer: COMMERCIAL

## 2024-05-29 DIAGNOSIS — K21.9 GASTROESOPHAGEAL REFLUX DISEASE WITHOUT ESOPHAGITIS: Primary | ICD-10-CM

## 2024-05-29 PROCEDURE — 99204 OFFICE O/P NEW MOD 45 MIN: CPT | Mod: 95,,,

## 2024-05-29 RX ORDER — OMEPRAZOLE 40 MG/1
40 CAPSULE, DELAYED RELEASE ORAL
Qty: 180 CAPSULE | Refills: 3 | Status: SHIPPED | OUTPATIENT
Start: 2024-05-29 | End: 2025-05-29

## 2024-05-29 NOTE — PROGRESS NOTES
The patient location is: Patient Home   The chief complaint leading to consultation is: cough, throat clearing, LPR  Visit type: Virtual visit with synchronous audio and video  Total time spent with patient: 10 minutes    Each patient to whom he or she provides medical services by telemedicine is:  (1) informed of the relationship between the physician and patient and the respective role of any other health care provider with respect to management of the patient; and (2) notified that he or she may decline to receive medical services by telemedicine and may withdraw from such care at any time.                                                                             Gastroenterology Clinic Consultation Note    Reason for Visit:  The encounter diagnosis was Gastroesophageal reflux disease without esophagitis.    PCP:   Blake Adams         Initial HPI   This is a 48 y.o. female presenting for cough and throat clearing. This has been ongoing for several months but patient reports this is happening more frequently. Has been taking Protonix 40mg BID with no relief. Towards the end of the day, she will have throat pain from this frequent cough and throat clearing. Denies nausea, vomiting, unintentional weight loss.     Patient saw ENT on 3/24/2023 for similar complaints with throat clearing, cough. Also noted chronic sinusitis with history of septoplasty and balloon sinuplasty.     ROS:  Review of Systems   Constitutional:  Negative for chills, fever and weight loss.   Eyes:  Negative for redness.   Respiratory:  Positive for cough and sputum production.    Cardiovascular:  Negative for chest pain.   Gastrointestinal:  Negative for abdominal pain, blood in stool, constipation, diarrhea, heartburn, melena, nausea and vomiting.   Skin:  Negative for rash.   Neurological:  Negative for seizures, loss of consciousness and weakness.        Medical History:  has a past medical history of Abnormal Pap smear,  Anxiety, Constipation - functional, Headache(784.0), Hemorrhoid, Leukopenia, Menorrhagia, PONV (postoperative nausea and vomiting), and Strain of neck muscle.    Surgical History:  has a past surgical history that includes Dilation and curettage of uterus (12/18/2012); Tonsillectomy; Partial hysterectomy (2013); Tubal ligation; Thyroidectomy, partial (2013); Colon surgery (2014); Hemorrhoid surgery (2014); Hysterectomy; Oophorectomy; Breast surgery; Breast reconstruction (2018); Colonoscopy (N/A, 02/03/2022); Laparoscopic sigmoidectomy (Left, 07/20/2022); Flexible sigmoidoscopy (N/A, 07/20/2022); Anoscopy (N/A, 07/20/2022); Mobilization of splenic flexure (N/A, 07/20/2022); Small intestine surgery (2017); Cheilectomy (Right, 08/18/2023); Correction of hammer toe (Right, 08/18/2023); and arthroplasty,spine,cervical (N/A, 10/12/2023).    Family History: family history includes Alcohol abuse in her cousin and maternal uncle; Breast cancer in her maternal grandmother; COPD in her mother; Cancer in her father and mother; Depression in her sister; Diabetes in her mother and paternal grandmother; Drug abuse in her maternal aunt; Emphysema in her mother; Heart disease (age of onset: 50) in her father; Hypertension in her paternal grandmother; Mental illness in her brother and sister; Ovarian cancer in her maternal grandmother; Schizophrenia in her paternal aunt; Suicide in her brother..       Review of patient's allergies indicates:  No Known Allergies    Current Outpatient Medications on File Prior to Visit   Medication Sig Dispense Refill    amoxicillin-clavulanate 875-125mg (AUGMENTIN) 875-125 mg per tablet Take 1 tablet by mouth 2 (two) times daily. (Patient not taking: Reported on 5/10/2024) 20 tablet 0    butalbital-acetaminophen-caffeine -40 mg (FIORICET, ESGIC) -40 mg per tablet Take 1 tablet by mouth every 6 (six) hours as needed for Headaches (Do not exceed 3 doses per week). 12 tablet 0    co-enzyme  Q-10 30 mg capsule Take by mouth.      methylPREDNISolone (MEDROL DOSEPACK) 4 mg tablet use as directed (Patient not taking: Reported on 5/10/2024) 1 each 0    [DISCONTINUED] pantoprazole (PROTONIX) 40 MG tablet Take 1 tablet (40 mg total) by mouth once daily. 30 tablet 11     No current facility-administered medications on file prior to visit.         Objective Findings:    Vital Signs:  LMP 05/05/2013   There is no height or weight on file to calculate BMI.    Physical Exam: Limited due to virtual visit  Physical Exam  Neurological:      Mental Status: She is alert and oriented to person, place, and time.             Labs:  Lab Results   Component Value Date    WBC 4.99 04/29/2024    HGB 13.5 04/29/2024    HCT 39.2 04/29/2024     04/29/2024    CRP 6.8 11/30/2021    CHOL 179 10/03/2022    TRIG 51 10/03/2022    HDL 65 10/03/2022    ALKPHOS 54 (L) 04/29/2024    LIPASE 14 11/27/2021    ALT 28 04/29/2024    AST 17 04/29/2024     04/29/2024    K 3.8 04/29/2024     04/29/2024    CREATININE 0.8 04/29/2024    BUN 22 (H) 04/29/2024    CO2 24 04/29/2024    TSH 0.897 10/03/2022    INR 1.0 10/09/2023       Imaging reviewed: No pertinent imaging reviewed       Endoscopy reviewed: Colonoscopy 2/3/2022  Impression:            - Patent end-to-end colo-rectal anastomosis,                          characterized by healthy appearing mucosa.                          - The examined portion of the ileum was normal.                          - Stool in the entire examined colon.                          - No specimens collected.   Recommendation:        - Discharge patient to home (ambulatory).                          - Resume previous diet.                          - Continue present medications.                          - Use Linzess (linaclotide) 145 mcg PO daily.                          - Repeat colonoscopy in 5 years for surveillance.   Amol Melo MD   2/3/2022 7:58:27 AM     Laryngoscopy  3/24/2023  Laryngoscopy:    Areas examined:  Nasal cavities, nasopharynx, oropharynx, hypopharynx,  larynx and vocal cords    Laryngoscope size:  4 mm  Nose Intranasal:     Mucosa no polyps     Mucosa ulcers not present     No mucosa lesions present     No septum gross deformity     Enlarged turbinates  Nasopharynx:     No mucosa lesions     Adenoids present     Posterior choanae patent     Eustachian tube patent  Larynx/hypopharynx:     No epiglottis lesions     No epiglottis edema     No AE folds lesions     No vocal cord polyps     Equal and normal bilateral     No hypopharynx lesions     No piriform sinus pooling     No piriform sinus lesions     No post cricoid edema     No post cricoid erythema     Erythematous MTs bilaterally  Septum appears straight  Stringy mucous bilaterally with thick clear mucous in R posterior choana  Erythema and edema to arytenoids       Last Resulted: 03/24/23 11:00 CDT          Assessment:  1. Gastroesophageal reflux disease without esophagitis      Orders Placed This Encounter    omeprazole (PRILOSEC) 40 MG capsule         Plan:  Orders Placed This Encounter    omeprazole (PRILOSEC) 40 MG capsule     Given patients ongoing atypical symptoms, will proceed with EGD with Bravo OFF PPI to evaluate if reflux is causing her symptoms. Changed PPI to Prilosec 40mg to start once daily. Increase to BID if symptoms persist. Will need to hold 3 weeks prior to her EGD, this was discussed with her.   RTC pending above.       Thank you for allowing me to participate in this patient's care.    Sincerely,     Shanann Nguyễn NP  Gastroenterology Department  [facility]

## 2024-05-29 NOTE — PATIENT INSTRUCTIONS
For GERD/Reflux:     Take your PPI 30-45 minutes before your first protein containing meal (breakfast) every day. Take twice daily for 8 weeks. If symptoms improve ok discontinue an use PPI as needed for symptoms.      Take Pepcid 20mg every evening before bedtime to help with nocturnal symptoms, as needed.     Remain upright for at least 3 hours after eating.      Elevate the head of the bed for nighttime.      Avoid foods that you have noticed make your symptoms worse (possible triggers include: peppermint, alcohol, chocolate, caffeine, spicy foods, greasy/fried foods, acidic foods-citrus).

## 2024-05-31 ENCOUNTER — PATIENT MESSAGE (OUTPATIENT)
Dept: GASTROENTEROLOGY | Facility: CLINIC | Age: 49
End: 2024-05-31
Payer: COMMERCIAL

## 2024-06-03 ENCOUNTER — PATIENT MESSAGE (OUTPATIENT)
Dept: NEUROLOGY | Facility: CLINIC | Age: 49
End: 2024-06-03
Payer: COMMERCIAL

## 2024-06-11 ENCOUNTER — PATIENT MESSAGE (OUTPATIENT)
Dept: SURGERY | Facility: CLINIC | Age: 49
End: 2024-06-11
Payer: COMMERCIAL

## 2024-06-12 ENCOUNTER — PATIENT MESSAGE (OUTPATIENT)
Dept: SURGERY | Facility: CLINIC | Age: 49
End: 2024-06-12
Payer: COMMERCIAL

## 2024-06-13 ENCOUNTER — PROCEDURE VISIT (OUTPATIENT)
Dept: NEUROLOGY | Facility: CLINIC | Age: 49
End: 2024-06-13
Payer: COMMERCIAL

## 2024-06-13 VITALS — DIASTOLIC BLOOD PRESSURE: 76 MMHG | SYSTOLIC BLOOD PRESSURE: 114 MMHG | HEART RATE: 76 BPM

## 2024-06-13 DIAGNOSIS — G43.709 CHRONIC MIGRAINE W/O AURA W/O STATUS MIGRAINOSUS, NOT INTRACTABLE: Primary | ICD-10-CM

## 2024-06-13 NOTE — PROCEDURES
PROCEDURE NOTE:  BOTOX was performed as an indicated therapy for intractable chronic migraine headaches given that the patient failed more than 2 headache medications     A time out was conducted just before the start of the procedure to verify the correct patient and procedure, procedure location, and all relevant critical information.      Botulinum Toxin Injection Procedure      PROCEDURE PERFORMED: Botulinum toxin injection (10519)  CLINICAL INDICATION: Chronic Migraines  After risks and benefits were explained including bleeding, infection, worsening of pain, damage to the areas being injected, weakness of muscles, loss of muscle control, dysphagia if injecting the head or neck, facial droop if injecting the facial area, painful injection, allergic or other reaction to the medications being injected, and the failure of the procedure to help the problem, a signed consent was obtained.   The patient was placed in a comfortable area and the sites to be treated were identified.The area to be treated was prepped three times with alcohol and the alcohol allowed to dry. Next, a 30 gauge needle was used to inject the medication in the area to be treated.      Total Botox used: 155 Units   Unavoidable waste: 45 Units      Injection sites:    muscle bilaterally ( a total of 10 units divided into 2 sites)   Procerus muscle (5 units)   Frontalis muscle bilaterally (a total of 20 units divided into 4 sites)   Temporalis muscle bilaterally (a total of 40 units divided into 8 sites)   Occipitalis muscle bilaterally (a total of 30 units divided into 6 sites)   Cervical paraspinal muscles (a total of 20 units divided into 4 sites)   Trapezius muscle bilaterally (a total of 30 units divided into 6 sites)   Complications: none   RTC for the next Botox injection: 12 weeks     Problem List Items Addressed This Visit          Neuro    Chronic migraine w/o aura w/o status migrainosus, not intractable - Primary    Relevant  Medications    onabotulinumtoxina injection 200 Units (Completed) (Start on 6/13/2024 10:45 AM)         Sarena Patel, PA-C [facility]   [phone]

## 2024-06-19 ENCOUNTER — PATIENT MESSAGE (OUTPATIENT)
Dept: NEUROLOGY | Facility: CLINIC | Age: 49
End: 2024-06-19
Payer: COMMERCIAL

## 2024-06-20 ENCOUNTER — HOSPITAL ENCOUNTER (EMERGENCY)
Facility: HOSPITAL | Age: 49
Discharge: HOME | End: 2024-06-20
Attending: EMERGENCY MEDICINE
Payer: COMMERCIAL

## 2024-06-20 ENCOUNTER — PATIENT MESSAGE (OUTPATIENT)
Dept: SURGERY | Facility: CLINIC | Age: 49
End: 2024-06-20
Payer: COMMERCIAL

## 2024-06-20 VITALS
RESPIRATION RATE: 16 BRPM | OXYGEN SATURATION: 100 % | SYSTOLIC BLOOD PRESSURE: 113 MMHG | BODY MASS INDEX: 25.11 KG/M2 | HEART RATE: 73 BPM | DIASTOLIC BLOOD PRESSURE: 68 MMHG | HEIGHT: 67 IN | WEIGHT: 160 LBS | TEMPERATURE: 98 F

## 2024-06-20 DIAGNOSIS — K59.00 CONSTIPATION, UNSPECIFIED CONSTIPATION TYPE: ICD-10-CM

## 2024-06-20 DIAGNOSIS — R10.9 ABDOMINAL PAIN, UNSPECIFIED ABDOMINAL LOCATION: Primary | ICD-10-CM

## 2024-06-20 LAB
ALBUMIN SERPL BCP-MCNC: 4.2 G/DL (ref 3.5–5.2)
ALP SERPL-CCNC: 75 U/L (ref 55–135)
ALT SERPL W/O P-5'-P-CCNC: 84 U/L (ref 10–44)
ANION GAP SERPL CALC-SCNC: 9 MMOL/L (ref 8–16)
AST SERPL-CCNC: 44 U/L (ref 10–40)
BASOPHILS # BLD AUTO: 0.02 K/UL (ref 0–0.2)
BASOPHILS NFR BLD: 0.3 % (ref 0–1.9)
BILIRUB SERPL-MCNC: 0.5 MG/DL (ref 0.1–1)
BILIRUB UR QL STRIP: NEGATIVE
BILIRUB UR QL STRIP: NEGATIVE
BUN SERPL-MCNC: 10 MG/DL (ref 6–20)
CALCIUM SERPL-MCNC: 9.6 MG/DL (ref 8.7–10.5)
CHLORIDE SERPL-SCNC: 105 MMOL/L (ref 95–110)
CLARITY UR REFRACT.AUTO: CLEAR
CLARITY UR REFRACT.AUTO: CLEAR
CO2 SERPL-SCNC: 25 MMOL/L (ref 23–29)
COLOR UR AUTO: COLORLESS
COLOR UR AUTO: NORMAL
CREAT SERPL-MCNC: 0.7 MG/DL (ref 0.5–1.4)
DIFFERENTIAL METHOD BLD: ABNORMAL
EOSINOPHIL # BLD AUTO: 0 K/UL (ref 0–0.5)
EOSINOPHIL NFR BLD: 0.3 % (ref 0–8)
ERYTHROCYTE [DISTWIDTH] IN BLOOD BY AUTOMATED COUNT: 11.9 % (ref 11.5–14.5)
EST. GFR  (NO RACE VARIABLE): >60 ML/MIN/1.73 M^2
GLUCOSE SERPL-MCNC: 89 MG/DL (ref 70–110)
GLUCOSE UR QL STRIP: NEGATIVE
GLUCOSE UR QL STRIP: NEGATIVE
HCT VFR BLD AUTO: 39.7 % (ref 37–48.5)
HGB BLD-MCNC: 13.7 G/DL (ref 12–16)
HGB UR QL STRIP: NEGATIVE
HGB UR QL STRIP: NEGATIVE
HIV 1+2 AB+HIV1 P24 AG SERPL QL IA: NORMAL
IMM GRANULOCYTES # BLD AUTO: 0.02 K/UL (ref 0–0.04)
IMM GRANULOCYTES NFR BLD AUTO: 0.3 % (ref 0–0.5)
KETONES UR QL STRIP: NEGATIVE
KETONES UR QL STRIP: NEGATIVE
LEUKOCYTE ESTERASE UR QL STRIP: NEGATIVE
LEUKOCYTE ESTERASE UR QL STRIP: NEGATIVE
LIPASE SERPL-CCNC: 69 U/L (ref 4–60)
LYMPHOCYTES # BLD AUTO: 1.4 K/UL (ref 1–4.8)
LYMPHOCYTES NFR BLD: 20.1 % (ref 18–48)
MCH RBC QN AUTO: 32.6 PG (ref 27–31)
MCHC RBC AUTO-ENTMCNC: 34.5 G/DL (ref 32–36)
MCV RBC AUTO: 95 FL (ref 82–98)
MONOCYTES # BLD AUTO: 0.4 K/UL (ref 0.3–1)
MONOCYTES NFR BLD: 5.4 % (ref 4–15)
NEUTROPHILS # BLD AUTO: 5.3 K/UL (ref 1.8–7.7)
NEUTROPHILS NFR BLD: 73.6 % (ref 38–73)
NITRITE UR QL STRIP: NEGATIVE
NITRITE UR QL STRIP: NEGATIVE
NRBC BLD-RTO: 0 /100 WBC
PH UR STRIP: 7 [PH] (ref 5–8)
PH UR STRIP: 7 [PH] (ref 5–8)
PLATELET # BLD AUTO: 250 K/UL (ref 150–450)
PMV BLD AUTO: 8.1 FL (ref 9.2–12.9)
POTASSIUM SERPL-SCNC: 4.3 MMOL/L (ref 3.5–5.1)
PROT SERPL-MCNC: 7.2 G/DL (ref 6–8.4)
PROT UR QL STRIP: NEGATIVE
PROT UR QL STRIP: NEGATIVE
RBC # BLD AUTO: 4.2 M/UL (ref 4–5.4)
SODIUM SERPL-SCNC: 139 MMOL/L (ref 136–145)
SP GR UR STRIP: 1 (ref 1–1.03)
SP GR UR STRIP: >1.03 (ref 1–1.03)
URN SPEC COLLECT METH UR: ABNORMAL
URN SPEC COLLECT METH UR: NORMAL
WBC # BLD AUTO: 7.17 K/UL (ref 3.9–12.7)

## 2024-06-20 PROCEDURE — 80053 COMPREHEN METABOLIC PANEL: CPT

## 2024-06-20 PROCEDURE — 99285 EMERGENCY DEPT VISIT HI MDM: CPT | Mod: 25

## 2024-06-20 PROCEDURE — 96374 THER/PROPH/DIAG INJ IV PUSH: CPT

## 2024-06-20 PROCEDURE — 81003 URINALYSIS AUTO W/O SCOPE: CPT | Mod: 91

## 2024-06-20 PROCEDURE — 87389 HIV-1 AG W/HIV-1&-2 AB AG IA: CPT | Performed by: PHYSICIAN ASSISTANT

## 2024-06-20 PROCEDURE — 85025 COMPLETE CBC W/AUTO DIFF WBC: CPT

## 2024-06-20 PROCEDURE — 96375 TX/PRO/DX INJ NEW DRUG ADDON: CPT

## 2024-06-20 PROCEDURE — 63600175 PHARM REV CODE 636 W HCPCS

## 2024-06-20 PROCEDURE — 25500020 PHARM REV CODE 255: Performed by: EMERGENCY MEDICINE

## 2024-06-20 PROCEDURE — 83690 ASSAY OF LIPASE: CPT

## 2024-06-20 RX ORDER — ONDANSETRON HYDROCHLORIDE 2 MG/ML
4 INJECTION, SOLUTION INTRAVENOUS
Status: COMPLETED | OUTPATIENT
Start: 2024-06-20 | End: 2024-06-20

## 2024-06-20 RX ORDER — MORPHINE SULFATE 4 MG/ML
4 INJECTION, SOLUTION INTRAMUSCULAR; INTRAVENOUS
Status: COMPLETED | OUTPATIENT
Start: 2024-06-20 | End: 2024-06-20

## 2024-06-20 RX ADMIN — MORPHINE SULFATE 4 MG: 4 INJECTION INTRAVENOUS at 12:06

## 2024-06-20 RX ADMIN — IOHEXOL 75 ML: 350 INJECTION, SOLUTION INTRAVENOUS at 12:06

## 2024-06-20 RX ADMIN — ONDANSETRON 4 MG: 2 INJECTION INTRAMUSCULAR; INTRAVENOUS at 12:06

## 2024-06-20 NOTE — DISCHARGE INSTRUCTIONS
Take Miralax 7 cap fulls in a 32 Oz  Gatorade or Powerade bottle can be Zero calories if that is preferred.  Drink over 1 hour for bowel regiment.  Follow up with primary care physician.    Return to emergency department if you have worsening abdominal pain, nausea vomiting unable tolerate oral intake, unable to have bowel movements no longer passing gas, blood in the stool or any other new or concerning symptoms.

## 2024-06-20 NOTE — ED PROVIDER NOTES
Encounter Date: 6/20/2024       History     Chief Complaint   Patient presents with    Abdominal Pain     Hx of diverticulitis. +umbilical pain accompanied by nausea w/o vomiting. Denies blood in stool or urine      Patient is a 48-year-old female previous history of diverticulitis with multiple bowel resections due to severe disease and perforation presenting to the emergency department for abdominal pain.  Patient reports that around 2:00 a.m. she began having severe abdominal pain with associated nausea.  She denies any episodes of emesis.  Patient reports that she has had soft well-formed stools with multiple bowel movements today.  She does not believe she is constipated at this time.  She denies any hematemesis hematochezia melena.  Patient denies any fevers cough congestion dysuria hematuria or flank pain.  Pain is primarily periumbilical in the bilateral lower quadrants.  No other symptoms or complaints at this time.    The history is provided by the patient.     Review of patient's allergies indicates:  No Known Allergies  Past Medical History:   Diagnosis Date    Abnormal Pap smear     Anxiety     celexa & prozac didn't help much    Constipation - functional     Headache(784.0)     Hemorrhoid     Leukopenia     benign, evaluated in [facility] y 2000    Menorrhagia     PONV (postoperative nausea and vomiting)     Strain of neck muscle     tx with PT at [facility] in past     Past Surgical History:   Procedure Laterality Date    ANOSCOPY N/A 07/20/2022    Procedure: ANOSCOPY;  Surgeon: ASHTYN Melo MD;  Location: [address];  Service: Colon and Rectal;  Laterality: N/A;    ARTHROPLASTY,SPINE,CERVICAL N/A 10/12/2023    Procedure: ARTHROPLASTY,SPINE,CERVICAL;  Surgeon: Amador Wood MD;  Location: [facility] OR;  Service: Neurosurgery;  Laterality: N/A;  Anterior Cervical Discectomy C5/6, C6/7 with artifical disc, possible fusion        BREAST RECONSTRUCTION  2018    implant removal and lift    BREAST SURGERY       CHEILECTOMY Right 08/18/2023    Procedure: CHEILECTOMY;  Surgeon: Tacho Sanchez DPM;  Location: [facility] OR;  Service: Podiatry;  Laterality: Right;    COLON SURGERY  2014         COLONOSCOPY N/A 02/03/2022    Procedure: COLONOSCOPY;  Surgeon: ASHTYN Melo MD;  [facility];  Service: Endoscopy;  Laterality: N/A;    CORRECTION OF HAMMER TOE Right 08/18/2023    Procedure: CORRECTION, HAMMER TOE;  Surgeon: Tacho Sanchez DPM;  Location: [facility] OR;  Service: Podiatry;  Laterality: Right;    DILATION AND CURETTAGE OF UTERUS  12/18/2012    complex hyperplasia, no atypia    FLEXIBLE SIGMOIDOSCOPY N/A 07/20/2022    Procedure: SIGMOIDOSCOPY, FLEXIBLE;  Surgeon: ASHTYN Melo MD;  Location: [facility] OR 2ND FLR;  Service: Colon and Rectal;  Laterality: N/A;    HEMORRHOID SURGERY  2014    HYSTERECTOMY      LAPAROSCOPIC SIGMOIDECTOMY Left 07/20/2022    Procedure: COLECTOMY, SIGMOID, LAPAROSCOPIC, ERAS low;  Surgeon: ASHTYN Melo MD;  Location: [facility] OR 2ND FLR;  Service: Colon and Rectal;  Laterality: Left;    MOBILIZATION OF SPLENIC FLEXURE N/A 07/20/2022    Procedure: MOBILIZATION, SPLENIC FLEXURE;  Surgeon: ASHTYN Melo MD;  Location: [facility] OR 2ND FLR;  Service: Colon and Rectal;  Laterality: N/A;    OOPHORECTOMY      PARTIAL HYSTERECTOMY  2013    for atypia    SMALL INTESTINE SURGERY  2017    THYROIDECTOMY, PARTIAL  2013    TONSILLECTOMY      TUBAL LIGATION       Family History   Problem Relation Name Age of Onset    Emphysema Mother Dagmar     Cancer Mother Dagmar         Lung    COPD Mother Dagmar     Diabetes Mother Dagmar     Cancer Father Terrazas         Lung    Heart disease Father Terrazas 50    Diabetes Paternal Grandmother Aan     Hypertension Paternal Grandmother Ana     Depression Sister Hope     Mental illness Sister Hope     Suicide Brother Reilly     Mental illness Brother Reilly     Drug abuse Maternal Aunt Citlali     Schizophrenia Paternal Aunt      Alcohol abuse Maternal  Uncle Florentino     Alcohol abuse Cousin      Breast cancer Maternal Grandmother      Ovarian cancer Maternal Grandmother      ADD / ADHD Neg Hx      Anxiety disorder Neg Hx      Bipolar disorder Neg Hx      Dementia Neg Hx      OCD Neg Hx      Paranoid behavior Neg Hx      Physical abuse Neg Hx      Seizures Neg Hx      Self injury Neg Hx      Sexual abuse Neg Hx       Social History     Tobacco Use    Smoking status: Never    Smokeless tobacco: Never   Substance Use Topics    Alcohol use: Not Currently     Comment: socially    Drug use: Never       Physical Exam     Initial Vitals [06/20/24 1003]   BP Pulse Resp Temp SpO2   128/79 76 17 97.7 °F (36.5 °C) 99 %      MAP       --         Physical Exam    Nursing note and vitals reviewed.  Constitutional: She appears well-developed and well-nourished.   HENT:   Head: Normocephalic and atraumatic.   Cardiovascular:  Normal rate.           Pulmonary/Chest: Breath sounds normal.   Abdominal: She exhibits no distension and no mass. There is abdominal tenderness.   Tenderness to the periumbilical right lower and left lower quadrant  No guarding or rebound or mass noted There is no guarding.     Neurological: She is alert and oriented to person, place, and time.   Skin: Skin is warm and dry.         ED Course   Procedures  Labs Reviewed   CBC W/ AUTO DIFFERENTIAL - Abnormal; Notable for the following components:       Result Value    MCH 32.6 (*)     MPV 8.1 (*)     Gran % 73.6 (*)     All other components within normal limits   COMPREHENSIVE METABOLIC PANEL - Abnormal; Notable for the following components:    AST 44 (*)     ALT 84 (*)     All other components within normal limits   LIPASE - Abnormal; Notable for the following components:    Lipase 69 (*)     All other components within normal limits   HIV 1 / 2 ANTIBODY    Narrative:     Release to patient->Immediate   URINALYSIS, REFLEX TO URINE CULTURE    Narrative:     Specimen Source->Urine   URINALYSIS, REFLEX TO URINE  CULTURE          Imaging Results              CT Abdomen Pelvis With IV Contrast NO Oral Contrast (Final result)  Result time 06/20/24 12:52:42      Final result by Romulo Veras MD (06/20/24 12:52:42)                   Impression:      1. Moderate stool throughout the colon, may reflect constipation noting colorectal anastomosis appears patent.  Correlation is advised.  2. Fluid-filled distal small bowel loops likely reflect slow flow related to developing colonic constipation.  Early enteritis felt less likely.  3. Please see above for several additional findings.      Electronically signed by: Romulo Veras MD  Date:    06/20/2024  Time:    12:52               Narrative:    EXAMINATION:  CT ABDOMEN PELVIS WITH IV CONTRAST    CLINICAL HISTORY:  Abdominal pain, acute, nonlocalized;    TECHNIQUE:  Low dose axial images, sagittal and coronal reformations were obtained from the lung bases to the pubic symphysis following the IV administration of 75 mL of Omnipaque 350 .  Oral contrast was not given.    COMPARISON:  04/29/2024    FINDINGS:  Images of the lower thorax are unremarkable.    The liver is mildly hypoattenuating, possibly reflecting steatosis, correlation with LFTs recommended.  The spleen, pancreas, gallbladder and adrenal glands are grossly unremarkable.  There is no biliary dilation or ascites.  The stomach is relatively decompressed without wall thickening.  The portal vein, splenic vein, SMV, celiac axis and SMA all are patent.  No significant abdominal lymphadenopathy.    The kidneys enhance symmetrically without hydronephrosis or nephrolithiasis.  The bilateral ureters are unremarkable without calculi seen.  The urinary bladder is unremarkable.  The uterus is absent the adnexa is unremarkable.    There is surgical change of distal colectomy, anastomosis appears patent.  There is a moderate to large amount of stool throughout the residual colon.  The terminal ileum is unremarkable.  The  appendix is unremarkable.  The small bowel is grossly unremarkable noting slow flow through a few distal loops.  There are a few scattered shotty periaortic, pericaval, and mesenteric lymph nodes.  No focal organized pelvic fluid collection.    There are degenerative changes of the spine.  There is some degree of osteopenia.  No significant inguinal lymphadenopathy.                                       Medications   morphine injection 4 mg (4 mg Intravenous Given 6/20/24 1224)   ondansetron injection 4 mg (4 mg Intravenous Given 6/20/24 1223)   iohexoL (OMNIPAQUE 350) injection 75 mL (75 mLs Intravenous Given 6/20/24 1228)     Medical Decision Making  48-year-old female presenting to the emergency department for abdominal pain with associated nausea no vomiting.  On assessment patient is alert oriented in no acute distress.  Patient has tenderness along the periumbilical right lower and left lower quadrant.  No urinary symptoms.  Patient has a complex abdominal history including previously diverticulitis perforated requiring bowel resection 2 years ago.  At this time concern for differential including enteritis versus diverticulitis versus less likely appendicitis.  Labs obtained to evaluate for leukocytosis anemia metabolic derangements renal impairment about dysfunction.  Labs with no gross derangements.  At this time CT abdomen pelvis obtained.  CT abdomen pelvis notable for large stool burden with no other acute surgical findings at this time.  At this time discussed patient findings and recommended bowel regiment.  Patient is agreeable with plan.  Patient discharged return precautions discussed and understood.  Patient is in contact with the colorectal surgeon who will follow up outpatient.  Patient agreeable with plan.    Amount and/or Complexity of Data Reviewed  Labs: ordered.  Radiology: ordered.    Risk  Prescription drug management.                                      Clinical Impression:  Final  diagnoses:  [R10.9] Abdominal pain, unspecified abdominal location (Primary)  [K59.00] Constipation, unspecified constipation type          ED Disposition Condition    Discharge Stable          ED Prescriptions    None       Follow-up Information       Follow up With Specialties Details Why Contact Info    Blake Adams MD Internal Medicine Go in 1 week  [address]  [address] , [address] Floor  [facility] [address]  [phone]      [facility] - Emergency Dept Emergency Medicine Go to  If symptoms worsen [address] [facility] [id number] [phone]             Arthur Amanda MD  Resident  06/20/24 7201

## 2024-06-20 NOTE — ED TRIAGE NOTES
Socorro Huang, a 48 y.o. female presents to the ED via personal transportation w/ complaint of lower abdominal pain that radiates to the back that began this morning accompanied by nausea without vomiting. Pain 10/10. Pt denies fevers, chills, vomiting, SOB and chest pain. Hx diverticulitis.    Triage note:  Chief Complaint   Patient presents with    Abdominal Pain     Hx of diverticulitis. +umbilical pain accompanied by nausea w/o vomiting. Denies blood in stool or urine      Review of patient's allergies indicates:  No Known Allergies  Past Medical History:   Diagnosis Date    Abnormal Pap smear     Anxiety     celexa & prozac didn't help much    Constipation - functional     Headache(784.0)     Hemorrhoid     Leukopenia     benign, evaluated in [facility] y 2000    Menorrhagia     PONV (postoperative nausea and vomiting)     Strain of neck muscle     tx with PT at [facility] in past

## 2024-06-28 ENCOUNTER — TELEPHONE (OUTPATIENT)
Dept: ENDOSCOPY | Facility: HOSPITAL | Age: 49
End: 2024-06-28
Payer: COMMERCIAL

## 2024-06-28 VITALS — BODY MASS INDEX: 25.11 KG/M2 | HEIGHT: 67 IN | WEIGHT: 160 LBS

## 2024-06-28 DIAGNOSIS — K21.9 LARYNGOPHARYNGEAL REFLUX (LPR): ICD-10-CM

## 2024-06-28 DIAGNOSIS — R09.89 THROAT CLEARING: ICD-10-CM

## 2024-06-28 DIAGNOSIS — R05.9 COUGH, UNSPECIFIED TYPE: Primary | ICD-10-CM

## 2024-06-28 NOTE — TELEPHONE ENCOUNTER
"----- Message from Marlen Be sent at 2024  2:50 PM CDT -----  Regarding: FW: EGD w/ Bravo OFF PPI    ----- Message -----  From: Shannan Nguyễn NP  Sent: 2024   1:06 PM CDT  To: [facility]  Subject: EGD w/ Bravo OFF PPI                             Procedure: EGD w/Bravo OFF PPI (for 96 hours)    Diagnosis: cough, throat clearing, LPR    Procedure Timin-8 weeks    #If within 4 weeks selected, please reymundo as high priority#    #If greater than 12 weeks, please select "5-12 weeks" and delay sending until 3 months prior to requested date#     Location: No Preference    Additional Scheduling Information: No scheduling concerns    Prep Specifications:N/A    Is the patient taking a GLP-1 Agonist:no    Have you attached a patient to this message: yes  "

## 2024-06-28 NOTE — TELEPHONE ENCOUNTER
Spoke to patient to schedule procedure(s) Upper Endoscopy (EGD) with [facility]       Physician to perform procedure(s) Dr. TOBI Costello  Date of Procedure (s) 9/17/24  Arrival Time 12:00 PM  Time of Procedure(s) 1:00 PM   Location of Procedure(s) [facility] [address] [facility]  Type of Rx Prep sent to patient: N/A  Instructions provided to patient via [facility]    Patient was informed on the following information and verbalized understanding. Screening questionnaire reviewed with patient and complete. If procedure requires anesthesia, a responsible adult needs to be present to accompany the patient home, patient cannot drive after receiving anesthesia. Appointment details are tentative, especially check-in time. Patient will receive a prep-op call 7 days prior to confirm check-in time for procedure. If applicable the patient should contact their pharmacy to verify Rx for procedure prep is ready for pick-up. Patient was advised to call the scheduling department at [phone] if pharmacy states no Rx is available. Patient was advised to call the endoscopy scheduling department if any questions or concerns arise.      [facility] Scheduling Department

## 2024-07-08 ENCOUNTER — TELEPHONE (OUTPATIENT)
Dept: SURGERY | Facility: CLINIC | Age: 49
End: 2024-07-08
Payer: COMMERCIAL

## 2024-07-08 ENCOUNTER — PATIENT MESSAGE (OUTPATIENT)
Dept: SURGERY | Facility: CLINIC | Age: 49
End: 2024-07-08
Payer: COMMERCIAL

## 2024-07-08 NOTE — TELEPHONE ENCOUNTER
Attempted to contact pt to remind her of her appt on tomorrow with . pt's VM not set up. Portal message sent.     TH

## 2024-07-09 ENCOUNTER — OFFICE VISIT (OUTPATIENT)
Dept: SURGERY | Facility: CLINIC | Age: 49
End: 2024-07-09
Payer: COMMERCIAL

## 2024-07-09 VITALS
DIASTOLIC BLOOD PRESSURE: 64 MMHG | SYSTOLIC BLOOD PRESSURE: 108 MMHG | HEIGHT: 67 IN | RESPIRATION RATE: 19 BRPM | OXYGEN SATURATION: 97 % | WEIGHT: 155.56 LBS | HEART RATE: 86 BPM | BODY MASS INDEX: 24.42 KG/M2

## 2024-07-09 DIAGNOSIS — K62.82 AIN (ANAL INTRAEPITHELIAL NEOPLASIA) ANAL CANAL: Primary | ICD-10-CM

## 2024-07-09 PROCEDURE — 99213 OFFICE O/P EST LOW 20 MIN: CPT | Mod: 25,S$GLB,, | Performed by: COLON & RECTAL SURGERY

## 2024-07-09 PROCEDURE — 46600 DIAGNOSTIC ANOSCOPY SPX: CPT | Mod: S$GLB,,, | Performed by: COLON & RECTAL SURGERY

## 2024-07-09 PROCEDURE — 99999 PR PBB SHADOW E&M-EST. PATIENT-LVL III: CPT | Mod: PBBFAC,,, | Performed by: COLON & RECTAL SURGERY

## 2024-07-09 NOTE — PROGRESS NOTES
"  CRS Office follow-up Visit    SUBJECTIVE:     Chief Complaint:  Rectal Pain    History:  history of anxiety, history of anal dysplasia, and previous laparoscopic sigmoid colectomy for diverticulitis (2018 by Dr. Alonso) was admitted 11/27/21 to 11/30/21 for worsening diverticulitis despite outpatient antibiotic management with augmentin.     She had an aunt with colon cancer in her 60s but denies any family history of inflammatory bowel disease.     Repeat CT on 12/9/21 performed due to ongoing abdominal pain and inflammation was improved.     She recovered well after her episode of diverticulitis in November 2021 but then had recurrence in June of 2022.  She therefore wished to proceed with redo resection.     7/20/22:  Laparoscopic redo segmental colectomy for diverticulitis  Pathology:   A.   COLON, LEFT, RESECTION:          -Diverticulitis.          -Negative for malignancy.   B.   DESIGNATED "PROXIMAL AND DISTAL ANASTOMOTIC DONUTS":          -Benign colon with focal mucosal hemorrhage and submucosal vascular   congestion.          -Negative for malignancy.     Last Colonoscopy:  2/3/22:  Impression:            - Patent end-to-end colo-rectal anastomosis,                          characterized by healthy appearing mucosa.                          - The examined portion of the ileum was normal.                          - Stool in the entire examined colon.                          - No specimens collected.      Current Status:   4/30/24:  Presents for evaluation for left-sided abdominal pain.  This occasionally occurs at night causing her to double over.  His occurred 3 times over the past several months.  Pain does improve after passing gas or bowel movement.  She intermittently has crampy abdominal pain throughout the day as well.  She is having daily bowel movements while taking MiraLax.  She previously took Linzess but this was too powerful and resulted in diarrhea.  She was seen by her primary care " "provider yesterday and underwent blood work as well as a CT scan that demonstrated no obvious abnormalities.  Weight has been stable.  She did start on a PPI 1 month ago.  7/9/24: presents for evaluation for rectal pain.  From a bowel movement standpoint, she is taking MiraLax twice per day.  Her bowel movements remain very irregular.  If she stops MiraLax, she will have constipation.  However, with the twice daily MiraLax, she often has diarrhea.  She presents for evaluation for rectal pain with defecation without any associated prolapse or bleeding.    Review of Systems:  Review of Systems   Constitutional:  Negative for chills, diaphoresis, fever, malaise/fatigue and weight loss.   HENT:  Negative for congestion.    Respiratory:  Negative for shortness of breath.    Cardiovascular:  Negative for chest pain and leg swelling.   Gastrointestinal:  Positive for abdominal pain. Negative for blood in stool, constipation, diarrhea, nausea and vomiting.   Genitourinary:  Negative for dysuria, frequency and urgency.   Musculoskeletal:  Negative for back pain and myalgias.   Skin:  Negative for rash.   Neurological:  Negative for dizziness and weakness.   Endo/Heme/Allergies:  Does not bruise/bleed easily.   Psychiatric/Behavioral:  Negative for depression.        OBJECTIVE:     Vital Signs (Most Recent)  /64 (BP Location: Left arm, Patient Position: Sitting)   Pulse 86   Resp 19   Ht 5' 7.13" (1.705 m)   Wt 70.5 kg (155 lb 8.6 oz)   LMP 05/05/2013   SpO2 97%   BMI 24.26 kg/m²       Physical Exam:  General: White female in no distress   Neuro: alert and oriented x 4.  Moves all extremities.     HEENT: no icterus.  Trachea midline  Respiratory: respirations are even and unlabored  Cardiac: regular rate  Abdomen:  Incisions well healed.  Nondistended.  Nontender.  Extremities: Warm dry and intact  Skin: no rashes  Anorectal:  External exam was normal.  No masses or lesions.  Digital exam with no masses.  Normal " rectal tone.  Anoscopy with normal dentate line and anal transition zone.  Small volume internal hemorrhoids.      Imaging:  CT abdomen pelvis 04/29/2024:  Personally reviewed.  Well mobilized colon with the majority of the colon on the right side of the abdomen.  Fecalized stool throughout the colon.  Anastomosis widely patent.  Remainder of the abdomen appears normal  CT abd pelvis 6/20/24:   1. Moderate stool throughout the colon, may reflect constipation noting colorectal anastomosis appears paten     Labs:  No anemia.  Normal renal function.  Normal albumin    ASSESSMENT/PLAN:     Diagnoses and all orders for this visit:    AIN (anal intraepithelial neoplasia) anal canal        48 y.o. female with a history of diverticulitis status post initial sigmoid resection at outside hospital in 2018 and developed recurrent diverticular disease and underwent redo laparoscopic segmental colectomy on 07/20/2022.      She has issues with constipation.  Currently, she appears to be taking too much Miralax resulting in diarrhea.  Recommended decreasing the miralax and taking a probiotic or Citrucel.      Regarding her AIN, no sign of recurrent AIN on today's exam.  Reassurance provided.        ASHTYN Melo MD, FACS  Staff Surgeon  Colon & Rectal Surgery

## 2024-07-19 ENCOUNTER — PATIENT MESSAGE (OUTPATIENT)
Dept: NEUROLOGY | Facility: CLINIC | Age: 49
End: 2024-07-19
Payer: COMMERCIAL

## 2024-08-09 ENCOUNTER — OFFICE VISIT (OUTPATIENT)
Dept: PRIMARY CARE CLINIC | Facility: CLINIC | Age: 49
End: 2024-08-09
Payer: COMMERCIAL

## 2024-08-09 VITALS
OXYGEN SATURATION: 98 % | BODY MASS INDEX: 24.99 KG/M2 | TEMPERATURE: 97 F | HEIGHT: 67 IN | SYSTOLIC BLOOD PRESSURE: 110 MMHG | HEART RATE: 76 BPM | RESPIRATION RATE: 18 BRPM | WEIGHT: 159.19 LBS | DIASTOLIC BLOOD PRESSURE: 74 MMHG

## 2024-08-09 DIAGNOSIS — G44.229 CHRONIC TENSION-TYPE HEADACHE, NOT INTRACTABLE: Primary | ICD-10-CM

## 2024-08-09 PROCEDURE — 99999 PR PBB SHADOW E&M-EST. PATIENT-LVL III: CPT | Mod: PBBFAC,,, | Performed by: INTERNAL MEDICINE

## 2024-08-09 RX ORDER — BUTALBITAL, ACETAMINOPHEN AND CAFFEINE 50; 325; 40 MG/1; MG/1; MG/1
1 TABLET ORAL EVERY 4 HOURS PRN
Qty: 30 TABLET | Refills: 1 | Status: SHIPPED | OUTPATIENT
Start: 2024-08-09 | End: 2024-09-08

## 2024-08-09 RX ORDER — CYCLOBENZAPRINE HCL 5 MG
5 TABLET ORAL 3 TIMES DAILY PRN
Qty: 15 TABLET | Refills: 0 | Status: SHIPPED | OUTPATIENT
Start: 2024-08-09 | End: 2024-08-19

## 2024-08-12 ENCOUNTER — PATIENT MESSAGE (OUTPATIENT)
Dept: PRIMARY CARE CLINIC | Facility: CLINIC | Age: 49
End: 2024-08-12
Payer: COMMERCIAL

## 2024-08-28 ENCOUNTER — OFFICE VISIT (OUTPATIENT)
Dept: PAIN MEDICINE | Facility: CLINIC | Age: 49
End: 2024-08-28
Payer: COMMERCIAL

## 2024-08-28 VITALS
HEART RATE: 80 BPM | DIASTOLIC BLOOD PRESSURE: 71 MMHG | HEIGHT: 67 IN | BODY MASS INDEX: 24.99 KG/M2 | WEIGHT: 159.19 LBS | SYSTOLIC BLOOD PRESSURE: 104 MMHG

## 2024-08-28 DIAGNOSIS — M54.16 LUMBAR RADICULOPATHY: ICD-10-CM

## 2024-08-28 DIAGNOSIS — M51.37 DDD (DEGENERATIVE DISC DISEASE), LUMBOSACRAL: ICD-10-CM

## 2024-08-28 DIAGNOSIS — M47.816 LUMBAR SPONDYLOSIS: ICD-10-CM

## 2024-08-28 DIAGNOSIS — M54.9 DORSALGIA, UNSPECIFIED: Primary | ICD-10-CM

## 2024-08-28 PROCEDURE — 99999 PR PBB SHADOW E&M-EST. PATIENT-LVL III: CPT | Mod: PBBFAC,,, | Performed by: EMERGENCY MEDICINE

## 2024-08-28 RX ORDER — FLUCONAZOLE 150 MG/1
TABLET ORAL
COMMUNITY
Start: 2024-07-17

## 2024-08-28 RX ORDER — MELOXICAM 7.5 MG/1
7.5 TABLET ORAL DAILY PRN
Qty: 30 TABLET | Refills: 0 | Status: SHIPPED | OUTPATIENT
Start: 2024-08-28 | End: 2024-09-27

## 2024-08-28 RX ORDER — TRAZODONE HYDROCHLORIDE 100 MG/1
TABLET ORAL
COMMUNITY
Start: 2024-08-25

## 2024-08-28 RX ORDER — GABAPENTIN 300 MG/1
CAPSULE ORAL
Qty: 69 CAPSULE | Refills: 0 | Status: SHIPPED | OUTPATIENT
Start: 2024-08-28 | End: 2024-09-26

## 2024-08-28 RX ORDER — PROGESTERONE 200 MG/1
CAPSULE ORAL
COMMUNITY
Start: 2024-08-25

## 2024-08-28 NOTE — PROGRESS NOTES
Chronic Pain - New Patient Visit       Original HPI 08/28/2024: Socorro Huang is a 48 y.o. year old female patient who has a past medical history of Abnormal Pap smear, Anxiety, Constipation - functional, Headache(784.0), Hemorrhoid, Leukopenia, Menorrhagia, PONV (postoperative nausea and vomiting), and Strain of neck muscle. She presents in referral from No ref. provider found for or back pain for the past several years.     Original Pain Description:  The pain is located in the lower back and in the left posterior thigh. The pain is described as aching and sharp. Exacerbating factors: Standing and Bending. Mitigating factors heat, ice, and medications. Symptoms interfere with daily activity. The patient feels like symptoms have been worsening. Patient denies loss of sensations. 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 Patient has had lumbar ESIs previously with Dr. Gonzales and it helped. She feels that the ESIs worked better than the RFAs. The pain stays above her knee and worse when she sits.       PAIN SCORES:  Best: Pain is 0  Current: Pain is 1  Worst: Pain is 9        8/28/2024     8:14 AM   Last 3 PDI Scores   Pain Disability Index (PDI) 63       6 weeks of Conservative therapy:  PT: Completed  Chiro:  HEP: Participating    Treatments / Medications:   Fioricet  Flexeril    Antiplatelets/Anticoagulants:  NA    Interventional Pain Procedures:   10/10/2023 ACdiscectomy C5/6 and C6/7  2022 Lumbar ESIs   2022 Lumbar RFA    IMAGING:    XR CERVICAL SPINE AP LAT WITH FLEX EXTEN    04/12/2024  Reconfirmed straightening of normal cervical lordosis. Stable findings of intervertebral disc arthroplasty at C5-C6 and C6-C7, no findings hardware malfunction.  Elsewhere, preserved cervical disc spaces.  Some stable mild uncovertebral spurring C4-C5 level.  Some stable C7-T1 facet arthropathic changes.  Intact odontoid tip and unremarkable C1-C2 articulation.   Cervical spine findings unchanged to earlier exam.     Past Surgical History:   Procedure Laterality Date    ANOSCOPY N/A 07/20/2022    Procedure: ANOSCOPY;  Surgeon: ASHTYN Melo MD;  Location: [facility] 2ND FLR;  Service: Colon and Rectal;  Laterality: N/A;    ARTHROPLASTY,SPINE,CERVICAL N/A 10/12/2023    Procedure: ARTHROPLASTY,SPINE,CERVICAL;  Surgeon: Amador Wood MD;  Location: [facility] OR;  Service: Neurosurgery;  Laterality: N/A;  Anterior Cervical Discectomy C5/6, C6/7 with artifical disc, possible fusion        BREAST RECONSTRUCTION  2018    implant removal and lift    BREAST SURGERY      CHEILECTOMY Right 08/18/2023    Procedure: CHEILECTOMY;  Surgeon: Tacho Sanchez DPM;  Location: [facility] OR;  Service: Podiatry;  Laterality: Right;    COLON SURGERY  2014         COLONOSCOPY N/A 02/03/2022    Procedure: COLONOSCOPY;  Surgeon: ASHTYN Melo MD;  Location: [facility];  Service: Endoscopy;  Laterality: N/A;    CORRECTION OF HAMMER TOE Right 08/18/2023    Procedure: CORRECTION, HAMMER TOE;  Surgeon: Tacho Sanchez DPM;  Location: [facility] OR;  Service: Podiatry;  Laterality: Right;    DILATION AND CURETTAGE OF UTERUS  12/18/2012    complex hyperplasia, no atypia    FLEXIBLE SIGMOIDOSCOPY N/A 07/20/2022    Procedure: SIGMOIDOSCOPY, FLEXIBLE;  Surgeon: ASHTYN Melo MD;  Location: [facility] 2[facility];  Service: Colon and Rectal;  Laterality: N/A;    HEMORRHOID SURGERY  2014    HYSTERECTOMY      LAPAROSCOPIC SIGMOIDECTOMY Left 07/20/2022    Procedure: COLECTOMY, SIGMOID, LAPAROSCOPIC, ERAS low;  Surgeon: ASHTYN Melo MD;  Location: [facility];  Service: Colon and Rectal;  Laterality: Left;    MOBILIZATION OF SPLENIC FLEXURE N/A 07/20/2022    Procedure: MOBILIZATION, SPLENIC FLEXURE;  Surgeon: ASHTYN Melo MD;  Location: [facility];  Service: Colon and Rectal;  Laterality: N/A;    OOPHORECTOMY      PARTIAL HYSTERECTOMY  2013    for atypia    SMALL INTESTINE SURGERY   2017    THYROIDECTOMY, PARTIAL  2013    TONSILLECTOMY      TUBAL LIGATION         Social History     Socioeconomic History    Marital status:     Number of children: 2   Occupational History     Employer: [employer]   Tobacco Use    Smoking status: Never    Smokeless tobacco: Never   Substance and Sexual Activity    Alcohol use: Not Currently     Comment: socially    Drug use: Never    Sexual activity: Yes     Partners: Male     Birth control/protection: See Surgical Hx   Other Topics Concern    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 Fulton , GYN Dr Ruby                 Social Determinants of Health     Financial Resource Strain: Low Risk  (12/27/2023)    Overall Financial Resource Strain (CARDIA)     Difficulty of Paying Living Expenses: Not hard at all   Food Insecurity: No Food Insecurity (12/27/2023)    Hunger Vital Sign     Worried About Running Out of Food in the Last Year: Never true     Ran Out of Food in the Last Year: Never true   Transportation Needs: No Transportation Needs (12/27/2023)    PRAPARE - Transportation     Lack of Transportation (Medical): No     Lack of Transportation  "(Non-Medical): No   Physical Activity: Insufficiently Active (12/27/2023)    Exercise Vital Sign     Days of Exercise per Week: 1 day     Minutes of Exercise per Session: 10 min   Stress: No Stress Concern Present (12/27/2023)    [employer] [place] [employer] - Occupational Stress Questionnaire     Feeling of Stress : Not at all   Housing Stability: Low Risk  (12/27/2023)    Housing Stability Vital Sign     Unable to Pay for Housing in the Last Year: No     Number of Places Lived in the Last Year: 1     Unstable Housing in the Last Year: No       Medications/Allergies: See med card    ROS:  GENERAL: No fever. No chills. No fatigue. Denies weight loss. Denies weight gain.  Back / musculoskeletal / neuro : See HPI    VITALS:   Vitals:    08/28/24 0815   BP: 104/71   Pulse: 80   Weight: 72.2 kg (159 lb 2.8 oz)   Height: 5' 7" (1.702 m)   PainSc:   1   PainLoc: Back     Body mass index is 24.93 kg/m².      8/28/2024     8:14 AM   Last 3 PDI Scores   Pain Disability Index (PDI) 63       PHYSICAL EXAM:   GENERAL: Well appearing, in no acute distress, alert and oriented x3.  PSYCH:  Mood and affect appropriate.  SKIN: Skin color, texture, turgor normal, no rashes or lesions.  HEENT:  Normocephalic, atraumatic. Cranial nerves grossly intact.  NECK: No pain to palpation over the cervical paraspinous muscles. No pain to palpation over facets. No pain with neck flexion, extension, or lateral flexion.   PULM: No evidence of respiratory difficulty, symmetric chest rise.  GI:  Non-distended  BACK: Normal range of motion. No pain to palpation over the spinous processes.  Pain with axial loading bilaterally. There is no pain with palpation over the sacroiliac joints bilaterally.  Negative Casi's, yeoman's and compression bilaterally  EXTREMITIES: No deformities, edema, or skin discoloration.   MUSCULOSKELETAL: Shoulder, hip, and knee provocative maneuvers are negative. No atrophy is noted.  NEURO: Sensation is equal and " "appropriate bilaterally. Bilateral upper and lower extremity strength is normal and symmetric. Bilateral upper and lower extremity coordination and muscle stretch reflexes are physiologic and symmetric. Plantar response are downgoing. Straight leg raising in the supine position is positive to radicular pain on the left.   GAIT: normal.      LABS:    No results found for: "LABA1C", "HGBA1C"    Lab Results   Component Value Date    CREATININE 0.7 06/20/2024         ASSESSMENT: 48 y.o. year old female with pain, consistent with:    No diagnosis found.    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    PLAN:  - I have stressed the importance of physical activity and a home exercise plan to help with pain and improve health.  - Patient can continue with medications for now since they are providing benefits, using them appropriately, and without side effects.  - Counseled patient regarding the importance of activity modification and constant sleeping habits.  - Consider JANINA vs TFESI  - Medications:Start Neurontin 300mg gradually to three times a day. Explained titration schedule with starting nightly and increase dose gradually to tolerance without adverse effects. and Start Mobic 7.5mg daily PRN with food to help with pain and inflammation.    - Imaging: Reviewed available imaging with patient and answered any questions they had regarding study.Order Flexion-Extension X-rays of the Lumbar spine to rule out any instability. and Order MRI of the Lumbar Spine to help evaluate possible source of pain.  - The patient's pathophysiology was explained in detail with reference to x-rays, models, other visual aids as appropriate.   - Follow up visit: return to clinic in 1 week after MRI      Anthony Macias, " MD  08/28/2024

## 2024-09-03 ENCOUNTER — PATIENT MESSAGE (OUTPATIENT)
Dept: NEUROLOGY | Facility: CLINIC | Age: 49
End: 2024-09-03
Payer: COMMERCIAL

## 2024-09-04 ENCOUNTER — PATIENT MESSAGE (OUTPATIENT)
Dept: NEUROLOGY | Facility: CLINIC | Age: 49
End: 2024-09-04
Payer: COMMERCIAL

## 2024-09-10 ENCOUNTER — PATIENT MESSAGE (OUTPATIENT)
Dept: NEUROLOGY | Facility: CLINIC | Age: 49
End: 2024-09-10
Payer: COMMERCIAL

## 2024-09-10 ENCOUNTER — TELEPHONE (OUTPATIENT)
Dept: ENDOSCOPY | Facility: HOSPITAL | Age: 49
End: 2024-09-10
Payer: COMMERCIAL

## 2024-09-10 NOTE — TELEPHONE ENCOUNTER
Spoke to patient for pre-call to confirm scheduled Upper Endoscopy (EGD) with [facility] and patient verbalized understanding of the following:       Date & arrival time of procedure(s) verified 9/17/24, 12:00 PM.  Location of procedure(s) [place] 2nd Floor verified.  NPO status reinforced. Ok to continue clear liquids until 9:00 AM.   Patient denies use of blood thinners, GLP-1 medications, and weight loss medications.  Patient confirmed receipt of prep instructions.  Instructions provided to patient via [facility].  Patient confirmed ride home after procedure if procedure requires anesthesia.   Pre-call screening questionnaire reviewed and completed with patient.   Appointment details are tentative, including check-in time.  If the patient begins taking any blood thinning medications, injectable weight loss/diabetes medications (other than insulin), or Adipex (phentermine) patient was instructed to contact the endoscopy scheduling department as soon as possible.  Patient was advised to call the endoscopy scheduling department if any questions or concerns arise.      Endoscopy Scheduling Department

## 2024-09-12 ENCOUNTER — PROCEDURE VISIT (OUTPATIENT)
Dept: NEUROLOGY | Facility: CLINIC | Age: 49
End: 2024-09-12
Payer: COMMERCIAL

## 2024-09-12 VITALS — HEART RATE: 73 BPM | SYSTOLIC BLOOD PRESSURE: 106 MMHG | DIASTOLIC BLOOD PRESSURE: 69 MMHG

## 2024-09-12 DIAGNOSIS — G43.709 CHRONIC MIGRAINE W/O AURA W/O STATUS MIGRAINOSUS, NOT INTRACTABLE: Primary | ICD-10-CM

## 2024-09-12 NOTE — PROCEDURES
PROCEDURE NOTE:  BOTOX was performed as an indicated therapy for intractable chronic migraine headaches given that the patient failed more than 2 headache medications     A time out was conducted just before the start of the procedure to verify the correct patient and procedure, procedure location, and all relevant critical information.      Botulinum Toxin Injection Procedure      PROCEDURE PERFORMED: Botulinum toxin injection (04399)  CLINICAL INDICATION: Chronic Migraines  After risks and benefits were explained including bleeding, infection, worsening of pain, damage to the areas being injected, weakness of muscles, loss of muscle control, dysphagia if injecting the head or neck, facial droop if injecting the facial area, painful injection, allergic or other reaction to the medications being injected, and the failure of the procedure to help the problem, a signed consent was obtained.   The patient was placed in a comfortable area and the sites to be treated were identified.The area to be treated was prepped three times with alcohol and the alcohol allowed to dry. Next, a 30 gauge needle was used to inject the medication in the area to be treated.      Total Botox used: 155 Units   Unavoidable waste: 45 Units      Injection sites:    muscle bilaterally ( a total of 10 units divided into 2 sites)   Procerus muscle (5 units)   Frontalis muscle bilaterally (a total of 20 units divided into 4 sites)   Temporalis muscle bilaterally (a total of 40 units divided into 8 sites)   Occipitalis muscle bilaterally (a total of 30 units divided into 6 sites)   Cervical paraspinal muscles (a total of 20 units divided into 4 sites)   Trapezius muscle bilaterally (a total of 30 units divided into 6 sites)   Complications: none   RTC for the next Botox injection: 12 weeks     Problem List Items Addressed This Visit          Neuro    Chronic migraine w/o aura w/o status migrainosus, not intractable - Primary    Relevant  Medications    onabotulinumtoxina injection 200 Units (Completed) (Start on 9/12/2024 10:00 AM)         Nilda Hill PA-C  [facility]   [phone]    Dr. Lund was available during today's encounter.

## 2024-09-13 ENCOUNTER — ANESTHESIA EVENT (OUTPATIENT)
Dept: ENDOSCOPY | Facility: HOSPITAL | Age: 49
End: 2024-09-13
Payer: COMMERCIAL

## 2024-09-13 NOTE — ANESTHESIA PREPROCEDURE EVALUATION
09/13/2024  Socorro Huang is a 48 y.o., female.    Procedure: EGD (ESOPHAGOGASTRODUODENOSCOPY) (N/A), PH MONITORING, ESOPHAGUS, WIRELESS, (OFF REFLUX MEDS) (N/A)         Patient Active Problem List   Diagnosis    S/P hysterectomy    History of diverticulitis    Pelvic pain syndrome    Anxiety    Gastroesophageal reflux disease without esophagitis    Hypothyroidism    Insomnia    Lymphopenia    Chronic migraine w/o aura w/o status migrainosus, not intractable    Cervical spondylosis    Cervical radiculopathy    S/P cervical disc replacement    Dysplasia of anus    Swelling of first metatarsophalangeal (MTP) joint    Chronic tension-type headache, not intractable       Past Medical History:   Diagnosis Date    Abnormal Pap smear     Anxiety     celexa & prozac didn't help much    Constipation - functional     Headache(784.0)     Hemorrhoid     Leukopenia     benign, evaluated in [facility] y 2000    Menorrhagia     PONV (postoperative nausea and vomiting)     Strain of neck muscle     tx with PT at [facility] in past       ECHO: No results found for this or any previous visit.      There is no height or weight on file to calculate BMI.    Tobacco Use: Low Risk  (9/12/2024)    Patient History     Smoking Tobacco Use: Never     Smokeless Tobacco Use: Never     Passive Exposure: Not on file       Social History     Substance and Sexual Activity   Drug Use Never        Alcohol Use: Not At Risk (12/27/2023)    AUDIT-C     Frequency of Alcohol Consumption: 2-4 times a month     Average Number of Drinks: 1 or 2     Frequency of Binge Drinking: Never       Review of patient's allergies indicates:  No Known Allergies      Airway:  No value filed.    Pre-op Assessment    I have reviewed the Patient Summary Reports.    I have reviewed the NPO Status.   I have reviewed the Medications.     Review of Systems  Anesthesia Hx:              Denies Family Hx of Anesthesia complications.    Denies Personal Hx of Anesthesia complications.                    Hematology/Oncology:  Hematology Normal   Oncology Normal                                   EENT/Dental:  EENT/Dental Normal           Cardiovascular:  Cardiovascular Normal                                            Pulmonary:  Pulmonary Normal                       Renal/:  Renal/ Normal                 Hepatic/GI:     GERD             Musculoskeletal:  Arthritis               Neurological:    Neuromuscular Disease,  Headaches                                 Endocrine:   Hypothyroidism          Dermatological:  Skin Normal    Psych:  Psychiatric Normal                       Anesthesia Plan  Type of Anesthesia, risks & benefits discussed:    Anesthesia Type: Gen Natural Airway  Intra-op Monitoring Plan: Standard ASA Monitors  Post Op Pain Control Plan: multimodal analgesia  Induction:  IV  Informed Consent: Informed consent signed with the Patient and all parties understand the risks and agree with anesthesia plan.  All questions answered.   ASA Score: 2  Day of Surgery Review of History & Physical: H&P Update referred to the surgeon/provider.    Ready For Surgery From Anesthesia Perspective.     .

## 2024-09-16 ENCOUNTER — OFFICE VISIT (OUTPATIENT)
Dept: INTERNAL MEDICINE | Facility: CLINIC | Age: 49
End: 2024-09-16
Payer: COMMERCIAL

## 2024-09-16 ENCOUNTER — TELEPHONE (OUTPATIENT)
Dept: ENDOSCOPY | Facility: HOSPITAL | Age: 49
End: 2024-09-16
Payer: COMMERCIAL

## 2024-09-16 VITALS — WEIGHT: 156 LBS | HEIGHT: 67 IN | BODY MASS INDEX: 24.48 KG/M2

## 2024-09-16 VITALS
DIASTOLIC BLOOD PRESSURE: 68 MMHG | RESPIRATION RATE: 18 BRPM | WEIGHT: 156.94 LBS | TEMPERATURE: 99 F | HEART RATE: 74 BPM | BODY MASS INDEX: 24.63 KG/M2 | SYSTOLIC BLOOD PRESSURE: 100 MMHG | HEIGHT: 67 IN | OXYGEN SATURATION: 98 %

## 2024-09-16 DIAGNOSIS — J06.9 UPPER RESPIRATORY TRACT INFECTION, UNSPECIFIED TYPE: Primary | ICD-10-CM

## 2024-09-16 DIAGNOSIS — J98.01 BRONCHOSPASM: ICD-10-CM

## 2024-09-16 LAB
CTP QC/QA: YES
FLUAV AG NPH QL: NEGATIVE
FLUBV AG NPH QL: NEGATIVE
S PYO RRNA THROAT QL PROBE: NEGATIVE
SARS-COV-2 RDRP RESP QL NAA+PROBE: NEGATIVE

## 2024-09-16 PROCEDURE — 99999 PR PBB SHADOW E&M-EST. PATIENT-LVL IV: CPT | Mod: PBBFAC,,, | Performed by: PHYSICIAN ASSISTANT

## 2024-09-16 RX ORDER — ALBUTEROL SULFATE 90 UG/1
2 INHALANT RESPIRATORY (INHALATION) EVERY 4 HOURS PRN
Qty: 18 G | Refills: 1 | Status: SHIPPED | OUTPATIENT
Start: 2024-09-16

## 2024-09-16 RX ORDER — PREDNISONE 20 MG/1
20 TABLET ORAL 2 TIMES DAILY
Qty: 10 TABLET | Refills: 0 | Status: SHIPPED | OUTPATIENT
Start: 2024-09-16 | End: 2024-09-21

## 2024-09-16 NOTE — PROGRESS NOTES
Subjective:       Patient ID: Socorro Huang is a 48 y.o. female.        Chief Complaint: Cough, Shortness of Breath, Headache, and Sore Throat    Socorro Huang is an established patient of Blake Adams MD here today for urgent care visit.    Sx started 4 days ago.  Temp up to 99.6.  Progressively worsening cough, sore throat, congestion.  Nausea but no vomiting.  Diarrhea over weekend but that's now better.  Feels congestion in chest.  Also shortness of breath when trying to do things.  In past has used albuterol inhaler when sick.             Review of Systems   Constitutional:  Negative for chills, diaphoresis, fatigue and fever.   HENT:  Positive for congestion and sore throat.    Eyes:  Negative for visual disturbance.   Respiratory:  Positive for cough and shortness of breath. Negative for chest tightness.    Cardiovascular:  Negative for chest pain, palpitations and leg swelling.   Gastrointestinal:  Positive for nausea. Negative for abdominal pain, blood in stool, constipation, diarrhea and vomiting.   Genitourinary:  Negative for dysuria, frequency, hematuria and urgency.   Musculoskeletal:  Negative for arthralgias and back pain.   Skin:  Negative for rash.   Neurological:  Negative for dizziness, syncope, weakness and headaches.   Psychiatric/Behavioral:  Negative for dysphoric mood and sleep disturbance. The patient is not nervous/anxious.        Objective:      Physical Exam  Vitals and nursing note reviewed.   Constitutional:       Appearance: Normal appearance. She is well-developed.   HENT:      Head: Normocephalic.      Right Ear: Tympanic membrane and external ear normal.      Left Ear: Tympanic membrane and external ear normal.      Nose: No mucosal edema or rhinorrhea.      Mouth/Throat:      Pharynx: Oropharynx is clear.   Eyes:      Pupils: Pupils are equal, round, and reactive to light.   Cardiovascular:      Rate and Rhythm: Normal rate and regular rhythm.      Heart sounds:  "Normal heart sounds. No murmur heard.     No friction rub. No gallop.   Pulmonary:      Effort: Pulmonary effort is normal. No respiratory distress.      Breath sounds: Wheezing present. No rhonchi.   Abdominal:      Palpations: Abdomen is soft.      Tenderness: There is no abdominal tenderness.   Skin:     General: Skin is warm and dry.   Neurological:      Mental Status: She is alert.         Assessment:       1. Upper respiratory tract infection, unspecified type    2. Bronchospasm        Plan:       Socorro was seen today for cough, shortness of breath, headache and sore throat.    Diagnoses and all orders for this visit:    Upper respiratory tract infection, unspecified type  -     POCT COVID-19 Rapid Screening  -     POCT Rapid Strep A  -     POCT Influenza A/B Rapid Antigen  -     albuterol (PROVENTIL/VENTOLIN HFA) 90 mcg/actuation inhaler; Inhale 2 puffs into the lungs every 4 (four) hours as needed for Wheezing.  -     predniSONE (DELTASONE) 20 MG tablet; Take 1 tablet (20 mg total) by mouth 2 (two) times daily. for 5 days    Bronchospasm  -     albuterol (PROVENTIL/VENTOLIN HFA) 90 mcg/actuation inhaler; Inhale 2 puffs into the lungs every 4 (four) hours as needed for Wheezing.  -     predniSONE (DELTASONE) 20 MG tablet; Take 1 tablet (20 mg total) by mouth 2 (two) times daily. for 5 days    Flu, covid, and strep negative    Pt has been given instructions populated from patient instructions database and has verbalized understanding of the after visit summary and information contained wherein.    Follow up with a primary care provider. May go to ER for acute shortness of breath, lightheadedness, fever, or any other emergent complaints or changes in condition.    "This note will be shared with the patient"    Future Appointments   Date Time Provider Department Center   9/20/2024  8:00 AM [facility]  MRI1 [facility] MRI [place]   9/20/2024  8:30 AM [facility]  XR2 700LB LIMIT [facility] X[facility] [place]   9/27/2024  8:15 AM Rodrick, " Bev STEPHENSON MD [facility] ZAHRA [facility]   9/27/2024 10:00 AM Anthony Macias MD [facility] PAINERNIE [facility]   12/5/2024 12:00 PM Nilda Hill, BRENDA [facility] NEURO [place]

## 2024-09-16 NOTE — TELEPHONE ENCOUNTER
Spoke to patient to reschedule procedure(s) Upper Endoscopy (EGD) with Bra Placement       Physician to perform procedure(s) Dr. SLY Molina  Date of Procedure (s) 9/23/24  Arrival Time 8:00 AM  Time of Procedure(s) 9:00 AM   Location of Procedure(s) [place] [address] Floor  Type of Rx Prep sent to patient: N/A  Instructions provided to patient via [facility]    Patient was informed on the following information and verbalized understanding. Screening questionnaire reviewed with patient and complete. If procedure requires anesthesia, a responsible adult needs to be present to accompany the patient home, patient cannot drive after receiving anesthesia. Appointment details are tentative, especially check-in time. Patient will receive a prep-op call 7 days prior to confirm check-in time for procedure. If applicable the patient should contact their pharmacy to verify Rx for procedure prep is ready for pick-up. Patient was advised to call the scheduling department at [phone] if pharmacy states no Rx is available. Patient was advised to call the endoscopy scheduling department if any questions or concerns arise.      [facility] Scheduling Department

## 2024-09-17 ENCOUNTER — ANESTHESIA (OUTPATIENT)
Dept: ENDOSCOPY | Facility: HOSPITAL | Age: 49
End: 2024-09-17
Payer: COMMERCIAL

## 2024-09-20 ENCOUNTER — HOSPITAL ENCOUNTER (OUTPATIENT)
Dept: RADIOLOGY | Facility: HOSPITAL | Age: 49
Discharge: HOME | End: 2024-09-20
Attending: EMERGENCY MEDICINE
Payer: COMMERCIAL

## 2024-09-20 ENCOUNTER — TELEPHONE (OUTPATIENT)
Dept: ENDOSCOPY | Facility: HOSPITAL | Age: 49
End: 2024-09-20
Payer: COMMERCIAL

## 2024-09-20 ENCOUNTER — TELEPHONE (OUTPATIENT)
Dept: GASTROENTEROLOGY | Facility: CLINIC | Age: 49
End: 2024-09-20
Payer: COMMERCIAL

## 2024-09-20 DIAGNOSIS — M54.9 DORSALGIA, UNSPECIFIED: ICD-10-CM

## 2024-09-20 DIAGNOSIS — M51.37 DDD (DEGENERATIVE DISC DISEASE), LUMBOSACRAL: ICD-10-CM

## 2024-09-20 DIAGNOSIS — M54.16 LUMBAR RADICULOPATHY: ICD-10-CM

## 2024-09-20 PROCEDURE — 72114 X-RAY EXAM L-S SPINE BENDING: CPT | Mod: TC

## 2024-09-20 PROCEDURE — 72148 MRI LUMBAR SPINE W/O DYE: CPT | Mod: TC

## 2024-09-20 PROCEDURE — 72148 MRI LUMBAR SPINE W/O DYE: CPT | Mod: 26,,, | Performed by: INTERNAL MEDICINE

## 2024-09-20 PROCEDURE — 72114 X-RAY EXAM L-S SPINE BENDING: CPT | Mod: 26,,, | Performed by: RADIOLOGY

## 2024-09-20 NOTE — TELEPHONE ENCOUNTER
----- Message from Olimpia Rodriguez sent at 9/20/2024  8:51 AM CDT -----  Regarding: Reschedule procedure  Contact: [phone]  Calling to reschedule appointment due to being sick. Please contact patient as soon as possible.

## 2024-09-23 ENCOUNTER — OFFICE VISIT (OUTPATIENT)
Dept: INTERNAL MEDICINE | Facility: CLINIC | Age: 49
End: 2024-09-23
Payer: COMMERCIAL

## 2024-09-23 ENCOUNTER — HOSPITAL ENCOUNTER (OUTPATIENT)
Dept: RADIOLOGY | Facility: HOSPITAL | Age: 49
Discharge: HOME | End: 2024-09-23
Attending: NURSE PRACTITIONER
Payer: COMMERCIAL

## 2024-09-23 VITALS
SYSTOLIC BLOOD PRESSURE: 134 MMHG | HEIGHT: 67 IN | BODY MASS INDEX: 25.01 KG/M2 | WEIGHT: 159.38 LBS | HEART RATE: 76 BPM | TEMPERATURE: 98 F | OXYGEN SATURATION: 99 % | DIASTOLIC BLOOD PRESSURE: 78 MMHG

## 2024-09-23 DIAGNOSIS — J40 BRONCHITIS: ICD-10-CM

## 2024-09-23 DIAGNOSIS — J40 BRONCHITIS: Primary | ICD-10-CM

## 2024-09-23 PROCEDURE — 99213 OFFICE O/P EST LOW 20 MIN: CPT | Mod: S$GLB,,, | Performed by: NURSE PRACTITIONER

## 2024-09-23 PROCEDURE — 99999 PR PBB SHADOW E&M-EST. PATIENT-LVL IV: CPT | Mod: PBBFAC,,, | Performed by: NURSE PRACTITIONER

## 2024-09-23 PROCEDURE — 71046 X-RAY EXAM CHEST 2 VIEWS: CPT | Mod: 26,,, | Performed by: INTERNAL MEDICINE

## 2024-09-23 PROCEDURE — 71046 X-RAY EXAM CHEST 2 VIEWS: CPT | Mod: TC

## 2024-09-23 RX ORDER — PROMETHAZINE HYDROCHLORIDE AND DEXTROMETHORPHAN HYDROBROMIDE 6.25; 15 MG/5ML; MG/5ML
7.5 SYRUP ORAL NIGHTLY PRN
Qty: 118 ML | Refills: 0 | Status: SHIPPED | OUTPATIENT
Start: 2024-09-23 | End: 2024-10-03

## 2024-09-23 RX ORDER — AZITHROMYCIN 250 MG/1
TABLET, FILM COATED ORAL
Qty: 6 TABLET | Refills: 0 | Status: SHIPPED | OUTPATIENT
Start: 2024-09-23 | End: 2024-09-28

## 2024-09-23 NOTE — PROGRESS NOTES
INTERNAL MEDICINE PROGRESS/URGENT CARE NOTE    CHIEF COMPLAINT     Chief Complaint   Patient presents with    Cough       HPI     Socorro Huang is a 48 y.o. female who presents for an urgent visit today.    Cough x 10 days. Initially saw COSMO Ball 9/16  For cough/sore throat congestion. Temp up to 99.6. Feels congestion in chest. Also shortness of breath when trying to do things.   Treated with prednisone and albuterol. States no improvement. She has tried multiple cough meds otc.     Patient Active Problem List   Diagnosis    S/P hysterectomy    History of diverticulitis    Pelvic pain syndrome    Anxiety    Gastroesophageal reflux disease without esophagitis    Hypothyroidism    Insomnia    Lymphopenia    Chronic migraine w/o aura w/o status migrainosus, not intractable    Cervical spondylosis    Cervical radiculopathy    S/P cervical disc replacement    Dysplasia of anus    Swelling of first metatarsophalangeal (MTP) joint    Chronic tension-type headache, not intractable        Past Medical History:  Past Medical History:   Diagnosis Date    Abnormal Pap smear     Anxiety     celexa & prozac didn't help much    Constipation - functional     Headache(784.0)     Hemorrhoid     Leukopenia     benign, evaluated in [facility] y 2000    Menorrhagia     PONV (postoperative nausea and vomiting)     Strain of neck muscle     tx with PT at [facility] in past        Past Surgical History:  Past Surgical History:   Procedure Laterality Date    ANOSCOPY N/A 07/20/2022    Procedure: ANOSCOPY;  Surgeon: ASHTYN Melo MD;  Location: [address];  Service: Colon and Rectal;  Laterality: N/A;    ARTHROPLASTY,SPINE,CERVICAL N/A 10/12/2023    Procedure: ARTHROPLASTY,SPINE,CERVICAL;  Surgeon: Amador Wood MD;  [facility];  Service: Neurosurgery;  Laterality: N/A;  Anterior Cervical Discectomy C5/6, C6/7 with artifical disc, possible fusion        BREAST RECONSTRUCTION  2018    implant removal and lift     BREAST SURGERY      CHEILECTOMY Right 08/18/2023    Procedure: CHEILECTOMY;  Surgeon: Tacho Sanchez DPM;  Location: [facility] OR;  Service: Podiatry;  Laterality: Right;    COLON SURGERY  2014         COLONOSCOPY N/A 02/03/2022    Procedure: COLONOSCOPY;  Surgeon: ASHTYN Melo MD;  Location: [facility] ENDO;  Service: Endoscopy;  Laterality: N/A;    CORRECTION OF HAMMER TOE Right 08/18/2023    Procedure: CORRECTION, HAMMER TOE;  Surgeon: Tacho Sanchez DPM;  Location: [facility] OR;  Service: Podiatry;  Laterality: Right;    DILATION AND CURETTAGE OF UTERUS  12/18/2012    complex hyperplasia, no atypia    FLEXIBLE SIGMOIDOSCOPY N/A 07/20/2022    Procedure: SIGMOIDOSCOPY, FLEXIBLE;  Surgeon: ASHTYN Melo MD;  Location: [facility] OR 2ND FLR;  Service: Colon and Rectal;  Laterality: N/A;    HEMORRHOID SURGERY  2014    HYSTERECTOMY      LAPAROSCOPIC SIGMOIDECTOMY Left 07/20/2022    Procedure: COLECTOMY, SIGMOID, LAPAROSCOPIC, ERAS low;  Surgeon: ASHTYN Melo MD;  Location: [facility] OR 2ND FLR;  Service: Colon and Rectal;  Laterality: Left;    MOBILIZATION OF SPLENIC FLEXURE N/A 07/20/2022    Procedure: MOBILIZATION, SPLENIC FLEXURE;  Surgeon: ASHTYN Melo MD;  Location: [facility] OR 2ND FLR;  Service: Colon and Rectal;  Laterality: N/A;    OOPHORECTOMY      PARTIAL HYSTERECTOMY  2013    for atypia    SMALL INTESTINE SURGERY  2017    THYROIDECTOMY, PARTIAL  2013    TONSILLECTOMY      TUBAL LIGATION          Allergies:  Review of patient's allergies indicates:  No Known Allergies    Home Medications:    Current Outpatient Medications:     albuterol (PROVENTIL/VENTOLIN HFA) 90 mcg/actuation inhaler, Inhale 2 puffs into the lungs every 4 (four) hours as needed for Wheezing., Disp: 18 g, Rfl: 1    co-enzyme Q-10 30 mg capsule, Take by mouth., Disp: , Rfl:     omeprazole (PRILOSEC) 40 MG capsule, Take 1 capsule (40 mg total) by mouth 2 (two) times daily before meals., Disp: 180 capsule, Rfl: 3    progesterone  "(PROMETRIUM) 200 MG capsule, , Disp: , Rfl:     traZODone (DESYREL) 100 MG tablet, , Disp: , Rfl:     azithromycin (Z-SYL) 250 MG tablet, Take 2 tablets by mouth on day 1; Take 1 tablet by mouth on days 2-5, Disp: 6 tablet, Rfl: 0    gabapentin (NEURONTIN) 300 MG capsule, Take 1 capsule (300 mg total) by mouth every evening for 7 days, THEN 1 capsule (300 mg total) 2 (two) times daily for 7 days, THEN 1 capsule (300 mg total) 3 (three) times daily for 16 days. (Patient not taking: Reported on 9/16/2024), Disp: 69 capsule, Rfl: 0    meloxicam (MOBIC) 7.5 MG tablet, Take 1 tablet (7.5 mg total) by mouth daily as needed for Pain. (Patient not taking: Reported on 9/16/2024), Disp: 30 tablet, Rfl: 0    promethazine-dextromethorphan (PROMETHAZINE-DM) 6.25-15 mg/5 mL Syrp, Take 7.5 mLs by mouth nightly as needed., Disp: 118 mL, Rfl: 0     Review of Systems:  Review of Systems   Constitutional:  Negative for fever.   HENT:  Negative for congestion and sore throat.    Respiratory:  Positive for cough and shortness of breath.    Cardiovascular:  Negative for chest pain.   Neurological:  Negative for weakness and headaches.         PHYSICAL EXAM     Vitals:    09/23/24 1527   BP: 134/78   BP Location: Left arm   Patient Position: Sitting   Pulse: 76   Temp: 97.8 °F (36.6 °C)   TempSrc: Oral   SpO2: 99%   Weight: 72.3 kg (159 lb 6.3 oz)   Height: 5' 7" (1.702 m)      Body mass index is 24.96 kg/m².     Physical Exam  Vitals reviewed.   Constitutional:       Appearance: Normal appearance.   HENT:      Head: Normocephalic.      Right Ear: Tympanic membrane normal.      Left Ear: Tympanic membrane normal.      Mouth/Throat:      Mouth: Mucous membranes are moist.      Pharynx: Oropharynx is clear.   Eyes:      Conjunctiva/sclera: Conjunctivae normal.      Pupils: Pupils are equal, round, and reactive to light.   Cardiovascular:      Rate and Rhythm: Normal rate and regular rhythm.   Pulmonary:      Effort: Pulmonary effort is " "normal.      Comments: Rhonchi in left lower base. Clears with cough  Skin:     General: Skin is warm and dry.   Neurological:      General: No focal deficit present.      Mental Status: She is alert.   Psychiatric:         Mood and Affect: Mood normal.         Behavior: Behavior normal.         LABS     No results found for: "LABA1C", "HGBA1C"  CMP  Sodium   Date Value Ref Range Status   06/20/2024 139 136 - 145 mmol/L Final     Potassium   Date Value Ref Range Status   06/20/2024 4.3 3.5 - 5.1 mmol/L Final     Chloride   Date Value Ref Range Status   06/20/2024 105 95 - 110 mmol/L Final     CO2   Date Value Ref Range Status   06/20/2024 25 23 - 29 mmol/L Final     Glucose   Date Value Ref Range Status   06/20/2024 89 70 - 110 mg/dL Final     BUN   Date Value Ref Range Status   06/20/2024 10 6 - 20 mg/dL Final     Creatinine   Date Value Ref Range Status   06/20/2024 0.7 0.5 - 1.4 mg/dL Final     Calcium   Date Value Ref Range Status   06/20/2024 9.6 8.7 - 10.5 mg/dL Final     Total Protein   Date Value Ref Range Status   06/20/2024 7.2 6.0 - 8.4 g/dL Final     Albumin   Date Value Ref Range Status   06/20/2024 4.2 3.5 - 5.2 g/dL Final     Total Bilirubin   Date Value Ref Range Status   06/20/2024 0.5 0.1 - 1.0 mg/dL Final     Comment:     For infants and newborns, interpretation of results should be based  on gestational age, weight and in agreement with clinical  observations.    Premature Infant recommended reference ranges:  Up to 24 hours.............<8.0 mg/dL  Up to 48 hours............<12.0 mg/dL  3-5 days..................<15.0 mg/dL  6-29 days.................<15.0 mg/dL       Alkaline Phosphatase   Date Value Ref Range Status   06/20/2024 75 55 - 135 U/L Final     AST   Date Value Ref Range Status   06/20/2024 44 (H) 10 - 40 U/L Final     ALT   Date Value Ref Range Status   06/20/2024 84 (H) 10 - 44 U/L Final     Anion Gap   Date Value Ref Range Status   06/20/2024 9 8 - 16 mmol/L Final     eGFR if "    Date Value Ref Range Status   07/21/2022 >60.0 >60 mL/min/1.73 m^2 Final     eGFR if non    Date Value Ref Range Status   07/21/2022 >60.0 >60 mL/min/1.73 m^2 Final     Comment:     Calculation used to obtain the estimated glomerular filtration  rate (eGFR) is the CKD-EPI equation.        Lab Results   Component Value Date    WBC 7.17 06/20/2024    HGB 13.7 06/20/2024    HCT 39.7 06/20/2024    MCV 95 06/20/2024     06/20/2024     Lab Results   Component Value Date    CHOL 179 10/03/2022    CHOL 140 10/27/2014    CHOL 135 04/12/2013     Lab Results   Component Value Date    HDL 65 10/03/2022    HDL 54 10/27/2014    HDL 55 04/12/2013     Lab Results   Component Value Date    LDLCALC 103.8 10/03/2022    LDLCALC 78.6 10/27/2014    LDLCALC 70.0 04/12/2013     Lab Results   Component Value Date    TRIG 51 10/03/2022    TRIG 37 10/27/2014    TRIG 50 04/12/2013     Lab Results   Component Value Date    CHOLHDL 36.3 10/03/2022    CHOLHDL 38.6 10/27/2014    CHOLHDL 40.7 04/12/2013     Lab Results   Component Value Date    TSH 0.897 10/03/2022    G5EYCBY 85 10/03/2022       ASSESSMENT     1. Bronchitis           PLAN  Treat possible atypical PNA with zpack and check cxr. Cough syrup sent. Hydrate well.   F/u iwht PCP if no improvement.     1. Bronchitis  - azithromycin (Z-SYL) 250 MG tablet; Take 2 tablets by mouth on day 1; Take 1 tablet by mouth on days 2-5  Dispense: 6 tablet; Refill: 0  - X-Ray Chest PA And Lateral; Future  - promethazine-dextromethorphan (PROMETHAZINE-DM) 6.25-15 mg/5 mL Syrp; Take 7.5 mLs by mouth nightly as needed.  Dispense: 118 mL; Refill: 0       Follow up with PCP     Patient was counseled on when to seek emergent care. Patient's plan/treatment was discussed including medications and possible side effects. Verbalized understanding of all instructions.     This note was partly generated with [employer] voice recognition software. I apologize for any possible  typographical errors.          ALLIE Mathew-C  Department of Internal Medicine - [facility]  09/23/2024

## 2024-10-03 ENCOUNTER — TELEPHONE (OUTPATIENT)
Dept: ENDOSCOPY | Facility: HOSPITAL | Age: 49
End: 2024-10-03
Payer: COMMERCIAL

## 2024-10-03 ENCOUNTER — PATIENT MESSAGE (OUTPATIENT)
Dept: ENDOSCOPY | Facility: HOSPITAL | Age: 49
End: 2024-10-03
Payer: COMMERCIAL

## 2024-10-03 NOTE — TELEPHONE ENCOUNTER
----- Message from Marlen sent at 9/25/2024 10:18 AM CDT -----  Regarding: FW: EGD with bravo reschedule    ----- Message -----  From: Shannan Riggins RN  Sent: 9/24/2024  12:36 PM CDT  To: [facility]  Subject: EGD with bravo reschedule                        Pt was scheduled at [facility] for egd with bravo 9.23.24 with Dr. Molina but canceled due to bronchitis; please reschedule

## 2024-10-11 ENCOUNTER — OFFICE VISIT (OUTPATIENT)
Dept: PAIN MEDICINE | Facility: CLINIC | Age: 49
End: 2024-10-11
Payer: COMMERCIAL

## 2024-10-11 VITALS
SYSTOLIC BLOOD PRESSURE: 110 MMHG | DIASTOLIC BLOOD PRESSURE: 70 MMHG | BODY MASS INDEX: 25.37 KG/M2 | HEART RATE: 70 BPM | HEIGHT: 67 IN | WEIGHT: 161.63 LBS

## 2024-10-11 DIAGNOSIS — M54.16 LUMBAR RADICULOPATHY: Primary | ICD-10-CM

## 2024-10-11 DIAGNOSIS — M51.372 DEGENERATION OF INTERVERTEBRAL DISC OF LUMBOSACRAL REGION WITH DISCOGENIC BACK PAIN AND LOWER EXTREMITY PAIN: ICD-10-CM

## 2024-10-11 PROCEDURE — 99999 PR PBB SHADOW E&M-EST. PATIENT-LVL III: CPT | Mod: PBBFAC,,, | Performed by: EMERGENCY MEDICINE

## 2024-10-11 NOTE — H&P (VIEW-ONLY)
Chronic Pain - New Patient Visit     Interval History 10/11/24: Socorro Huang is a 49 y.o. year old female patient who presents today in clinic for a MRI results. She was started on mobic and gabapentin at her last visit.  She reports that the Mobic and gabapentin are helping her symptoms, but she still gets exacerbations with certain movements that increase her pain to a level 9.    Original HPI 08/28/2024: Socorro Huang is a 49 y.o. year old female patient who has a past medical history of Abnormal Pap smear, Anxiety, Constipation - functional, Headache(784.0), Hemorrhoid, Leukopenia, Menorrhagia, PONV (postoperative nausea and vomiting), and Strain of neck muscle. She presents in referral from No ref. provider found for or back pain for the past several years.     Original Pain Description:  The pain is located in the lower back and in the left posterior thigh. The pain is described as aching and sharp. Exacerbating factors: Standing and Bending. Mitigating factors heat, ice, and medications. Symptoms interfere with daily activity. The patient feels like symptoms have been worsening. Patient denies loss of sensations. 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 Patient has had lumbar ESIs previously with Dr. Gonzales and it helped. She feels that the ESIs worked better than the RFAs. The pain stays above her knee and worse when she sits.       PAIN SCORES:  Best: Pain is 0  Current: Pain is 1  Worst: Pain is 9        10/11/2024     2:00 PM   Last 3 PDI Scores   Pain Disability Index (PDI) 0       6 weeks of Conservative therapy:  PT: Completed  Chiro:  HEP: Participating    Treatments / Medications:   Fioricet  Flexeril  Gabapentin 300 mg t.i.d.  Mobic 7.5 mg q.day p.r.n.    Antiplatelets/Anticoagulants:  NA    Interventional Pain Procedures:   10/10/2023 ACdiscectomy C5/6 and C6/7  2022 Lumbar ESIs   2022 Lumbar RFA    IMAGING:    MRI LUMBAR SPINE  WITHOUT CONTRAST 09/20/2024  L3/4: CDB & mild facet arthropathy=>mild bilateral neural foraminal narrowing.  L4/5: CDB w/posterior annular fissure & mod facet arthropathy=>mild spinal canal stenosis + encroachment on BL lateral recesses + abutment BL descending L5NRs & mild BL neural foraminal narrowing.    XR LUMBAR SPINE 5 VIEW WITH FLEX AND EXT    09/20/2024  Mild DJD.  Mild narrowing of the L4/L5 disc space.  .  No fracture, spondylolisthesis or bone destruction identified    XR CERVICAL SPINE AP LAT WITH FLEX EXTEN    04/12/2024  Reconfirmed straightening of normal cervical lordosis. Stable findings of intervertebral disc arthroplasty at C5-C6 and C6-C7, no findings hardware malfunction.  Elsewhere, preserved cervical disc spaces.  Some stable mild uncovertebral spurring C4-C5 level.  Some stable C7-T1 facet arthropathic changes.  Intact odontoid tip and unremarkable C1-C2 articulation.  Cervical spine findings unchanged to earlier exam.     Past Surgical History:   Procedure Laterality Date    ANOSCOPY N/A 07/20/2022    Procedure: ANOSCOPY;  Surgeon: ASHTYN Melo MD;  Location: [address];  Service: Colon and Rectal;  Laterality: N/A;    ARTHROPLASTY,SPINE,CERVICAL N/A 10/12/2023    Procedure: ARTHROPLASTY,SPINE,CERVICAL;  Surgeon: Amador Wood MD;  Location: [facility];  Service: Neurosurgery;  Laterality: N/A;  Anterior Cervical Discectomy C5/6, C6/7 with artifical disc, possible fusion        BREAST RECONSTRUCTION  2018    implant removal and lift    BREAST SURGERY      CHEILECTOMY Right 08/18/2023    Procedure: CHEILECTOMY;  Surgeon: Tacho Sanchez DPM;  Location: [facility] OR;  Service: Podiatry;  Laterality: Right;    COLON SURGERY  2014         COLONOSCOPY N/A 02/03/2022    Procedure: COLONOSCOPY;  Surgeon: ASHTYN Melo MD;  Location: [facility];  Service: Endoscopy;  Laterality: N/A;    CORRECTION OF HAMMER TOE Right 08/18/2023    Procedure: CORRECTION, HAMMER TOE;  Surgeon: Daniel  Tacho ESCOBAR DPM;  Location: [facility] OR;  Service: Podiatry;  Laterality: Right;    DILATION AND CURETTAGE OF UTERUS  12/18/2012    complex hyperplasia, no atypia    FLEXIBLE SIGMOIDOSCOPY N/A 07/20/2022    Procedure: SIGMOIDOSCOPY, FLEXIBLE;  Surgeon: ASHTYN Melo MD;  Location: [facility] OR 2ND FLR;  Service: Colon and Rectal;  Laterality: N/A;    HEMORRHOID SURGERY  2014    HYSTERECTOMY      LAPAROSCOPIC SIGMOIDECTOMY Left 07/20/2022    Procedure: COLECTOMY, SIGMOID, LAPAROSCOPIC, ERAS low;  Surgeon: ASHTYN Melo MD;  Location: [facility] OR 2ND FLR;  Service: Colon and Rectal;  Laterality: Left;    MOBILIZATION OF SPLENIC FLEXURE N/A 07/20/2022    Procedure: MOBILIZATION, SPLENIC FLEXURE;  Surgeon: ASHTYN Melo MD;  Location: [facility] OR 2ND FLR;  Service: Colon and Rectal;  Laterality: N/A;    OOPHORECTOMY      PARTIAL HYSTERECTOMY  2013    for atypia    SMALL INTESTINE SURGERY  2017    THYROIDECTOMY, PARTIAL  2013    TONSILLECTOMY      TUBAL LIGATION         Social History     Socioeconomic History    Marital status:     Number of children: 2   Occupational History     Employer: [employer]   Tobacco Use    Smoking status: Never    Smokeless tobacco: Never   Substance and Sexual Activity    Alcohol use: Not Currently     Comment: socially    Drug use: Never    Sexual activity: Yes     Partners: Male     Birth control/protection: See Surgical Hx   Other Topics Concern    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 Fulton , GYN Dr Ruby                 Social Drivers of Health     Financial Resource Strain: Low Risk  (12/27/2023)    Overall Financial Resource Strain (CARDIA)     Difficulty of Paying Living Expenses: Not hard at all   Food Insecurity: No Food Insecurity (12/27/2023)    Hunger Vital Sign     Worried About Running Out of Food in the Last Year: Never true     Ran Out of Food in the Last Year: Never true   Transportation Needs: No Transportation Needs (12/27/2023)    PRAPARE - Transportation     Lack of Transportation (Medical): No     Lack of Transportation (Non-Medical): No   Physical Activity: Insufficiently Active (12/27/2023)    Exercise Vital Sign     Days of Exercise per Week: 1 day     Minutes of Exercise per Session: 10 min   Stress: No Stress Concern Present (12/27/2023)    [employer] [place] [employer] - Occupational Stress Questionnaire     Feeling of Stress : Not at all   Housing Stability: Low Risk  (12/27/2023)    Housing Stability Vital Sign     Unable to Pay for Housing in the Last Year: No     Number of Places Lived in the Last Year: 1     Unstable Housing in the Last Year: No       Medications/Allergies: See med card    ROS:  GENERAL: No fever. No chills. No fatigue. Denies weight loss. Denies weight gain.  Back / musculoskeletal / neuro : See HPI    VITALS:   Vitals:    10/11/24 1359   BP: 110/70   Pulse: 70   Weight: 73.3 kg (161 lb 9.6 oz)   Height: 5' 7" (1.702 m)   PainSc: 0-No pain     Body mass index is 25.31 kg/m².      10/11/2024     2:00 PM 8/28/2024     8:14 AM   Last 3 PDI Scores   Pain Disability Index (PDI) 0 63       PHYSICAL EXAM:   GENERAL: Well " "appearing, in no acute distress, alert and oriented x3.  PSYCH:  Mood and affect appropriate.  SKIN: Skin color, texture, turgor normal, no rashes or lesions.  HEENT:  Normocephalic, atraumatic. Cranial nerves grossly intact.  NECK: No pain to palpation over the cervical paraspinous muscles. No pain to palpation over facets. No pain with neck flexion, extension, or lateral flexion.   PULM: No evidence of respiratory difficulty, symmetric chest rise.  GI:  Non-distended  BACK: Normal range of motion. No pain to palpation over the spinous processes.  Pain with axial loading bilaterally. There is no pain with palpation over the sacroiliac joints bilaterally.  Negative Casi's, yeoman's and compression bilaterally  EXTREMITIES: No deformities, edema, or skin discoloration.   MUSCULOSKELETAL: Shoulder, hip, and knee provocative maneuvers are negative. No atrophy is noted.  NEURO: Sensation is equal and appropriate bilaterally. Bilateral upper and lower extremity strength is normal and symmetric. Bilateral upper and lower extremity coordination and muscle stretch reflexes are physiologic and symmetric. Plantar response are downgoing. Straight leg raising in the supine position is positive to radicular pain on the left.   GAIT: normal.      LABS:    No results found for: "LABA1C", "HGBA1C"    Lab Results   Component Value Date    CREATININE 0.7 06/20/2024         ASSESSMENT: 49 y.o. year old female with pain, consistent with:    Encounter Diagnoses   Name Primary?    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    PLAN:  - I have stressed the importance of " physical activity and a home exercise plan to help with pain and improve health.  - Patient can continue with medications for now since they are providing benefits, using them appropriately, and without side effects.  - Counseled patient regarding the importance of activity modification and constant sleeping habits.  - will schedule patient for L4/5 bilateral TFESI  - Imaging: Reviewed available imaging with patient and answered any questions they had regarding study.  - The patient's pathophysiology was explained in detail with reference to x-rays, models, other visual aids as appropriate.   - Follow up visit: return to clinic 3-4 weeks after procedure      Anthony Macias MD  10/11/2024

## 2024-10-11 NOTE — PROGRESS NOTES
Chronic Pain - New Patient Visit     Interval History 10/11/24: Socorro Huang is a 49 y.o. year old female patient who presents today in clinic for a MRI results. She was started on mobic and gabapentin at her last visit.  She reports that the Mobic and gabapentin are helping her symptoms, but she still gets exacerbations with certain movements that increase her pain to a level 9.    Original HPI 08/28/2024: Socorro Huang is a 49 y.o. year old female patient who has a past medical history of Abnormal Pap smear, Anxiety, Constipation - functional, Headache(784.0), Hemorrhoid, Leukopenia, Menorrhagia, PONV (postoperative nausea and vomiting), and Strain of neck muscle. She presents in referral from No ref. provider found for or back pain for the past several years.     Original Pain Description:  The pain is located in the lower back and in the left posterior thigh. The pain is described as aching and sharp. Exacerbating factors: Standing and Bending. Mitigating factors heat, ice, and medications. Symptoms interfere with daily activity. The patient feels like symptoms have been worsening. Patient denies loss of sensations. 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 Patient has had lumbar ESIs previously with Dr. Gonzales and it helped. She feels that the ESIs worked better than the RFAs. The pain stays above her knee and worse when she sits.       PAIN SCORES:  Best: Pain is 0  Current: Pain is 1  Worst: Pain is 9        10/11/2024     2:00 PM   Last 3 PDI Scores   Pain Disability Index (PDI) 0       6 weeks of Conservative therapy:  PT: Completed  Chiro:  HEP: Participating    Treatments / Medications:   Fioricet  Flexeril  Gabapentin 300 mg t.i.d.  Mobic 7.5 mg q.day p.r.n.    Antiplatelets/Anticoagulants:  NA    Interventional Pain Procedures:   10/10/2023 ACdiscectomy C5/6 and C6/7  2022 Lumbar ESIs   2022 Lumbar RFA    IMAGING:    MRI LUMBAR SPINE  WITHOUT CONTRAST 09/20/2024  L3/4: CDB & mild facet arthropathy=>mild bilateral neural foraminal narrowing.  L4/5: CDB w/posterior annular fissure & mod facet arthropathy=>mild spinal canal stenosis + encroachment on BL lateral recesses + abutment BL descending L5NRs & mild BL neural foraminal narrowing.    XR LUMBAR SPINE 5 VIEW WITH FLEX AND EXT    09/20/2024  Mild DJD.  Mild narrowing of the L4/L5 disc space.  .  No fracture, spondylolisthesis or bone destruction identified    XR CERVICAL SPINE AP LAT WITH FLEX EXTEN    04/12/2024  Reconfirmed straightening of normal cervical lordosis. Stable findings of intervertebral disc arthroplasty at C5-C6 and C6-C7, no findings hardware malfunction.  Elsewhere, preserved cervical disc spaces.  Some stable mild uncovertebral spurring C4-C5 level.  Some stable C7-T1 facet arthropathic changes.  Intact odontoid tip and unremarkable C1-C2 articulation.  Cervical spine findings unchanged to earlier exam.     Past Surgical History:   Procedure Laterality Date    ANOSCOPY N/A 07/20/2022    Procedure: ANOSCOPY;  Surgeon: ASHTYN Melo MD;  Location: [address];  Service: Colon and Rectal;  Laterality: N/A;    ARTHROPLASTY,SPINE,CERVICAL N/A 10/12/2023    Procedure: ARTHROPLASTY,SPINE,CERVICAL;  Surgeon: Amador Wood MD;  Location: [facility];  Service: Neurosurgery;  Laterality: N/A;  Anterior Cervical Discectomy C5/6, C6/7 with artifical disc, possible fusion        BREAST RECONSTRUCTION  2018    implant removal and lift    BREAST SURGERY      CHEILECTOMY Right 08/18/2023    Procedure: CHEILECTOMY;  Surgeon: Tacho Sanchez DPM;  [facility];  Service: Podiatry;  Laterality: Right;    COLON SURGERY  2014         COLONOSCOPY N/A 02/03/2022    Procedure: COLONOSCOPY;  Surgeon: ASHTYN Melo MD;  Location: [facility];  Service: Endoscopy;  Laterality: N/A;    CORRECTION OF HAMMER TOE Right 08/18/2023    Procedure: CORRECTION, HAMMER TOE;  Surgeon: Daniel  Tacho ESCOBAR DPM;  Location: [facility] OR;  Service: Podiatry;  Laterality: Right;    DILATION AND CURETTAGE OF UTERUS  12/18/2012    complex hyperplasia, no atypia    FLEXIBLE SIGMOIDOSCOPY N/A 07/20/2022    Procedure: SIGMOIDOSCOPY, FLEXIBLE;  Surgeon: ASHTYN Melo MD;  Location: [facility] OR 2ND FLR;  Service: Colon and Rectal;  Laterality: N/A;    HEMORRHOID SURGERY  2014    HYSTERECTOMY      LAPAROSCOPIC SIGMOIDECTOMY Left 07/20/2022    Procedure: COLECTOMY, SIGMOID, LAPAROSCOPIC, ERAS low;  Surgeon: ASHTYN Melo MD;  Location: [facility] OR 2ND FLR;  Service: Colon and Rectal;  Laterality: Left;    MOBILIZATION OF SPLENIC FLEXURE N/A 07/20/2022    Procedure: MOBILIZATION, SPLENIC FLEXURE;  Surgeon: ASHTYN Melo MD;  Location: [facility] OR 2ND FLR;  Service: Colon and Rectal;  Laterality: N/A;    OOPHORECTOMY      PARTIAL HYSTERECTOMY  2013    for atypia    SMALL INTESTINE SURGERY  2017    THYROIDECTOMY, PARTIAL  2013    TONSILLECTOMY      TUBAL LIGATION         Social History     Socioeconomic History    Marital status:     Number of children: 2   Occupational History     Employer: [employer]   Tobacco Use    Smoking status: Never    Smokeless tobacco: Never   Substance and Sexual Activity    Alcohol use: Not Currently     Comment: socially    Drug use: Never    Sexual activity: Yes     Partners: Male     Birth control/protection: See Surgical Hx   Other Topics Concern    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 Fulton , GYN Dr Ruby                 Social Drivers of Health     Financial Resource Strain: Low Risk  (12/27/2023)    Overall Financial Resource Strain (CARDIA)     Difficulty of Paying Living Expenses: Not hard at all   Food Insecurity: No Food Insecurity (12/27/2023)    Hunger Vital Sign     Worried About Running Out of Food in the Last Year: Never true     Ran Out of Food in the Last Year: Never true   Transportation Needs: No Transportation Needs (12/27/2023)    PRAPARE - Transportation     Lack of Transportation (Medical): No     Lack of Transportation (Non-Medical): No   Physical Activity: Insufficiently Active (12/27/2023)    Exercise Vital Sign     Days of Exercise per Week: 1 day     Minutes of Exercise per Session: 10 min   Stress: No Stress Concern Present (12/27/2023)    [employer] [place] [employer] - Occupational Stress Questionnaire     Feeling of Stress : Not at all   Housing Stability: Low Risk  (12/27/2023)    Housing Stability Vital Sign     Unable to Pay for Housing in the Last Year: No     Number of Places Lived in the Last Year: 1     Unstable Housing in the Last Year: No       Medications/Allergies: See med card    ROS:  GENERAL: No fever. No chills. No fatigue. Denies weight loss. Denies weight gain.  Back / musculoskeletal / neuro : See HPI    VITALS:   Vitals:    10/11/24 1359   BP: 110/70   Pulse: 70   Weight: 73.3 kg (161 lb 9.6 oz)   Height: 5' 7" (1.702 m)   PainSc: 0-No pain     Body mass index is 25.31 kg/m².      10/11/2024     2:00 PM 8/28/2024     8:14 AM   Last 3 PDI Scores   Pain Disability Index (PDI) 0 63       PHYSICAL EXAM:   GENERAL: Well " "appearing, in no acute distress, alert and oriented x3.  PSYCH:  Mood and affect appropriate.  SKIN: Skin color, texture, turgor normal, no rashes or lesions.  HEENT:  Normocephalic, atraumatic. Cranial nerves grossly intact.  NECK: No pain to palpation over the cervical paraspinous muscles. No pain to palpation over facets. No pain with neck flexion, extension, or lateral flexion.   PULM: No evidence of respiratory difficulty, symmetric chest rise.  GI:  Non-distended  BACK: Normal range of motion. No pain to palpation over the spinous processes.  Pain with axial loading bilaterally. There is no pain with palpation over the sacroiliac joints bilaterally.  Negative Casi's, yeoman's and compression bilaterally  EXTREMITIES: No deformities, edema, or skin discoloration.   MUSCULOSKELETAL: Shoulder, hip, and knee provocative maneuvers are negative. No atrophy is noted.  NEURO: Sensation is equal and appropriate bilaterally. Bilateral upper and lower extremity strength is normal and symmetric. Bilateral upper and lower extremity coordination and muscle stretch reflexes are physiologic and symmetric. Plantar response are downgoing. Straight leg raising in the supine position is positive to radicular pain on the left.   GAIT: normal.      LABS:    No results found for: "LABA1C", "HGBA1C"    Lab Results   Component Value Date    CREATININE 0.7 06/20/2024         ASSESSMENT: 49 y.o. year old female with pain, consistent with:    Encounter Diagnoses   Name Primary?    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    PLAN:  - I have stressed the importance of " physical activity and a home exercise plan to help with pain and improve health.  - Patient can continue with medications for now since they are providing benefits, using them appropriately, and without side effects.  - Counseled patient regarding the importance of activity modification and constant sleeping habits.  - will schedule patient for L4/5 bilateral TFESI  - Imaging: Reviewed available imaging with patient and answered any questions they had regarding study.  - The patient's pathophysiology was explained in detail with reference to x-rays, models, other visual aids as appropriate.   - Follow up visit: return to clinic 3-4 weeks after procedure      Anthony Macias MD  10/11/2024

## 2024-10-14 ENCOUNTER — TELEPHONE (OUTPATIENT)
Dept: PAIN MEDICINE | Facility: CLINIC | Age: 49
End: 2024-10-14
Payer: COMMERCIAL

## 2024-10-14 DIAGNOSIS — M51.372 DEGENERATION OF INTERVERTEBRAL DISC OF LUMBOSACRAL REGION WITH DISCOGENIC BACK PAIN AND LOWER EXTREMITY PAIN: ICD-10-CM

## 2024-10-14 DIAGNOSIS — M54.16 LUMBAR RADICULOPATHY: Primary | ICD-10-CM

## 2024-10-14 NOTE — TELEPHONE ENCOUNTER
----- Message from Anthony Macias MD sent at 10/11/2024  2:25 PM CDT -----  Regarding: Order for MESSI ROCHA    Patient Name: MESSI ROCHA([id number])  Sex: Female  : 1975      PCP: BEA MITCHELL    Center: [facility] CENTRAL BILLING OFFICE     Types of orders made on 10/11/2024: Procedure Request    Order Date:10/11/2024  Ordering User:ANTHONY MACIAS [[id number]]  Encounter Provider:Anthony Macias MD [[id number]]  Authorizing Provider: Anthony Macias MD [[id number]]  Department:[facility] [place] [facility][[id number]]    Common Order Information  Procedure -> Transforaminal Injection (Specify level and laterality) Cmt: L4/5             bilateral    Order Specific Information  Order: Procedure Order to Pain Management [Custom: [id number]]  Order #:          [id number]: 1 FUTURE    Priority: Routine  Class: Clinic Performed    Future Order Information      Expires on:10/11/2025            Expected by:10/11/2024                   Associated Diagnoses      M54.16 Lumbar radiculopathy      M51.372 Degeneration of intervertebral disc of lumbosacral region with       discogenic back pain and lower extremity pain      Physician -> troy         Facility Name: -> [facility]           Priority: Routine  Class: Clinic Performed    Future Order Information      Expires on:10/11/2025            Expected by:10/11/2024                   Associated Diagnoses      M54.16 Lumbar radiculopathy      M51.372 Degeneration of intervertebral disc of lumbosacral region with       discogenic back pain and lower extremity pain      Procedure -> Transforaminal Injection (Specify level and laterality) Cmt:                 L4/5 bilateral        Physician -> troy         Facility Name: -> [facility]

## 2024-10-17 ENCOUNTER — TELEPHONE (OUTPATIENT)
Dept: NEUROSURGERY | Facility: CLINIC | Age: 49
End: 2024-10-17
Payer: COMMERCIAL

## 2024-10-17 ENCOUNTER — PATIENT MESSAGE (OUTPATIENT)
Dept: NEUROSURGERY | Facility: CLINIC | Age: 49
End: 2024-10-17
Payer: COMMERCIAL

## 2024-10-17 DIAGNOSIS — Z98.890 S/P CERVICAL DISC REPLACEMENT: Primary | ICD-10-CM

## 2024-10-23 ENCOUNTER — TELEPHONE (OUTPATIENT)
Dept: PAIN MEDICINE | Facility: CLINIC | Age: 49
End: 2024-10-23
Payer: COMMERCIAL

## 2024-10-29 NOTE — PRE-PROCEDURE INSTRUCTIONS
Patient reviewed on 10/29/2024.  Okay to proceed at [facility]. The following pre-procedure instructions and arrival time have been reviewed with patient via phone and sent to patient portal for review.  Patient verbalized an understanding.  Pt to be accompanied by Pedro day of procedure as responsible .      Dear Socorro,     Please read over the following pre-procedure instructions in it's entirety as there is helpful information here to get you well prepared for your upcoming procedure.     You are scheduled for a procedure with Dr. Macias on 10/31/2024.     [facility] at the corner of [facility] and [facility]. It is in the [place] [employer] [place] next to [employer]. The address is: [address] [place] [address]. Take the elevator to the 2nd floor.       Registration check in time: 6:00 am  Procedure scheduled for time: 7:00 am     If you are receiving sedation, you CANNOT drive yourself and must have a responsible friend or family member (no rideshare) to drive you home.        You should take any medications that you routinely take for blood pressure, heart medications, thyroid, cholesterol, etc.      The fasting restrictions are dependent on whether or not you are receiving sedation. Sedation is not available for all procedures.      Your fasting instructions/Sedation type are as follow:  IV sedation. You should not eat for 8 hours and can only drink clear liquids (water or black coffee without cream/sugar) up until 2 hours before your scheduled time.  You CANNOT drive yourself and must have a .        If you are on blood thinners, you need to follow the anticoagulation instructions that had been discussed previously. You should only stop the blood thinners if it was approved by your primary care physician or your cardiologist. In the event that you are not able to stop your blood thinners, a blood thinner was not listed on your medication list, or we were not able to get  clearance from your cardiologist, then the procedure may have to be postponed/canceled.      IF you were told to stop your blood thinners, this is how long you should generally hold some of the more common ones. Remember that stopping blood thinners is only necessary for certain procedures. If you are unsure of your instructions, please call us.   Aspirin - 5 days  Plavix/Clopidogrel - 7 days  Warfarin / Coumadin - 5 days  Eliquis - 3 days  Pradaxa/Dabigatran - 4 days  Xarelto/Rivaroxaban - 3 days     If you are a diabetic, do not take your medication if you will be fasting, but bring it with you. Please plan on being here for roughly 2-3 hours.     Please call us if you have been sick (running fever, having any flu-like symptoms) or have been taking ANTIBIOTICS in the past 2 weeks or had any outpatient procedures other than with us (colonoscopy, endoscopy, OBGYN, dental, etc.).      If you have been previously COVID positive, you will need to hold off on your procedure until you are symptom free for 10 days. If you did not have any symptoms, you can have your procedure 10 days from your positive test result.         On the morning of your procedure:  *HOLD ALL VITAMINS, MINERALS, HERBS (INCLUDING HERBAL TEAS) AND SUPPLEMENTS  *SHOWER WITH ANTIBACTERIAL SOAP (EX. DIAL) NIGHT BEFORE AND MORNING OF PROCEDURE  *DO NOT APPLY ANY LOTIONS, OILS, POWDERS, PERFUME/COLOGNE, OINTMENTS, GELS, CREAMS, MAKEUP OR DEODORANT TO YOUR SKIN MORNING OF PROCEDURE  *LEAVE JEWELRY AND ANY VALUABLES AT HOME  *WEAR LOOSE COMFORTABLE CLOTHING         Please reply to this portal message as receipt of delivery.     Thank you,  [facility]  [place], LPN [facility]  Pre-Admit

## 2024-10-31 ENCOUNTER — HOSPITAL ENCOUNTER (OUTPATIENT)
Facility: HOSPITAL | Age: 49
Discharge: HOME | End: 2024-10-31
Attending: EMERGENCY MEDICINE | Admitting: EMERGENCY MEDICINE
Payer: COMMERCIAL

## 2024-10-31 VITALS
BODY MASS INDEX: 26.68 KG/M2 | HEIGHT: 67 IN | RESPIRATION RATE: 16 BRPM | OXYGEN SATURATION: 97 % | SYSTOLIC BLOOD PRESSURE: 103 MMHG | DIASTOLIC BLOOD PRESSURE: 65 MMHG | HEART RATE: 66 BPM | TEMPERATURE: 98 F | WEIGHT: 170 LBS

## 2024-10-31 DIAGNOSIS — G89.29 CHRONIC PAIN: ICD-10-CM

## 2024-10-31 PROCEDURE — 64483 NJX AA&/STRD TFRM EPI L/S 1: CPT | Mod: 50 | Performed by: EMERGENCY MEDICINE

## 2024-10-31 PROCEDURE — 64483 NJX AA&/STRD TFRM EPI L/S 1: CPT | Mod: 50,,, | Performed by: EMERGENCY MEDICINE

## 2024-10-31 PROCEDURE — 63600175 PHARM REV CODE 636 W HCPCS: Performed by: EMERGENCY MEDICINE

## 2024-10-31 PROCEDURE — 25500020 PHARM REV CODE 255: Performed by: EMERGENCY MEDICINE

## 2024-10-31 RX ORDER — DEXAMETHASONE SODIUM PHOSPHATE 10 MG/ML
INJECTION INTRAMUSCULAR; INTRAVENOUS
Status: DISCONTINUED | OUTPATIENT
Start: 2024-10-31 | End: 2024-10-31 | Stop reason: HOSPADM

## 2024-10-31 RX ORDER — LIDOCAINE HYDROCHLORIDE 10 MG/ML
INJECTION, SOLUTION EPIDURAL; INFILTRATION; INTRACAUDAL; PERINEURAL
Status: DISCONTINUED | OUTPATIENT
Start: 2024-10-31 | End: 2024-10-31 | Stop reason: HOSPADM

## 2024-10-31 RX ORDER — FENTANYL CITRATE 50 UG/ML
INJECTION, SOLUTION INTRAMUSCULAR; INTRAVENOUS
Status: DISCONTINUED | OUTPATIENT
Start: 2024-10-31 | End: 2024-10-31 | Stop reason: HOSPADM

## 2024-10-31 RX ORDER — MIDAZOLAM HYDROCHLORIDE 1 MG/ML
INJECTION INTRAMUSCULAR; INTRAVENOUS
Status: DISCONTINUED | OUTPATIENT
Start: 2024-10-31 | End: 2024-10-31 | Stop reason: HOSPADM

## 2024-10-31 RX ORDER — LIDOCAINE HYDROCHLORIDE 20 MG/ML
INJECTION, SOLUTION EPIDURAL; INFILTRATION; INTRACAUDAL; PERINEURAL
Status: DISCONTINUED | OUTPATIENT
Start: 2024-10-31 | End: 2024-10-31 | Stop reason: HOSPADM

## 2024-10-31 NOTE — DISCHARGE SUMMARY
Discharge Note  Short Stay      SUMMARY     Admit Date: 10/31/2024    Attending Physician: Anthony Macias      Discharge Physician: Anthony Macias      Discharge Date: 10/31/2024 7:28 AM    Procedure(s) (LRB):  TFESI B/L L4/5 (Bilateral)    Final Diagnosis: Lumbar radiculopathy [M54.16]  Degeneration of intervertebral disc of lumbosacral region with discogenic back pain and lower extremity pain [M51.372]    Disposition: Home or self care    Patient Instructions:   Current Discharge Medication List        CONTINUE these medications which have NOT CHANGED    Details   co-enzyme Q-10 30 mg capsule Take by mouth.      gabapentin (NEURONTIN) 300 MG capsule Take 1 capsule (300 mg total) by mouth every evening for 7 days, THEN 1 capsule (300 mg total) 2 (two) times daily for 7 days, THEN 1 capsule (300 mg total) 3 (three) times daily for 16 days.  Qty: 69 capsule, Refills: 0      progesterone (PROMETRIUM) 200 MG capsule       traZODone (DESYREL) 100 MG tablet       albuterol (PROVENTIL/VENTOLIN HFA) 90 mcg/actuation inhaler Inhale 2 puffs into the lungs every 4 (four) hours as needed for Wheezing.  Qty: 18 g, Refills: 1    Associated Diagnoses: Upper respiratory tract infection, unspecified type; Bronchospasm      omeprazole (PRILOSEC) 40 MG capsule Take 1 capsule (40 mg total) by mouth 2 (two) times daily before meals.  Qty: 180 capsule, Refills: 3    Associated Diagnoses: Gastroesophageal reflux disease without esophagitis                 Discharge Diagnosis: Lumbar radiculopathy [M54.16]  Degeneration of intervertebral disc of lumbosacral region with discogenic back pain and lower extremity pain [M51.372]  Condition on Discharge: Stable with no complications to procedure   Diet on Discharge: Same as before.  Activity: as per instruction sheet.  Discharge to: Home with a responsible adult.  Follow up: 2-4 weeks       Please call my office or pager at [phone] if experienced any weakness or loss of sensation, fever  > 101.5, pain uncontrolled with oral medications, persistent nausea/vomiting/or diarrhea, redness or drainage from the incisions, or any other worrisome concerns. If physician on call was not reached or could not communicate with our office for any reason please go to the nearest emergency department

## 2024-10-31 NOTE — OP NOTE
Lumbar Transforaminal Epidural Steroid Injection under Fluoroscopic Guidance    The procedure, risks, benefits, and options were discussed with the patient. There are no contraindications to the procedure. The patent expressed understanding and agreed to the procedure. Informed written consent was obtained prior to the start of the procedure and can be found in the patient's chart.    PATIENT NAME: Socorro Huang   MRN: [id number]     DATE OF PROCEDURE: 10/31/2024    PROCEDURE:  Bilateral  L4/5 Lumbar Transforaminal Epidural Steroid Injection under Fluoroscopic Guidance    PRE-OP DIAGNOSIS: Lumbar radiculopathy [M54.16]  Degeneration of intervertebral disc of lumbosacral region with discogenic back pain and lower extremity pain [M51.372] Lumbar radiculopathy [M54.16]    POST-OP DIAGNOSIS: Same    PHYSICIAN: Anthony Macias MD    MEDICATIONS INJECTED: Preservative-free Decadron 10mg with 5cc of Lidocaine 1% MPF     LOCAL ANESTHETIC INJECTED: Xylocaine 2%     SEDATION: Versed 2mg and Fentanyl 50mcg                                                                                                                                                                                     Conscious sedation ordered by M.D. Patient re-evaluation prior to administration of conscious sedation. No changes noted in patient's status from initial evaluation. The patient's vital signs were monitored by RN and patient remained hemodynamically stable throughout the procedure.    Event Time In   Sedation Start 0714   Sedation End 0723       ESTIMATED BLOOD LOSS: None    COMPLICATIONS: None    TECHNIQUE: Time-out was performed to identify the patient and procedure to be performed. With the patient laying in a prone position, the surgical area was prepped and draped in the usual sterile fashion using ChloraPrep and a fenestrated drape.The levels were determined under fluoroscopy guidance. Skin anesthesia was achieved by injecting Lidocaine 2%  over the injection sites. The transforaminal spaces were then approached with a 25 gauge, 3.5 inch spinal quinke needle that was introduced under fluoroscopic guidance in the AP and Lateral views. Once the needle tip was in the area of the transforaminal space, and there was no blood, CSF or paraesthesias, contrast dye Omnipaque (300mg/mL) was injected to confirm placement and there was no vascular runoff. Fluoroscopic imaging in the AP and lateral views revealed a clear outline of the spinal nerve with proximal spread of agent through the neural foramen into the epidural space. 3 mL of the medication mixture listed above was injected slowly at each site. Displacement of the radio opaque contrast after injection of the medication confirmed that the medication went into the area of the transforaminal spaces. The needles were removed and bleeding was nil. A sterile dressing was applied. No specimens collected. The patient tolerated the procedure well.     The patient was monitored after the procedure in the recovery area. They were given post-procedure and discharge instructions to follow at home. The patient was discharged in a stable condition.    Anthony Macias MD

## 2024-10-31 NOTE — PLAN OF CARE
Discharge instructions given and explained to patient with verbalization of understanding all instructions. Patients v/s stable, denies n/v and tolerating po, rates pain level tolerable, IV removed, and ready for patient discharge home.

## 2024-10-31 NOTE — DISCHARGE INSTRUCTIONS
[facility]  Dr. Anthony Macias  Messaging service # [phone]    POST-PROCEDURE INSTRUCTIONS:    Today you had an injection that included a steroid medications.  The steroid may or may not have been mixed with a local anesthetic when it was injected.   If the injection was in the neck, you may feel some pressure, numbness, or slight weakness in the arm after the procedure for a short period of time (this is a normal response), if this persists for longer than 1 day please contact our office or go to the emergency room.  If the injection was in the low back, you may feel some pressure, numbness, or slight weakness in the leg after the procedure for a short period of time (this is a normal response), if this persists for longer than 1 day please contact our office or go to the emergency room.  You may get side effects from the steroid.  This is not uncommon.  Symptoms include: elevated blood sugar, elevated blood pressure, headache, flushing, nausea, insomnia.  These symptoms are transient and will resolve within 1-3 days.  If symptoms last longer than this please contact our office or head to the emergency room.  Steroid medications can take anywhere from 3-14 days to take effect (rarely longer).  You may notice that your pain worsens for a short period of time after the injection, this would not be unusual due to the pressure and trauma from the needle.    If you do not have a follow up appointment scheduled, please contact my office (or the office of the physician who referred you for the procedure) to get a post-procedure follow up scheduled 2-4 weeks after the procedure.  This can be done as a virtual visit if that is more convenient for you.      What you need to do:    Keep a record of your response to the injection you had today.    How much relief did you get?   When did the relief start and how long did it last?  Were you able to decrease the use of any of your pain  medications?  Were you able to increase your level of activity?  How long did the relief last?    What to watch out for:    If you experience any of the following symptoms after your procedure, please notify the messaging service immediately (see above for contact information):   fever (increased oral temperature)   bleeding or swelling at the injection site,    drainage, rash or redness at the injection site    possible signs of infection    increased pain at the injection site   worsening of your usual pain   severe headache   new or worsening numbness    new arm and/or leg weakness, or    changes in bowel and/or bladder function: urinating or defecating on yourself and not knowing that you did it.    PLEASE FOLLOW ALL INSTRUCTIONS CAREFULLY     Do not engage in strenuous activity (e.g., lifting or pushing heavy objects or repeated bending) for 24 hours.     Do not take a bath, swim or use Jacuzzi for 24 hours after procedure. (A shower is fine).   Remove any Band-Aids when you get home.    Use cold/ice, as needed for comfort.  We recommend the use of cold therapy alternating on for 20 minutes, off for 20 minutes.    Do not apply direct heat (heating pad or heat packs) to the injection site for 24 hours.     Resume your usual medications, unless instructed otherwise by your Pain Physician.     If you are on warfarin (Coumadin) or other blood thinner, resume this medication as instructed by your prescribing Physician.    IF AT ANY POINT YOU ARE VERY CONCERNED ABOUT YOUR SYMPTOMS, or you have an urgent or emergent issue after between 5pm and 7am or on weekends, please contact  the on call provider at [phone].    If you develop worsening pain, weakness, numbness, lose bowel or bladder control (i.e., having an accident where you did not even know you had to go to the bathroom and suddenly noticed you soiled yourself), saddle anesthesia (a loss of sensation restricted to the area of the buttocks, anus and between  the legs -- i.e., those parts of your body that would touch a saddle if you were sitting on one) you need to go immediately to the emergency department for evaluation and treatment.    ----------------------------------------------------------------------------------------------------------------------------------------------------------------  If you received Sedation please read the following instructions:  POST SEDATION INSTRUCTIONS    Today you received intravenous medication (also known as sedation) that was used to help you relax and/or decrease discomfort during your procedure. This medication will be acting in your body for the next 24 hours, so you might feel a little tired or sleepy. This feeling will slowly wear off.   Common side effects associated with these medications include: drowsiness, dizziness, sleepiness, confusion, feeling excited, difficulty remembering things, lack of steadiness with walking or balance, loss of fine muscle control, slowed reflexes, difficulty focusing, and blurred vision.  Some over-the-counter and prescription medications (e.g., muscle relaxants, opioids, mood-altering medications, sedatives/hypnotics, antihistamines) can interact with the intravenous medication you received and cause an increased risk of the side effects listed above in addition to other potentially life threatening side effects. Use extreme caution if you are taking such medications, and consult with your Pain Physician or prescribing physician if you have any questions.  For the next 12-24 hours:    DO NOT--Drive a car, operate machinery or power tools   DO NOT--Drink any alcoholic beverages (not even beer), they may dangerously increase the risk of side effects.    DO NOT--Make any important legal or business decisions or sign important documents.  We advise you to have someone to assist you at home. Move slowly and carefully. Do not make sudden changes in position. Be aware of dizziness or  light-headedness and move accordingly.   If you seek medical treatment within 24 hours, let the nurse or doctor caring for you know that you have received the above medications. If you have any questions or concerns related to your sedation or treatment today please contact us.

## 2024-11-01 ENCOUNTER — PATIENT MESSAGE (OUTPATIENT)
Dept: PRIMARY CARE CLINIC | Facility: CLINIC | Age: 49
End: 2024-11-01
Payer: COMMERCIAL

## 2024-11-07 ENCOUNTER — PATIENT MESSAGE (OUTPATIENT)
Dept: PRIMARY CARE CLINIC | Facility: CLINIC | Age: 49
End: 2024-11-07
Payer: COMMERCIAL

## 2024-11-08 ENCOUNTER — TELEPHONE (OUTPATIENT)
Dept: PAIN MEDICINE | Facility: CLINIC | Age: 49
End: 2024-11-08
Payer: COMMERCIAL

## 2024-11-08 NOTE — TELEPHONE ENCOUNTER
----- Message from Anthony Macias MD sent at 10/31/2024  7:28 AM CDT -----  Regarding: virtual follow up  Please make this patient a virtual follow up appointment with me in 3-4 weeks.   Thanks!  Doc A

## 2024-11-08 NOTE — TELEPHONE ENCOUNTER
Called pt to follow up in regards to scheduling VV  Pt agreed to be seen on Friday 12-06-24 for 11:45 am   No further questions    ND

## 2024-11-12 ENCOUNTER — TELEPHONE (OUTPATIENT)
Dept: ENDOSCOPY | Facility: HOSPITAL | Age: 49
End: 2024-11-12
Payer: COMMERCIAL

## 2024-11-13 NOTE — TELEPHONE ENCOUNTER
Referral for procedure from [facility]      Spoke to patient to schedule procedure(s) Upper Endoscopy (EGD) with [facility]       Physician to perform procedure(s) Dr. JOSE Bourgeois  Date of Procedure (s) 12/18/2024  Arrival Time 8:30 Am   Time of Procedure(s) 9:30 Am    Location of Procedure(s) [place] 2nd Floor  Type of Rx Prep sent to patient: N/A  Instructions provided to [facility] via [facility]    Patient was informed on the following information and verbalized understanding. Screening questionnaire reviewed with patient and complete. If procedure requires anesthesia, a responsible adult needs to be present to accompany the patient home, patient cannot drive after receiving anesthesia. Appointment details are tentative, especially check-in time. Patient will receive a prep-op call 7 days prior to confirm check-in time for procedure. If applicable the patient should contact their pharmacy to verify Rx for procedure prep is ready for pick-up. Patient was advised to call the scheduling department at [phone] if pharmacy states no Rx is available. Patient was advised to call the endoscopy scheduling department if any questions or concerns arise.       Endoscopy Scheduling Department

## 2024-11-19 ENCOUNTER — PATIENT MESSAGE (OUTPATIENT)
Dept: PAIN MEDICINE | Facility: CLINIC | Age: 49
End: 2024-11-19
Payer: COMMERCIAL

## 2024-11-24 DIAGNOSIS — G47.00 INSOMNIA, UNSPECIFIED: ICD-10-CM

## 2024-11-24 NOTE — TELEPHONE ENCOUNTER
No care due was identified.  [facility] Embedded Care Due Messages. Reference number: [id number].   11/24/2024 7:12:41 AM CST

## 2024-11-24 NOTE — TELEPHONE ENCOUNTER
Refill Routing Note   Medication(s) are not appropriate for processing by [facility] for the following reason(s):        No active prescription written by provider    [facility] action(s):  Defer        Medication Therapy Plan: Last ordered: 2 months ago (8/28/2024) by Historical Provider      Appointments  past 12m or future 3m with PCP    Date Provider   Last Visit   8/9/2024 Blake Adams MD   Next Visit   Visit date not found Blake Adams MD   ED visits in past 90 days: 0        Note composed:3:58 PM 11/24/2024

## 2024-11-25 ENCOUNTER — PATIENT MESSAGE (OUTPATIENT)
Dept: NEUROSURGERY | Facility: CLINIC | Age: 49
End: 2024-11-25
Payer: COMMERCIAL

## 2024-11-25 RX ORDER — TRAZODONE HYDROCHLORIDE 100 MG/1
100 TABLET ORAL NIGHTLY
Qty: 90 TABLET | Refills: 2 | Status: SHIPPED | OUTPATIENT
Start: 2024-11-25

## 2024-12-03 ENCOUNTER — PATIENT MESSAGE (OUTPATIENT)
Dept: PRIMARY CARE CLINIC | Facility: CLINIC | Age: 49
End: 2024-12-03
Payer: COMMERCIAL

## 2024-12-05 ENCOUNTER — PROCEDURE VISIT (OUTPATIENT)
Dept: NEUROLOGY | Facility: CLINIC | Age: 49
End: 2024-12-05
Payer: COMMERCIAL

## 2024-12-05 VITALS
BODY MASS INDEX: 26.59 KG/M2 | SYSTOLIC BLOOD PRESSURE: 112 MMHG | HEART RATE: 66 BPM | WEIGHT: 169.75 LBS | DIASTOLIC BLOOD PRESSURE: 74 MMHG

## 2024-12-05 DIAGNOSIS — G43.709 CHRONIC MIGRAINE W/O AURA W/O STATUS MIGRAINOSUS, NOT INTRACTABLE: Primary | ICD-10-CM

## 2024-12-05 RX ORDER — PREDNISONE 20 MG/1
TABLET ORAL
Qty: 12 TABLET | Refills: 0 | Status: SHIPPED | OUTPATIENT
Start: 2024-12-05

## 2024-12-05 RX ORDER — IBUPROFEN 600 MG/1
600 TABLET ORAL EVERY 4 HOURS PRN
Qty: 20 TABLET | Refills: 5 | Status: SHIPPED | OUTPATIENT
Start: 2024-12-05

## 2024-12-05 NOTE — PROCEDURES
Established Patient  Procedure Note    SUBJECTIVE:  Patient ID: Socorro Huang  Chief Complaint: Headache      History of Present Illness:  Socorro Huang is a 49 y.o. female with migraines, cervical spondylosis w/ radiculopathy s/p disc replacement, tension ha's, diverticulitis, anxiety, GERD, hypothyroidism, s/p hysterectomy, insomnia, who presents to clinic alone for follow-up of headaches and Botox injections.       12/05/2024- Interval History: botox  Pt would like to transition care for her ha's. She has been experiencing migraines since her teens. She also has a family history of migraines in her mother. Medina's worsened in 2018 occurring >15/30 days per month, almost daily. She saw Dr. De La Paz at  at the time, trying and failing multiple medications listed below. Started on botox in 2018 which has helped greatly. Since obtaining botox, ha's have been well controlled occurring 2/30 days per month, lasting 1-2 hrs with otcs. She was seeing Dr. Montes for medical mgmt and myself for botox, but would like to transition HA care to 1 provider for ease. She describes ha's further as mild to severe throbbing, pulsating, and sharp pains in her bilateral retro-orbital and temple regions. Stress can trigger ha's. Pain is associated with photophobia, phonophobia, and nausea. Of note, this week pt has had an intractable ha x 4 days which she contributes to stress and weather changes. Is pending vacation next week.     Treatments Tried:   Botox - helping  Imitrex tabs, NS - vomiting  Maxalt   Nurtec - helped somewhat  Fioricet - rebound ha's  Bc powders, tylenol, ibuprofen    Current Medications:    Current Outpatient Medications:     co-enzyme Q-10 30 mg capsule, Take by mouth., Disp: , Rfl:     progesterone (PROMETRIUM) 200 MG capsule, , Disp: , Rfl:     traZODone (DESYREL) 100 MG tablet, Take 1 tablet (100 mg total) by mouth every evening., Disp: 90 tablet, Rfl: 2    albuterol (PROVENTIL/VENTOLIN HFA) 90 mcg/actuation  inhaler, Inhale 2 puffs into the lungs every 4 (four) hours as needed for Wheezing., Disp: 18 g, Rfl: 1    gabapentin (NEURONTIN) 300 MG capsule, Take 1 capsule (300 mg total) by mouth every evening for 7 days, THEN 1 capsule (300 mg total) 2 (two) times daily for 7 days, THEN 1 capsule (300 mg total) 3 (three) times daily for 16 days., Disp: 69 capsule, Rfl: 0    ibuprofen (ADVIL,MOTRIN) 600 MG tablet, Take 1 tablet (600 mg total) by mouth every 4 (four) hours as needed (for headaches)., Disp: 20 tablet, Rfl: 5    omeprazole (PRILOSEC) 40 MG capsule, Take 1 capsule (40 mg total) by mouth 2 (two) times daily before meals., Disp: 180 capsule, Rfl: 3    predniSONE (DELTASONE) 20 MG tablet, Take 3 tabs in AM x 2 days, then 2 tabs in AM x 2 days, then 1 tab in AM x 2 days. Take with food., Disp: 12 tablet, Rfl: 0  No current facility-administered medications for this visit.    Review of Systems - as per HPI, otherwise a balanced 10 systems review is negative.    OBJECTIVE:  Vitals:  /74 (BP Location: Left arm, Patient Position: Sitting)   Pulse 66   Wt 77 kg (169 lb 12.1 oz)   LMP 05/05/2013   BMI 26.59 kg/m²     Physical Exam:  Constitutional: she appears well-developed and well-nourished. she is well groomed. NAD   HENT:    Head: Normocephalic and atraumatic  Eyes: Conjunctivae and EOM are normal  Musculoskeletal: Normal range of motion. No joint stiffness.   Skin: Skin is warm and dry.  Psychiatric: Mood and affect are normal    Neuro: Patient is alert and oriented to person, place, and time. Language is intact and fluent.  Recent and remote memory are intact.  Normal attention and concentration.  Facial movement is symmetric.  Gait is normal.     Review of Data:   Notes from neuro reviewed.   Labs:  Office Visit on 09/16/2024   Component Date Value Ref Range Status    POC Rapid COVID 09/16/2024 Negative  Negative Final     Acceptable 09/16/2024 Yes   Final    Rapid Strep A Screen 09/16/2024  Negative  Negative Final     Acceptable 09/16/2024 Yes   Final    Rapid Influenza A Ag 09/16/2024 Negative  Negative Final    Rapid Influenza B Ag 09/16/2024 Negative  Negative Final     Acceptable 09/16/2024 Yes   Final     Imaging:  No results found for this or any previous visit.    Note: I have independently reviewed any/all imaging/labs/tests and agree with the report (s) as documented.  Any discrepancies will be as noted/demarcated by free text.  BRENDA HANCOCK 12/5/2024    ASSESSMENT:  1. Chronic migraine w/o aura w/o status migrainosus, not intractable        PLAN:  - ppx - continue botox  - Botox administered in clinic for Chronic Migraine (see below)   - abortive - try ibuprofen 600mg prn, limit to avoid rebound ha's  - nausea - defers  - prednisone taper to break current intractable ha  - track ha's  - RTC in 12 weeks for repeat Botox injections or sooner if needed     Orders Placed This Encounter    onabotulinumtoxina injection 200 Units    predniSONE (DELTASONE) 20 MG tablet    ibuprofen (ADVIL,MOTRIN) 600 MG tablet       All questions and concerns addressed.  Patient verbalizes understanding and is agreeable with the above stated treatment plan.      PROCEDURE NOTE:  BOTOX was performed as an indicated therapy for intractable chronic migraine headaches given that the patient failed more than 2 headache medications    Two patient identifiers were confirmed with the patient prior to performing this procedure. A time out to determine correct patient and and agreement on procedure performed was conducted prior to the procedure.      Botulinum Toxin Injection Procedure   Procedure: Botulinum toxin injection (59403)  After risks and benefits were explained including bleeding, infection, worsening of pain, damage to the areas being injected, weakness of muscles, loss of muscle control, dysphagia if injecting the head or neck, facial droop if injecting the facial area, painful  injection, allergic or other reaction to the medications being injected, and the failure of the procedure to help the problem, a signed consent was obtained.   The patient was placed in a comfortable area and the sites to be treated were identified.The area to be treated was prepped three times with alcohol and the alcohol allowed to dry. Next, a 30 gauge needle was used to inject the medication in the area to be treated.      Total Botox used: 155 Units   Botox wastage: 45 Units     Injection sites:    muscle bilaterally ( a total of 10 units divided into 2 sites)   Procerus muscle (5 units)   Frontalis muscle bilaterally (a total of 20 units divided into 4 sites)   Temporalis muscle bilaterally (a total of 40 units divided into 8 sites)   Occipitalis muscle bilaterally (a total of 30 units divided into 6 sites)   Cervical paraspinal muscles (a total of 20 units divided into 4 sites)   Trapezius muscle bilaterally (a total of 30 units divided into 6 sites)   Complications: none   RTC for the next Botox injection: 12 weeks     The patient tolerated the procedure well and did not experience any complications.     CC: Blake Adams MD Sarena Patel, PA-C  [facility]   [phone]    Dr. Lund was available during today's encounter.

## 2024-12-05 NOTE — PROGRESS NOTES
Interventional Pain Tele-Medicine Follow up visit      The patient location is:  [place]  The chief complaint leading to consultation is:  Follow-up  Visit type: Virtual visit with synchronous audio and video  Total time spent with patient:  15 minute  Each patient to whom he or she provides medical services by telemedicine is:  (1) informed of the relationship between the physician and patient and the respective role of any other health care provider with respect to management of the patient; and (2) notified that he or she may decline to receive medical services by telemedicine and may withdraw from such care at any time.    Interval History 12/05/2024: On 10/31/2024 the patient underwent L4/5 BL TFESI and she reports 100% pain relief, but pain is flaring up recently if she sits for too long. The pain is on the right side only to her left knee. She is taking the gabapentin PRN. She is marching in [employer] and dancing.     Interval History 10/11/24: Socorro Huang is a 49 y.o. year old female patient who presents today in clinic for a MRI results. She was started on mobic and gabapentin at her last visit.  She reports that the Mobic and gabapentin are helping her symptoms, but she still gets exacerbations with certain movements that increase her pain to a level 9.    Original HPI 08/28/2024: Socorro Huang is a 49 y.o. year old female patient who has a past medical history of Abnormal Pap smear, Anxiety, Constipation - functional, Headache(784.0), Hemorrhoid, Leukopenia, Menorrhagia, PONV (postoperative nausea and vomiting), and Strain of neck muscle. She presents in referral from No ref. provider found for or back pain for the past several years.     Original Pain Description:  The pain is located in the lower back and in the left posterior thigh. The pain is described as aching and sharp. Exacerbating factors: Standing and Bending. Mitigating factors heat, ice, and medications. Symptoms interfere with daily  activity. The patient feels like symptoms have been worsening. Patient denies loss of sensations. 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 Patient has had lumbar ESIs previously with Dr. Gonzales and it helped. She feels that the ESIs worked better than the RFAs. The pain stays above her knee and worse when she sits.       PAIN SCORES:  Best: Pain is 0  Current: Pain is 1  Worst: Pain is 9        10/11/2024     2:00 PM   Last 3 PDI Scores   Pain Disability Index (PDI) 0       6 weeks of Conservative therapy:  PT: Completed  Chiro:  HEP: Participating     Treatments / Medications:   Fioricet  Flexeril  Gabapentin 300 mg t.i.d.  Mobic 7.5 mg q.day p.r.n.    Antiplatelets/Anticoagulants:  NA    Interventional Pain Procedures:   10/31/24 L4/5 BL TFESI  10/10/23 ACDF C5/6 and C6/7  2022 Lumbar ESIs   2022 Lumbar RFA    IMAGING:    MRI LUMBAR SPINE WITHOUT CONTRAST 09/20/2024  L3/4: CDB & mild facet arthropathy=>mild bilateral neural foraminal narrowing.  L4/5: CDB w/posterior annular fissure & mod facet arthropathy=>mild spinal canal stenosis + encroachment on BL lateral recesses + abutment BL descending L5NRs & mild BL neural foraminal narrowing.    XR LUMBAR SPINE 5 VIEW WITH FLEX AND EXT    09/20/2024  Mild DJD.  Mild narrowing of the L4/L5 disc space.  .  No fracture, spondylolisthesis or bone destruction identified    XR CERVICAL SPINE AP LAT WITH FLEX EXTEN    04/12/2024  Reconfirmed straightening of normal cervical lordosis. Stable findings of intervertebral disc arthroplasty at C5-C6 and C6-C7, no findings hardware malfunction.  Elsewhere, preserved cervical disc spaces.  Some stable mild uncovertebral spurring C4-C5 level.  Some stable C7-T1 facet arthropathic changes.  Intact odontoid tip and unremarkable C1-C2 articulation.  Cervical spine findings unchanged to earlier exam.     Past Surgical History:   Procedure Laterality Date    ANOSCOPY N/A  07/20/2022    Procedure: ANOSCOPY;  Surgeon: ASHTYN Melo MD;  Location: [facility] OR 2ND FLR;  Service: Colon and Rectal;  Laterality: N/A;    ARTHROPLASTY,SPINE,CERVICAL N/A 10/12/2023    Procedure: ARTHROPLASTY,SPINE,CERVICAL;  Surgeon: Amador Wood MD;  Location: [facility] OR;  Service: Neurosurgery;  Laterality: N/A;  Anterior Cervical Discectomy C5/6, C6/7 with artifical disc, possible fusion        BREAST RECONSTRUCTION  2018    implant removal and lift    BREAST SURGERY      CHEILECTOMY Right 08/18/2023    Procedure: CHEILECTOMY;  Surgeon: aTcho Sanchez DPM;  Location: [facility] OR;  Service: Podiatry;  Laterality: Right;    COLON SURGERY  2014         COLONOSCOPY N/A 02/03/2022    Procedure: COLONOSCOPY;  Surgeon: ASHTYN Melo MD;  Location: [facility] ENDO;  Service: Endoscopy;  Laterality: N/A;    CORRECTION OF HAMMER TOE Right 08/18/2023    Procedure: CORRECTION, HAMMER TOE;  Surgeon: Tacho Sanchez DPM;  Location: [facility] OR;  Service: Podiatry;  Laterality: Right;    DILATION AND CURETTAGE OF UTERUS  12/18/2012    complex hyperplasia, no atypia    FLEXIBLE SIGMOIDOSCOPY N/A 07/20/2022    Procedure: SIGMOIDOSCOPY, FLEXIBLE;  Surgeon: ASHTYN Melo MD;  Location: [facility] OR 2ND FLR;  Service: Colon and Rectal;  Laterality: N/A;    HEMORRHOID SURGERY  2014    HYSTERECTOMY      INJECTION, SPINE, LUMBOSACRAL, TRANSFORAMINAL APPROACH Bilateral 10/31/2024    Procedure: TFESI B/L L4/5;  Surgeon: Anthony Macias MD;  Location: [facility] PAIN MANAGEMENT;  Service: Pain Management;  Laterality: Bilateral;  no ac    LAPAROSCOPIC SIGMOIDECTOMY Left 07/20/2022    Procedure: COLECTOMY, SIGMOID, LAPAROSCOPIC, ERAS low;  Surgeon: ASHTYN Melo MD;  Location: [facility] OR 2ND FLR;  Service: Colon and Rectal;  Laterality: Left;    MOBILIZATION OF SPLENIC FLEXURE N/A 07/20/2022    Procedure: MOBILIZATION, SPLENIC FLEXURE;  Surgeon: ASHTYN Melo MD;  Location: [facility] OR 2ND FLR;  Service: Colon and Rectal;   Laterality: N/A;    OOPHORECTOMY      PARTIAL HYSTERECTOMY  2013    for atypia    SMALL INTESTINE SURGERY  2017    THYROIDECTOMY, PARTIAL  2013    TONSILLECTOMY      TUBAL LIGATION         Social History     Socioeconomic History    Marital status:     Number of children: 2   Occupational History     Employer: [employer]   Tobacco Use    Smoking status: Never    Smokeless tobacco: Never   Substance and Sexual Activity    Alcohol use: Not Currently     Comment: socially    Drug use: Never    Sexual activity: Yes     Partners: Male     Birth control/protection: See Surgical Hx   Other Topics Concern    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 Fulton , GYN Dr Ruby                 Social Drivers of Health     Financial Resource Strain: Low Risk  (12/27/2023)    Overall Financial Resource Strain (CARDIA)     Difficulty of Paying Living Expenses: Not hard at all   Food Insecurity: No Food Insecurity (12/27/2023)    Hunger Vital Sign     Worried About Running Out of Food in the Last Year: Never true     Ran Out of Food in the Last Year: Never true   Transportation Needs: No Transportation Needs (12/27/2023)     PRAPARE - Transportation     Lack of Transportation (Medical): No     Lack of Transportation (Non-Medical): No   Physical Activity: Insufficiently Active (12/27/2023)    Exercise Vital Sign     Days of Exercise per Week: 1 day     Minutes of Exercise per Session: 10 min   Stress: No Stress Concern Present (12/27/2023)    [employer] [place] [employer] - Occupational Stress Questionnaire     Feeling of Stress : Not at all   Housing Stability: Low Risk  (12/27/2023)    Housing Stability Vital Sign     Unable to Pay for Housing in the Last Year: No     Number of Places Lived in the Last Year: 1     Unstable Housing in the Last Year: No       Medications/Allergies: See med card    ROS:  GENERAL: No fever. No chills. No fatigue. Denies weight loss. Denies weight gain.  Back / musculoskeletal / neuro : See HPI    VITALS:   There were no vitals filed for this visit.    There is no height or weight on file to calculate BMI.      10/11/2024     2:00 PM 8/28/2024     8:14 AM   Last 3 PDI Scores   Pain Disability Index (PDI) 0 63       Physical Exam:  General appearance: Well appearing, in no acute distress, alert and oriented x3.  Psych:  Mood and affect appropriate.    Physical exam (previously):  GENERAL: Well appearing, in no acute distress, alert and oriented x3.  PSYCH:  Mood and affect appropriate.  SKIN: Skin color, texture, turgor normal, no rashes or lesions.  HEENT:  Normocephalic, atraumatic. Cranial nerves grossly intact.  NECK: No pain to palpation over the cervical paraspinous muscles. No pain to palpation over facets. No pain with neck flexion, extension, or lateral flexion.   PULM: No evidence of respiratory difficulty, symmetric chest rise.  GI:  Non-distended  BACK: Normal range of motion. No pain to palpation over the spinous processes.  Pain with axial loading bilaterally. There is no pain with palpation over the sacroiliac joints bilaterally.  Negative Casi's, yeoman's and compression  "bilaterally  EXTREMITIES: No deformities, edema, or skin discoloration.   MUSCULOSKELETAL: Shoulder, hip, and knee provocative maneuvers are negative. No atrophy is noted.  NEURO: Sensation is equal and appropriate bilaterally. Bilateral upper and lower extremity strength is normal and symmetric. Bilateral upper and lower extremity coordination and muscle stretch reflexes are physiologic and symmetric. Plantar response are downgoing. Straight leg raising in the supine position is positive to radicular pain on the left.   GAIT: normal.      LABS:    No results found for: "LABA1C", "HGBA1C"    Lab Results   Component Value Date    CREATININE 0.7 06/20/2024         ASSESSMENT: 49 y.o. year old female with pain, consistent with:    Encounter Diagnoses   Name Primary?    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  Patient's pain resolved bilaterally for 4 weeks, but now the right-sided pain has returned.  Possibly sacroiliitis which is driving this pain.  Will have patient come in for an exam to confirm    PLAN:  - I have stressed the importance of physical activity and a home exercise plan to help with pain and improve health.  - Patient can continue with medications for now since they are providing benefits, using them appropriately, and without side effects.  - Counseled patient regarding the importance of activity modification and constant sleeping habits.  - consider right SI injection verses L4/5 JANINA (a.m. right)  - start taking the gabapentin as previously prescribed, 300 mg q.h.s. with a ramp up to t.i.d. as tolerated  - Imaging: Reviewed available imaging with patient and answered any questions they had regarding study.  - The patient's " pathophysiology was explained in detail with reference to x-rays, models, other visual aids as appropriate.   - Follow up visit: return to clinic for repeat exam      Anthony Macias MD  12/06/2024

## 2024-12-06 ENCOUNTER — OFFICE VISIT (OUTPATIENT)
Dept: PAIN MEDICINE | Facility: CLINIC | Age: 49
End: 2024-12-06
Payer: COMMERCIAL

## 2024-12-06 DIAGNOSIS — M54.16 LUMBAR RADICULOPATHY: Primary | ICD-10-CM

## 2024-12-06 DIAGNOSIS — M47.816 LUMBAR SPONDYLOSIS: ICD-10-CM

## 2024-12-09 ENCOUNTER — TELEPHONE (OUTPATIENT)
Dept: PAIN MEDICINE | Facility: CLINIC | Age: 49
End: 2024-12-09
Payer: COMMERCIAL

## 2024-12-09 ENCOUNTER — PATIENT MESSAGE (OUTPATIENT)
Dept: PAIN MEDICINE | Facility: CLINIC | Age: 49
End: 2024-12-09
Payer: COMMERCIAL

## 2024-12-09 NOTE — TELEPHONE ENCOUNTER
Per Dr. Macias I called Mrs. Huang to schedule her follow up appointment in clinic. Patient vm box has not been set up. I sent a message throught patient portal with next available date/time for appointment and office contact information. Thank you.

## 2024-12-16 ENCOUNTER — TELEPHONE (OUTPATIENT)
Dept: PAIN MEDICINE | Facility: CLINIC | Age: 49
End: 2024-12-16
Payer: COMMERCIAL

## 2024-12-16 NOTE — H&P
Short Stay Endoscopy History and Physical    PCP - Blake Adams MD  Referring Physician - Shannan Nguyễn, NP  [address] [place] [address]    Procedure - EGD  ASA - per anesthesia  Mallampati - per anesthesia  History of Anesthesia problems - no  Family history Anesthesia problems -  no   Plan of anesthesia - General    HPI  49 y.o. female  Reason for procedure:  Cough, unspecified type [R05.9]  Throat clearing [R09.89]  Laryngopharyngeal reflux (LPR) [K21.9]      ROS:  Constitutional: No fevers, chills, No weight loss  CV: No chest pain  Pulm: No cough, No shortness of breath  GI: see HPI    Medical History:  has a past medical history of Abnormal Pap smear, Anxiety, Constipation - functional, Headache(784.0), Hemorrhoid, Leukopenia, Menorrhagia, PONV (postoperative nausea and vomiting), and Strain of neck muscle.    Surgical History:  has a past surgical history that includes Dilation and curettage of uterus (12/18/2012); Tonsillectomy; Partial hysterectomy (2013); Tubal ligation; Thyroidectomy, partial (2013); Colon surgery (2014); Hemorrhoid surgery (2014); Hysterectomy; Oophorectomy; Breast surgery; Breast reconstruction (2018); Colonoscopy (N/A, 02/03/2022); Laparoscopic sigmoidectomy (Left, 07/20/2022); Flexible sigmoidoscopy (N/A, 07/20/2022); Anoscopy (N/A, 07/20/2022); Mobilization of splenic flexure (N/A, 07/20/2022); Small intestine surgery (2017); Cheilectomy (Right, 08/18/2023); Correction of hammer toe (Right, 08/18/2023); arthroplasty,spine,cervical (N/A, 10/12/2023); and injection, spine, lumbosacral, transforaminal approach (Bilateral, 10/31/2024).    Family History: family history includes Alcohol abuse in her cousin and maternal uncle; Breast cancer in her maternal grandmother; COPD in her mother; Cancer in her father and mother; Depression in her sister; Diabetes in her mother and paternal grandmother; Drug abuse in her maternal aunt; Emphysema in her mother;  Heart disease (age of onset: 50) in her father; Hypertension in her paternal grandmother; Mental illness in her brother and sister; Ovarian cancer in her maternal grandmother; Schizophrenia in her paternal aunt; Suicide in her brother..    Social History:  reports that she has never smoked. She has never used smokeless tobacco. She reports that she does not currently use alcohol. She reports that she does not use drugs.    Review of patient's allergies indicates:  No Known Allergies    Medications:   Medications Prior to Admission   Medication Sig Dispense Refill Last Dose/Taking    co-enzyme Q-10 30 mg capsule Take by mouth.   Past Week    ibuprofen (ADVIL,MOTRIN) 600 MG tablet Take 1 tablet (600 mg total) by mouth every 4 (four) hours as needed (for headaches). 20 tablet 5 Past Week    progesterone (PROMETRIUM) 200 MG capsule    12/17/2024    traZODone (DESYREL) 100 MG tablet Take 1 tablet (100 mg total) by mouth every evening. 90 tablet 2 Past Week    albuterol (PROVENTIL/VENTOLIN HFA) 90 mcg/actuation inhaler Inhale 2 puffs into the lungs every 4 (four) hours as needed for Wheezing. 18 g 1     gabapentin (NEURONTIN) 300 MG capsule Take 1 capsule (300 mg total) by mouth every evening for 7 days, THEN 1 capsule (300 mg total) 2 (two) times daily for 7 days, THEN 1 capsule (300 mg total) 3 (three) times daily for 16 days. 69 capsule 0 More than a month    omeprazole (PRILOSEC) 40 MG capsule Take 1 capsule (40 mg total) by mouth 2 (two) times daily before meals. 180 capsule 3     predniSONE (DELTASONE) 20 MG tablet Take 3 tabs in AM x 2 days, then 2 tabs in AM x 2 days, then 1 tab in AM x 2 days. Take with food. 12 tablet 0 More than a month       Physical Exam:    Vital Signs:   Vitals:    12/18/24 0844   BP: 121/72   Pulse: 71   Resp: 16   Temp: 98.8 °F (37.1 °C)       General Appearance: Well appearing in no acute distress  Abdomen: Soft, non tender, non distended with normal bowel sounds, no  masses      Labs:  Lab Results   Component Value Date    WBC 7.17 06/20/2024    HGB 13.7 06/20/2024    HCT 39.7 06/20/2024     06/20/2024    CHOL 179 10/03/2022    TRIG 51 10/03/2022    HDL 65 10/03/2022    ALT 84 (H) 06/20/2024    AST 44 (H) 06/20/2024     06/20/2024    K 4.3 06/20/2024     06/20/2024    CREATININE 0.7 06/20/2024    BUN 10 06/20/2024    CO2 25 06/20/2024    TSH 0.897 10/03/2022    INR 1.0 10/09/2023       I have explained the risks and benefits of this endoscopic procedure to the patient including but not limited to bleeding, inflammation, infection, perforation, and death.    Assessment/Plan:     Reflux     - Proceed with EGD with Bravo off PPI     Edith Bourgeois MD  Gastroenterology   [facility]

## 2024-12-18 ENCOUNTER — HOSPITAL ENCOUNTER (OUTPATIENT)
Facility: HOSPITAL | Age: 49
Discharge: HOME | End: 2024-12-18
Attending: STUDENT IN AN ORGANIZED HEALTH CARE EDUCATION/TRAINING PROGRAM | Admitting: STUDENT IN AN ORGANIZED HEALTH CARE EDUCATION/TRAINING PROGRAM
Payer: COMMERCIAL

## 2024-12-18 VITALS
TEMPERATURE: 99 F | DIASTOLIC BLOOD PRESSURE: 77 MMHG | SYSTOLIC BLOOD PRESSURE: 123 MMHG | RESPIRATION RATE: 19 BRPM | OXYGEN SATURATION: 98 % | HEART RATE: 73 BPM

## 2024-12-18 DIAGNOSIS — K21.9 GASTROESOPHAGEAL REFLUX DISEASE WITHOUT ESOPHAGITIS: ICD-10-CM

## 2024-12-18 DIAGNOSIS — R13.10 DYSPHAGIA, UNSPECIFIED TYPE: Primary | ICD-10-CM

## 2024-12-18 PROCEDURE — 27201012 HC FORCEPS, HOT/COLD, DISP: Performed by: STUDENT IN AN ORGANIZED HEALTH CARE EDUCATION/TRAINING PROGRAM

## 2024-12-18 PROCEDURE — A4216 STERILE WATER/SALINE, 10 ML: HCPCS | Performed by: NURSE ANESTHETIST, CERTIFIED REGISTERED

## 2024-12-18 PROCEDURE — 37000009 HC ANESTHESIA EA ADD 15 MINS: Performed by: STUDENT IN AN ORGANIZED HEALTH CARE EDUCATION/TRAINING PROGRAM

## 2024-12-18 PROCEDURE — 43239 EGD BIOPSY SINGLE/MULTIPLE: CPT | Performed by: STUDENT IN AN ORGANIZED HEALTH CARE EDUCATION/TRAINING PROGRAM

## 2024-12-18 PROCEDURE — 94761 N-INVAS EAR/PLS OXIMETRY MLT: CPT

## 2024-12-18 PROCEDURE — 88305 TISSUE EXAM BY PATHOLOGIST: CPT | Performed by: PATHOLOGY

## 2024-12-18 PROCEDURE — 27200942: Performed by: STUDENT IN AN ORGANIZED HEALTH CARE EDUCATION/TRAINING PROGRAM

## 2024-12-18 PROCEDURE — 25000003 PHARM REV CODE 250: Performed by: NURSE ANESTHETIST, CERTIFIED REGISTERED

## 2024-12-18 PROCEDURE — 99900035 HC TECH TIME PER 15 MIN (STAT)

## 2024-12-18 PROCEDURE — 88305 TISSUE EXAM BY PATHOLOGIST: CPT | Mod: 26,,, | Performed by: PATHOLOGY

## 2024-12-18 PROCEDURE — 37000008 HC ANESTHESIA 1ST 15 MINUTES: Performed by: STUDENT IN AN ORGANIZED HEALTH CARE EDUCATION/TRAINING PROGRAM

## 2024-12-18 PROCEDURE — 43239 EGD BIOPSY SINGLE/MULTIPLE: CPT | Mod: ,,, | Performed by: STUDENT IN AN ORGANIZED HEALTH CARE EDUCATION/TRAINING PROGRAM

## 2024-12-18 PROCEDURE — 63600175 PHARM REV CODE 636 W HCPCS: Performed by: NURSE ANESTHETIST, CERTIFIED REGISTERED

## 2024-12-18 RX ORDER — FENTANYL CITRATE 50 UG/ML
INJECTION, SOLUTION INTRAMUSCULAR; INTRAVENOUS
Status: DISCONTINUED | OUTPATIENT
Start: 2024-12-18 | End: 2024-12-18

## 2024-12-18 RX ORDER — PROPOFOL 10 MG/ML
VIAL (ML) INTRAVENOUS
Status: DISCONTINUED | OUTPATIENT
Start: 2024-12-18 | End: 2024-12-18

## 2024-12-18 RX ORDER — SODIUM CHLORIDE 0.9 % (FLUSH) 0.9 %
SYRINGE (ML) INJECTION
Status: DISCONTINUED | OUTPATIENT
Start: 2024-12-18 | End: 2024-12-18

## 2024-12-18 RX ORDER — LIDOCAINE HYDROCHLORIDE 20 MG/ML
INJECTION INTRAVENOUS
Status: DISCONTINUED | OUTPATIENT
Start: 2024-12-18 | End: 2024-12-18

## 2024-12-18 RX ADMIN — FENTANYL CITRATE 50 MCG: 50 INJECTION, SOLUTION INTRAMUSCULAR; INTRAVENOUS at 10:12

## 2024-12-18 RX ADMIN — PROPOFOL 80 MG: 10 INJECTION, EMULSION INTRAVENOUS at 10:12

## 2024-12-18 RX ADMIN — GLYCOPYRROLATE 0.2 MG: 0.2 INJECTION, SOLUTION INTRAMUSCULAR; INTRAVENOUS at 10:12

## 2024-12-18 RX ADMIN — PROPOFOL 175 MCG/KG/MIN: 10 INJECTION, EMULSION INTRAVENOUS at 10:12

## 2024-12-18 RX ADMIN — LIDOCAINE HYDROCHLORIDE 100 MG: 20 INJECTION INTRAVENOUS at 10:12

## 2024-12-18 RX ADMIN — SODIUM CHLORIDE 20 ML: 9 INJECTION, SOLUTION INTRAMUSCULAR; INTRAVENOUS; SUBCUTANEOUS at 10:12

## 2024-12-18 NOTE — PLAN OF CARE
Pt in preop bay 5, VSS, and IV inserted. Pt denies any open wounds on body or the use of any weight loss injections. Pt needs procedural consents signed, anesthesia consents signed, updated H&P, otherwise ready to roll.

## 2024-12-18 NOTE — ANESTHESIA POSTPROCEDURE EVALUATION
Anesthesia Post Evaluation    Patient: Socorro Huang    Procedure(s) Performed: Procedure(s) (LRB):  EGD (ESOPHAGOGASTRODUODENOSCOPY) (N/A)  PH MONITORING, ESOPHAGUS, WIRELESS, (OFF REFLUX MEDS) (N/A)    Final Anesthesia Type: general      Patient location during evaluation: PACU  Patient participation: Yes- Able to Participate  Level of consciousness: awake and alert  Post-procedure vital signs: reviewed and stable  Pain management: adequate  Airway patency: patent    PONV status at discharge: No PONV  Anesthetic complications: no      Cardiovascular status: blood pressure returned to baseline  Respiratory status: unassisted  Hydration status: euvolemic  Follow-up not needed.          Vitals Value Taken Time   /77 12/18/24 1048   Temp 37 °C (98.6 °F) 12/18/24 1047   Pulse 72 12/18/24 1052   Resp 17 12/18/24 1052   SpO2 98 % 12/18/24 1052   Vitals shown include unfiled device data.      Event Time   Out of Recovery 10:54:00         Pain/Delaney Score: Delanye Score: 10 (12/18/2024 10:51 AM)

## 2024-12-18 NOTE — TRANSFER OF CARE
Anesthesia Transfer of Care Note    Patient: Socorro Huang    Procedure(s) Performed: Procedure(s) (LRB):  EGD (ESOPHAGOGASTRODUODENOSCOPY) (N/A)  PH MONITORING, ESOPHAGUS, WIRELESS, (OFF REFLUX MEDS) (N/A)    Patient location: PACU    Anesthesia Type: general    Transport from OR: Transported from OR on room air with adequate spontaneous ventilation    Post pain: adequate analgesia    Post assessment: no apparent anesthetic complications and tolerated procedure well    Post vital signs: stable    Level of consciousness: awake    Nausea/Vomiting: no nausea/vomiting    Complications: none    Transfer of care protocol was followed      Last vitals: Visit Vitals  /72 (BP Location: Left arm, Patient Position: Lying)   Pulse 71   Temp 37.1 °C (98.8 °F) (Temporal)   Resp 16   LMP 05/05/2013   SpO2 97%   Breastfeeding No

## 2024-12-20 LAB
FINAL PATHOLOGIC DIAGNOSIS: NORMAL
GROSS: NORMAL
Lab: NORMAL

## 2024-12-31 ENCOUNTER — PATIENT MESSAGE (OUTPATIENT)
Dept: FAMILY MEDICINE | Facility: CLINIC | Age: 49
End: 2024-12-31

## 2024-12-31 ENCOUNTER — HOSPITAL ENCOUNTER (OUTPATIENT)
Dept: RADIOLOGY | Facility: HOSPITAL | Age: 49
Discharge: HOME | End: 2024-12-31
Attending: FAMILY MEDICINE
Payer: COMMERCIAL

## 2024-12-31 ENCOUNTER — OFFICE VISIT (OUTPATIENT)
Dept: FAMILY MEDICINE | Facility: CLINIC | Age: 49
End: 2024-12-31
Payer: COMMERCIAL

## 2024-12-31 VITALS
HEART RATE: 73 BPM | BODY MASS INDEX: 25.71 KG/M2 | SYSTOLIC BLOOD PRESSURE: 102 MMHG | OXYGEN SATURATION: 98 % | WEIGHT: 163.81 LBS | HEIGHT: 67 IN | DIASTOLIC BLOOD PRESSURE: 72 MMHG

## 2024-12-31 DIAGNOSIS — M25.531 RIGHT WRIST PAIN: ICD-10-CM

## 2024-12-31 DIAGNOSIS — K21.9 GASTROESOPHAGEAL REFLUX DISEASE WITHOUT ESOPHAGITIS: ICD-10-CM

## 2024-12-31 DIAGNOSIS — M25.531 RIGHT WRIST PAIN: Primary | ICD-10-CM

## 2024-12-31 PROCEDURE — 99999 PR PBB SHADOW E&M-EST. PATIENT-LVL IV: CPT | Mod: PBBFAC,,, | Performed by: FAMILY MEDICINE

## 2024-12-31 PROCEDURE — 73110 X-RAY EXAM OF WRIST: CPT | Mod: 26,RT,, | Performed by: RADIOLOGY

## 2024-12-31 PROCEDURE — 73110 X-RAY EXAM OF WRIST: CPT | Mod: TC,FY,RT

## 2024-12-31 PROCEDURE — 99214 OFFICE O/P EST MOD 30 MIN: CPT | Mod: S$GLB,,, | Performed by: FAMILY MEDICINE

## 2024-12-31 RX ORDER — OMEPRAZOLE 40 MG/1
40 CAPSULE, DELAYED RELEASE ORAL
Start: 2024-12-31 | End: 2025-12-31

## 2024-12-31 RX ORDER — IBUPROFEN 600 MG/1
600 TABLET ORAL EVERY 4 HOURS PRN
Qty: 20 TABLET | Refills: 5 | Status: SHIPPED | OUTPATIENT
Start: 2024-12-31

## 2024-12-31 RX ORDER — DICLOFENAC SODIUM 10 MG/G
2 GEL TOPICAL 4 TIMES DAILY
Qty: 100 G | Refills: 0 | Status: SHIPPED | OUTPATIENT
Start: 2024-12-31

## 2024-12-31 NOTE — PROGRESS NOTES
"Subjective:       Patient ID: Socorro Huang is a 49 y.o. female.    Chief Complaint: Wrist Pain (Pt stated she felt pop in wrist. Now have knot in right wrist. Pain for about 3 weeks. Knot just develop last weekend. )    Socorro is a 49 y.o. female who presents today for an  visit for wrist pain. She was early to her apt today. She initially hurt her wrist 3 weeks ago. She was trying to put her purse down and she felt something "pop." It hurt for about 20 minutes. Then there was no pain after. This weekend, she started to have some swelling. Currently, her wrist started aching again. Hurts to bend or to touch. There was a period of time where there was no pain, before pain started this weekend. No other trauma that she recalls. No MVA. No falls. She has not tried anything for this. Has not tried icing this.         Review of Systems   Constitutional:  Negative for chills and fever.   Gastrointestinal:  Negative for nausea and vomiting.   Musculoskeletal:  Positive for arthralgias.   Hematological:  Negative for adenopathy. Does not bruise/bleed easily.             Objective:     Vitals:    12/31/24 0830   BP: 102/72   BP Location: Left arm   Patient Position: Sitting   Pulse: 73   SpO2: 98%   Weight: 74.3 kg (163 lb 12.8 oz)   Height: 5' 7" (1.702 m)        Physical Exam  Vitals and nursing note reviewed.   Constitutional:       General: She is not in acute distress.     Appearance: She is not ill-appearing, toxic-appearing or diaphoretic.   Musculoskeletal:         General: Swelling, tenderness and deformity present. No signs of injury.      Right lower leg: No edema.      Left lower leg: No edema.      Comments: Normal  strength  Normal lumbrical strength  Normal sensation  Area of swelling, right wrist, see picture below  It is TTP  There is a positive finklestein's test, but unclear how significant this is   Neurological:      General: No focal deficit present.      Mental Status: She is alert. "   Psychiatric:         Mood and Affect: Mood normal.         Behavior: Behavior normal.         Thought Content: Thought content normal.         Judgment: Judgment normal.             Assessment:       1. Right wrist pain    2. Gastroesophageal reflux disease without esophagitis        Plan:       Area of swelling  Unclear  Doesn't feel like ganglion cyst  Hard, and TTP  Unclear if related to lifting a heavy purse  There is some numbness but I doubt carpal tunnel  Also possible deQuervain tenosynovitis? But doubtful  XR  Hand surgery  NSAID  Icing  Compression if tolerated.     Right wrist pain  -     X-Ray Wrist Complete 3 views Right; Future; Expected date: 12/31/2024  -     Ambulatory referral/consult to Hand Surgery; Future; Expected date: 01/07/2025  -     ibuprofen (ADVIL,MOTRIN) 600 MG tablet; Take 1 tablet (600 mg total) by mouth every 4 (four) hours as needed (for headaches).  Dispense: 20 tablet; Refill: 5  -     diclofenac sodium (VOLTAREN ARTHRITIS PAIN) 1 % Gel; Apply 2 g topically 4 (four) times daily.  Dispense: 100 g; Refill: 0    Gastroesophageal reflux disease without esophagitis  -     omeprazole (PRILOSEC) 40 MG capsule; Take 1 capsule (40 mg total) by mouth 2 (two) times daily before meals.

## 2024-12-31 NOTE — PATIENT INSTRUCTIONS
Area of swelling  Unclear  Doesn't feel like ganglion cyst  Hard, and TTP  Unclear if related to lifting a heavy purse  There is some numbness but I doubt carpal tunnel  Also possible deQuervain tenosynovitis? But doubtful  XR  Hand surgery  NSAID  Icing  Compression if tolerated.

## 2025-01-02 ENCOUNTER — TELEPHONE (OUTPATIENT)
Dept: ORTHOPEDICS | Facility: CLINIC | Age: 50
End: 2025-01-02
Payer: COMMERCIAL

## 2025-01-02 NOTE — TELEPHONE ENCOUNTER
Patient communication     Notified patient to stop at [facility] - 1st floor check in [address] [facility] [address] 30 minutes prior to your appointment time on 1/6/2025 with Maria Del Carmen Rutledge PA-C for x-rays.     Made them aware that this is not a scheduled xray appointment and they might be running behind as they are considered a walk-in xray.    Verbalized the Following:  *Please arrive at your informed time above, if you are more than 15 Minutes late to your appointment with Maria Del Carmen Rutledge PA-C we will have to reschedule your appointment. This will allow you to be seen in a timely manor and be conscious to other patients being seen that same day*     Annie Malhotra MA  [facility]  P: [phone]  F: [phone]

## 2025-01-06 ENCOUNTER — PATIENT MESSAGE (OUTPATIENT)
Dept: ORTHOPEDICS | Facility: CLINIC | Age: 50
End: 2025-01-06

## 2025-01-06 ENCOUNTER — HOSPITAL ENCOUNTER (OUTPATIENT)
Dept: RADIOLOGY | Facility: HOSPITAL | Age: 50
Discharge: HOME | End: 2025-01-06
Payer: COMMERCIAL

## 2025-01-06 ENCOUNTER — OFFICE VISIT (OUTPATIENT)
Dept: ORTHOPEDICS | Facility: CLINIC | Age: 50
End: 2025-01-06
Payer: COMMERCIAL

## 2025-01-06 DIAGNOSIS — G56.01 CARPAL TUNNEL SYNDROME ON RIGHT: ICD-10-CM

## 2025-01-06 DIAGNOSIS — M25.531 RIGHT WRIST PAIN: ICD-10-CM

## 2025-01-06 DIAGNOSIS — M67.431 GANGLION CYST OF VOLAR ASPECT OF RIGHT WRIST: Primary | ICD-10-CM

## 2025-01-06 PROCEDURE — 99999 PR PBB SHADOW E&M-EST. PATIENT-LVL III: CPT | Mod: PBBFAC,,,

## 2025-01-06 PROCEDURE — 99204 OFFICE O/P NEW MOD 45 MIN: CPT | Mod: S$GLB,,,

## 2025-01-06 PROCEDURE — 73110 X-RAY EXAM OF WRIST: CPT | Mod: TC,RT

## 2025-01-06 PROCEDURE — 73110 X-RAY EXAM OF WRIST: CPT | Mod: 26,RT,, | Performed by: RADIOLOGY

## 2025-01-06 NOTE — PROGRESS NOTES
"[facility]  History & Physical  Orthopedics    Subjective:      Chief Complaint: Pain of the Right Hand (Began ~1 month ago)    Referring Provider: Babar Rubio DO     History of Present Illness:  Socorro Huang is a 49 y.o. right hand dominant female who presents to clinic today with pain and a "knot" on radio-volar wrist x 1.5 weeks. Patient reports 1 month ago, she was putting her heavy purse in the car and felt a pop with pain in right wrist that resolved on its own within 20 minutes. Then, last weekend she hit her right wrist on counter while cleaning and developed a painful knot in the wrist. She endorses pain when touching or applying pressure to the knot. Also has pain localized to radial wrist with motion, particularly wrist extension and thumb motion. She reports "puffiness" surrounding the mass at night. Patient reports numbness and tingling in the thumb and index finger while typing at work. She denies numbness and tingling in the hand prior to onset of wrist pain, however she does have history of cervical radiculopathy. She had an EMG/NCS 10/4/2023 that showed chronic right C7 radiculopathy. She denies any symptoms waking her up at night. numbness and tingling. Went to  12/31 where she was given voltaren gel and referred to hand clinic for evaluation. Socorro denies any previous injury, trauma, or surgery to her hands/wrists. The patient denies any fevers, chills, N/V, D/C and presents for evaluation.  Patient is dental  - involves typing/computer work.    Vitals:    01/06/25 0817   PainSc:   3   PainLoc: Hand     Review of Systems:  Constitutional: no fever or chills  Eyes: no visual changes  ENT: no nasal congestion or sore throat  Respiratory: no cough or shortness of breath  Cardiovascular: no chest pain  Gastrointestinal: no nausea or vomiting, tolerating diet  Musculoskeletal: + numbness and tingling. + soreness. + mass.    Past Medical History:   Diagnosis " Date    Abnormal Pap smear     Anxiety     celexa & prozac didn't help much    Constipation - functional     Headache(784.0)     Hemorrhoid     Leukopenia     benign, evaluated in [facility] y 2000    Menorrhagia     PONV (postoperative nausea and vomiting)     Strain of neck muscle     tx with PT at [facility] in past     Past Surgical History:   Procedure Laterality Date    ANOSCOPY N/A 07/20/2022    Procedure: ANOSCOPY;  Surgeon: ASHTYN Melo MD;  Location: [address];  Service: Colon and Rectal;  Laterality: N/A;    ARTHROPLASTY,SPINE,CERVICAL N/A 10/12/2023    Procedure: ARTHROPLASTY,SPINE,CERVICAL;  Surgeon: Amador Wood MD;  Location: [facility] OR;  Service: Neurosurgery;  Laterality: N/A;  Anterior Cervical Discectomy C5/6, C6/7 with artifical disc, possible fusion        BREAST RECONSTRUCTION  2018    implant removal and lift    BREAST SURGERY      CHEILECTOMY Right 08/18/2023    Procedure: CHEILECTOMY;  Surgeon: Tacho Sanchez DPM;  Location: [facility] OR;  Service: Podiatry;  Laterality: Right;    COLON SURGERY  2014         COLONOSCOPY N/A 02/03/2022    Procedure: COLONOSCOPY;  Surgeon: ASHTYN Melo MD;  Location: [facility] ENDO;  Service: Endoscopy;  Laterality: N/A;    CORRECTION OF HAMMER TOE Right 08/18/2023    Procedure: CORRECTION, HAMMER TOE;  Surgeon: Tacho Sanchez DPM;  Location: [facility] OR;  Service: Podiatry;  Laterality: Right;    DILATION AND CURETTAGE OF UTERUS  12/18/2012    complex hyperplasia, no atypia    ESOPHAGOGASTRODUODENOSCOPY N/A 12/18/2024    Procedure: EGD (ESOPHAGOGASTRODUODENOSCOPY);  Surgeon: Edith Bourgeois MD;  Location: [facility] ENDOSCOPY;  Service: Endoscopy;  Laterality: N/A;  EGD/Bravo-Instr send portal-OFF PPI (96hrs)-ASul  9/10/24- pc complete. DBM  9/16-pt r/s, no allergy or sensitivity to metal/jewelry per pt, updated instructions sent to portal-Kpvt  12/18 R/s update instru to portal. ASam  12/11/24- portal msg for pc. [facility]      FLEXIBLE SIGMOIDOSCOPY N/A  07/20/2022    Procedure: SIGMOIDOSCOPY, FLEXIBLE;  Surgeon: ASHTYN Melo MD;  Location: [facility] OR 2ND FLR;  Service: Colon and Rectal;  Laterality: N/A;    HEMORRHOID SURGERY  2014    HYSTERECTOMY      INJECTION, SPINE, LUMBOSACRAL, TRANSFORAMINAL APPROACH Bilateral 10/31/2024    Procedure: TFESI B/L L4/5;  Surgeon: Anthony Macias MD;  Location: [facility];  Service: Pain Management;  Laterality: Bilateral;  no ac    LAPAROSCOPIC SIGMOIDECTOMY Left 07/20/2022    Procedure: COLECTOMY, SIGMOID, LAPAROSCOPIC, ERAS low;  Surgeon: ASHTYN Melo MD;  Location: [facility] OR 2ND FLR;  Service: Colon and Rectal;  Laterality: Left;    MOBILIZATION OF SPLENIC FLEXURE N/A 07/20/2022    Procedure: MOBILIZATION, SPLENIC FLEXURE;  Surgeon: ASHTYN Melo MD;  Location: [facility] OR 2ND FLR;  Service: Colon and Rectal;  Laterality: N/A;    OOPHORECTOMY      PARTIAL HYSTERECTOMY  2013    for atypia    PH MONITORING, ESOPHAGUS, WIRELESS, (OFF REFLUX MEDS) N/A 12/18/2024    Procedure: PH MONITORING, ESOPHAGUS, WIRELESS, (OFF REFLUX MEDS);  Surgeon: Edith Bourgeois MD;  Location: [facility] ENDOSCOPY;  Service: Endoscopy;  Laterality: N/A;    SMALL INTESTINE SURGERY  2017    THYROIDECTOMY, PARTIAL  2013    TONSILLECTOMY      TUBAL LIGATION       Family History   Problem Relation Name Age of Onset    Emphysema Mother Dagmar     Cancer Mother Dagmar         Lung    COPD Mother Dagmar     Diabetes Mother Dagmar     Cancer Father Terrazas         Lung    Heart disease Father Terrazas 50    Diabetes Paternal Grandmother Ana     Hypertension Paternal Grandmother Ana     Depression Sister Hope     Mental illness Sister Hope     Suicide Brother Reilly     Mental illness Brother Reilly     Drug abuse Maternal Aunt Citlali     Schizophrenia Paternal Aunt      Alcohol abuse Maternal Uncle Florentino     Alcohol abuse Cousin      Breast cancer Maternal Grandmother      Ovarian cancer Maternal Grandmother      ADD / ADHD Neg Hx      Anxiety  disorder Neg Hx      Bipolar disorder Neg Hx      Dementia Neg Hx      OCD Neg Hx      Paranoid behavior Neg Hx      Physical abuse Neg Hx      Seizures Neg Hx      Self injury Neg Hx      Sexual abuse Neg Hx       Social History     Socioeconomic History    Marital status:     Number of children: 2   Occupational History     Employer: [employer]   Tobacco Use    Smoking status: Never    Smokeless tobacco: Never   Substance and Sexual Activity    Alcohol use: Not Currently     Comment: socially    Drug use: Never    Sexual activity: Yes     Partners: Male     Birth control/protection: See Surgical Hx   Other Topics Concern    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 Fulton , GYN Dr Ruby                 Social Drivers of Health     Financial Resource Strain: Low Risk  (12/31/2024)    Overall Financial Resource Strain (CARDIA)     Difficulty of Paying Living Expenses: Not hard at all   Food Insecurity: No Food Insecurity (12/31/2024)    Hunger Vital Sign     Worried About Running Out of Food in the Last Year: Never true     Ran Out of Food in the Last Year: Never true   Transportation Needs: No  Transportation Needs (12/27/2023)    PRAPARE - Transportation     Lack of Transportation (Medical): No     Lack of Transportation (Non-Medical): No   Physical Activity: Inactive (12/31/2024)    Exercise Vital Sign     Days of Exercise per Week: 0 days     Minutes of Exercise per Session: 0 min   Stress: No Stress Concern Present (12/31/2024)    [employer] - Occupational Stress Questionnaire     Feeling of Stress : Not at all   Housing Stability: Low Risk  (12/27/2023)    Housing Stability Vital Sign     Unable to Pay for Housing in the Last Year: No     Number of Places Lived in the Last Year: 1     Unstable Housing in the Last Year: No       Current Outpatient Medications   Medication Sig Dispense Refill    diclofenac sodium (VOLTAREN ARTHRITIS PAIN) 1 % Gel Apply 2 g topically 4 (four) times daily. 100 g 0    ibuprofen (ADVIL,MOTRIN) 600 MG tablet Take 1 tablet (600 mg total) by mouth every 4 (four) hours as needed (for headaches). 20 tablet 5    omeprazole (PRILOSEC) 40 MG capsule Take 1 capsule (40 mg total) by mouth 2 (two) times daily before meals.      progesterone (PROMETRIUM) 200 MG capsule       traZODone (DESYREL) 100 MG tablet Take 1 tablet (100 mg total) by mouth every evening. 90 tablet 2     No current facility-administered medications for this visit.     Review of patient's allergies indicates:  No Known Allergies    Objective:   Vital Signs (Most Recent):  There were no vitals filed for this visit.  There is no height or weight on file to calculate BMI.    Physical Exam:  Constitutional: The patient appears well-developed and well-nourished. No distress.   Skin: No lesions appreciated  Head: Normocephalic and atraumatic.   Nose: Nose normal.   Ears: No deformities seen  Eyes: Conjunctivae and EOM are normal.   Neck: No tracheal deviation present.   Cardiovascular: Normal rate and intact distal pulses.    Pulmonary/Chest: Effort normal. No respiratory distress.    Abdominal: There is no guarding.   Neurological: The patient is alert.   Psychiatric: The patient has a normal mood and affect.     HAND/WRIST EXAMINATION:    Observation/Inspection:  Swelling: firm well-circumscribed non-mobile mass to volar radial aspect of right wrist consistent with volar ganglion cyst. + tenderness to palpation over mass.     Deformity  none  Discoloration  none     Scars   none    Atrophy  none    Range of Motion:  ROM hand full. + pain at radiovolar wrist with active thumb abduction  ROM wrist full. + pain with active wrist extension  ROM elbow full, painless    Special Tests and Maneuvers:  Finkelstein's Test   Neg  WHAT Test    Neg  Snuff box tenderness   Neg  Moyer's Test    Neg  Hook of Hamate Tenderness  Neg  CMC grind    Neg  Circumduction test   Neg  Tinel's Test - Carpal Tunnel  Neg  Tinel's Test - Cubital Tunnel  Neg  Phalen's Test    Neg  Median Nerve Compression Test Neg  Elbow Flexion Test    Neg    Neurovascular Exam:  Digits WWP, brisk CR < 3s throughout  NVI motor/LTS to M/R/U nerves, radial pulse 2+    Diagnostics Review:   Imaging: I independently viewed the patient's imaging as well as the radiology report.    My personal interpretation of X-rays AP, lateral, oblique right wrist taken today demonstrate cortical thickening at medial distal radius with well preserved cartilage spaces and no acute fracture or dislocation.    Assessment:   49 y.o. yo female with   Encounter Diagnoses   Name Primary?    Right wrist pain     Ganglion cyst of volar aspect of right wrist Yes    Carpal tunnel syndrome on right       Plan:   The patient and I had a thorough discussion today. We discussed the working diagnosis as well as several other potential alternative diagnoses. Treatment options were discussed, both conservative and surgical. Conservative treatment options would include things such as activity modifications, workplace modifications, a period of rest, ice/heat, oral vs topical  OTC and prescription anti-inflammatory medications, acetaminophen, occupational therapy to include strengthening and range of motion exercises, splinting/bracing, immobilization, corticosteroid injections for temporary relief. Surgical options were discussed as well.     Discussed volar ganglion cyst excision with patient. Patient would like to proceed with surgical excision in the near future. 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 Offered patient modabber brace for compression, patient declined. Recommend patient apply voltaren gel and ice for pain in the meantime.     Follow up with Dr. Lees after EMG/NCS for results review and surgical discussion   Call with any questions/concerns in the interim    Should the patient's symptoms worsen, persist, or fail to improve they should return for reevaluation.     The patient's pathophysiology was explained in detail with reference to x-rays, models, other visual aids as appropriate. Treatment options were discussed in detail.  Questions were invited and answered to the patient's satisfaction. I reviewed Primary care and other specialty's notes to better coordinate patient's care.    Maria Del Carmen Rutledge PA-C  [facility]     This note was generated with the assistance of ambient listening technology. Verbal consent was obtained by the patient and accompanying visitor(s) for the recording of patient appointment to facilitate this note. I attest to having reviewed and edited the generated note for accuracy, though some syntax or spelling errors may persist. Please contact the author of this note for any clarification.

## 2025-01-10 ENCOUNTER — OFFICE VISIT (OUTPATIENT)
Dept: PAIN MEDICINE | Facility: CLINIC | Age: 50
End: 2025-01-10
Payer: COMMERCIAL

## 2025-01-10 ENCOUNTER — HOSPITAL ENCOUNTER (OUTPATIENT)
Dept: RADIOLOGY | Facility: HOSPITAL | Age: 50
Discharge: HOME | End: 2025-01-10
Attending: INTERNAL MEDICINE
Payer: COMMERCIAL

## 2025-01-10 VITALS
SYSTOLIC BLOOD PRESSURE: 103 MMHG | HEIGHT: 67 IN | BODY MASS INDEX: 25.96 KG/M2 | DIASTOLIC BLOOD PRESSURE: 72 MMHG | HEART RATE: 70 BPM | WEIGHT: 165.38 LBS

## 2025-01-10 VITALS — WEIGHT: 165 LBS | BODY MASS INDEX: 25.9 KG/M2 | HEIGHT: 67 IN

## 2025-01-10 DIAGNOSIS — Z12.31 ENCOUNTER FOR SCREENING MAMMOGRAM FOR BREAST CANCER: ICD-10-CM

## 2025-01-10 DIAGNOSIS — M46.1 SACROILIITIS: Primary | ICD-10-CM

## 2025-01-10 PROCEDURE — 99999 PR PBB SHADOW E&M-EST. PATIENT-LVL III: CPT | Mod: PBBFAC,,, | Performed by: EMERGENCY MEDICINE

## 2025-01-10 PROCEDURE — 77067 SCR MAMMO BI INCL CAD: CPT | Mod: 26,,, | Performed by: RADIOLOGY

## 2025-01-10 PROCEDURE — 77063 BREAST TOMOSYNTHESIS BI: CPT | Mod: 26,,, | Performed by: RADIOLOGY

## 2025-01-10 PROCEDURE — 77063 BREAST TOMOSYNTHESIS BI: CPT | Mod: TC

## 2025-01-10 RX ORDER — METHOCARBAMOL 500 MG/1
500 TABLET, FILM COATED ORAL 3 TIMES DAILY PRN
Qty: 90 TABLET | Refills: 0 | Status: SHIPPED | OUTPATIENT
Start: 2025-01-10 | End: 2025-02-09

## 2025-01-10 RX ORDER — TIZANIDINE 4 MG/1
4 TABLET ORAL NIGHTLY PRN
Qty: 30 TABLET | Refills: 0 | Status: SHIPPED | OUTPATIENT
Start: 2025-01-10 | End: 2025-02-09

## 2025-01-10 NOTE — PROGRESS NOTES
Interventional Pain Tele-Medicine Follow up visit    Interval history 01/10/2024: Patient reports that the pain in her right buttock has improved since she has been focusing on not aggrevating the area. The pain is worse when she sits on a couch for instance, so she avoids this.    Interval History 12/05/2024: On 10/31/2024 the patient underwent L4/5 BL TFESI and she reports 100% pain relief, but pain is flaring up recently if she sits for too long. The pain is on the right side only to her left knee. She is taking the gabapentin PRN. She is marching in [employer] and dancing.     Interval History 10/11/24: Socorro Huang is a 49 y.o. year old female patient who presents today in clinic for a MRI results. She was started on mobic and gabapentin at her last visit.  She reports that the Mobic and gabapentin are helping her symptoms, but she still gets exacerbations with certain movements that increase her pain to a level 9.    Original HPI 08/28/2024: Socorro Huang is a 49 y.o. year old female patient who has a past medical history of Abnormal Pap smear, Anxiety, Constipation - functional, Headache(784.0), Hemorrhoid, Leukopenia, Menorrhagia, PONV (postoperative nausea and vomiting), and Strain of neck muscle. She presents in referral from No ref. provider found for or back pain for the past several years.     Original Pain Description:  The pain is located in the lower back and in the left posterior thigh. The pain is described as aching and sharp. Exacerbating factors: Standing and Bending. Mitigating factors heat, ice, and medications. Symptoms interfere with daily activity. The patient feels like symptoms have been worsening. Patient denies loss of sensations. 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 Patient has had lumbar ESIs previously with Dr. Gonzales and it helped. She feels that the ESIs worked better than the RFAs. The pain stays above her knee and  "worse when she sits.       PAIN SCORES:  Best: Pain is 0  Current: Pain is 1  Worst: Pain is 9        1/10/2025     8:56 AM   Last 3 PDI Scores   Pain Disability Index (PDI) 21       6 weeks of Conservative therapy:  PT: Completed  Chiro:  HEP: Participating     Treatments / Medications:   Fioricet  Flexeril  Gabapentin 300 mg t.i.d.  Mobic 7.5 mg q.day p.r.n.    Antiplatelets/Anticoagulants:  NA    Interventional Pain Procedures:   10/31/24 L4/5 BL TFESI  10/10/23 ACDF C5/6 and C6/7  2022 Lumbar ESIs   2022 Lumbar RFA    IMAGING:    MRI LUMBAR SPINE WITHOUT CONTRAST 09/20/2024  L3/4: CDB & mild facet arthropathy=>mild bilateral neural foraminal narrowing.  L4/5: CDB w/posterior annular fissure & mod facet arthropathy=>mild spinal canal stenosis + encroachment on BL lateral recesses + abutment BL descending L5NRs & mild BL neural foraminal narrowing.    XR LUMBAR SPINE 5 VIEW WITH FLEX AND EXT    09/20/2024  Mild DJD.  Mild narrowing of the L4/L5 disc space.  .  No fracture, spondylolisthesis or bone destruction identified    XR CERVICAL SPINE AP LAT WITH FLEX EXTEN    04/12/2024  Reconfirmed straightening of normal cervical lordosis. Stable findings of intervertebral disc arthroplasty at C5-C6 and C6-C7, no findings hardware malfunction.  Elsewhere, preserved cervical disc spaces.  Some stable mild uncovertebral spurring C4-C5 level.  Some stable C7-T1 facet arthropathic changes.  Intact odontoid tip and unremarkable C1-C2 articulation.  Cervical spine findings unchangedMedications/Allergies: See med card    ROS:  GENERAL: No fever. No chills. No fatigue. Denies weight loss. Denies weight gain.  Back / musculoskeletal / neuro : See HPI    VITALS:   Vitals:    01/10/25 0853   BP: 103/72   Pulse: 70   Weight: 75 kg (165 lb 5.5 oz)   Height: 5' 7" (1.702 m)   PainSc: 0-No pain   PainLoc: Back       Body mass index is 25.9 kg/m².      1/10/2025     8:56 AM 10/11/2024     2:00 PM 8/28/2024     8:14 AM   Last 3 PDI " "Scores   Pain Disability Index (PDI) 21 0 63       Physical Exam:  GENERAL: Well appearing, in no acute distress, alert and oriented x3.  PSYCH:  Mood and affect appropriate.  SKIN: Skin color, texture, turgor normal, no rashes or lesions.  HEENT:  Normocephalic, atraumatic. Cranial nerves grossly intact.  NECK: No pain to palpation over the cervical paraspinous muscles. No pain to palpation over facets. No pain with neck flexion, extension, or lateral flexion.   PULM: No evidence of respiratory difficulty, symmetric chest rise.  GI:  Non-distended  BACK: Normal range of motion. No pain to palpation over the spinous processes.  Pain with axial loading bilaterally. Positive tenderness to palpation over right SIJ. Positive LARA, and Ganselin, and Yeoman's tests. FADIR negative.   EXTREMITIES: No deformities, edema, or skin discoloration.   MUSCULOSKELETAL: Shoulder, hip, and knee provocative maneuvers are negative. No atrophy is noted. No TTP over GTBs  NEURO: Sensation is equal and appropriate bilaterally. Bilateral upper and lower extremity strength is normal and symmetric. Bilateral upper and lower extremity coordination and muscle stretch reflexes are physiologic and symmetric. Plantar response are downgoing. Straight leg raising in the supine position is positive to radicular pain on the left.   GAIT: normal.      LABS:    No results found for: "LABA1C", "HGBA1C"    Lab Results   Component Value Date    CREATININE 0.7 06/20/2024         ASSESSMENT: 49 y.o. year old female with pain, consistent with:    Encounter Diagnosis   Name Primary?    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 Patient's BL pain resolved, but she now has sacroiliitis. She does still have a positive SLR, but on physical exam the SIJ is the most painful.  She reports that " the pain is improving.  Will pursue conservative management    PLAN:  - I have stressed the importance of physical activity and a home exercise plan to help with pain and improve health.  - Patient can continue with medications for now since they are providing benefits, using them appropriately, and without side effects.  - Counseled patient regarding the importance of activity modification and constant sleeping habits.  - consider right SI injection if conservative treatment fails  - Medications:Start Zanaflex 4mg at bedtime PRN to help with muscular symptoms.  and Start Robaxin 500mg every 6 hours to help with muscular symptoms.   - Therapy: Referral to Physical therapy for SIJ stabilization, strengthening, and a home exercise program.  - Imaging: Reviewed available imaging with patient and answered any questions they had regarding study.  - The patient's pathophysiology was explained in detail with reference to x-rays, models, other visual aids as appropriate.   - Follow up visit: return to clinic 4-6 weeks      Anthony Macias MD  01/10/2025

## 2025-01-13 ENCOUNTER — TELEPHONE (OUTPATIENT)
Dept: ORTHOPEDICS | Facility: CLINIC | Age: 50
End: 2025-01-13
Payer: COMMERCIAL

## 2025-01-13 NOTE — TELEPHONE ENCOUNTER
"M    Called pt to schedule EMG result review with Dr. Lees    Pt message in [facility] stated: "Hello. I had my Emg today. She doesn't think I need the carpal tunnel procedure done. Can I please schedule my follow up with the dr? "    Annie Malhotra MA  [facility]  P: [phone]  F: [phone]    "

## 2025-01-13 NOTE — TELEPHONE ENCOUNTER
Pt called back to schedule EMG review with Dr. Lees. Pt requested 3:45pm on 1/29/2025. Notified pt this appointment is at [facility] location and provided address for appointment.    Annie Malhotra MA  [facility]  P: [phone]  F: [phone]

## 2025-01-28 ENCOUNTER — PATIENT MESSAGE (OUTPATIENT)
Dept: NEUROLOGY | Facility: CLINIC | Age: 50
End: 2025-01-28
Payer: COMMERCIAL

## 2025-01-29 ENCOUNTER — OFFICE VISIT (OUTPATIENT)
Dept: ORTHOPEDICS | Facility: CLINIC | Age: 50
End: 2025-01-29
Payer: COMMERCIAL

## 2025-01-29 DIAGNOSIS — M25.531 RIGHT WRIST PAIN: ICD-10-CM

## 2025-01-29 DIAGNOSIS — M67.431 GANGLION CYST OF VOLAR ASPECT OF RIGHT WRIST: ICD-10-CM

## 2025-01-29 DIAGNOSIS — G56.01 CARPAL TUNNEL SYNDROME OF RIGHT WRIST: Primary | ICD-10-CM

## 2025-01-29 PROCEDURE — 99204 OFFICE O/P NEW MOD 45 MIN: CPT | Mod: S$GLB,,, | Performed by: ORTHOPAEDIC SURGERY

## 2025-01-29 PROCEDURE — 99999 PR PBB SHADOW E&M-EST. PATIENT-LVL IV: CPT | Mod: PBBFAC,,, | Performed by: ORTHOPAEDIC SURGERY

## 2025-01-29 NOTE — PATIENT INSTRUCTIONS
[facility]  HAND CLINIC SURGERY INSTRUCTIONS  Sara Lees MD  Date of Surgery: 3/20/25    Location of Surgery:      [facility] [place] [address]    PRE-OPERATIVE INSTRUCTIONS:    Dr. Lees's clinic staff will call you THE DAY BEFORE SURGERY with your arrival time. You will be notified in the afternoon between 11:00AM - 5:00pm. We will attempt to provide your arrival time earlier and will call you if this is at all possible.     DO NOT eat or drink after midnight, this includes gum/hard candy, coffee.   You may drink gatorade and water only up to 2 hours prior to arrival, NOTHING ELSE.    You ARE NOT to drive or take an Uber/Lyft/taxi home from surgery. Please arrange a friend/family member to accompany you at the surgery center and drive you home.  Transportation: Fernando Huang    PLEASE REMOVE nail polish and/or artificial nails prior to your surgery date if applicable. Please remove fingernail polish    [facility] Visitor Policy:    Each patient will be allowed 2 visitors with 1 visitor at a time. Only 1 swap out allowed.   Pediatric patients: Both parents are allowed if present.   Post-Op: If there is more than 1 visitor, the person that is the main caregiver will need to be available for d/c instructions should visit 2nd, and stay with the patient until d/c.  All visitors must be accompanied in and out with an employee.       PRE-OPERATIVE MEDICATION INSTRUCTIONS:    If you are diabetic, DO NOT take any diabetic medication the night before surgery or morning of surgery. If you are type I diabetic on an insulin pump, please bring your monitor the morning of surgery.   NA    MUST HOLD SEMAGLUTIDE MEDICATIONS 7 DAYS PRIOR TO SURGERY, examples: Mounjaro, Ozempic, Trulicity.   NA    Please HOLD Vitamins 5 days prior to surgery or sooner, CONTINUE Vitamin D up until the AM of your surgery.    Avoid anti-inflammatory medications 5 days before your  surgery. This includes Aleve/Naproxen, Celebrex, Meloxicam/Mobic, Voltaren/Diclofenac.   You may dose ibuprofen/Advil/Motrin up until the day before your surgery.  You may take extra strength Tylenol/Acetaminophen.    HOLD ALL OTHER MEDICATIONS AM OF SURGERY THAT ARE NOT DISCUSSED ABOVE        POST-OPERATIVE MEDICATION INSTRUCTIONS:    Your post-operative pain medication will be DELIVERED BEDSIDE to you at the surgery center by the [facility]. You DO NOT need to pick this medication up from the [facility].     TAKE post-operative pain medication as directed.   You may also take over-the-counter extra strength Tylenol/acetaminophen as directed on the bottle for breakthrough pain between dosed of prescribed pain medication.   Please note, some pain medications contain Tylenol/acetaminophen.   DO NOT exceed 3000mg of Tylenol/acetaminophen in 1 day.  You may also use an over-the-counter anti-inflammatory medication also known as an NSAID,  (Aleve/Naproxen) as directed on the bottle for breakthrough pain between doses of prescribed pain medication.  DO NOT take NSAIDs if you have been previously instructed not to by a physician.     POST-OPERATIVE INSTRUCTIONS:    DO NOT let your operative area become wet, Your dressings/bandages/splints must remain dry.   Recommend waterproof arm cast cover to be worn when showering and bathing and can be purchased on Amazon. Garbage bags, plastic shopping bags, or umbrella bags can also be used instead.    DO NOT remove any post-operative dressings/bandages/splints until you are seen by Maria Del Carmen Rutledge PA-C, Dr. Lees or Occupational Therapy   These dressings/bandages/splints are in place to keep the operative site clean, dry, and in optimal healing position     ELEVATE your hand/arm above the level of your heart as much as possible to decrease post-operative swelling for first 48 hours.  Swelling after surgery is TO BE EXPECTED.      ICE the operative site following  surgery for 20-30 minutes, 4-5 times per day.  Be sure to have a towel between your dressings/bandages/splint to keep from getting wet.    MOVE the parts of your hand/arm that are not immobilized in a sling or splint.   Make a gentle fist with your fingers, bend/straighten your elbow, etc.   DO NOT attempt any strengthening exercises (hand putty, exercise balls, etc) on your operative side as this can increase swelling.     *CALL the [facility] at [phone]*  If at any time following surgery your dressings/bandages/splints: get wet, come loose, feels tight, feels uncomfortable.  Or if you are concerned about possible infection, worsening pain, worsening swelling or have any other concerns regarding your surgery.   Signs of infection:  fever (over 100.3), chills, body aches, increased warmth, redness, significant increase in swelling & pain at surgical site.     OUTPATIENT FOLLOW UP:  YOU WILL THEN BE SEEN BY GLORIA PRUITT PA-C IN CLINIC 13 days AFTER SURGERY AND DR. AMBROSE IN CLINIC 6 WEEKS AFTER SURGERY    Patient IQ (Survey)  A quick questionnaire (2-3 minutes) will be sent to you via text message or email in the next few hours. It will only take a few minutes to complete!  We care about your health, answering the questionnaire allows us to track your progress through your recovery to provide the best outcome for your recovery.    FMLA or Disability paperwork can be sent to [facility]  If you feel you will need accommodations at work for more than 2 weeks please ask your HR department for paperwork so that they can accommodate your restrictions. * Only needed if you are going to be out of work 2 weeks or more*\  Please send the paperwork filled out with your information to: Fax: [phone] or Email [email or web address]   This process should take 8-10 business days. If you have any concerns or need to check the status of your request please contact HIM at the email  address or phone number listed below as once it leaves this department we do not have access to the status of your request. Phone: [phone]

## 2025-01-29 NOTE — PROGRESS NOTES
[facility]  History & Physical  Orthopedics    SUBJECTIVE:      Chief Complaint:   Chief Complaint   Patient presents with    Right Hand - Pain    Left Hand - Pain       Referring Provider: No ref. provider found   History of Present Illness    CHIEF COMPLAINT:  - Ganglion cyst on the wrist and carpal tunnel syndrome.    HPI:  Patient is a 49 y.o. right hand dominant female who presents today with complaints of right wrist ganglion for 2 months. She describes the affected area as tight and sore. The ganglion cyst causes discomfort, especially when typing.    Her fingers have reduced mobility since the cyst appeared. When asked about potential trauma, she recalled being knocked over by a foster dog a few months ago, resulting in a fall, but did not consider it significant at the time.    She reports a history of hand numbness for about a year. She types often for work and this causes numbness.  She states wearing a brace has helped with her carpal tunnel symptoms.    Patient denies any heart problems or lung problems. Patient denies any history of problems with anesthesia.    WORK STATUS:  - Works at [employer]    Vitals:    01/29/25 1526   PainSc:   6   PainLoc: Hand     The patient denies any fevers, chills, N/V, D/C and presents for evaluation.    Past Medical History:   Diagnosis Date    Abnormal Pap smear     Anxiety     celexa & prozac didn't help much    Constipation - functional     Headache(784.0)     Hemorrhoid     Leukopenia     benign, evaluated in [facility] y 2000    Menorrhagia     PONV (postoperative nausea and vomiting)     Strain of neck muscle     tx with PT at [facility] in past     Past Surgical History:   Procedure Laterality Date    ANOSCOPY N/A 07/20/2022    Procedure: ANOSCOPY;  Surgeon: ASHTYN Melo MD;  Location: [facility] OR [address] [place] [address];  Service: Colon and Rectal;  Laterality: N/A;    ARTHROPLASTY,SPINE,CERVICAL N/A 10/12/2023    Procedure: ARTHROPLASTY,SPINE,CERVICAL;   Surgeon: Amador Wood MD;  Location: [facility] OR;  Service: Neurosurgery;  Laterality: N/A;  Anterior Cervical Discectomy C5/6, C6/7 with artifical disc, possible fusion        BREAST RECONSTRUCTION  2018    implant removal and lift    BREAST SURGERY      CHEILECTOMY Right 08/18/2023    Procedure: CHEILECTOMY;  Surgeon: Tacho Sanchez DPM;  Location: [facility] OR;  Service: Podiatry;  Laterality: Right;    COLON SURGERY  2014         COLONOSCOPY N/A 02/03/2022    Procedure: COLONOSCOPY;  Surgeon: ASHTYN Melo MD;  Location: [facility] ENDO;  Service: Endoscopy;  Laterality: N/A;    CORRECTION OF HAMMER TOE Right 08/18/2023    Procedure: CORRECTION, HAMMER TOE;  Surgeon: Tacho Sanchez DPM;  Location: [facility] OR;  Service: Podiatry;  Laterality: Right;    DILATION AND CURETTAGE OF UTERUS  12/18/2012    complex hyperplasia, no atypia    ESOPHAGOGASTRODUODENOSCOPY N/A 12/18/2024    Procedure: EGD (ESOPHAGOGASTRODUODENOSCOPY);  Surgeon: Edith Bourgeois MD;  Location: [facility] ENDOSCOPY;  Service: Endoscopy;  Laterality: N/A;  EGD/Bravo-Instr send portal-OFF PPI (96hrs)-ASul  9/10/24- pc complete. DBM  9/16-pt r/s, no allergy or sensitivity to metal/jewelry per pt, updated instructions sent to portal-Kpvt  12/18 R/s update instru to portal. ASam  12/11/24- portal msg for pc. [facility]      FLEXIBLE SIGMOIDOSCOPY N/A 07/20/2022    Procedure: SIGMOIDOSCOPY, FLEXIBLE;  Surgeon: ASHTYN Melo MD;  Location: [address];  Service: Colon and Rectal;  Laterality: N/A;    HEMORRHOID SURGERY  2014    HYSTERECTOMY      INJECTION, SPINE, LUMBOSACRAL, TRANSFORAMINAL APPROACH Bilateral 10/31/2024    Procedure: TFESI B/L L4/5;  Surgeon: Anthony Macias MD;  Location: [facility] PAIN MANAGEMENT;  Service: Pain Management;  Laterality: Bilateral;  no ac    LAPAROSCOPIC SIGMOIDECTOMY Left 07/20/2022    Procedure: COLECTOMY, SIGMOID, LAPAROSCOPIC, ERAS low;  Surgeon: ASHTYN Melo MD;  Location: [facility] OR [address] [place] [address];  Service: Colon  and Rectal;  Laterality: Left;    MOBILIZATION OF SPLENIC FLEXURE N/A 07/20/2022    Procedure: MOBILIZATION, SPLENIC FLEXURE;  Surgeon: ASHTYN Melo MD;  Location: [facility] OR [address] [place] [address];  Service: Colon and Rectal;  Laterality: N/A;    OOPHORECTOMY      PARTIAL HYSTERECTOMY  2013    for atypia    PH MONITORING, ESOPHAGUS, WIRELESS, (OFF REFLUX MEDS) N/A 12/18/2024    Procedure: PH MONITORING, ESOPHAGUS, WIRELESS, (OFF REFLUX MEDS);  Surgeon: Edith Bourgeois MD;  Location: [facility] ENDOSCOPY;  Service: Endoscopy;  Laterality: N/A;    SMALL INTESTINE SURGERY  2017    THYROIDECTOMY, PARTIAL  2013    TONSILLECTOMY      TUBAL LIGATION       Review of patient's allergies indicates:  No Known Allergies  Social History     Social History Narrative    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 Fulton , GYN Dr Ruby                 Family History   Problem Relation Name Age of Onset    Emphysema Mother Dagmar     Cancer Mother Dagmar         Lung    COPD Mother Dagmar     Diabetes Mother Dagmar     Cancer Father Terrazas         Lung    Heart disease Father Terrazas 50    Diabetes Paternal Grandmother Ana     Hypertension Paternal Grandmother Ana     Depression Sister Hope     Mental illness Sister Hope     Suicide Brother Reilly     Mental illness Brother Reilly     Drug abuse Maternal Aunt Citlali     Schizophrenia Paternal Aunt      Alcohol abuse Maternal Uncle Florentino     Alcohol abuse Cousin      Breast cancer Maternal Grandmother      Ovarian cancer Maternal Grandmother      ADD / ADHD Neg Hx      Anxiety disorder Neg Hx      Bipolar disorder Neg Hx      Dementia Neg Hx      OCD Neg Hx      Paranoid behavior Neg Hx      Physical abuse Neg Hx      Seizures Neg Hx      Self injury Neg Hx      Sexual  abuse Neg Hx           Current Outpatient Medications:     diclofenac sodium (VOLTAREN ARTHRITIS PAIN) 1 % Gel, Apply 2 g topically 4 (four) times daily., Disp: 100 g, Rfl: 0    ibuprofen (ADVIL,MOTRIN) 600 MG tablet, Take 1 tablet (600 mg total) by mouth every 4 (four) hours as needed (for headaches)., Disp: 20 tablet, Rfl: 5    omeprazole (PRILOSEC) 40 MG capsule, Take 1 capsule (40 mg total) by mouth 2 (two) times daily before meals., Disp: , Rfl:     progesterone (PROMETRIUM) 200 MG capsule, , Disp: , Rfl:     tiZANidine (ZANAFLEX) 4 MG tablet, Take 1 tablet (4 mg total) by mouth nightly as needed (muscle tightness and spasms)., Disp: 30 tablet, Rfl: 0    traZODone (DESYREL) 100 MG tablet, Take 1 tablet (100 mg total) by mouth every evening., Disp: 90 tablet, Rfl: 2    ROS    Review of Systems:  Constitutional: no fever or chills  Eyes: no visual changes  ENT: no nasal congestion or sore throat  Respiratory: no cough or shortness of breath  Cardiovascular: no chest pain  Gastrointestinal: no nausea or vomiting, tolerating diet  Musculoskeletal: pain, soreness, and numbness/tingling    OBJECTIVE:      Vital Signs (Most Recent):  There were no vitals filed for this visit.  There is no height or weight on file to calculate BMI.    Physical Exam    Physical Exam:  Constitutional: The patient appears well-developed and well-nourished. No distress.   Head: Normocephalic and atraumatic.   Nose: Nose normal.   Eyes: Conjunctivae and EOM are normal.   Neck: No tracheal deviation present.   Cardiovascular: Normal rate and intact distal pulses.    Pulmonary/Chest: Effort normal. No respiratory distress.   Abdominal: There is no guarding.   Lymphatic: Negative for adenopathy   Neurological: The patient is alert.   Psychiatric: The patient has a normal mood and affect.     Bilateral Hand/Wrist Examination:    Observation/Inspection:  Swelling  none    Deformity  none  Discoloration  none      Scars   none    Atrophy  none    HAND/WRIST EXAMINATION:  Finkelstein's Test   Neg  WHAT Test    Neg  Snuff box tenderness   Neg  Moyer's Test    Neg  Hook of Hamate Tenderness  Neg  CMC grind    Neg  Circumduction test   Neg  TFCC Compression Test  Neg    1x1 cm volar ganglion right wrist, bilateral ROM hand/wrist/elbow full    Neurovascular Exam:  Digits WWP, brisk CR < 3s throughout  NVI motor/LTS to M/R/U nerves, radial pulse 2+  2+ biceps and brachioradialis reflexes  Tinel's Test - Carpal Tunnel  Positive right  Tinel's Test - Cubital Tunnel  Neg  Phalen's Test    Positive right  Median Nerve Compression Test Positive right    Diagnostic studies and other clinical records review:  X-rays AP, lateral and oblique right wrist are independently reviewed by me and shows no bony or ligamentous abnormalities.    ASSESSMENT/PLAN:    49 y.o. yo female with   Encounter Diagnoses   Name Primary?    Carpal tunnel syndrome of right wrist Yes    Ganglion cyst of volar aspect of right wrist     Right wrist pain       PLAN:   The patient and I had a thorough discussion today. We discussed the working diagnosis as well as several other potential alternative diagnoses. Treatment options were discussed, both conservative and surgical. Conservative treatment options would include things such as activity modifications, workplace modifications, a period of rest, oral vs topical OTC and prescription anti-inflammatory medications, occupational therapy, splinting/bracing, immobilization, corticosteroid injections, and others. Surgical options were discussed as well.     At this time, the patient has exhausted conservative measures and would like to proceed with surgery. Surgery would include Right carpal tunnel release, right volar wrist ganglion excision, right radiocarpal arthrotomy. Consent signed today in clinic. Light use of the hand will be indicated for the first 4-6 weeks.     We have discussed risks, benefits and  alternatives of hand surgery which include but are not limited to blood clots in the legs that can travel to the lungs (pulmonary embolism). Pulmonary embolism can cause shortness of breath, chest pain, and even shock. Other risks include urinary tract infection, nausea and vomiting (usually related to pain medication), chronic pain, bleeding, nerve damage, blood vessel injury, scarring and infection of the hand which can require re-operation. Furthermore, the risks of anesthesia include potential heart, lung, kidney, and liver damage.  Informed consent was obtained. she understands and would like to proceed with surgery in the near future.      Call with any questions/concerns in the interim        Sara Lees MD    Please be aware that this note has been generated with the assistance of [employer] voice-to-text.  Please excuse any spelling or grammatical errors.  This note was generated with the assistance of ambient listening technology. Verbal consent was obtained by the patient and accompanying visitor(s) for the recording of patient appointment to facilitate this note. I attest to having reviewed and edited the generated note for accuracy, though some syntax or spelling errors may persist. Please contact the author of this note for any clarification.

## 2025-02-06 ENCOUNTER — PATIENT MESSAGE (OUTPATIENT)
Dept: ORTHOPEDICS | Facility: CLINIC | Age: 50
End: 2025-02-06
Payer: COMMERCIAL

## 2025-02-07 ENCOUNTER — CLINICAL SUPPORT (OUTPATIENT)
Dept: REHABILITATION | Facility: HOSPITAL | Age: 50
End: 2025-02-07
Attending: EMERGENCY MEDICINE
Payer: COMMERCIAL

## 2025-02-07 DIAGNOSIS — M46.1 SACROILIITIS: ICD-10-CM

## 2025-02-07 DIAGNOSIS — G89.29 CHRONIC RIGHT-SIDED LOW BACK PAIN WITHOUT SCIATICA: Primary | ICD-10-CM

## 2025-02-07 DIAGNOSIS — M54.50 CHRONIC RIGHT-SIDED LOW BACK PAIN WITHOUT SCIATICA: Primary | ICD-10-CM

## 2025-02-07 PROCEDURE — 97530 THERAPEUTIC ACTIVITIES: CPT

## 2025-02-07 PROCEDURE — 97161 PT EVAL LOW COMPLEX 20 MIN: CPT

## 2025-02-07 NOTE — TELEPHONE ENCOUNTER
Patient requesting sooner surgery date. Offered patient 2/20/25 and she had to decline due to her riding in [employer].     Will continue to keep her on the waitlst.    Oleg Grimm MS, OTC   Sports Medicine Assistant   [facility]  (P) [phone]  (F) [phone]

## 2025-02-07 NOTE — PROGRESS NOTES
Outpatient Rehab    Physical Therapy Evaluation    Patient Name: Socorro Huang  MRN: [id number]  YOB: 1975  Today's Date: 2/7/2025    Therapy Diagnosis:   Encounter Diagnoses   Name Primary?    Sacroiliitis     Chronic right-sided low back pain without sciatica Yes     Physician: Anthony Macias MD    Physician Orders: Eval and Treat  Medical Diagnosis: Sacroiliitis [M46.1]     Visit # / Visits Authorized:  1 / 1   Date of Evaluation:  2/7/2025   Insurance Authorization Period: 1/10/2025 to 1/10/2026  Plan of Care Certification:  2/7/2025 to 4/11/2025      Time In: 1006   Time Out: 1100  Total Time: 54   Total Billable Time: 54         Subjective   History of Present Illness  Socorro is a 49 y.o. female who reports to physical therapy with a chief concern of right side low back/sacral pain. According to the patient's chart, Socorro has a past medical history of Abnormal Pap smear, Anxiety, Constipation - functional, Headache(784.0), Hemorrhoid, Leukopenia, Menorrhagia, PONV (postoperative nausea and vomiting), and Strain of neck muscle. Socorro has a past surgical history that includes Dilation and curettage of uterus (12/18/2012); Tonsillectomy; Partial hysterectomy (2013); Tubal ligation; Thyroidectomy, partial (2013); Colon surgery (2014); Hemorrhoid surgery (2014); Hysterectomy; Oophorectomy; Breast surgery; Breast reconstruction (2018); Colonoscopy (N/A, 02/03/2022); Laparoscopic sigmoidectomy (Left, 07/20/2022); Flexible sigmoidoscopy (N/A, 07/20/2022); Anoscopy (N/A, 07/20/2022); Mobilization of splenic flexure (N/A, 07/20/2022); Small intestine surgery (2017); Cheilectomy (Right, 08/18/2023); Correction of hammer toe (Right, 08/18/2023); arthroplasty,spine,cervical (N/A, 10/12/2023); injection, spine, lumbosacral, transforaminal approach (Bilateral, 10/31/2024); Esophagogastroduodenoscopy (N/A, 12/18/2024); and ph monitoring, esophagus, wireless, (off reflux meds) (N/A, 12/18/2024).                 History of Present Condition/Illness: Patient presents with reports of chronic low back pain and right sided glute pain that occasionally has gone down to the knee. She denies numbness/tingling or any changes to sensation in the leg. Denies saddle anesthesia and denies changes in bowel or bladder symptoms. Patient states about 2-3 weeks ago she was lifting heavy boxes (roughly 20 pounds) and she felt her right low back start hurting not long after. States she has had difficulty and pain with sitting for prolonged periods of time ever since and also has difficulty with prolonged standing.     Activities of Daily Living  Social history was obtained from Patient.               Previously independent with activities of daily living? Yes     Currently independent with activities of daily living? Yes          Previously independent with instrumental activities of daily living? Yes     Currently independent with instrumental activities of daily living? Yes              Pain     Patient reports a current pain level of 4/10. Pain at best is reported as 2/10. Pain at worst is reported as 6/10.   Location: right PSIS region/right low back  Clinical Progression (since onset): Stable  Pain Qualities: Aching, Discomfort, Tenderness  Pain-Relieving Factors: Activity modification, Other (Comment)  Other Pain-Relieving Factors: injections (late October 2024)  Pain-Aggravating Factors: Bending, Sitting, Squatting, Lifting, Other (Comment)  Other Pain-Aggravating Factors: extension         Review of Systems  Patient denies: Bladder Incontinence, Bowel Incontinence, Lower Extremity Neurological Deficits, and Saddle Numbness        Living Arrangements  Living Situation  Living Arrangements: Family members  Support Systems: Family members        Employment  Patient reports: Does the patient's condition impact their ability to work?  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    Socorro Huang  has a past surgical history that includes Dilation and curettage of uterus (12/18/2012); Tonsillectomy; Partial hysterectomy (2013); Tubal ligation; Thyroidectomy, partial (2013); Colon surgery (2014); Hemorrhoid surgery (2014); Hysterectomy; Oophorectomy; Breast surgery; Breast reconstruction (2018); Colonoscopy (N/A, 02/03/2022); Laparoscopic sigmoidectomy (Left, 07/20/2022); Flexible sigmoidoscopy (N/A, 07/20/2022); Anoscopy (N/A, 07/20/2022); Mobilization of splenic flexure (N/A, 07/20/2022); Small intestine surgery (2017); Cheilectomy (Right, 08/18/2023); Correction of hammer toe (Right, 08/18/2023); arthroplasty,spine,cervical (N/A, 10/12/2023); injection, spine, lumbosacral, transforaminal approach (Bilateral, 10/31/2024); Esophagogastroduodenoscopy (N/A, 12/18/2024); and ph monitoring, esophagus, wireless, (off reflux meds) (N/A, 12/18/2024).    Socorro has a current medication list which includes the following prescription(s): diclofenac sodium, ibuprofen, omeprazole, progesterone, tizanidine, and trazodone.    Review of patient's allergies indicates:  No Known Allergies     Objective      Lower Extremity Sensation  Right Lumbar/Lower Extremity Sensation  Intact: Light Touch       Left Lumbar/Lower Extremity Sensation  Intact: Light Touch                Right Lower Extremity Reflexes  Patellar, L4: Trace (1+)         Achilles, S1: Normal (2+)         Left Lower Extremity Reflexes  Patellar, L4: Normal (2+)          Achilles, S1: Normal (2+)             Spinal Mobility  Hypomobile: Lumbosacral  Lumbosacral Mobility Details: Right PSIS hypomobile with spring testing         Lumbar Range of Motion   Active (deg) Passive (deg) Pain    Flexion 40       Extension 10   Yes   Right Lateral Flexion 30       Right Rotation 10       Left Lateral Flexion 30       Left Rotation 10                Hip Range of Motion   Right Hip   Active (deg) Passive (deg) Pain   Flexion 95       Extension 10       ABduction 30       ADduction         External Rotation 90/90 55       External Rotation Prone         Internal Rotation 90/90 25       Internal Rotation Prone             Left Hip   Active (deg) Passive (deg) Pain   Flexion 95       Extension 10       ABduction 30       ADduction         External Rotation 90/90 55       External Rotation Prone         Internal Rotation 90/90 25       Internal Rotation Prone                            Hip Strength - Planes of Motion   Right Strength Right Pain Left Strength Left  Pain   Flexion (L2) 3 Yes 4-     Extension 3- Yes 3-     ABduction 3-   3     ADduction           Internal Rotation 4   4+     External Rotation 4- Yes 4             Lumbar/Pelvic Girdle Special Tests  Pelvic Girdle / Sacrum Tests  Positive: Right LARA and Right Sacroiliac Compression  Positive: Right Stork         Hip Special Tests  Intra-Articular/Impingement Tests  Positive: Right LARA  Sacroiliac Joint Tests  Positive: Right Compression              Intake Outcome Measure for FOTO Survey    Therapist reviewed FOTO scores for Socorro Huang on 2/7/2025.   FOTO report - see Media section or FOTO account episode details.     Intake Score: 59%    Treatment:  Therapeutic Activity  Therapeutic Activity 1: HEP: Supine TA bracing 10 x 10 sec holds; TA bracing with bent knee fallouts, 2 x 10 with biofeedback cuff; lateral stepping GTB above ankles; Standing paloff press, 3 x 10 Bilat with BTB    Patient's spiritual, cultural, and educational needs considered and patient agreeable to plan of care and goals.     Assessment & Plan   Assessment  Socorro presents with a condition of Low complexity.   Presentation of Symptoms: Stable  Will Comorbidities Impact  Care: No       Functional Limitations: Driving, Pain with ADLs/IADLs, Participating in leisure activities, Performing household chores, Range of motion, Squatting, Sitting tolerance, Standing tolerance    Patient Goal for Therapy (PT): reduce low back pain and improve function  Prognosis: Good  Assessment Details: Patient presents with right sided low back pain in sacroiliac region. She demonstrates decreased lumbar range of motion in standing position as well as right sided hypomobility of the sacroiliac joint. Patient demonstrates pain with active SLR and benefits from force closure assistance in order to reduce pain. Patient will benefit from skilled therapy in order to return to prior level of function.     Plan  From a physical therapy perspective, the patient would benefit from: Skilled Rehab Services    Planned therapy interventions include: Therapeutic exercise, Therapeutic activities, Neuromuscular re-education, Manual therapy, and ADLs/IADLs.    Planned modalities to include: Biofeedback.        Visit Frequency: 2 times Per Week for 8 Weeks.       This plan was discussed with Patient.   Discussion participants: Agreed Upon Plan of Care             Goals:   Active       Physical Therapy       Physical Therapy Goal (Progressing)       Start:  02/07/25    Expected End:  04/11/25       GOALS: Short Term Goals:  4 weeks  1.Report decreased right sided low back pain </= 2/10  to increase tolerance for progressing exercises in therapy.  2. Increase pain-free ROM by 5-10 degrees where limited in order to perform ADLs without difficulty.  3. Increase strength by 1/3 MMT grade in bilateral hip abduction, extension, and flexion to increase tolerance for ADL and work activities.  4. Pt to tolerate HEP to improve ROM and independence with ADL's    Long Term Goals: 8 weeks  1.Report decreased right sided low back pain </= 1/10  to increase tolerance for progressing with lifting mechanics.   2.Patient goal: reduce low  back pain with lifting and ADLs.   3.Increase strength to 4+/5 in bilateral hip flexion, extension, and abduction to increase tolerance for ADL and work activities.  4. Pt will report at >/= 67% on FOTO to demonstrate increase in LE function with every day tasks.              Saul Telles, PT

## 2025-02-14 ENCOUNTER — PATIENT MESSAGE (OUTPATIENT)
Dept: REHABILITATION | Facility: HOSPITAL | Age: 50
End: 2025-02-14
Payer: COMMERCIAL

## 2025-02-18 ENCOUNTER — PATIENT MESSAGE (OUTPATIENT)
Dept: REHABILITATION | Facility: HOSPITAL | Age: 50
End: 2025-02-18
Payer: COMMERCIAL

## 2025-02-20 ENCOUNTER — PATIENT MESSAGE (OUTPATIENT)
Dept: REHABILITATION | Facility: HOSPITAL | Age: 50
End: 2025-02-20

## 2025-02-20 ENCOUNTER — TELEPHONE (OUTPATIENT)
Facility: CLINIC | Age: 50
End: 2025-02-20
Payer: COMMERCIAL

## 2025-02-24 ENCOUNTER — PATIENT MESSAGE (OUTPATIENT)
Dept: ORTHOPEDICS | Facility: CLINIC | Age: 50
End: 2025-02-24
Payer: COMMERCIAL

## 2025-02-26 ENCOUNTER — PATIENT MESSAGE (OUTPATIENT)
Dept: NEUROLOGY | Facility: CLINIC | Age: 50
End: 2025-02-26
Payer: COMMERCIAL

## 2025-02-26 NOTE — TELEPHONE ENCOUNTER
Called patient to offer early surgical date. She is going out of town tomorrow.     Oleg Grimm MS, OTC   Sports Medicine Assistant   [facility]  (P) [phone]  (F) [phone]

## 2025-03-05 ENCOUNTER — PATIENT MESSAGE (OUTPATIENT)
Dept: PREADMISSION TESTING | Facility: HOSPITAL | Age: 50
End: 2025-03-05
Payer: COMMERCIAL

## 2025-03-05 RX ORDER — SCOPOLAMINE 1 MG/3D
PATCH, EXTENDED RELEASE TRANSDERMAL
COMMUNITY
Start: 2024-12-05

## 2025-03-06 ENCOUNTER — OFFICE VISIT (OUTPATIENT)
Dept: URGENT CARE | Facility: CLINIC | Age: 50
End: 2025-03-06
Payer: COMMERCIAL

## 2025-03-06 VITALS
TEMPERATURE: 99 F | WEIGHT: 163.13 LBS | HEIGHT: 67 IN | OXYGEN SATURATION: 98 % | DIASTOLIC BLOOD PRESSURE: 78 MMHG | RESPIRATION RATE: 20 BRPM | BODY MASS INDEX: 25.6 KG/M2 | SYSTOLIC BLOOD PRESSURE: 112 MMHG | HEART RATE: 78 BPM

## 2025-03-06 DIAGNOSIS — R09.81 NASAL CONGESTION WITH RHINORRHEA: ICD-10-CM

## 2025-03-06 DIAGNOSIS — J10.1 INFLUENZA A: Primary | ICD-10-CM

## 2025-03-06 DIAGNOSIS — J34.89 NASAL CONGESTION WITH RHINORRHEA: ICD-10-CM

## 2025-03-06 DIAGNOSIS — R05.8 OTHER COUGH: ICD-10-CM

## 2025-03-06 DIAGNOSIS — R52 GENERALIZED BODY ACHES: ICD-10-CM

## 2025-03-06 LAB
CTP QC/QA: YES
POC MOLECULAR INFLUENZA A AGN: POSITIVE
POC MOLECULAR INFLUENZA B AGN: NEGATIVE

## 2025-03-06 PROCEDURE — 99213 OFFICE O/P EST LOW 20 MIN: CPT | Mod: S$GLB,,, | Performed by: PHYSICIAN ASSISTANT

## 2025-03-06 PROCEDURE — 87502 INFLUENZA DNA AMP PROBE: CPT | Mod: QW,S$GLB,, | Performed by: PHYSICIAN ASSISTANT

## 2025-03-06 RX ORDER — OSELTAMIVIR PHOSPHATE 75 MG/1
75 CAPSULE ORAL 2 TIMES DAILY
Qty: 10 CAPSULE | Refills: 0 | Status: SHIPPED | OUTPATIENT
Start: 2025-03-06 | End: 2025-03-11

## 2025-03-06 RX ORDER — AZELASTINE HYDROCHLORIDE, FLUTICASONE PROPIONATE 137; 50 UG/1; UG/1
1 SPRAY, METERED NASAL 2 TIMES DAILY PRN
Qty: 23 G | Refills: 0 | Status: SHIPPED | OUTPATIENT
Start: 2025-03-06 | End: 2025-04-05

## 2025-03-06 RX ORDER — BROMPHENIRAMINE MALEATE, PSEUDOEPHEDRINE HYDROCHLORIDE, AND DEXTROMETHORPHAN HYDROBROMIDE 2; 30; 10 MG/5ML; MG/5ML; MG/5ML
10 SYRUP ORAL EVERY 4 HOURS PRN
Qty: 150 ML | Refills: 0 | Status: SHIPPED | OUTPATIENT
Start: 2025-03-06 | End: 2025-03-16

## 2025-03-06 NOTE — PATIENT INSTRUCTIONS
Recommend oral antihistamine (loratadine [Claritin]/cetrizine [Zyrtec]) +/- oral decongestant (pseudoephedrine) for rhinorrhea, steroid nasal spray (flonase), prescription/OTC cough medicine [brompheniramine-pseudoeph-DM (BROMFED DM) ] as needed during day before 8 pm,  Nyquil at night, Tylenol (Acetaminophen) and/or Motrin (Ibuprofen) as directed for control of pain and/or fever.  Add Mucinex Dm 12 hour for wet cough   Bromphed contains pseudoephedrine so please do not take a separate oral decongestant (sudafed/pseudoephedrine) or stimulant (adderall/vyvanse) for ADHD.       Please drink plenty of fluids.  Please get plenty of rest.  Nasal irrigation with a saline spray or Netti Pot like device per their directions is also recommended.  If you  smoke, please stop smoking.    To help ease a sore throat, you can:  Use a sore throat spray.  Suck on hard candy or throat lozenges.  Gargle with warm saltwater a few times each day. Mix of 1/4 teaspoon (1.25 grams) salt in 8 ounces (240 mL) of warm water.  Use a cool mist humidifier to help you breathe easier.    If you negative (-) for a COVID test today and you are continuing to have symptoms, it is recommended to repeat the test in 48 hours x 3. If you continue to be negative, you may return to school/work once you have improved symptoms and no fever for 24 hours without any medications. This applies to all viral illnesses.       Discussed prescriptions and over-the-counter medicines to help with patient's symptoms:  A steroid nose spray (flonase) and antihistamine nasal spray (azelastine) can help with a stuffy nose. It can also help with drainage down the back of your throat.  An antihistamine (loratadine,zyrtec,allegra, xyzal) can help with itching, sneezing, or runny nose.  An antihistamine eye drop can help with itchy eyes.  A decongestant (pseudoephedrine,  Phenylephrine, oxymetazoline aka afrin nasal spray) can help with a stuffy nose. Take <10 days for  congestion and rhinorrhea. Once symptoms improve, proceed with loratadine/zyrtec once a day. These ingredients can keep you up all night, decrease appetite, feel jittery, and raise blood pressure with long term use.  Medications that control cough are suppressants and expectorants. Suppressants are tessalon pearls and dextromethorphan. If you have a productive cough with sputum, you need an expectorant called guaifenesin. Dextromethorphan and Guaifenesin are active ingredients in many OTC cough/cold medications such as Dayquil/Nyquil, Mucinex, and Robitussin Mucus+Chest Congestion.            Common Cold Medicine Ingredients Cheat sheet  Acetaminophen (APAP) -pain reliever/fever reducer  Dextromethorphan - cough suppressant  Guaifenesin - expectorant/thins and loosens mucus  Phenylephrine - nasal decongestant  Diphenhydramine or Doxylamine succinate - antihistamine, helps you fall asleep  Promethazine or Brompheniramine - Prescription strength antihistamines    These OTC cold medications are safe to use if you do not have high blood pressure (hypertension) or palpitations.  DayQuil and NyQuil - Cough, Cold & Flu Relief LiquiCaps  DayQuil: Acetaminophen, Dextromethorphan, and Phenylephrine   NyQuil: Acetaminophen, Dextromethorphan, and Doxylamine  DayQuil and NyQuil SEVERE Maximum Strength Cough, Cold & Flu Relief LiquiCaps   DAYQUIL: Acetaminophen, Dextromethorphan, Guaifenesin, and Phenylephrine  NYQUIL: Acetaminophen, Dextromethorphan, Doxylamine, and Phenylephrine   Mucinex DM: Guaifenesin,Dextromethorphan  Mucinex Maximum Strength Sinus-Max® Pressure, Pain & Cough Liquid Gels: Acetaminophen/Dextromethorphan/ Guaifenesin/Phenylephrine       If not allergic, take Tylenol (Acetaminophen) 650 mg to  1 g every 6 hours as needed  and/or Motrin (Ibuprofen) 600 to 800 mg every 6 hours as needed for fever or pain.          Please remember that you have received care at an urgent care today. Urgent cares are not emergency  rooms and are not equipped to handle life threatening emergencies and cannot rule in or out certain medical conditions and you may be released before all of your medical problems are known or treated.     Please arrange follow up with your primary care physician or speciality clinic within 2-5 days if your signs and symptoms have not resolved or worsen.     Patient can call our Referral Hotline at [phone] to make an appointment.      Please return here or go to the Emergency Department for any concerns or worsening of condition.  Signs of infection. These include a fever of 100.4°F (38°C) or higher, chills, cough, more sputum or change in color of sputum.  You are having so much trouble breathing that you can only say one or two words at a time.  You need to sit upright at all times to be able to breathe and or cannot lie down.  You have trouble breathing when talking or sitting still.  You have a fever of 100.4°F (38°C) or higher or chills.  You have chest pain when you cough, have trouble breathing but can still talk in full sentences, or cough up blood.

## 2025-03-06 NOTE — PROGRESS NOTES
"Subjective:      Patient ID: Socorro Huang is a 49 y.o. female.    Vitals:  height is 5' 7" (1.702 m) and weight is 74 kg (163 lb 2.3 oz). Her oral temperature is 98.7 °F (37.1 °C). Her blood pressure is 112/78 and her pulse is 78. Her respiration is 20 and oxygen saturation is 98%.     Chief Complaint: Cough (Coughing, fever, achy all over. - Entered by patient)    Socorro Huang is a 49 y.o. female who complains of cough, body aches, fever, runny nose, sneezing. Sx started yesterday. Tx include tylenol extra strength and ibuprofen 800 mg with no relief. Pt reports fever of 101F.    Cough  This is a new problem. The current episode started yesterday. The problem has been gradually worsening. The problem occurs constantly. The cough is Productive of sputum. Associated symptoms include a fever, headaches, myalgias, nasal congestion, postnasal drip and rhinorrhea. Pertinent negatives include no chest pain, chills, ear congestion, ear pain, heartburn, hemoptysis, rash, sore throat, shortness of breath, sweats, weight loss or wheezing. Nothing aggravates the symptoms. She has tried OTC cough suppressant for the symptoms. The treatment provided mild relief. There is no history of asthma, bronchiectasis, bronchitis, COPD, emphysema, environmental allergies or pneumonia.     Constitution: Positive for fatigue, fever and generalized weakness. Negative for activity change, appetite change and chills.   HENT:  Positive for congestion and postnasal drip. Negative for ear pain, ear discharge, foreign body in ear, tinnitus, sinus pain, sinus pressure, sore throat, trouble swallowing and voice change.    Cardiovascular:  Negative for chest pain, leg swelling, palpitations and sob on exertion.   Respiratory:  Positive for cough and sputum production. Negative for sleep apnea, bloody sputum, shortness of breath, wheezing and asthma.    Gastrointestinal:  Negative for nausea, vomiting and heartburn.   Musculoskeletal:  Positive " for pain, back pain and muscle ache.   Skin:  Negative for rash.   Allergic/Immunologic: Positive for sneezing. Negative for environmental allergies, seasonal allergies, food allergies and asthma.   Neurological:  Positive for headaches.      Objective:     Physical Exam   Constitutional: She is oriented to person, place, and time. She is cooperative. No distress.      Comments:Patient is awake and alert, sitting up in exam chair, speaking and answering in complete sentences     normalawake  HENT:   Head: Normocephalic and atraumatic.      Comments: Patient observed breathing through mouth, sniffling, and frequently clearing throat.    Ears:   Right Ear: External ear and ear canal normal. A middle ear effusion is present.   Left Ear: External ear and ear canal normal. A middle ear effusion is present.   Nose: Mucosal edema, rhinorrhea and congestion present.   Mouth/Throat: Uvula is midline and oropharynx is clear and moist. Mucous membranes are moist. No oropharyngeal exudate or posterior oropharyngeal erythema. Oropharynx is clear.      Comments:  postnasal discharge noted on the posterior pharyngeal wall    Eyes: Conjunctivae and lids are normal. Pupils are equal, round, and reactive to light. Extraocular movement intact vision grossly intact gaze aligned appropriately   Neck: Neck supple.   Cardiovascular: Normal rate, regular rhythm, normal heart sounds and normal pulses.   Pulmonary/Chest: Effort normal and breath sounds normal. No respiratory distress. She has no wheezes. She has no rhonchi. She has no rales.   Abdominal: Normal appearance.   Musculoskeletal: Normal range of motion.         General: Normal range of motion.      Cervical back: She exhibits no tenderness.   Lymphadenopathy:     She has no cervical adenopathy.   Neurological: She is alert and oriented to person, place, and time.   Skin: Skin is warm.   Psychiatric: Her behavior is normal. Mood, judgment and thought content normal.   Nursing note  and vitals reviewed.      Assessment:     1. Influenza A    2. Other cough    3. Generalized body aches    4. Nasal congestion with rhinorrhea      Patient presents with clinical exam findings and history consistent with above.      On exam, patient is nontoxic appearing and vitals are stable.      Diagnostic testing results were reviewed and discussed with patient/guardian.   Tests ordered in clinic:  Results for orders placed or performed in visit on 03/06/25   POCT Influenza A/B MOLECULAR    Collection Time: 03/06/25  8:45 AM   Result Value Ref Range    POC Molecular Influenza A Ag Positive (A) Negative    POC Molecular Influenza B Ag Negative Negative     Acceptable Yes      Previous progress notes/admissions/labs and medications were reviewed.  Reviewed GFR > 60 from CMP on 6/20/24.    Plan:       Influenza A  -     oseltamivir (TAMIFLU) 75 MG capsule; Take 1 capsule (75 mg total) by mouth 2 (two) times daily. for 5 days  Dispense: 10 capsule; Refill: 0  -     Ambulatory referral/consult to Internal Medicine    Other cough  -     POCT Influenza A/B MOLECULAR  -     brompheniramine-pseudoeph-DM (BROMFED DM) 2-30-10 mg/5 mL Syrp; Take 10 mLs by mouth every 4 (four) hours as needed (cough, cold, and congestion).  Dispense: 150 mL; Refill: 0    Generalized body aches  -     oseltamivir (TAMIFLU) 75 MG capsule; Take 1 capsule (75 mg total) by mouth 2 (two) times daily. for 5 days  Dispense: 10 capsule; Refill: 0    Nasal congestion with rhinorrhea  -     brompheniramine-pseudoeph-DM (BROMFED DM) 2-30-10 mg/5 mL Syrp; Take 10 mLs by mouth every 4 (four) hours as needed (cough, cold, and congestion).  Dispense: 150 mL; Refill: 0  -     azelastine-fluticasone (DYMISTA) 137-50 mcg/spray Spry nassal spray; 1 spray by Each Nostril route 2 (two) times daily as needed (runny nose and congestion).  Dispense: 23 g; Refill: 0                1) See orders for this visit as documented in the electronic medical  "record.  2) Symptomatic therapy suggested: use acetaminophen/ibuprofen every 6-8 hours prn pain or fever, push fluids.   3) Call or return to clinic prn if these symptoms worsen or fail to improve as anticipated.    Discussed results/diagnosis/plan with patient in clinic.  We had shared decision making for patient's treatment. Patient verbalized understanding and in agreement with current treatment plan.     Patient was instructed to return for re-evaluation with urgent care or PCP for continued outpatient workup and management if symptoms do not improve/worsening symptoms. Strict ED versus clinic precautions given in depth.    Discharge and follow-up instructions given verbally/printed with the patient who expressed understanding. The instructions and results are also available on [employer].              Evon "Katty Ragsdale PA-C          Patient Instructions   Recommend oral antihistamine (loratadine [Claritin]/cetrizine [Zyrtec]) +/- oral decongestant (pseudoephedrine) for rhinorrhea, steroid nasal spray (flonase), prescription/OTC cough medicine [brompheniramine-pseudoeph-DM (BROMFED DM) ] as needed during day before 8 pm,  Nyquil at night, Tylenol (Acetaminophen) and/or Motrin (Ibuprofen) as directed for control of pain and/or fever.  Add Mucinex Dm 12 hour for wet cough   Bromphed contains pseudoephedrine so please do not take a separate oral decongestant (sudafed/pseudoephedrine) or stimulant (adderall/vyvanse) for ADHD.       Please drink plenty of fluids.  Please get plenty of rest.  Nasal irrigation with a saline spray or Netti Pot like device per their directions is also recommended.  If you  smoke, please stop smoking.    To help ease a sore throat, you can:  Use a sore throat spray.  Suck on hard candy or throat lozenges.  Gargle with warm saltwater a few times each day. Mix of 1/4 teaspoon (1.25 grams) salt in 8 ounces (240 mL) of warm water.  Use a cool mist humidifier to help you breathe easier.    If " you negative (-) for a COVID test today and you are continuing to have symptoms, it is recommended to repeat the test in 48 hours x 3. If you continue to be negative, you may return to school/work once you have improved symptoms and no fever for 24 hours without any medications. This applies to all viral illnesses.       Discussed prescriptions and over-the-counter medicines to help with patient's symptoms:  A steroid nose spray (flonase) and antihistamine nasal spray (azelastine) can help with a stuffy nose. It can also help with drainage down the back of your throat.  An antihistamine (loratadine,zyrtec,allegra, xyzal) can help with itching, sneezing, or runny nose.  An antihistamine eye drop can help with itchy eyes.  A decongestant (pseudoephedrine,  Phenylephrine, oxymetazoline aka afrin nasal spray) can help with a stuffy nose. Take <10 days for congestion and rhinorrhea. Once symptoms improve, proceed with loratadine/zyrtec once a day. These ingredients can keep you up all night, decrease appetite, feel jittery, and raise blood pressure with long term use.  Medications that control cough are suppressants and expectorants. Suppressants are tessalon pearls and dextromethorphan. If you have a productive cough with sputum, you need an expectorant called guaifenesin. Dextromethorphan and Guaifenesin are active ingredients in many OTC cough/cold medications such as Dayquil/Nyquil, Mucinex, and Robitussin Mucus+Chest Congestion.            Common Cold Medicine Ingredients Cheat sheet  Acetaminophen (APAP) -pain reliever/fever reducer  Dextromethorphan - cough suppressant  Guaifenesin - expectorant/thins and loosens mucus  Phenylephrine - nasal decongestant  Diphenhydramine or Doxylamine succinate - antihistamine, helps you fall asleep  Promethazine or Brompheniramine - Prescription strength antihistamines    These OTC cold medications are safe to use if you do not have high blood pressure (hypertension) or  palpitations.  DayQuil and NyQuil - Cough, Cold & Flu Relief LiquiCaps  DayQuil: Acetaminophen, Dextromethorphan, and Phenylephrine   NyQuil: Acetaminophen, Dextromethorphan, and Doxylamine  DayQuil and NyQuil SEVERE Maximum Strength Cough, Cold & Flu Relief LiquiCaps   DAYQUIL: Acetaminophen, Dextromethorphan, Guaifenesin, and Phenylephrine  NYQUIL: Acetaminophen, Dextromethorphan, Doxylamine, and Phenylephrine   Mucinex DM: Guaifenesin,Dextromethorphan  Mucinex Maximum Strength Sinus-Max® Pressure, Pain & Cough Liquid Gels: Acetaminophen/Dextromethorphan/ Guaifenesin/Phenylephrine       If not allergic, take Tylenol (Acetaminophen) 650 mg to  1 g every 6 hours as needed  and/or Motrin (Ibuprofen) 600 to 800 mg every 6 hours as needed for fever or pain.          Please remember that you have received care at an urgent care today. Urgent cares are not emergency rooms and are not equipped to handle life threatening emergencies and cannot rule in or out certain medical conditions and you may be released before all of your medical problems are known or treated.     Please arrange follow up with your primary care physician or speciality clinic within 2-5 days if your signs and symptoms have not resolved or worsen.     Patient can call our Referral Hotline at [phone] to make an appointment.      Please return here or go to the Emergency Department for any concerns or worsening of condition.  Signs of infection. These include a fever of 100.4°F (38°C) or higher, chills, cough, more sputum or change in color of sputum.  You are having so much trouble breathing that you can only say one or two words at a time.  You need to sit upright at all times to be able to breathe and or cannot lie down.  You have trouble breathing when talking or sitting still.  You have a fever of 100.4°F (38°C) or higher or chills.  You have chest pain when you cough, have trouble breathing but can still talk in full sentences, or cough up  blood.

## 2025-03-06 NOTE — LETTER
"  March 6, 2025      [facility] [address]  [place] [address]  Phone: [phone]  Fax: [phone]       Patient: Socorro Huang   YOB: 1975  Date of Visit: 03/06/2025    To Whom It May Concern:    Padmini Huang  was at [facility] on 03/06/2025. The patient may return to work/school on 3/10/25 with no restrictions. If you have any questions or concerns, or if I can be of further assistance, please do not hesitate to contact me.    Sincerely,        Evonnoah Ragsdale PA-C (Jackie)       "

## 2025-03-07 ENCOUNTER — PATIENT MESSAGE (OUTPATIENT)
Dept: REHABILITATION | Facility: HOSPITAL | Age: 50
End: 2025-03-07
Payer: COMMERCIAL

## 2025-03-10 ENCOUNTER — PATIENT MESSAGE (OUTPATIENT)
Dept: NEUROLOGY | Facility: CLINIC | Age: 50
End: 2025-03-10
Payer: COMMERCIAL

## 2025-03-18 ENCOUNTER — PROCEDURE VISIT (OUTPATIENT)
Facility: CLINIC | Age: 50
End: 2025-03-18
Payer: COMMERCIAL

## 2025-03-18 VITALS — SYSTOLIC BLOOD PRESSURE: 113 MMHG | HEART RATE: 72 BPM | DIASTOLIC BLOOD PRESSURE: 69 MMHG

## 2025-03-18 DIAGNOSIS — M67.431 GANGLION CYST OF VOLAR ASPECT OF RIGHT WRIST: Primary | ICD-10-CM

## 2025-03-18 DIAGNOSIS — G43.709 CHRONIC MIGRAINE W/O AURA W/O STATUS MIGRAINOSUS, NOT INTRACTABLE: Primary | ICD-10-CM

## 2025-03-18 DIAGNOSIS — G56.01 CARPAL TUNNEL SYNDROME OF RIGHT WRIST: ICD-10-CM

## 2025-03-18 PROCEDURE — 64615 CHEMODENERV MUSC MIGRAINE: CPT | Mod: S$GLB,,, | Performed by: PHYSICIAN ASSISTANT

## 2025-03-18 RX ORDER — ONDANSETRON HYDROCHLORIDE 8 MG/1
8 TABLET, FILM COATED ORAL EVERY 8 HOURS PRN
Qty: 30 TABLET | Refills: 0 | Status: SHIPPED | OUTPATIENT
Start: 2025-03-18

## 2025-03-18 RX ORDER — HYDROCODONE BITARTRATE AND ACETAMINOPHEN 5; 325 MG/1; MG/1
1 TABLET ORAL EVERY 6 HOURS PRN
Qty: 30 TABLET | Refills: 0 | Status: SHIPPED | OUTPATIENT
Start: 2025-03-18

## 2025-03-18 RX ORDER — DOCUSATE SODIUM 100 MG/1
100 CAPSULE, LIQUID FILLED ORAL 2 TIMES DAILY
Qty: 30 CAPSULE | Refills: 1 | Status: SHIPPED | OUTPATIENT
Start: 2025-03-18

## 2025-03-18 NOTE — PROCEDURES
PROCEDURE NOTE:  BOTOX was performed as an indicated therapy for intractable chronic migraine headaches given that the patient failed more than 2 headache medications     A time out was conducted just before the start of the procedure to verify the correct patient and procedure, procedure location, and all relevant critical information.      Botulinum Toxin Injection Procedure      PROCEDURE PERFORMED: Botulinum toxin injection (99859)  CLINICAL INDICATION: Chronic Migraines  After risks and benefits were explained including bleeding, infection, worsening of pain, damage to the areas being injected, weakness of muscles, loss of muscle control, dysphagia if injecting the head or neck, facial droop if injecting the facial area, painful injection, allergic or other reaction to the medications being injected, and the failure of the procedure to help the problem, a signed consent was obtained.   The patient was placed in a comfortable area and the sites to be treated were identified.The area to be treated was prepped three times with alcohol and the alcohol allowed to dry. Next, a 30 gauge needle was used to inject the medication in the area to be treated.      Total Botox used: 155 Units   Unavoidable waste: 45 Units      Injection sites:    muscle bilaterally ( a total of 10 units divided into 2 sites)   Procerus muscle (5 units)   Frontalis muscle bilaterally (a total of 20 units divided into 4 sites)   Temporalis muscle bilaterally (a total of 40 units divided into 8 sites)   Occipitalis muscle bilaterally (a total of 30 units divided into 6 sites)   Cervical paraspinal muscles (a total of 20 units divided into 4 sites)   Trapezius muscle bilaterally (a total of 30 units divided into 6 sites)   Complications: none   RTC for the next Botox injection: 12 weeks     Problem List Items Addressed This Visit          Neuro    Chronic migraine w/o aura w/o status migrainosus, not intractable - Primary    Relevant  Medications    onabotulinumtoxina injection 200 Units (Completed)         Nilda Hill PA-C  [facility]   [phone]    Dr. Lund was available during today's encounter.

## 2025-03-19 ENCOUNTER — ANESTHESIA EVENT (OUTPATIENT)
Dept: SURGERY | Facility: HOSPITAL | Age: 50
End: 2025-03-19
Payer: COMMERCIAL

## 2025-03-19 ENCOUNTER — TELEPHONE (OUTPATIENT)
Dept: ORTHOPEDICS | Facility: CLINIC | Age: 50
End: 2025-03-19
Payer: COMMERCIAL

## 2025-03-19 NOTE — TELEPHONE ENCOUNTER
Spoke to patient to inform them of their surgery arrival time (5 am), JOSH, [facility]:  Verbalized understanding of instructions for pre-wash, and reviewed NPO after midnight.   You may drink gatorade and water only up to 2 hours prior to arrival, NOTHING ELSE.  Confirmed call in regards to medication instructions from anesthesia.   confirmed  Confirmed patient was NOT using a rideshare service for surgery arrival or  transportation.     Discussed removing any finger nail polish if applicable   removed  Confirmed no new wounds or abrasions on operative extremity.  none      [facility] Pre-Op and PACU Visiting Policy  · Each patient will be allowed 2 visitors with 1 visitor at a time. Only 1 swap out allowed.  · Pediatric patients: Both parents are allowed if present.  · Post-Op: If there is more than 1 visitor, the person that is the main caregiver will need to be available for d/c instructions should visit 2nd, and stay with the patient until d/c.  All visitors must be accompanied in and out with an employee.

## 2025-03-20 ENCOUNTER — HOSPITAL ENCOUNTER (OUTPATIENT)
Facility: HOSPITAL | Age: 50
Discharge: HOME | End: 2025-03-20
Attending: ORTHOPAEDIC SURGERY | Admitting: ORTHOPAEDIC SURGERY
Payer: COMMERCIAL

## 2025-03-20 ENCOUNTER — ANESTHESIA (OUTPATIENT)
Dept: SURGERY | Facility: HOSPITAL | Age: 50
End: 2025-03-20
Payer: COMMERCIAL

## 2025-03-20 VITALS
HEIGHT: 67 IN | DIASTOLIC BLOOD PRESSURE: 64 MMHG | RESPIRATION RATE: 19 BRPM | SYSTOLIC BLOOD PRESSURE: 129 MMHG | BODY MASS INDEX: 25.58 KG/M2 | TEMPERATURE: 97 F | OXYGEN SATURATION: 97 % | HEART RATE: 52 BPM | WEIGHT: 163 LBS

## 2025-03-20 DIAGNOSIS — M79.641 RIGHT HAND PAIN: ICD-10-CM

## 2025-03-20 DIAGNOSIS — G56.01 CARPAL TUNNEL SYNDROME OF RIGHT WRIST: Primary | ICD-10-CM

## 2025-03-20 DIAGNOSIS — M67.431 GANGLION CYST OF VOLAR ASPECT OF RIGHT WRIST: ICD-10-CM

## 2025-03-20 PROCEDURE — 63600175 PHARM REV CODE 636 W HCPCS: Performed by: PHYSICIAN ASSISTANT

## 2025-03-20 PROCEDURE — 27201423 OPTIME MED/SURG SUP & DEVICES STERILE SUPPLY: Performed by: ORTHOPAEDIC SURGERY

## 2025-03-20 PROCEDURE — 25000003 PHARM REV CODE 250: Performed by: NURSE ANESTHETIST, CERTIFIED REGISTERED

## 2025-03-20 PROCEDURE — 25000003 PHARM REV CODE 250

## 2025-03-20 PROCEDURE — 36000706: Performed by: ORTHOPAEDIC SURGERY

## 2025-03-20 PROCEDURE — 71000033 HC RECOVERY, INTIAL HOUR: Performed by: ORTHOPAEDIC SURGERY

## 2025-03-20 PROCEDURE — 25000003 PHARM REV CODE 250: Performed by: ORTHOPAEDIC SURGERY

## 2025-03-20 PROCEDURE — 71000015 HC POSTOP RECOV 1ST HR: Performed by: ORTHOPAEDIC SURGERY

## 2025-03-20 PROCEDURE — 25000003 PHARM REV CODE 250: Performed by: PHYSICIAN ASSISTANT

## 2025-03-20 PROCEDURE — 64415 NJX AA&/STRD BRCH PLXS IMG: CPT | Performed by: ANESTHESIOLOGY

## 2025-03-20 PROCEDURE — 25111 REMOVE WRIST TENDON LESION: CPT | Mod: 59,51,RT, | Performed by: ORTHOPAEDIC SURGERY

## 2025-03-20 PROCEDURE — 88304 TISSUE EXAM BY PATHOLOGIST: CPT | Performed by: PATHOLOGY

## 2025-03-20 PROCEDURE — 36000707: Performed by: ORTHOPAEDIC SURGERY

## 2025-03-20 PROCEDURE — 63600175 PHARM REV CODE 636 W HCPCS: Performed by: NURSE ANESTHETIST, CERTIFIED REGISTERED

## 2025-03-20 PROCEDURE — 63600175 PHARM REV CODE 636 W HCPCS

## 2025-03-20 PROCEDURE — 88304 TISSUE EXAM BY PATHOLOGIST: CPT | Mod: 26,,, | Performed by: PATHOLOGY

## 2025-03-20 PROCEDURE — 37000009 HC ANESTHESIA EA ADD 15 MINS: Performed by: ORTHOPAEDIC SURGERY

## 2025-03-20 PROCEDURE — 94761 N-INVAS EAR/PLS OXIMETRY MLT: CPT

## 2025-03-20 PROCEDURE — 63600175 PHARM REV CODE 636 W HCPCS: Performed by: ANESTHESIOLOGY

## 2025-03-20 PROCEDURE — 27000221 HC OXYGEN, UP TO 24 HOURS

## 2025-03-20 PROCEDURE — 64721 CARPAL TUNNEL SURGERY: CPT | Mod: RT,,, | Performed by: ORTHOPAEDIC SURGERY

## 2025-03-20 PROCEDURE — 37000008 HC ANESTHESIA 1ST 15 MINUTES: Performed by: ORTHOPAEDIC SURGERY

## 2025-03-20 RX ORDER — ROPIVACAINE HYDROCHLORIDE 5 MG/ML
INJECTION, SOLUTION EPIDURAL; INFILTRATION; PERINEURAL
Status: COMPLETED | OUTPATIENT
Start: 2025-03-20 | End: 2025-03-20

## 2025-03-20 RX ORDER — HYDROCODONE BITARTRATE AND ACETAMINOPHEN 5; 325 MG/1; MG/1
1 TABLET ORAL EVERY 4 HOURS PRN
Status: DISCONTINUED | OUTPATIENT
Start: 2025-03-20 | End: 2025-03-20 | Stop reason: HOSPADM

## 2025-03-20 RX ORDER — DEXAMETHASONE SODIUM PHOSPHATE 4 MG/ML
INJECTION, SOLUTION INTRA-ARTICULAR; INTRALESIONAL; INTRAMUSCULAR; INTRAVENOUS; SOFT TISSUE
Status: DISCONTINUED | OUTPATIENT
Start: 2025-03-20 | End: 2025-03-20

## 2025-03-20 RX ORDER — ACETAMINOPHEN 500 MG
1000 TABLET ORAL
Status: COMPLETED | OUTPATIENT
Start: 2025-03-20 | End: 2025-03-20

## 2025-03-20 RX ORDER — MUPIROCIN 20 MG/G
OINTMENT TOPICAL 2 TIMES DAILY
Status: DISCONTINUED | OUTPATIENT
Start: 2025-03-20 | End: 2025-03-20 | Stop reason: HOSPADM

## 2025-03-20 RX ORDER — BACITRACIN ZINC 500 [USP'U]/G
OINTMENT TOPICAL
Status: DISCONTINUED | OUTPATIENT
Start: 2025-03-20 | End: 2025-03-20 | Stop reason: HOSPADM

## 2025-03-20 RX ORDER — FENTANYL CITRATE 50 UG/ML
100 INJECTION, SOLUTION INTRAMUSCULAR; INTRAVENOUS
Status: DISCONTINUED | OUTPATIENT
Start: 2025-03-20 | End: 2025-03-20 | Stop reason: HOSPADM

## 2025-03-20 RX ORDER — MUPIROCIN 20 MG/G
OINTMENT TOPICAL
Status: DISCONTINUED | OUTPATIENT
Start: 2025-03-20 | End: 2025-03-20 | Stop reason: HOSPADM

## 2025-03-20 RX ORDER — MIDAZOLAM HYDROCHLORIDE 1 MG/ML
1 INJECTION, SOLUTION INTRAMUSCULAR; INTRAVENOUS
Status: DISCONTINUED | OUTPATIENT
Start: 2025-03-20 | End: 2025-03-20 | Stop reason: HOSPADM

## 2025-03-20 RX ORDER — CELECOXIB 200 MG/1
400 CAPSULE ORAL
Status: COMPLETED | OUTPATIENT
Start: 2025-03-20 | End: 2025-03-20

## 2025-03-20 RX ORDER — PROPOFOL 10 MG/ML
VIAL (ML) INTRAVENOUS CONTINUOUS PRN
Status: DISCONTINUED | OUTPATIENT
Start: 2025-03-20 | End: 2025-03-20

## 2025-03-20 RX ORDER — LIDOCAINE HYDROCHLORIDE 20 MG/ML
INJECTION INTRAVENOUS
Status: DISCONTINUED | OUTPATIENT
Start: 2025-03-20 | End: 2025-03-20

## 2025-03-20 RX ORDER — GLUCAGON 1 MG
1 KIT INJECTION
Status: DISCONTINUED | OUTPATIENT
Start: 2025-03-20 | End: 2025-03-20 | Stop reason: HOSPADM

## 2025-03-20 RX ORDER — ONDANSETRON HYDROCHLORIDE 2 MG/ML
INJECTION, SOLUTION INTRAVENOUS
Status: DISCONTINUED | OUTPATIENT
Start: 2025-03-20 | End: 2025-03-20

## 2025-03-20 RX ORDER — HALOPERIDOL LACTATE 5 MG/ML
0.5 INJECTION, SOLUTION INTRAMUSCULAR EVERY 10 MIN PRN
Status: DISCONTINUED | OUTPATIENT
Start: 2025-03-20 | End: 2025-03-20 | Stop reason: HOSPADM

## 2025-03-20 RX ORDER — FAMOTIDINE 10 MG/ML
INJECTION, SOLUTION INTRAVENOUS
Status: DISCONTINUED | OUTPATIENT
Start: 2025-03-20 | End: 2025-03-20

## 2025-03-20 RX ORDER — PROPOFOL 10 MG/ML
VIAL (ML) INTRAVENOUS
Status: DISCONTINUED | OUTPATIENT
Start: 2025-03-20 | End: 2025-03-20

## 2025-03-20 RX ORDER — DEXMEDETOMIDINE HYDROCHLORIDE 100 UG/ML
INJECTION, SOLUTION INTRAVENOUS
Status: DISCONTINUED | OUTPATIENT
Start: 2025-03-20 | End: 2025-03-20

## 2025-03-20 RX ORDER — CEFAZOLIN 2 G/1
2 INJECTION, POWDER, FOR SOLUTION INTRAMUSCULAR; INTRAVENOUS
Status: COMPLETED | OUTPATIENT
Start: 2025-03-20 | End: 2025-03-20

## 2025-03-20 RX ADMIN — PROPOFOL 100 MCG/KG/MIN: 10 INJECTION, EMULSION INTRAVENOUS at 07:03

## 2025-03-20 RX ADMIN — DEXAMETHASONE SODIUM PHOSPHATE 4 MG: 4 INJECTION, SOLUTION INTRAMUSCULAR; INTRAVENOUS at 07:03

## 2025-03-20 RX ADMIN — FENTANYL CITRATE 100 MCG: 50 INJECTION, SOLUTION INTRAMUSCULAR; INTRAVENOUS at 06:03

## 2025-03-20 RX ADMIN — SODIUM CHLORIDE: 9 INJECTION, SOLUTION INTRAVENOUS at 05:03

## 2025-03-20 RX ADMIN — ONDANSETRON 4 MG: 2 INJECTION INTRAMUSCULAR; INTRAVENOUS at 07:03

## 2025-03-20 RX ADMIN — ACETAMINOPHEN 1000 MG: 500 TABLET ORAL at 05:03

## 2025-03-20 RX ADMIN — LIDOCAINE HYDROCHLORIDE 100 MG: 20 INJECTION INTRAVENOUS at 07:03

## 2025-03-20 RX ADMIN — CELECOXIB 400 MG: 200 CAPSULE ORAL at 05:03

## 2025-03-20 RX ADMIN — PROPOFOL 30 MG: 10 INJECTION, EMULSION INTRAVENOUS at 07:03

## 2025-03-20 RX ADMIN — FAMOTIDINE 20 MG: 10 INJECTION, SOLUTION INTRAVENOUS at 07:03

## 2025-03-20 RX ADMIN — ROPIVACAINE HYDROCHLORIDE 30 ML: 5 INJECTION EPIDURAL; INFILTRATION; PERINEURAL at 06:03

## 2025-03-20 RX ADMIN — MUPIROCIN: 20 OINTMENT TOPICAL at 05:03

## 2025-03-20 RX ADMIN — CEFAZOLIN 2 G: 2 INJECTION, POWDER, FOR SOLUTION INTRAMUSCULAR; INTRAVENOUS at 07:03

## 2025-03-20 RX ADMIN — MIDAZOLAM 2 MG: 1 INJECTION INTRAMUSCULAR; INTRAVENOUS at 06:03

## 2025-03-20 RX ADMIN — DEXMEDETOMIDINE 10 MCG: 100 INJECTION, SOLUTION, CONCENTRATE INTRAVENOUS at 07:03

## 2025-03-20 RX ADMIN — PROPOFOL 20 MG: 10 INJECTION, EMULSION INTRAVENOUS at 07:03

## 2025-03-20 NOTE — OP NOTE
[facility])  Surgery Department  Operative Note    SUMMARY     Date of Procedure: 3/20/2025     Procedure:   Right volar wrist ganglion excision, cpt 46566  Right wrist arthrotomy, cpt 04764  Right trapezium partial ostectomy, cpt 56806  Right carpal tunnel release, cpt 98706    Surgeons and Role:     * Sara Lees MD - Primary    Assisting Surgeon: None    Pre-Operative Diagnosis: Carpal tunnel syndrome of right wrist [G56.01]  Ganglion cyst of volar aspect of right wrist [M67.431]    Post-Operative Diagnosis: Post-Op Diagnosis Codes:     * Carpal tunnel syndrome of right wrist [G56.01]     * Ganglion cyst of volar aspect of right wrist [M67.431]    Anesthesia: General/Regional    Indication for Procedure: 50 yo female with right carpal tunnel syndrome and right volar wrist ganglion that had failed conservative treatment. Risks and benefits of the procedure were discussed with the patient and informed consent was obtained.    Description of the Findings of the Procedure: The patient was seen in the preoperative holding area and the right hand was marked.  Regional anesthesia was achieved in the preoperative holding area.  The patient was taken to the OR, placed supine on the table, and a padded hand table was used. After monitored anesthesia care was administered without difficulty, a time-out procedure was performed identifying the patient, the operative site and the procedure to be performed.  A tourniquet was placed on the arm and the upper extremity was prepped and draped in standard sterile fashion. The upper extremity was elevated and exsanguinated with an Esmarch bandage, and the tourniquet was inflated to 250 mm Hg.     A 4 cm longitudinal incision was made over the right wrist volarly.  Littler scissors were used to dissect down to the forearm fascia, this was sharply incised.  The ganglion was intertwined with the radial artery as well as the vena comitantes.  The ganglion stalk was  traced down to the wrist joint.  A radiocarpal arthrotomy was made.  The ganglion as well as the stalk emanating from the wrist joint was truncated and removed in its entirety. It measured 1. X 0.4 cm and was sent for permanent pathological specimen.  There was also a volar osteophyte emanating from the trapezium which was removed with a rongeur.  Bone wax was placed over the excision site.  The neurovascular bundles were identified and protected throughout the case. The wound was then irrigated with normal saline using a bulb syringe. 4-0 monocryl was used to close the skin in a deep dermal fashion, dermabond was used on the skin.    A 2 cm incision was made over the left carpal tunnel.  The palmar fascia was sharply incised.  Jenna retractors were used to expose the transverse carpal ligament, this was sharply incised.  A carpal tunnel skid was placed underneath the transverse carpal ligament proximally, and the proximal transverse carpal ligament as well as the distal forearm fascia was sharply released. The median nerve was found to be significantly compressed through the carpal tunnel. The carpal tunnel skid was replaced distally, and the transverse carpal ligament was released under direct visualization.  The median nerve was found to be completely decompressed. The neurovascular bundles were identified and protected throughout the case. The wound was then irrigated with normal saline using a bulb syringe. 3-0 prolene was used to close the skin in a horizontal mattress fashion.       All instrument, sponge and needle counts were correct. Adaptic, 4 x 4, cast padding and Coban were used to dress the wrist.  The tourniquet was deflated and the hand and finger was pink and well-perfused.  The patient tolerated the procedure well.  She was taken awake and alert to the recovery room.    Post op plans patient keep dressing clean dry intact. Will see the patient back in 10-14 days to remove dressing and sutures. Light  use of the hand for 4 weeks.    Complications: No    Estimated Blood Loss (EBL): none           Specimens:   Specimen (24h ago, onward)       Start     Ordered    03/20/25 0807  Specimen to Pathology, Surgery Orthopedics  Once        Comments: Pre-op Diagnosis: Carpal tunnel syndrome of right wrist [G56.01]Ganglion cyst of volar aspect of right wrist [M67.431]Procedure(s):RELEASE, CARPAL TUNNELEXCISION, GANGLION CYST, WRISTARTHROTOMY, HAND RADIOCARPALOSTECTOMY, CARPAL BONE Number of specimens: 1Name of specimens: 1. Right wrist ganglion     References:    Click here for ordering Quick Tip   Question Answer Comment   Procedure Type: Orthopedics    Release to patient Immediate        03/20/25 0808                           Condition: Good    Disposition: PACU - hemodynamically stable.    Attestation: I was present and scrubbed for the entire procedure.

## 2025-03-20 NOTE — TRANSFER OF CARE
"Anesthesia Transfer of Care Note    Patient: Socorro Huang    Procedure(s) Performed: Procedure(s) (LRB):  RELEASE, CARPAL TUNNEL (Right)  EXCISION, GANGLION CYST, WRIST (Right)  ARTHROTOMY, HAND RADIOCARPAL (Right)  OSTECTOMY, CARPAL BONE (Right)    Patient location: [facility]    Anesthesia Type: general    Transport from OR: Transported from OR on 6-10 L/min O2 by face mask with adequate spontaneous ventilation    Post pain: adequate analgesia    Post assessment: no apparent anesthetic complications    Post vital signs: stable    Level of consciousness: awake    Nausea/Vomiting: no nausea/vomiting    Complications: none    Transfer of care protocol was followed      Last vitals: Visit Vitals  /68 (BP Location: Left arm, Patient Position: Lying)   Pulse 62   Temp 36.6 °C (97.8 °F) (Oral)   Resp (!) 24   Ht 5' 7" (1.702 m)   Wt 73.9 kg (163 lb)   LMP 05/05/2013   SpO2 100%   Breastfeeding No   BMI 25.53 kg/m²     "

## 2025-03-20 NOTE — PLAN OF CARE
Discharge instructions given and explained to patient and family with verbalization of understanding all instructions. Patient is AAOx3,v/s stable, denies n/v and tolerating po, rates pain level tolerable, IV removed, and family at bedside. Meds delivered to bedside.  Patient discharged to home. Patient transported off unit via wheelchair to private vehicle with family.

## 2025-03-20 NOTE — ANESTHESIA PROCEDURE NOTES
Right supraclavicular single shot    Patient location during procedure: pre-op   Block not for primary anesthetic.  Reason for block: at surgeon's request and post-op pain management   Post-op Pain Location: Right arm pain   Start time: 3/20/2025 6:15 AM  Timeout: 3/20/2025 6:15 AM   End time: 3/20/2025 6:20 AM    Staffing  Authorizing Provider: Romulo Mackenzie MD  Performing Provider: Romulo Mackenzie MD    Staffing  Performed by: Romulo Mackenzie MD  Authorized by: Romulo Mackenzie MD    Preanesthetic Checklist  Completed: patient identified, IV checked, site marked, risks and benefits discussed, surgical consent, monitors and equipment checked, pre-op evaluation and timeout performed  Peripheral Block  Patient position: supine  Prep: ChloraPrep  Patient monitoring: heart rate, cardiac monitor, continuous pulse ox, continuous capnometry and frequent blood pressure checks  Block type: supraclavicular  Laterality: right  Injection technique: single shot  Needle  Needle type: Stimuplex   Needle gauge: 22 G  Needle length: 2 in  Needle localization: anatomical landmarks and ultrasound guidance   -ultrasound image captured on disc.  Assessment  Injection assessment: negative aspiration, negative parasthesia and local visualized surrounding nerve  Paresthesia pain: none  Heart rate change: no  Slow fractionated injection: yes  Pain Tolerance: comfortable throughout block and no complaints  Medications:    Medications: ropivacaine (NAROPIN) injection 0.5% - Perineural   30 mL - 3/20/2025 6:20:00 AM    Additional Notes  VSS.  [facility] RN monitoring vitals throughout procedure.  Patient tolerated procedure well.     10ml 0.5% ropivacaine ICB

## 2025-03-20 NOTE — BRIEF OP NOTE
[facility] ([facility])  Surgery Department  Operative Note    SUMMARY     Date of Procedure: 3/20/2025     Procedure: Procedure(s) (LRB):  RELEASE, CARPAL TUNNEL (Right)  EXCISION, GANGLION CYST, WRIST (Right)  ARTHROTOMY, HAND RADIOCARPAL (Right)  OSTECTOMY, CARPAL BONE (Right)     Surgeons and Role:     * Sara Lees MD - Primary    Assisting Surgeon: None    Pre-Operative Diagnosis: Carpal tunnel syndrome of right wrist [G56.01]  Ganglion cyst of volar aspect of right wrist [M67.431]    Post-Operative Diagnosis: Post-Op Diagnosis Codes:     * Carpal tunnel syndrome of right wrist [G56.01]     * Ganglion cyst of volar aspect of right wrist [M67.431]  Right wrist osteophyte carpal trapezium    Anesthesia: General/Regional    Estimated Blood Loss (EBL): minimal           Implants: * No implants in log *    Specimens:   Specimen (24h ago, onward)       Start     Ordered    03/20/25 0807  Specimen to Pathology, Surgery Orthopedics  Once        Comments: Pre-op Diagnosis: Carpal tunnel syndrome of right wrist [G56.01]Ganglion cyst of volar aspect of right wrist [M67.431]Procedure(s):RELEASE, CARPAL TUNNELEXCISION, GANGLION CYST, WRISTARTHROTOMY, HAND RADIOCARPALOSTECTOMY, CARPAL BONE Number of specimens: 1Name of specimens: 1. Right wrist ganglion     References:    Click here for ordering Quick Tip   Question Answer Comment   Procedure Type: Orthopedics    Release to patient Immediate        03/20/25 0808                            Condition: Good    Disposition: PACU - hemodynamically stable.    Attestation: Op Note Attestation: I was physically present and scrubbed for the entire procedure.

## 2025-03-20 NOTE — ANESTHESIA PREPROCEDURE EVALUATION
03/20/2025  Socorro Huang is a 49 y.o., female.      Pre-op Assessment    I have reviewed the Patient Summary Reports.     I have reviewed the Nursing Notes. I have reviewed the NPO Status.   I have reviewed the Medications.     Review of Systems  Anesthesia Hx:  No problems with previous Anesthesia   History of prior surgery of interest to airway management or planning:  Previous anesthesia: General        Denies Family Hx of Anesthesia complications.   Personal Hx of Anesthesia complications, Post-Operative Nausea/Vomiting                    Social:  Non-Smoker       Hematology/Oncology:  Hematology Normal   Oncology Normal                                   EENT/Dental:  EENT/Dental Normal           Cardiovascular:  Cardiovascular Normal Exercise tolerance: good                                             Pulmonary:  Pulmonary Normal                       Renal/:  Renal/ Normal                 Hepatic/GI:     GERD, well controlled                Musculoskeletal:  Musculoskeletal Normal                Neurological:    Neuromuscular Disease,  Headaches                                 Endocrine:   Hypothyroidism          Dermatological:  Skin Normal    Psych:   anxiety                 Physical Exam  General: Well nourished, Cooperative, Alert and Oriented    Airway:  Mallampati: III / II  Mouth Opening: Normal  TM Distance: Normal  Tongue: Normal  Neck ROM: Normal ROM    Dental:  Intact, Veneers    Chest/Lungs:  Clear to auscultation, Normal Respiratory Rate    Heart:  Rate: Normal  Rhythm: Regular Rhythm  Sounds: Normal    Abdomen:  Normal, Soft, Nontender        Anesthesia Plan  Type of Anesthesia, risks & benefits discussed:    Anesthesia Type: Gen Supraglottic Airway, Gen ETT, Gen Natural Airway, MAC, Regional  Intra-op Monitoring Plan: Standard ASA Monitors  Post Op Pain Control Plan: multimodal  analgesia  Induction:  IV  Airway Plan: Direct, Post-Induction  Informed Consent: Informed consent signed with the Patient and all parties understand the risks and agree with anesthesia plan.  All questions answered.   ASA Score: 2    Ready For Surgery From Anesthesia Perspective.     .

## 2025-03-20 NOTE — ANESTHESIA POSTPROCEDURE EVALUATION
Anesthesia Post Evaluation    Patient: Socorro Huang    Procedure(s) Performed: Procedure(s) (LRB):  RELEASE, CARPAL TUNNEL (Right)  EXCISION, GANGLION CYST, WRIST (Right)  ARTHROTOMY, HAND RADIOCARPAL (Right)  OSTECTOMY, CARPAL BONE (Right)    Final Anesthesia Type: general      Patient location during evaluation: PACU  Patient participation: Yes- Able to Participate  Level of consciousness: awake and alert and oriented  Post-procedure vital signs: reviewed and stable  Pain management: adequate  Airway patency: patent    PONV status at discharge: No PONV  Anesthetic complications: no      Cardiovascular status: hemodynamically stable  Respiratory status: unassisted, spontaneous ventilation and room air  Hydration status: euvolemic  Follow-up not needed.              Vitals Value Taken Time   /64 03/20/25 08:46   Temp 36.2 °C (97.2 °F) 03/20/25 08:10   Pulse 52 03/20/25 09:00   Resp 19 03/20/25 09:00   SpO2 97 % 03/20/25 09:00         Event Time   Out of Recovery 08:40:00         Pain/Delaney Score: Pain Rating Prior to Med Admin: 0 (3/20/2025  5:55 AM)  Delaney Score: 10 (3/20/2025  8:30 AM)

## 2025-03-20 NOTE — H&P
Sara Lees MD at 1/29/2025  3:30 PM    Status: Signed   Expand All Collapse All     [facility]  History & Physical  Orthopedics     SUBJECTIVE:       Chief Complaint:       Chief Complaint   Patient presents with    Right Hand - Pain    Left Hand - Pain         Referring Provider: No ref. provider found   History of Present Illness    CHIEF COMPLAINT:  - Ganglion cyst on the wrist and carpal tunnel syndrome.     HPI:  Patient is a 49 y.o. right hand dominant female who presents today with complaints of right wrist ganglion for 2 months. She describes the affected area as tight and sore. The ganglion cyst causes discomfort, especially when typing.     Her fingers have reduced mobility since the cyst appeared. When asked about potential trauma, she recalled being knocked over by a foster dog a few months ago, resulting in a fall, but did not consider it significant at the time.     She reports a history of hand numbness for about a year. She types often for work and this causes numbness.  She states wearing a brace has helped with her carpal tunnel symptoms.     Patient denies any heart problems or lung problems. Patient denies any history of problems with anesthesia.     WORK STATUS:  - Works at [employer]     Vitals       Vitals:     01/29/25 1526   PainSc:   6   PainLoc: Hand         The patient denies any fevers, chills, N/V, D/C and presents for evaluation.          Past Medical History:   Diagnosis Date    Abnormal Pap smear      Anxiety       celexa & prozac didn't help much    Constipation - functional      Headache(784.0)      Hemorrhoid      Leukopenia       benign, evaluated in [facility] y 2000    Menorrhagia      PONV (postoperative nausea and vomiting)      Strain of neck muscle       tx with PT at [facility] in past            Past Surgical History:   Procedure Laterality Date    ANOSCOPY N/A 07/20/2022     Procedure: ANOSCOPY;  Surgeon: ASHTYN Melo MD;  Location: [facility]  OR 2ND FLR;  Service: Colon and Rectal;  Laterality: N/A;    ARTHROPLASTY,SPINE,CERVICAL N/A 10/12/2023     Procedure: ARTHROPLASTY,SPINE,CERVICAL;  Surgeon: Amador Wood MD;  Location: [facility] OR;  Service: Neurosurgery;  Laterality: N/A;  Anterior Cervical Discectomy C5/6, C6/7 with artifical disc, possible fusion          BREAST RECONSTRUCTION   2018     implant removal and lift    BREAST SURGERY        CHEILECTOMY Right 08/18/2023     Procedure: CHEILECTOMY;  Surgeon: Tacho Sanchez DPM;  Location: [facility] OR;  Service: Podiatry;  Laterality: Right;    COLON SURGERY   2014          COLONOSCOPY N/A 02/03/2022     Procedure: COLONOSCOPY;  Surgeon: ASHTYN Melo MD;  Location: [facility] ENDO;  Service: Endoscopy;  Laterality: N/A;    CORRECTION OF HAMMER TOE Right 08/18/2023     Procedure: CORRECTION, HAMMER TOE;  Surgeon: Tacho Sanchez DPM;  Location: [facility] OR;  Service: Podiatry;  Laterality: Right;    DILATION AND CURETTAGE OF UTERUS   12/18/2012     complex hyperplasia, no atypia    ESOPHAGOGASTRODUODENOSCOPY N/A 12/18/2024     Procedure: EGD (ESOPHAGOGASTRODUODENOSCOPY);  Surgeon: Edith Bourgeois MD;  Location: [facility] ENDOSCOPY;  Service: Endoscopy;  Laterality: N/A;  EGD/Bravo-Instr send portal-OFF PPI (96hrs)-ASul  9/10/24- pc complete. DBM  9/16-pt r/s, no allergy or sensitivity to metal/jewelry per pt, updated instructions sent to portal-Kpvt  12/18 R/s update instru to portal. ASam  12/11/24- portal msg for pc. DB       FLEXIBLE SIGMOIDOSCOPY N/A 07/20/2022     Procedure: SIGMOIDOSCOPY, FLEXIBLE;  Surgeon: ASHTYN Meol MD;  Location: [facility] OR 2ND FLR;  Service: Colon and Rectal;  Laterality: N/A;    HEMORRHOID SURGERY   2014    HYSTERECTOMY        INJECTION, SPINE, LUMBOSACRAL, TRANSFORAMINAL APPROACH Bilateral 10/31/2024     Procedure: TFESI B/L L4/5;  Surgeon: Anthony Macias MD;  Location: [facility];  Service: Pain Management;  Laterality: Bilateral;  no ac    LAPAROSCOPIC  SIGMOIDECTOMY Left 07/20/2022     Procedure: COLECTOMY, SIGMOID, LAPAROSCOPIC, ERAS low;  Surgeon: ASHTYN Melo MD;  Location: [facility] OR 2ND FLR;  Service: Colon and Rectal;  Laterality: Left;    MOBILIZATION OF SPLENIC FLEXURE N/A 07/20/2022     Procedure: MOBILIZATION, SPLENIC FLEXURE;  Surgeon: ASHTYN Melo MD;  Location: [facility] OR 2ND FLR;  Service: Colon and Rectal;  Laterality: N/A;    OOPHORECTOMY        PARTIAL HYSTERECTOMY   2013     for atypia    PH MONITORING, ESOPHAGUS, WIRELESS, (OFF REFLUX MEDS) N/A 12/18/2024     Procedure: PH MONITORING, ESOPHAGUS, WIRELESS, (OFF REFLUX MEDS);  Surgeon: Edith Bourgeois MD;  Location: [facility] ENDOSCOPY;  Service: Endoscopy;  Laterality: N/A;    SMALL INTESTINE SURGERY   2017    THYROIDECTOMY, PARTIAL   2013    TONSILLECTOMY        TUBAL LIGATION          Review of patient's allergies indicates:  No Known Allergies  Social History          Social History Narrative     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 Fulton , GYN Dr Ruby                               Family History   Problem Relation Name Age of Onset    Emphysema Mother Dagmar      Cancer Mother Dagmar           Lung    COPD Mother Dagmar      Diabetes Mother Dagmar      Cancer Father Terrazas           Lung    Heart disease Father Terrazas 50    Diabetes Paternal Grandmother Ana      Hypertension Paternal Grandmother Ana      Depression Sister Hope      Mental illness Sister Hope      Suicide Brother Reilly      Mental illness Brother Reilly      Drug abuse Maternal Aunt Citlali      Schizophrenia Paternal Aunt        Alcohol abuse Maternal Uncle Florentino      Alcohol abuse Cousin        Breast cancer Maternal Grandmother        Ovarian cancer Maternal Grandmother         ADD / ADHD Neg Hx        Anxiety disorder Neg Hx        Bipolar disorder Neg Hx        Dementia Neg Hx        OCD Neg Hx        Paranoid behavior Neg Hx        Physical abuse Neg Hx        Seizures Neg Hx        Self injury Neg Hx        Sexual abuse Neg Hx               Current Medications      Current Outpatient Medications:     diclofenac sodium (VOLTAREN ARTHRITIS PAIN) 1 % Gel, Apply 2 g topically 4 (four) times daily., Disp: 100 g, Rfl: 0    ibuprofen (ADVIL,MOTRIN) 600 MG tablet, Take 1 tablet (600 mg total) by mouth every 4 (four) hours as needed (for headaches)., Disp: 20 tablet, Rfl: 5    omeprazole (PRILOSEC) 40 MG capsule, Take 1 capsule (40 mg total) by mouth 2 (two) times daily before meals., Disp: , Rfl:     progesterone (PROMETRIUM) 200 MG capsule, , Disp: , Rfl:     tiZANidine (ZANAFLEX) 4 MG tablet, Take 1 tablet (4 mg total) by mouth nightly as needed (muscle tightness and spasms)., Disp: 30 tablet, Rfl: 0    traZODone (DESYREL) 100 MG tablet, Take 1 tablet (100 mg total) by mouth every evening., Disp: 90 tablet, Rfl: 2        ROS     Review of Systems:  Constitutional: no fever or chills  Eyes: no visual changes  ENT: no nasal congestion or sore throat  Respiratory: no cough or shortness of breath  Cardiovascular: no chest pain  Gastrointestinal: no nausea or vomiting, tolerating diet  Musculoskeletal: pain, soreness, and numbness/tingling     OBJECTIVE:       Vital Signs (Most Recent):  Vitals   There were no vitals filed for this visit.     There is no height or weight on file to calculate BMI.     Physical Exam     Physical Exam:  Constitutional: The patient appears well-developed and well-nourished. No distress.   Head: Normocephalic and atraumatic.   Nose: Nose normal.   Eyes: Conjunctivae and EOM are normal.   Neck: No tracheal deviation present.   Cardiovascular: Normal rate and intact distal pulses.    Pulmonary/Chest: Effort normal. No respiratory distress.   Abdominal: There is no  guarding.   Lymphatic: Negative for adenopathy   Neurological: The patient is alert.   Psychiatric: The patient has a normal mood and affect.      Bilateral Hand/Wrist Examination:     Observation/Inspection:  Swelling                       none                  Deformity                     none  Discoloration               none                  Scars                           none                  Atrophy                        none     HAND/WRIST EXAMINATION:  Finkelstein's Test                                Neg  WHAT Test                                         Neg  Snuff box tenderness                          Neg  Moyer's Test                                     Neg  Hook of Hamate Tenderness              Neg  CMC grind                                           Neg  Circumduction test                              Neg  TFCC Compression Test                    Neg     1x1 cm volar ganglion right wrist, bilateral ROM hand/wrist/elbow full     Neurovascular Exam:  Digits WWP, brisk CR < 3s throughout  NVI motor/LTS to M/R/U nerves, radial pulse 2+  2+ biceps and brachioradialis reflexes  Tinel's Test - Carpal Tunnel                Positive right  Tinel's Test - Cubital Tunnel               Neg  Phalen's Test                                      Positive right  Median Nerve Compression Test       Positive right     Diagnostic studies and other clinical records review:  X-rays AP, lateral and oblique right wrist are independently reviewed by me and shows no bony or ligamentous abnormalities.     ASSESSMENT/PLAN:    49 y.o. yo female with        Encounter Diagnoses   Name Primary?    Carpal tunnel syndrome of right wrist Yes    Ganglion cyst of volar aspect of right wrist      Right wrist pain        PLAN:   The patient and I had a thorough discussion today. We discussed the working diagnosis as well as several other potential alternative diagnoses. Treatment options were discussed, both conservative and surgical.  Conservative treatment options would include things such as activity modifications, workplace modifications, a period of rest, oral vs topical OTC and prescription anti-inflammatory medications, occupational therapy, splinting/bracing, immobilization, corticosteroid injections, and others. Surgical options were discussed as well.      At this time, the patient has exhausted conservative measures and would like to proceed with surgery. Surgery would include Right carpal tunnel release, right volar wrist ganglion excision, right radiocarpal arthrotomy. Consent signed today in clinic. Light use of the hand will be indicated for the first 4-6 weeks.     We have discussed risks, benefits and alternatives of hand surgery which include but are not limited to blood clots in the legs that can travel to the lungs (pulmonary embolism). Pulmonary embolism can cause shortness of breath, chest pain, and even shock. Other risks include urinary tract infection, nausea and vomiting (usually related to pain medication), chronic pain, bleeding, nerve damage, blood vessel injury, scarring and infection of the hand which can require re-operation. Furthermore, the risks of anesthesia include potential heart, lung, kidney, and liver damage.  Informed consent was obtained. she understands and would like to proceed with surgery in the near future.        Call with any questions/concerns in the interim          Sara Lees MD     Please be aware that this note has been generated with the assistance of [employer] voice-to-text.  Please excuse any spelling or grammatical errors.  This note was generated with the assistance of ambient listening technology. Verbal consent was obtained by the patient and accompanying visitor(s) for the recording of patient appointment to facilitate this note. I attest to having reviewed and edited the generated note for accuracy, though some syntax or spelling errors may persist. Please contact the author of this  note for any clarification.

## 2025-03-20 NOTE — PLAN OF CARE
Pre op complete. Waiting on anesthesia consent,. Site reymundo, update H&P.  at bedside. Pt resting comfortably with all questions addressed at this time and call light in reach. Belongings placed in locker.

## 2025-03-20 NOTE — DISCHARGE INSTRUCTIONS
HAND SURGERY - Care of the Hand after Surgery       Post-Op Care  It is important to follow your orthopaedic surgeon's instructions carefully after you return home. You should ask   someone to check on you that evening. The protocols described here are general in nature. Every person and   every surgery is different so the information given here is for guidance only. If you have questions you should   contact us.     Day of Surgery    You were place in a soft dressing with coban today to protect the surgical area during healing. Do not remove the soft dressing with coban until you are seen by Occupational Therapy, Maria Del Carmen Rutledge PA-C or Dr. Lees for your first postop visit    The hand and fingers may be numb for quite some time after surgery. This is due to the anesthetic block used at the time of surgery for pain control.   If you have an arm sling you may remove the sling at your convenience once you regain control of your arm from the anesthetic block.     Begin taking liquids and food as soon as you can. You should always eat some solid food, a sandwich or light meal, a little while before taking your pain meds.     Day 3  Things are much the same on the third day after your surgery. Usually you have less pain and feel like doing more.    Showering: It is important to keep the incision absolutely dry while showering or bathing (use two plastic bags over your hand) or a shower bag.   If you feel that the pain medication you were given after surgery is stronger than you really need you can reduce the dose, take it less frequently or switch to ibuprofen or Tylenol. If you received antibiotics they should be taken until the entire prescription is completed.  Day 10-11  We will see you back after your surgery to review with you what was done in surgery and will talk about rehab and answer any questions you may have.       HAND SURGERY  Driving: You can drive if you are comfortable and have regained full finger  movements and if you have sufficient power to control the vehicle.   Return to work: Timing of your return to work is variable according to your occupation and specific surgery. We should discuss this at your follow up visit if not already discussed prior.  Elevation: Hand elevation is important to prevent swelling and stiffness of the fingers. One minute of leaving your hand dangling negates four hours of keeping it elevated. Please remember not to walk with your hand hanging down or to sit with your hand resting in your lap. It is fine, however, to lower your hand for light use and you should get back to normal light activities as soon as possible as guided by common sense.     Post-operative exercises (PERFORM CHECKED EXERCISES ONLY)  [x] Bend your fingers  Relax your hand. Start with your fingers straight and close together. Bend the end and middle joints of your fingers. Keep your wrist and knuckles straight. Moving slowly and smoothly, return your hand to the starting position. Repeat with your other hand. If you can, perform multiple repetitions of this exercise on each hand.    [x] Open your hand wide                       Spread your fingers apart as wide as you can and hold that position. Slowly relax your fingers and bring them together. Return to the open-wide position. Repeat with each hand and gradually add to the number of repetitions.    [x] Make a fist  Start with your fingers straight and spread apart. Make a loose, gentle fist and wrap your thumb around the outside of your fingers. Be careful not to squeeze your fingers together too tightly. Moving slowly and smoothly, return to the starting position. Repeat. Perform this exercise on both hands.    [x] Touch your fingertips  Straighten your fingers and thumb. Bend your thumb across your palm, touching the tip of your thumb to the pad of your hand just below your pinky finger. If you can't make your thumb touch, just stretch as far as you can.  "Return your thumb to its starting position, as shown in images 1 through 3.  For the next exercise, form the letter O by touching your thumb to each fingertip, as shown in images 4 through 6. Moving slowly and smoothly, touch your index finger to your thumb, then straighten your fingers. Touch your middle finger to your thumb and straighten. Follow with your ring and pinky fingers.    [x] Walk your fingers  Rest your hand on a flat surface, such as a tabletop, with your palm facing down and your fingers spread slightly apart. Moving one finger at a time, slowly walk your fingers toward your thumb. Start by lifting and moving your index finger toward your thumb. Follow by lifting and moving your middle finger toward your thumb. Proceed with moving your ring finger and then your pinky finger toward your thumb. Don't move your wrist or thumb while doing this exercise. Repeat with your other hand.      HAND SURGERY - Common Concerns and Frequently Asked Questions    When to Call the Doctor  Pain, burning, or numbness of the fingers or the back of the hand not relieved by elevation of the arm  Pale or cold finger; bluish nail beds  Red line or streak going up the arm  Excessive swelling  Fever over 100.3ºF  Pain unrelieved by pain medication    Tips for one armed living  It helps to have...   In the shower   Plastic bags and rubber bands to cover bandages - the bags that newspapers come in are good to cover the hand and wrist. Otherwise small trash can liners will do. Use two at a time.   Bottle sponge (soft sponge on a long stick) - for the armpit of your "good" hand.   Shower brush   A hair brush in the shower will help you to wash your hair.   Cotton demar cloth bathrobe - to dry your back.    In the bathroom   Toothpaste, shampoo, etc. in flip-top or pump (not screw top) dispensers.   Consider an electric razor.   Flossers (dental floss on a "Y" shaped handle).    In the kitchen   Dycem mat (rubber jar opener mat) - " "to help open jars, but also keep things from sliding around while you are working on them.    Double suction cup pads ("little Octopus") - to hold items while you use or wash them.   Electric can opener with a lid magnet strong enough to hold the can in the air - for one handed use.   In the bedroom   Back scratcher.   Large sleeve shirts and tops.   Put away clothing which buttons, fastens or snaps in the back or which uses drawstrings.    Sports bra or a camisole instead of a bra.   [employer] nylons can be pulled on one handed - most others can't.   A "wash and wear" haircut.     "

## 2025-03-21 LAB
FINAL PATHOLOGIC DIAGNOSIS: NORMAL
GROSS: NORMAL
Lab: NORMAL
MICROSCOPIC EXAM: NORMAL

## 2025-03-25 ENCOUNTER — PATIENT MESSAGE (OUTPATIENT)
Dept: ORTHOPEDICS | Facility: CLINIC | Age: 50
End: 2025-03-25
Payer: COMMERCIAL

## 2025-03-25 NOTE — TELEPHONE ENCOUNTER
Spoke to patient     dressing saturated yesterday. 4x4 were dry under dressing - reinforced with 4x4, cast padding and coban.     Scheduled for incision check tomorrow.     Oleg Grimm MS, OTC   Sports Medicine Assistant   [facility]  (P) [phone]  (F) [phone]

## 2025-03-26 ENCOUNTER — OFFICE VISIT (OUTPATIENT)
Dept: ORTHOPEDICS | Facility: CLINIC | Age: 50
End: 2025-03-26
Payer: COMMERCIAL

## 2025-03-26 DIAGNOSIS — G56.01 CARPAL TUNNEL SYNDROME OF RIGHT WRIST: ICD-10-CM

## 2025-03-26 DIAGNOSIS — M67.431 GANGLION CYST OF VOLAR ASPECT OF RIGHT WRIST: Primary | ICD-10-CM

## 2025-03-26 PROCEDURE — 99999 PR PBB SHADOW E&M-EST. PATIENT-LVL II: CPT | Mod: PBBFAC,,, | Performed by: ORTHOPAEDIC SURGERY

## 2025-03-26 PROCEDURE — 99024 POSTOP FOLLOW-UP VISIT: CPT | Mod: S$GLB,,, | Performed by: ORTHOPAEDIC SURGERY

## 2025-03-26 NOTE — PROGRESS NOTES
Socorro Huang presents for post-operative evaluation of   Encounter Diagnoses   Name Primary?    Ganglion cyst of volar aspect of right wrist Yes    Carpal tunnel syndrome of right wrist     History of Present Illness           The patient is now 6 days s/p right carpal tunnel release, right volar wrist ganglion excision, arthrotomy and ostectomy. Overall the patient reports doing well.  The patient reports appropriate postoperative soreness with well controlled overall pain.  She states that she was showering in her dressing got wet.       Vitals:    03/26/25 1450   PainSc:   4   PainLoc: Hand        PE:    AA&O x 4.  NAD  HEENT:  NCAT, sclera nonicteric  Lungs:  Respirations are equal and unlabored.  CV:  2+ bilateral upper and lower extremity pulses.  MSK: Physical Exam           The wound is healing well with no signs of erythema or warmth.  There is no drainage.  Neurovascularly intact and has 5/5 thenar and intrinsic strength.    Diagnostic studies and other clinical records review:      Assessment & Plan:           Status post above, doing well  Continue with gentle range of motion  F/U 1 weeks for suture removal  Call with any questions/concerns in the interim        Sara Lees MD    Please be aware that this note has been generated with the assistance of [employer] voice-to-text.  Please excuse any spelling or grammatical errors.  This note was generated with the assistance of ambient listening technology. Verbal consent was obtained by the patient and accompanying visitor(s) for the recording of patient appointment to facilitate this note. I attest to having reviewed and edited the generated note for accuracy, though some syntax or spelling errors may persist. Please contact the author of this note for any clarification.

## 2025-03-31 ENCOUNTER — OFFICE VISIT (OUTPATIENT)
Dept: ORTHOPEDICS | Facility: CLINIC | Age: 50
End: 2025-03-31
Payer: COMMERCIAL

## 2025-03-31 DIAGNOSIS — Z98.890 POSTOPERATIVE STATE: Primary | ICD-10-CM

## 2025-03-31 DIAGNOSIS — M67.431 GANGLION CYST OF VOLAR ASPECT OF RIGHT WRIST: ICD-10-CM

## 2025-03-31 DIAGNOSIS — G56.01 CARPAL TUNNEL SYNDROME OF RIGHT WRIST: ICD-10-CM

## 2025-03-31 PROCEDURE — 99999 PR PBB SHADOW E&M-EST. PATIENT-LVL III: CPT | Mod: PBBFAC,,,

## 2025-03-31 NOTE — PROGRESS NOTES
[facility]  Hand and Upper Extremity  Post Op Visit  SUBJECTIVE:  Socorro Huang presents for post-operative evaluation of   Encounter Diagnoses   Name Primary?    Postoperative state Yes    Carpal tunnel syndrome of right wrist     Ganglion cyst of volar aspect of right wrist      History of Present Illness:  The patient is now 11 days status post right volar wrist ganglion cyst excision + wrist arthrotomy, right trapezium partial ostectomy, right carpal tunnel release by Dr. Lees on 3/20/2025. Overall the patient reports doing well post-operatively. The patient reports appropriate post-operative soreness with well controlled overall pain. She is taking Tylenol and Ibuprofen for post-operative pain control. Patient denies redness, warmth, or drainage from surgical site. She denies fever, chills, or sweats since the surgery.     Vitals:    03/31/25 0826   PainSc:   2   PainLoc: Wrist      OBJECTIVE:  Physical Exam:    Vital signs are stable, patient is afebrile.  AA&O x 4.  NAD.  HEENT: NCAT, sclera nonicteric  Lungs: Respirations are equal and unlabored.  CV: 2+ bilateral upper and lower extremity pulses.  MSK: The wound is healing well with no erythema, warmth, or drainage. Incision is clean, dry, and intact. There are no clinical signs or symptoms of infection present. All sutures removed today without difficulty. and Monocryl suture ends clipped without difficulty. Neurovascularly intact. Able to make composite fist.      Diagnostics Review:  Intra-Op Pathology:  Final Pathologic Diagnosis SOFT TISSUE, SUBMITTED AS 'RIGHT WRIST GANGLION', EXCISION:  - Scant fragments of fibroconnective tissue and focal adipose tissue with no significant histopathologic abnormality  - See comment    Comment:  The histologic findings are nonspecific but may represent fragments of ganglion cyst wall.     Assessment & Plan: Status post above, doing well.   Post-op instructions reviewed and provided to patient including  wound care, scar massage, lifting restrictions, and HEP range of motion exercises  Can use wrist brace as transitioning from post-op splint  Continue with range of motion exercises; no strengthening until 6 weeks post-op   Light use of the hand for 4-6 weeks post-op  All sutures removed today in clinic. Covered with bacitracin and band-aid. Discussed wound care and signs of infection.   Educated patient on scar massage, begin once incision fully healed.   F/U 4 weeks or sooner for any post-op problems  Call with any questions/concerns in the interim    Maria Del Carmen Rutledge PA-C  Hand and Upper Extremity

## 2025-04-01 ENCOUNTER — PATIENT MESSAGE (OUTPATIENT)
Dept: ORTHOPEDICS | Facility: CLINIC | Age: 50
End: 2025-04-01
Payer: COMMERCIAL

## 2025-04-09 ENCOUNTER — OFFICE VISIT (OUTPATIENT)
Dept: GASTROENTEROLOGY | Facility: CLINIC | Age: 50
End: 2025-04-09
Payer: COMMERCIAL

## 2025-04-09 VITALS
HEIGHT: 67 IN | HEART RATE: 69 BPM | WEIGHT: 171.31 LBS | SYSTOLIC BLOOD PRESSURE: 110 MMHG | BODY MASS INDEX: 26.89 KG/M2 | DIASTOLIC BLOOD PRESSURE: 72 MMHG

## 2025-04-09 DIAGNOSIS — R14.0 ABDOMINAL BLOATING: ICD-10-CM

## 2025-04-09 DIAGNOSIS — R10.11 RIGHT UPPER QUADRANT PAIN: Primary | ICD-10-CM

## 2025-04-09 DIAGNOSIS — R19.5 CHANGE IN CONSISTENCY OF STOOL: ICD-10-CM

## 2025-04-09 DIAGNOSIS — R14.3 FLATULENCE: ICD-10-CM

## 2025-04-09 PROCEDURE — 99999 PR PBB SHADOW E&M-EST. PATIENT-LVL III: CPT | Mod: PBBFAC,,,

## 2025-04-09 PROCEDURE — 99214 OFFICE O/P EST MOD 30 MIN: CPT | Mod: S$GLB,,,

## 2025-04-09 RX ORDER — DICYCLOMINE HYDROCHLORIDE 10 MG/1
10 CAPSULE ORAL
Qty: 120 CAPSULE | Refills: 1 | Status: SHIPPED | OUTPATIENT
Start: 2025-04-09 | End: 2025-06-08

## 2025-04-09 NOTE — PROGRESS NOTES
"Gastroenterology Clinic Consultation Note    Reason for Visit:  The primary encounter diagnosis was Right upper quadrant pain. Diagnoses of Abdominal bloating, Flatulence, and Change in consistency of stool were also pertinent to this visit.    PCP:   Blake Adams.       Initial HPI   This is a 49 y.o. female with past medical history of Abnormal Pap smear, Anxiety, Constipation - functional, Headache(784.0), Hemorrhoid, Leukopenia, Menorrhagia, PONV (postoperative nausea and vomiting), and Strain of neck muscle presenting for abdominal pain with bowel pattern changes. Patient presents today with concerns given her past GI history of diverticulitis with bowel resection.     Reports longstanding history of GI related issues; however, she says this is different. Reports issues of constipation and reflux in the past. Has been managed on medications including miralax and linzess. Stopped taking due to production of looser stools.     Within the last year, she has noticed increased episodes of abdominal pains predominately located in the RUQ. Recently, she finds the pain episodes have worsened. Describes as intermittent. This pain often wakes her during the night. She will experience sweating associated with discomfort with occasional febrile episodes. Notes that pain often radiates to her back. Describes as "cramping/aching." Rates 10/10 during severe episodes. Episodes can last up to 40 minutes. Notices fried foods and sweets worsen her pains. She also experiences increased flatulence and notes foul odor. Finds passing gas and completing a bowel movement somewhat relieves pain. Does not take anything to help with the pain. Within the last week, she has experienced two episodes of this. Prior to, she was experiencing once every few months. She denies any recent febrile episodes.     She does also report changes in stool consistency. Says she has noticed "looser" stools with the onset of this pain. Produces " daily bowel movements. Does find the color of her stool is lighter than normal. Denies blood in her stool. Denies nausea/vomiting. Denies UIWL. Does experience occasional episodes of fecal urgency and incontinence.     Reports social alcohol intake  Denies smoking history.    Aunt-GI issues uncertain exactly what.     Denies any dysphagia, odynophagia, nausea, vomiting, heartburn or acid regurgitation. No blood/ mucus in stool or unintentional weight loss. No melena or maroon stools. No recent changes in diet or medications. No family history of IBD, Celiac disease or GI malignancy. No regular NSAIDs. No alcohol or tobacco use. No recent antibiotic use, travels or sick contacts. No prior history of C.diff.    Abdominal Surgeries: Colon resection for diverticulitis (2022), hysterectomy.     ROS:  Review of Systems   Constitutional:  Negative for chills, fever, malaise/fatigue and weight loss.   Respiratory:  Negative for cough, hemoptysis, sputum production, shortness of breath and wheezing.    Cardiovascular:  Negative for chest pain, palpitations, orthopnea, claudication, leg swelling and PND.   Gastrointestinal:  Positive for abdominal pain and diarrhea. Negative for blood in stool, constipation, heartburn, melena, nausea and vomiting.   Genitourinary:  Negative for dysuria, flank pain, frequency, hematuria and urgency.   Musculoskeletal:  Negative for back pain, falls, joint pain, myalgias and neck pain.   Skin:  Negative for itching and rash.   Neurological:  Negative for dizziness, seizures, loss of consciousness, weakness and headaches.   Psychiatric/Behavioral:  The patient is not nervous/anxious and does not have insomnia.       Medical History:  has a past medical history of Abnormal Pap smear, Anxiety, Constipation - functional, Headache(784.0), Hemorrhoid, Leukopenia, Menorrhagia, PONV (postoperative nausea and vomiting), and Strain of neck muscle.    Surgical History:  has a past surgical history that  "includes Dilation and curettage of uterus (12/18/2012); Tonsillectomy; Partial hysterectomy (2013); Tubal ligation; Thyroidectomy, partial (2013); Colon surgery (2014); Hemorrhoid surgery (2014); Hysterectomy; Oophorectomy; Breast surgery; Breast reconstruction (2018); Colonoscopy (N/A, 02/03/2022); Laparoscopic sigmoidectomy (Left, 07/20/2022); Flexible sigmoidoscopy (N/A, 07/20/2022); Anoscopy (N/A, 07/20/2022); Mobilization of splenic flexure (N/A, 07/20/2022); Small intestine surgery (2017); Cheilectomy (Right, 08/18/2023); Correction of hammer toe (Right, 08/18/2023); arthroplasty,spine,cervical (N/A, 10/12/2023); injection, spine, lumbosacral, transforaminal approach (Bilateral, 10/31/2024); Esophagogastroduodenoscopy (N/A, 12/18/2024); ph monitoring, esophagus, wireless, (off reflux meds) (N/A, 12/18/2024); Carpal tunnel release (Right, 3/20/2025); Excision of ganglion of wrist (Right, 3/20/2025); Hand Arthrotomy (Right, 3/20/2025); and Surgical removal of carpal bone (Right, 3/20/2025).    Family History: family history includes Alcohol abuse in her cousin and maternal uncle; Breast cancer in her maternal grandmother; COPD in her mother; Cancer in her father and mother; Depression in her sister; Diabetes in her mother and paternal grandmother; Drug abuse in her maternal aunt; Emphysema in her mother; Heart disease (age of onset: 50) in her father; Hypertension in her paternal grandmother; Mental illness in her brother and sister; Ovarian cancer in her maternal grandmother; Schizophrenia in her paternal aunt; Suicide in her brother..     Review of patient's allergies indicates:  No Known Allergies    Medications Ordered Prior to Encounter[1]    Objective Findings:    Vital Signs:  /72 (BP Location: Left arm, Patient Position: Sitting)   Pulse 69   Ht 5' 7" (1.702 m)   Wt 77.7 kg (171 lb 4.8 oz)   LMP 05/05/2013   BMI 26.83 kg/m²   Body mass index is 26.83 kg/m².    Physical Exam  Constitutional:  "      Appearance: Normal appearance. She is normal weight.   HENT:      Head: Normocephalic and atraumatic.      Nose: Nose normal.      Mouth/Throat:      Mouth: Mucous membranes are moist.      Pharynx: Oropharynx is clear.   Cardiovascular:      Rate and Rhythm: Normal rate and regular rhythm.      Pulses: Normal pulses.      Heart sounds: Normal heart sounds.   Pulmonary:      Effort: Pulmonary effort is normal.      Breath sounds: Normal breath sounds.   Abdominal:      General: Bowel sounds are normal.      Palpations: Abdomen is soft.      Tenderness: There is abdominal tenderness.   Musculoskeletal:         General: Normal range of motion.      Cervical back: Normal range of motion and neck supple.   Skin:     General: Skin is warm and dry.   Neurological:      General: No focal deficit present.      Mental Status: She is alert and oriented to person, place, and time.   Psychiatric:         Mood and Affect: Mood normal.         Behavior: Behavior normal.         Thought Content: Thought content normal.         Judgment: Judgment normal.       Labs:  Lab Results   Component Value Date    WBC 7.17 06/20/2024    HGB 13.7 06/20/2024    HCT 39.7 06/20/2024     06/20/2024    CRP 6.8 11/30/2021    CHOL 179 10/03/2022    TRIG 51 10/03/2022    HDL 65 10/03/2022    ALKPHOS 75 06/20/2024    LIPASE 69 (H) 06/20/2024    ALT 84 (H) 06/20/2024    AST 44 (H) 06/20/2024     06/20/2024    K 4.3 06/20/2024     06/20/2024    CREATININE 0.7 06/20/2024    BUN 10 06/20/2024    CO2 25 06/20/2024    TSH 0.897 10/03/2022    INR 1.0 10/09/2023     Imaging reviewed:   EXAMINATION:  CT ABDOMEN PELVIS WITH IV CONTRAST 6/20/2024  CLINICAL HISTORY:  Abdominal pain, acute, nonlocalized  FINDINGS:  Images of the lower thorax are unremarkable.    The liver is mildly hypoattenuating, possibly reflecting steatosis, correlation with LFTs recommended.  The spleen, pancreas, gallbladder and adrenal glands are grossly  unremarkable.  There is no biliary dilation or ascites.  The stomach is relatively decompressed without wall thickening.  The portal vein, splenic vein, SMV, celiac axis and SMA all are patent.  No significant abdominal lymphadenopathy.     The kidneys enhance symmetrically without hydronephrosis or nephrolithiasis.  The bilateral ureters are unremarkable without calculi seen.  The urinary bladder is unremarkable.  The uterus is absent the adnexa is unremarkable.     There is surgical change of distal colectomy, anastomosis appears patent.  There is a moderate to large amount of stool throughout the residual colon.  The terminal ileum is unremarkable.  The appendix is unremarkable.  The small bowel is grossly unremarkable noting slow flow through a few distal loops.  There are a few scattered shotty periaortic, pericaval, and mesenteric lymph nodes.  No focal organized pelvic fluid collection.     There are degenerative changes of the spine.  There is some degree of osteopenia.  No significant inguinal lymphadenopathy.     Impression:     1. Moderate stool throughout the colon, may reflect constipation noting colorectal anastomosis appears patent.  Correlation is advised.  2. Fluid-filled distal small bowel loops likely reflect slow flow related to developing colonic constipation.  Early enteritis felt less likely.  3. Please see above for several additional findings.    Endoscopy reviewed:   Procedure:             Upper GI endoscopy 12/18/2024  Indications:           Epigastric abdominal painEsophageal reflux                          symptoms that recur despite appropriate therapy   Impression:            - Normal esophagus.                          - Z-line regular, 40 cm from the incisors.                          - Erythematous mucosa in the antrum. Biopsied.                          - Normal duodenal bulb, first portion of the                          duodenum and second portion of the duodenum.                           - The BRAVO pH capsule was positioned 34 cm from                          the incisors, which was 6 cm proximal to the GE                          junction.   Final Pathologic Diagnosis Stomach (biopsy):  - Antrum with reactive/chemical gastropathy  - Body with mild reactive changes  - Negative for active inflammation  - Negative for Helicobacter pylori organisms on H&E stain     Procedure:             Colonoscopy 2/3/2022  Indications:           Abnormal CT of the GI tract   Impression:            - Patent end-to-end colo-rectal anastomosis,                          characterized by healthy appearing mucosa.                          - The examined portion of the ileum was normal.                          - Stool in the entire examined colon.                          - No specimens collected.   - Use Linzess (linaclotide) 145 mcg PO daily.                          - Repeat colonoscopy in 5 years for surveillance.     Assessment:  1. Right upper quadrant pain    2. Abdominal bloating    3. Flatulence    4. Change in consistency of stool      Orders Placed This Encounter    US Abdomen Complete    H. pylori Antibody, IgG    C-reactive protein    Lipase    CBC Auto Differential    Comprehensive Metabolic Panel    Tissue transglutaminase, IgA    IgA    dicyclomine (BENTYL) 10 MG capsule     Plan:  US Abdomen for suspected gallbladder relation  2. CBC, CMP, Lipase, CRP, Celiac Panel, H.pylori today  3. Start dicyclomine 10 mg prn before meals and at bedtime as needed for abdominal discomfort with fecal urgency/incontinence  4. Advise over the counter pepto and/or gas x for symptom relief.  5. Advise she start daily probiotic (Ibgard) in the meantime.   6. ER PRECAUTIONS REVIEWED with patient including onset of excruciating, constant abdominal pain in the setting of nausea/vomiting with fever. Patient agrees to this plan.   6. F/u with GI in 3 months  3. Repeat surveillance colonoscopy 2027      Thank you for  allowing me to participate in this patient's care.    Sincerely,     JIMMY SIFUENTES  Gastroenterology Department  [facility]          [1]   Current Outpatient Medications on File Prior to Visit   Medication Sig Dispense Refill    docusate sodium (COLACE) 100 MG capsule Take 1 capsule (100 mg total) by mouth 2 (two) times daily. 30 capsule 1    HYDROcodone-acetaminophen (NORCO) 5-325 mg per tablet Take 1 tablet by mouth every 6 (six) hours as needed for Pain. 30 tablet 0    ibuprofen (ADVIL,MOTRIN) 600 MG tablet Take 1 tablet (600 mg total) by mouth every 4 (four) hours as needed (for headaches). 20 tablet 5    omeprazole (PRILOSEC) 40 MG capsule Take 1 capsule (40 mg total) by mouth 2 (two) times daily before meals.      ondansetron (ZOFRAN) 8 MG tablet Take 1 tablet (8 mg total) by mouth every 8 (eight) hours as needed for Nausea. 30 tablet 0    progesterone (PROMETRIUM) 200 MG capsule       scopolamine (TRANSDERM-SCOP) 1.3-1.5 mg (1 mg over 3 days) APPLY 1 PATCH BEHIND THE EAR EVERY 3 DAYS      traZODone (DESYREL) 100 MG tablet Take 1 tablet (100 mg total) by mouth every evening. 90 tablet 2     No current facility-administered medications on file prior to visit.

## 2025-04-09 NOTE — PATIENT INSTRUCTIONS
- ER precautions reviewed including excruciating, constant abdominal pains in the setting of nausea/vomiting with fever.

## 2025-04-10 ENCOUNTER — LAB VISIT (OUTPATIENT)
Dept: LAB | Facility: HOSPITAL | Age: 50
End: 2025-04-10
Payer: COMMERCIAL

## 2025-04-10 DIAGNOSIS — R14.0 ABDOMINAL BLOATING: ICD-10-CM

## 2025-04-10 DIAGNOSIS — R10.11 RIGHT UPPER QUADRANT PAIN: ICD-10-CM

## 2025-04-10 LAB
ABSOLUTE EOSINOPHIL (OHS): 0.1 K/UL
ABSOLUTE MONOCYTE (OHS): 0.34 K/UL (ref 0.3–1)
ABSOLUTE NEUTROPHIL COUNT (OHS): 1.29 K/UL (ref 1.8–7.7)
ALBUMIN SERPL BCP-MCNC: 3.8 G/DL (ref 3.5–5.2)
ALP SERPL-CCNC: 66 UNIT/L (ref 40–150)
ALT SERPL W/O P-5'-P-CCNC: 48 UNIT/L (ref 10–44)
ANION GAP (OHS): 6 MMOL/L (ref 8–16)
AST SERPL-CCNC: 28 UNIT/L (ref 11–45)
BASOPHILS # BLD AUTO: 0.03 K/UL
BASOPHILS NFR BLD AUTO: 1.1 %
BILIRUB SERPL-MCNC: 0.6 MG/DL (ref 0.1–1)
BUN SERPL-MCNC: 19 MG/DL (ref 6–20)
CALCIUM SERPL-MCNC: 8.7 MG/DL (ref 8.7–10.5)
CHLORIDE SERPL-SCNC: 106 MMOL/L (ref 95–110)
CO2 SERPL-SCNC: 27 MMOL/L (ref 23–29)
CREAT SERPL-MCNC: 0.7 MG/DL (ref 0.5–1.4)
CRP SERPL-MCNC: 0.9 MG/L
ERYTHROCYTE [DISTWIDTH] IN BLOOD BY AUTOMATED COUNT: 11.7 % (ref 11.5–14.5)
GFR SERPLBLD CREATININE-BSD FMLA CKD-EPI: >60 ML/MIN/1.73/M2
GLUCOSE SERPL-MCNC: 80 MG/DL (ref 70–110)
HCT VFR BLD AUTO: 38.4 % (ref 37–48.5)
HGB BLD-MCNC: 12.8 GM/DL (ref 12–16)
IGA SERPL-MCNC: 88 MG/DL (ref 40–350)
IMM GRANULOCYTES # BLD AUTO: 0.01 K/UL (ref 0–0.04)
IMM GRANULOCYTES NFR BLD AUTO: 0.4 % (ref 0–0.5)
LIPASE SERPL-CCNC: 25 U/L (ref 4–60)
LYMPHOCYTES # BLD AUTO: 0.86 K/UL (ref 1–4.8)
MCH RBC QN AUTO: 31.8 PG (ref 27–31)
MCHC RBC AUTO-ENTMCNC: 33.3 G/DL (ref 32–36)
MCV RBC AUTO: 96 FL (ref 82–98)
NUCLEATED RBC (/100WBC) (OHS): 0 /100 WBC
PLATELET # BLD AUTO: 219 K/UL (ref 150–450)
PMV BLD AUTO: 9 FL (ref 9.2–12.9)
POTASSIUM SERPL-SCNC: 3.8 MMOL/L (ref 3.5–5.1)
PROT SERPL-MCNC: 6.6 GM/DL (ref 6–8.4)
RBC # BLD AUTO: 4.02 M/UL (ref 4–5.4)
RELATIVE EOSINOPHIL (OHS): 3.8 %
RELATIVE LYMPHOCYTE (OHS): 32.7 % (ref 18–48)
RELATIVE MONOCYTE (OHS): 12.9 % (ref 4–15)
RELATIVE NEUTROPHIL (OHS): 49.1 % (ref 38–73)
SODIUM SERPL-SCNC: 139 MMOL/L (ref 136–145)
WBC # BLD AUTO: 2.63 K/UL (ref 3.9–12.7)

## 2025-04-10 PROCEDURE — 85025 COMPLETE CBC W/AUTO DIFF WBC: CPT

## 2025-04-10 PROCEDURE — 83690 ASSAY OF LIPASE: CPT

## 2025-04-10 PROCEDURE — 36415 COLL VENOUS BLD VENIPUNCTURE: CPT

## 2025-04-10 PROCEDURE — 84450 TRANSFERASE (AST) (SGOT): CPT

## 2025-04-10 PROCEDURE — 86140 C-REACTIVE PROTEIN: CPT

## 2025-04-10 PROCEDURE — 86364 TISS TRNSGLTMNASE EA IG CLAS: CPT

## 2025-04-10 PROCEDURE — 86677 HELICOBACTER PYLORI ANTIBODY: CPT

## 2025-04-10 PROCEDURE — 82784 ASSAY IGA/IGD/IGG/IGM EACH: CPT

## 2025-04-11 LAB — H PYLORI IGG SERPL QL IA: NEGATIVE

## 2025-04-14 LAB — W TISSUE TRANSGLUTAMINASE IGA AB: <0.2 U/ML

## 2025-04-15 ENCOUNTER — RESULTS FOLLOW-UP (OUTPATIENT)
Dept: GASTROENTEROLOGY | Facility: CLINIC | Age: 50
End: 2025-04-15
Payer: COMMERCIAL

## 2025-04-24 ENCOUNTER — TELEPHONE (OUTPATIENT)
Dept: ORTHOPEDICS | Facility: CLINIC | Age: 50
End: 2025-04-24
Payer: COMMERCIAL

## 2025-05-01 ENCOUNTER — HOSPITAL ENCOUNTER (OUTPATIENT)
Dept: RADIOLOGY | Facility: HOSPITAL | Age: 50
Discharge: HOME | End: 2025-05-01
Payer: COMMERCIAL

## 2025-05-01 DIAGNOSIS — R10.11 RIGHT UPPER QUADRANT PAIN: ICD-10-CM

## 2025-05-01 PROCEDURE — 76700 US EXAM ABDOM COMPLETE: CPT | Mod: TC

## 2025-05-01 PROCEDURE — 76700 US EXAM ABDOM COMPLETE: CPT | Mod: 26,,, | Performed by: RADIOLOGY

## 2025-05-07 DIAGNOSIS — R19.7 DIARRHEA, UNSPECIFIED TYPE: Primary | ICD-10-CM

## 2025-05-08 ENCOUNTER — PATIENT MESSAGE (OUTPATIENT)
Dept: ORTHOPEDICS | Facility: CLINIC | Age: 50
End: 2025-05-08
Payer: COMMERCIAL

## 2025-05-08 DIAGNOSIS — R74.8 ELEVATED LIVER ENZYMES: Primary | ICD-10-CM

## 2025-05-12 ENCOUNTER — TELEPHONE (OUTPATIENT)
Facility: CLINIC | Age: 50
End: 2025-05-12
Payer: COMMERCIAL

## 2025-05-12 DIAGNOSIS — G43.109 MIGRAINE WITH AURA AND WITHOUT STATUS MIGRAINOSUS, NOT INTRACTABLE: ICD-10-CM

## 2025-05-12 DIAGNOSIS — G43.709 CHRONIC MIGRAINE W/O AURA W/O STATUS MIGRAINOSUS, NOT INTRACTABLE: Primary | ICD-10-CM

## 2025-05-16 NOTE — TELEPHONE ENCOUNTER
Appointment confirmed.     R/s patient's postop 3/20/25 Right CTR, right volar wrist ganglion excision and arthrotomy, right trapezium partial ostectomy    Oleg Grimm MS, OTC   Sports Medicine Assistant   [facility]  (P) [phone]  (F) [phone]

## 2025-05-21 ENCOUNTER — OFFICE VISIT (OUTPATIENT)
Dept: ORTHOPEDICS | Facility: CLINIC | Age: 50
End: 2025-05-21
Payer: COMMERCIAL

## 2025-05-21 VITALS — BODY MASS INDEX: 26.89 KG/M2 | WEIGHT: 171.31 LBS | HEIGHT: 67 IN

## 2025-05-21 DIAGNOSIS — G56.02 CARPAL TUNNEL SYNDROME ON LEFT: ICD-10-CM

## 2025-05-21 DIAGNOSIS — Z98.890 POST-OPERATIVE STATE: Primary | ICD-10-CM

## 2025-05-21 DIAGNOSIS — M67.431 GANGLION CYST OF VOLAR ASPECT OF RIGHT WRIST: ICD-10-CM

## 2025-05-21 DIAGNOSIS — G56.01 CARPAL TUNNEL SYNDROME OF RIGHT WRIST: ICD-10-CM

## 2025-05-21 PROCEDURE — 99999 PR PBB SHADOW E&M-EST. PATIENT-LVL III: CPT | Mod: PBBFAC,,, | Performed by: ORTHOPAEDIC SURGERY

## 2025-05-21 NOTE — PROGRESS NOTES
Socorro Huang presents for post-operative evaluation of   Encounter Diagnoses   Name Primary?    Post-operative state Yes    Carpal tunnel syndrome of right wrist     Ganglion cyst of volar aspect of right wrist     Carpal tunnel syndrome on left    . The patient is now 8 weeks 6 days s/p 3/20/25 Right CTR, right volar wrist ganglion excision and arthrotomy, right trapezium partial ostectomy.  Overall the patient reports doing well.  She feels that her strength is back and she is performing all of her daily activities without difficulty.    She states that the left side is getting numb occasional, recently when she was driving.      Vitals:    05/21/25 0809   PainSc: 0-No pain       PE:    AA&O x 4.  NAD  HEENT:  NCAT, sclera nonicteric  Lungs:  Respirations are equal and unlabored.  CV:  2+ bilateral upper and lower extremity pulses.  MSK: Physical Exam          The incision is well healed.  Bilateral full wrist and finger motion.  Neurovascularly intact and has 5/5 thenar and intrinsic musculature strength.       Diagnostic studies and other clinical records review:  No new imaging today    Assessment & Plan: Assessment & Plan           Status post above, doing well  Continue with range of motion and strengthening  F/U prn, message if left side worsens  Call with any questions/concerns in the interim        Sara Lees MD    Please be aware that this note has been generated with the assistance of [employer] voice-to-text.  Please excuse any spelling or grammatical errors.  This note was generated with the assistance of ambient listening technology. Verbal consent was obtained by the patient and accompanying visitor(s) for the recording of patient appointment to facilitate this note. I attest to having reviewed and edited the generated note for accuracy, though some syntax or spelling errors may persist. Please contact the author of this note for any clarification.

## 2025-05-27 ENCOUNTER — OFFICE VISIT (OUTPATIENT)
Dept: INTERNAL MEDICINE | Facility: CLINIC | Age: 50
End: 2025-05-27
Payer: COMMERCIAL

## 2025-05-27 VITALS
TEMPERATURE: 98 F | WEIGHT: 170.88 LBS | HEART RATE: 72 BPM | HEIGHT: 67 IN | RESPIRATION RATE: 18 BRPM | OXYGEN SATURATION: 98 % | SYSTOLIC BLOOD PRESSURE: 118 MMHG | BODY MASS INDEX: 26.82 KG/M2 | DIASTOLIC BLOOD PRESSURE: 82 MMHG

## 2025-05-27 DIAGNOSIS — R11.0 NAUSEA: ICD-10-CM

## 2025-05-27 DIAGNOSIS — J06.9 UPPER RESPIRATORY TRACT INFECTION, UNSPECIFIED TYPE: Primary | ICD-10-CM

## 2025-05-27 LAB
FLUAV AG UPPER RESP QL IA.RAPID: NEGATIVE
FLUBV AG UPPER RESP QL IA.RAPID: NEGATIVE
RSV A 5' UTR RNA NPH QL NAA+PROBE: NEGATIVE
SARS-COV-2 RNA RESP QL NAA+PROBE: NEGATIVE

## 2025-05-27 PROCEDURE — 0241U SARS-COV2 (COVID) WITH FLU/RSV BY PCR: CPT | Performed by: NURSE PRACTITIONER

## 2025-05-27 PROCEDURE — 99213 OFFICE O/P EST LOW 20 MIN: CPT | Mod: S$GLB,,, | Performed by: NURSE PRACTITIONER

## 2025-05-27 PROCEDURE — 99999 PR PBB SHADOW E&M-EST. PATIENT-LVL IV: CPT | Mod: PBBFAC,,, | Performed by: NURSE PRACTITIONER

## 2025-05-27 RX ORDER — ONDANSETRON 4 MG/1
4 TABLET, ORALLY DISINTEGRATING ORAL EVERY 8 HOURS PRN
Qty: 15 TABLET | Refills: 0 | Status: SHIPPED | OUTPATIENT
Start: 2025-05-27

## 2025-05-27 NOTE — PROGRESS NOTES
Subjective:       Patient ID: Socorro Huang is a 49 y.o. female.    Chief Complaint: Dizziness, Fatigue, and Nausea    History of Present Illness    CHIEF COMPLAINT:  Ms. Huang presents today with weakness, shakiness, and nausea after camping trip.    HISTORY OF PRESENT ILLNESS:  She reports weakness, shakiness, and nausea without vomiting. She experiences chills without fever and chest pain with deep breathing, which she initially attributed to prolonged bed rest. She also notes head congestion, which improved after taking Mucinex D this morning. She had recent exposure to her grandson on Wednesday, who was subsequently diagnosed with flu, RSV, and lung infection. She recently went camping in [place] and only notes mosquito bites without any unusual marks or rashes or tick bites.          Review of patient's allergies indicates:  No Known Allergies    Medication List with Changes/Refills   New Medications    ONDANSETRON (ZOFRAN-ODT) 4 MG TBDL    Take 1 tablet (4 mg total) by mouth every 8 (eight) hours as needed (Nausea, Vomiting).   Current Medications    DICYCLOMINE (BENTYL) 10 MG CAPSULE    Take 1 capsule (10 mg total) by mouth 4 (four) times daily before meals and nightly.    DOCUSATE SODIUM (COLACE) 100 MG CAPSULE    Take 1 capsule (100 mg total) by mouth 2 (two) times daily.    HYDROCODONE-ACETAMINOPHEN (NORCO) 5-325 MG PER TABLET    Take 1 tablet by mouth every 6 (six) hours as needed for Pain.    IBUPROFEN (ADVIL,MOTRIN) 600 MG TABLET    Take 1 tablet (600 mg total) by mouth every 4 (four) hours as needed (for headaches).    OMEPRAZOLE (PRILOSEC) 40 MG CAPSULE    Take 1 capsule (40 mg total) by mouth 2 (two) times daily before meals.    ONDANSETRON (ZOFRAN) 8 MG TABLET    Take 1 tablet (8 mg total) by mouth every 8 (eight) hours as needed for Nausea.    PROGESTERONE (PROMETRIUM) 200 MG CAPSULE        SCOPOLAMINE (TRANSDERM-SCOP) 1.3-1.5 MG (1 MG OVER 3 DAYS)    APPLY 1 PATCH BEHIND THE EAR EVERY 3 DAYS  "   TRAZODONE (DESYREL) 100 MG TABLET    Take 1 tablet (100 mg total) by mouth every evening.       Review of Systems   Constitutional:  Positive for chills and fatigue. Negative for fever.   HENT:  Positive for sinus pressure/congestion.    Respiratory:  Negative for shortness of breath and wheezing.    Cardiovascular:  Negative for chest pain.   Gastrointestinal:  Positive for nausea. Negative for abdominal pain and vomiting.   Neurological:  Positive for weakness. Negative for dizziness and headaches.        Objective:   /82 (BP Location: Right arm, Patient Position: Sitting)   Pulse 72   Temp 97.8 °F (36.6 °C) (Temporal)   Resp 18   Ht 5' 7" (1.702 m)   Wt 77.5 kg (170 lb 13.7 oz)   LMP 05/05/2013   SpO2 98%   BMI 26.76 kg/m²     Physical Exam  HENT:      Right Ear: A middle ear effusion is present.      Left Ear: A middle ear effusion is present.      Mouth/Throat:      Pharynx: Posterior oropharyngeal erythema present.   Chest:      Chest wall: Tenderness present.   Lymphadenopathy:      Head:      Right side of head: Submental adenopathy present. No submandibular or tonsillar adenopathy.      Left side of head: Submental adenopathy present. No submandibular or tonsillar adenopathy.       Assessment and Plan:     Assessment & Plan      1. Upper respiratory tract infection, unspecified type (Primary)    - Recommend an OTC antihistamine (e.g., Zyrtec, Claritin, Allegra) for the next few days to help with ear fluid, nasal congestion, and throat symptoms.  - Ms. Huang reports chest pain, especially on deep breath, which has improved but not completely resolved. Possibly due to costochondritis (inflammation of rib-sternum joints).  OTC Tylenol/Ibuprofen PRN.     2. Nausea    - SARS-Cov2 (COVID) with FLU/RSV by PCR  - ondansetron (ZOFRAN-ODT) 4 MG TbDL; Take 1 tablet (4 mg total) by mouth every 8 (eight) hours as needed (Nausea, Vomiting).  Dispense: 15 tablet; Refill: 0      GENERAL ADVICE AND " FOLLOW-UP:  - Reassured patient about symptoms.  - Contact the office if new symptoms develop.  - Follow up when test results are available for further recommendations.         This note was generated with the assistance of ambient listening technology. Verbal consent was obtained by the patient and accompanying visitor(s) for the recording of patient appointment to facilitate this note. I attest to having reviewed and edited the generated note for accuracy, though some syntax or spelling errors may persist. Please contact the author of this note for any clarification.

## 2025-05-28 ENCOUNTER — RESULTS FOLLOW-UP (OUTPATIENT)
Dept: INTERNAL MEDICINE | Facility: CLINIC | Age: 50
End: 2025-05-28

## 2025-06-01 ENCOUNTER — PATIENT MESSAGE (OUTPATIENT)
Dept: PAIN MEDICINE | Facility: CLINIC | Age: 50
End: 2025-06-01
Payer: COMMERCIAL

## 2025-06-03 ENCOUNTER — TELEPHONE (OUTPATIENT)
Dept: PAIN MEDICINE | Facility: CLINIC | Age: 50
End: 2025-06-03
Payer: COMMERCIAL

## 2025-06-06 ENCOUNTER — LAB VISIT (OUTPATIENT)
Dept: LAB | Facility: HOSPITAL | Age: 50
End: 2025-06-06
Attending: INTERNAL MEDICINE
Payer: COMMERCIAL

## 2025-06-06 DIAGNOSIS — R19.7 DIARRHEA, UNSPECIFIED TYPE: ICD-10-CM

## 2025-06-06 LAB — OB PNL STL: NEGATIVE

## 2025-06-06 PROCEDURE — 82272 OCCULT BLD FECES 1-3 TESTS: CPT

## 2025-06-06 PROCEDURE — 87798 DETECT AGENT NOS DNA AMP: CPT

## 2025-06-06 PROCEDURE — 87046 STOOL CULTR AEROBIC BACT EA: CPT

## 2025-06-06 PROCEDURE — 87427 SHIGA-LIKE TOXIN AG IA: CPT

## 2025-06-06 PROCEDURE — 83993 ASSAY FOR CALPROTECTIN FECAL: CPT

## 2025-06-06 PROCEDURE — 87425 ROTAVIRUS AG IA: CPT

## 2025-06-06 PROCEDURE — 89055 LEUKOCYTE ASSESSMENT FECAL: CPT

## 2025-06-06 PROCEDURE — 87046 STOOL CULTR AEROBIC BACT EA: CPT | Mod: 59

## 2025-06-06 PROCEDURE — 87209 SMEAR COMPLEX STAIN: CPT

## 2025-06-06 PROCEDURE — 82653 EL-1 FECAL QUANTITATIVE: CPT

## 2025-06-06 PROCEDURE — 87328 CRYPTOSPORIDIUM AG IA: CPT

## 2025-06-07 LAB
C COLI+JEJ+UPSA DNA STL QL NAA+NON-PROBE: NEGATIVE
WBC #/AREA STL HPF: NORMAL /[HPF]

## 2025-06-08 LAB
E COLI SXT1 STL QL IA: NEGATIVE
E COLI SXT2 STL QL IA: NEGATIVE

## 2025-06-09 LAB
BACTERIA STL CULT: NORMAL
CALPROTECTIN INTERP (OHS): ABNORMAL
CALPROTECTIN STOOL (OHS): 84.8 ΜG/G
CRYPTOSP AG SPEC QL: NEGATIVE
ELASTASE, STOOL INTERPRETATION (OHS): NORMAL
G LAMBLIA AG STL QL IA: NEGATIVE
PANCREATIC ELASTASE, FECAL (OHS): >800 ΜG/G
RV AG STL QL IA.RAPID: NEGATIVE

## 2025-06-10 LAB
HADV DNA SPEC QL NAA+PROBE: NEGATIVE
SPECIMEN SOURCE: NORMAL

## 2025-06-12 ENCOUNTER — PATIENT MESSAGE (OUTPATIENT)
Dept: ORTHOPEDICS | Facility: CLINIC | Age: 50
End: 2025-06-12
Payer: COMMERCIAL

## 2025-06-12 ENCOUNTER — PROCEDURE VISIT (OUTPATIENT)
Dept: NEUROLOGY | Facility: CLINIC | Age: 50
End: 2025-06-12
Payer: COMMERCIAL

## 2025-06-12 VITALS
HEART RATE: 71 BPM | BODY MASS INDEX: 26.76 KG/M2 | DIASTOLIC BLOOD PRESSURE: 69 MMHG | SYSTOLIC BLOOD PRESSURE: 104 MMHG | WEIGHT: 170.88 LBS

## 2025-06-12 DIAGNOSIS — G43.709 CHRONIC MIGRAINE W/O AURA W/O STATUS MIGRAINOSUS, NOT INTRACTABLE: Primary | ICD-10-CM

## 2025-06-12 LAB — O+P STL MICRO: NORMAL

## 2025-06-12 NOTE — PROCEDURES
Established Patient  Procedure Note    SUBJECTIVE:  Patient ID: Socorro Huang  Chief Complaint: Headache      History of Present Illness:  Socorro Huang is a 49 y.o. female with migraines, cervical spondylosis w/ radiculopathy s/p disc replacement, tension ha's, diverticulitis, anxiety, GERD, hypothyroidism, s/p hysterectomy, insomnia, who presents to clinic alone for follow-up of headaches and Botox injections.       06/12/2025- botox    03/18/2025 - botox     12/05/2024- Interval History: botox  Pt would like to transition care for her ha's. She has been experiencing migraines since her teens. She also has a family history of migraines in her mother. [place]ina's worsened in 2018 occurring >15/30 days per month, almost daily. She saw Dr. De La Paz at  at the time, trying and failing multiple medications listed below. Started on botox in 2018 which has helped greatly. Since obtaining botox, ha's have been well controlled occurring 2/30 days per month, lasting 1-2 hrs with otcs. She was seeing Dr. Montes for medical mgmt and myself for botox, but would like to transition HA care to 1 provider for ease. She describes ha's further as mild to severe throbbing, pulsating, and sharp pains in her bilateral retro-orbital and temple regions. Stress can trigger ha's. Pain is associated with photophobia, phonophobia, and nausea. Of note, this week pt has had an intractable ha x 4 days which she contributes to stress and weather changes. Is pending vacation next week.     09/12/2024 - botox    06/13/2024 - botox    03/07/2024 - botox    12/14/2023 - botox    09/21/2023 - botox #3    06/29/2023 - botox #2    03/23/2023 - botox #1    Treatments Tried:   Botox - helps  Imitrex tabs, NS - vomiting  Maxalt   Nurtec - helped somewhat  Fioricet - rebound ha's  Bc powders, tylenol, ibuprofen    Current Medications:    Current Outpatient Medications:     omeprazole (PRILOSEC) 40 MG capsule, Take 1 capsule (40 mg total) by mouth 2 (two) times  daily before meals., Disp: , Rfl:     progesterone (PROMETRIUM) 200 MG capsule, , Disp: , Rfl:     traZODone (DESYREL) 100 MG tablet, Take 1 tablet (100 mg total) by mouth every evening., Disp: 90 tablet, Rfl: 2    docusate sodium (COLACE) 100 MG capsule, Take 1 capsule (100 mg total) by mouth 2 (two) times daily. (Patient not taking: Reported on 5/27/2025), Disp: 30 capsule, Rfl: 1    HYDROcodone-acetaminophen (NORCO) 5-325 mg per tablet, Take 1 tablet by mouth every 6 (six) hours as needed for Pain. (Patient not taking: Reported on 5/27/2025), Disp: 30 tablet, Rfl: 0    ibuprofen (ADVIL,MOTRIN) 600 MG tablet, Take 1 tablet (600 mg total) by mouth every 4 (four) hours as needed (for headaches). (Patient not taking: Reported on 5/27/2025), Disp: 20 tablet, Rfl: 5    ondansetron (ZOFRAN) 8 MG tablet, Take 1 tablet (8 mg total) by mouth every 8 (eight) hours as needed for Nausea. (Patient not taking: Reported on 5/27/2025), Disp: 30 tablet, Rfl: 0    ondansetron (ZOFRAN-ODT) 4 MG TbDL, Take 1 tablet (4 mg total) by mouth every 8 (eight) hours as needed (Nausea, Vomiting)., Disp: 15 tablet, Rfl: 0    scopolamine (TRANSDERM-SCOP) 1.3-1.5 mg (1 mg over 3 days), APPLY 1 PATCH BEHIND THE EAR EVERY 3 DAYS, Disp: , Rfl:   No current facility-administered medications for this visit.    Review of Systems - as per HPI, otherwise a balanced 10 systems review is negative.    OBJECTIVE:  Vitals:  /69 (BP Location: Left arm, Patient Position: Sitting)   Pulse 71   Wt 77.5 kg (170 lb 13.7 oz)   LMP 05/05/2013   BMI 26.76 kg/m²     Physical Exam:  Constitutional: she appears well-developed and well-nourished. she is well groomed. NAD   HENT:    Head: Normocephalic and atraumatic  Eyes: Conjunctivae and EOM are normal  Musculoskeletal: Normal range of motion. No joint stiffness.   Skin: Skin is warm and dry.  Psychiatric: Mood and affect are normal    Neuro: Patient is alert and oriented to person, place, and time. Language  is intact and fluent.  Recent and remote memory are intact.  Normal attention and concentration.  Facial movement is symmetric.  Gait is normal.     Review of Data:   Notes from neuro reviewed.   Labs:  Lab Visit on 06/06/2025   Component Date Value Ref Range Status    Pancreatic Elastase, Fecal 06/06/2025 >800  >=200 µg/g Final    Elastase, Stool Interpretation 06/06/2025 Normal   Final    OCCULT BLOOD STOOL 06/06/2025 Negative  Negative Final    WBC, Stool 06/06/2025 No neutrophils seen  No neutrophils seen Final    ROTAVIRUS RAPID AG 06/06/2025 Negative  Negative Final    Specimen source 06/06/2025 Stool   Final    Adenovirus PCR 06/06/2025 Negative  Negative Final    Calprotectin 06/06/2025 84.8  <=120 µg/g Final    Calprotectin Interp 06/06/2025 Borderline (A)  Normal Final    Giardia Antigen 06/06/2025 Negative  Negative Final    Cryptosporidium Antigen 06/06/2025 Negative  Negative Final    Stool Culture 06/06/2025 No Salmonella, Shigella, Vibrio, Yersinia isolated.   Final    Campylobacter Ag 06/06/2025 Negative  Negative Final    SHIGA TOXIN 1 ([facility]) 06/06/2025 Negative  Negative Final    SHIGA TOXIN 2 ([facility]) 06/06/2025 Negative  Negative Final   Office Visit on 05/27/2025   Component Date Value Ref Range Status    INFLUENZA A BY PCR 05/27/2025 Negative  Negative Final    INFLUENZA B BY PCR 05/27/2025 Negative  Negative Final    Respiratory Syncytial Virus PCR 05/27/2025 Negative  Negative Final    SARS-CoV-2 PCR 05/27/2025 Negative  Negative Final   Lab Visit on 04/10/2025   Component Date Value Ref Range Status    H. pylori IgG Interp 04/10/2025 Negative  Negative Final    CRP 04/10/2025 0.9  <=8.2 mg/L Final    Lipase Level 04/10/2025 25  4 - 60 U/L Final    Sodium 04/10/2025 139  136 - 145 mmol/L Final    Potassium 04/10/2025 3.8  3.5 - 5.1 mmol/L Final    Chloride 04/10/2025 106  95 - 110 mmol/L Final    CO2 04/10/2025 27  23 - 29 mmol/L Final    Glucose 04/10/2025 80  70 - 110 mg/dL Final    BUN  04/10/2025 19  6 - 20 mg/dL Final    Creatinine 04/10/2025 0.7  0.5 - 1.4 mg/dL Final    Calcium 04/10/2025 8.7  8.7 - 10.5 mg/dL Final    Protein Total 04/10/2025 6.6  6.0 - 8.4 gm/dL Final    Albumin 04/10/2025 3.8  3.5 - 5.2 g/dL Final    Bilirubin Total 04/10/2025 0.6  0.1 - 1.0 mg/dL Final    ALP 04/10/2025 66  40 - 150 unit/L Final    AST 04/10/2025 28  11 - 45 unit/L Final    ALT 04/10/2025 48 (H)  10 - 44 unit/L Final    Anion Gap 04/10/2025 6 (L)  8 - 16 mmol/L Final    eGFR 04/10/2025 >60  >60 mL/min/1.73/m2 Final    Tissue Transglutaminase IgA Ab 04/10/2025 <0.2  <7.0 U/mL Final    IgA Level 04/10/2025 88  40 - 350 mg/dL Final    WBC 04/10/2025 2.63 (L)  3.90 - 12.70 K/uL Final    RBC 04/10/2025 4.02  4.00 - 5.40 M/uL Final    HGB 04/10/2025 12.8  12.0 - 16.0 gm/dL Final    HCT 04/10/2025 38.4  37.0 - 48.5 % Final    MCV 04/10/2025 96  82 - 98 fL Final    MCH 04/10/2025 31.8 (H)  27.0 - 31.0 pg Final    MCHC 04/10/2025 33.3  32.0 - 36.0 g/dL Final    RDW 04/10/2025 11.7  11.5 - 14.5 % Final    Platelet Count 04/10/2025 219  150 - 450 K/uL Final    MPV 04/10/2025 9.0 (L)  9.2 - 12.9 fL Final    Nucleated RBC 04/10/2025 0  <=0 /100 WBC Final    Neut % 04/10/2025 49.1  38 - 73 % Final    Lymph % 04/10/2025 32.7  18 - 48 % Final    Mono % 04/10/2025 12.9  4 - 15 % Final    Eos % 04/10/2025 3.8  <=8 % Final    Basophil % 04/10/2025 1.1  <=1.9 % Final    Imm Grans % 04/10/2025 0.4  0.0 - 0.5 % Final    Neut # 04/10/2025 1.29 (L)  1.8 - 7.7 K/uL Final    Lymph # 04/10/2025 0.86 (L)  1 - 4.8 K/uL Final    Mono # 04/10/2025 0.34  0.3 - 1 K/uL Final    Eos # 04/10/2025 0.10  <=0.5 K/uL Final    Baso # 04/10/2025 0.03  <=0.2 K/uL Final    Imm Grans # 04/10/2025 0.01  0.00 - 0.04 K/uL Final   Admission on 03/20/2025, Discharged on 03/20/2025   Component Date Value Ref Range Status    Final Pathologic Diagnosis 03/20/2025    Final                    Value:SOFT TISSUE, SUBMITTED AS 'RIGHT WRIST GANGLION', EXCISION:   -  Scant fragments of fibroconnective tissue and focal adipose tissue with no significant histopathologic abnormality  - See comment    Comment:  The histologic findings are nonspecific but may represent fragments of ganglion cyst wall.      Microscopic Exam 03/20/2025 Multiple deeper levels examined on block 1A.   Final    Gross 03/20/2025    Final                    Value:One part    Part 1  Hospital/Clinic MRN:  [id number]  Pathology ID:  [id number]    Received fresh and subsequently placed in formalin labeled with the patient's name, medical record number, and designated &quot;1 right wrist ganglion&quot; is a single white tissue fragment (1.3 x 0.2 x 0.1 cm).  The specimen is entirely submitted in a   tea bag in cassette [id number].    Margaret LeJeune, MS, PA([facility])      Disclaimer 03/20/2025 Unless the case is a 'gross only' or additional testing only, the final diagnosis for each specimen is based on a microscopic examination of appropriate tissue sections.   Final     Imaging:  No results found for this or any previous visit.    Note: I have independently reviewed any/all imaging/labs/tests and agree with the report (s) as documented.  Any discrepancies will be as noted/demarcated by free text.  BRENDA HANCOCK 6/12/2025    ASSESSMENT:  1. Chronic migraine w/o aura w/o status migrainosus, not intractable        PLAN:  - ppx - continue botox  - Botox administered in clinic for Chronic Migraine (see below)   - abortive - try ibuprofen 600mg prn, limit to avoid rebound ha's  - nausea - defers  - prednisone taper to break current intractable ha  - track ha's  - RTC in 12 weeks for repeat Botox injections or sooner if needed     Orders Placed This Encounter    onabotulinumtoxina injection 200 Units       All questions and concerns addressed.  Patient verbalizes understanding and is agreeable with the above stated treatment plan.      PROCEDURE NOTE:  BOTOX was performed as an indicated therapy for intractable chronic  migraine headaches given that the patient failed more than 2 headache medications    Two patient identifiers were confirmed with the patient prior to performing this procedure. A time out to determine correct patient and and agreement on procedure performed was conducted prior to the procedure.      Botulinum Toxin Injection Procedure   Procedure: Botulinum toxin injection (27440)  After risks and benefits were explained including bleeding, infection, worsening of pain, damage to the areas being injected, weakness of muscles, loss of muscle control, dysphagia if injecting the head or neck, facial droop if injecting the facial area, painful injection, allergic or other reaction to the medications being injected, and the failure of the procedure to help the problem, a signed consent was obtained.   The patient was placed in a comfortable area and the sites to be treated were identified.The area to be treated was prepped three times with alcohol and the alcohol allowed to dry. Next, a 30 gauge needle was used to inject the medication in the area to be treated.      Total Botox used: 155 Units   Botox wastage: 45 Units     Injection sites:    muscle bilaterally ( a total of 10 units divided into 2 sites)   Procerus muscle (5 units)   Frontalis muscle bilaterally (a total of 20 units divided into 4 sites)   Temporalis muscle bilaterally (a total of 40 units divided into 8 sites)   Occipitalis muscle bilaterally (a total of 30 units divided into 6 sites)   Cervical paraspinal muscles (a total of 20 units divided into 4 sites)   Trapezius muscle bilaterally (a total of 30 units divided into 6 sites)   Complications: none   RTC for the next Botox injection: 12 weeks     The patient tolerated the procedure well and did not experience any complications.     CC: Blake Adams MD Sarena Patel, PA-C  [facility]   [phone]    Dr. Lund was available during today's encounter.

## 2025-06-12 NOTE — TELEPHONE ENCOUNTER
Patient communication:  Patient would like to discuss left carpal tunnel release sometime second week of August.     Scheduled appointment in July and held 8/14/25 on calendar for potential surgical date.      Oleg Grimm MS, OTC   Sports Medicine Assistant   [facility]  (P) [phone]  (F) [phone]

## 2025-06-13 ENCOUNTER — RESULTS FOLLOW-UP (OUTPATIENT)
Dept: GASTROENTEROLOGY | Facility: CLINIC | Age: 50
End: 2025-06-13

## 2025-06-13 DIAGNOSIS — R19.5 CHANGE IN CONSISTENCY OF STOOL: Primary | ICD-10-CM

## 2025-07-08 ENCOUNTER — PATIENT MESSAGE (OUTPATIENT)
Dept: ORTHOPEDICS | Facility: CLINIC | Age: 50
End: 2025-07-08
Payer: COMMERCIAL

## 2025-07-11 ENCOUNTER — TELEPHONE (OUTPATIENT)
Dept: HEPATOLOGY | Facility: CLINIC | Age: 50
End: 2025-07-11
Payer: COMMERCIAL

## 2025-07-11 NOTE — TELEPHONE ENCOUNTER
Patient cancelled scheduled appt with PA Scheuermann on 7/11/25.  I spoke with her in an attempt to reschedule visit.  Patient states that she will call back for scheduling.  I sent a letter reminding her to call.

## 2025-08-08 DIAGNOSIS — G56.02 CARPAL TUNNEL SYNDROME ON LEFT: Primary | ICD-10-CM

## 2025-08-15 ENCOUNTER — TELEPHONE (OUTPATIENT)
Dept: ORTHOPEDICS | Facility: CLINIC | Age: 50
End: 2025-08-15
Payer: COMMERCIAL

## 2025-08-20 ENCOUNTER — PATIENT MESSAGE (OUTPATIENT)
Dept: NEUROLOGY | Facility: CLINIC | Age: 50
End: 2025-08-20
Payer: COMMERCIAL

## 2025-08-20 ENCOUNTER — HOSPITAL ENCOUNTER (OUTPATIENT)
Dept: RADIOLOGY | Facility: OTHER | Age: 50
Discharge: HOME | End: 2025-08-20
Attending: ORTHOPAEDIC SURGERY
Payer: COMMERCIAL

## 2025-08-20 ENCOUNTER — OFFICE VISIT (OUTPATIENT)
Dept: ORTHOPEDICS | Facility: CLINIC | Age: 50
End: 2025-08-20
Payer: COMMERCIAL

## 2025-08-20 DIAGNOSIS — G56.02 CARPAL TUNNEL SYNDROME ON LEFT: Primary | ICD-10-CM

## 2025-08-20 DIAGNOSIS — M79.642 CHRONIC PAIN OF LEFT HAND: ICD-10-CM

## 2025-08-20 DIAGNOSIS — G56.02 CARPAL TUNNEL SYNDROME ON LEFT: ICD-10-CM

## 2025-08-20 DIAGNOSIS — G89.29 CHRONIC PAIN OF LEFT HAND: ICD-10-CM

## 2025-08-20 PROCEDURE — 99999 PR PBB SHADOW E&M-EST. PATIENT-LVL III: CPT | Mod: PBBFAC,,, | Performed by: ORTHOPAEDIC SURGERY

## 2025-08-20 PROCEDURE — 99215 OFFICE O/P EST HI 40 MIN: CPT | Mod: S$GLB,,, | Performed by: ORTHOPAEDIC SURGERY

## 2025-08-20 PROCEDURE — 73130 X-RAY EXAM OF HAND: CPT | Mod: TC,LT

## 2025-08-20 PROCEDURE — 73130 X-RAY EXAM OF HAND: CPT | Mod: 26,LT,, | Performed by: RADIOLOGY

## 2025-08-21 ENCOUNTER — PATIENT MESSAGE (OUTPATIENT)
Dept: PRIMARY CARE CLINIC | Facility: CLINIC | Age: 50
End: 2025-08-21
Payer: COMMERCIAL

## 2025-08-21 DIAGNOSIS — Z00.00 ANNUAL PHYSICAL EXAM: Primary | ICD-10-CM

## 2025-08-25 ENCOUNTER — PATIENT MESSAGE (OUTPATIENT)
Dept: ORTHOPEDICS | Facility: CLINIC | Age: 50
End: 2025-08-25
Payer: COMMERCIAL

## 2025-08-25 ENCOUNTER — PATIENT MESSAGE (OUTPATIENT)
Dept: GASTROENTEROLOGY | Facility: CLINIC | Age: 50
End: 2025-08-25
Payer: COMMERCIAL

## 2025-08-26 ENCOUNTER — LAB VISIT (OUTPATIENT)
Dept: LAB | Facility: HOSPITAL | Age: 50
End: 2025-08-26
Attending: INTERNAL MEDICINE
Payer: COMMERCIAL

## 2025-08-27 ENCOUNTER — TELEPHONE (OUTPATIENT)
Dept: SURGERY | Facility: CLINIC | Age: 50
End: 2025-08-27
Payer: COMMERCIAL

## 2025-08-27 DIAGNOSIS — K62.89 RECTAL PAIN: Primary | ICD-10-CM

## 2025-09-05 DIAGNOSIS — G89.29 CHRONIC PAIN OF LEFT HAND: ICD-10-CM

## 2025-09-05 DIAGNOSIS — G56.02 CARPAL TUNNEL SYNDROME ON LEFT: Primary | ICD-10-CM

## 2025-09-05 DIAGNOSIS — M79.642 CHRONIC PAIN OF LEFT HAND: ICD-10-CM

## (undated) DEVICE — GELATIN SURGIPOWDER ABSORBABLE

## (undated) DEVICE — PAD CURAD NONADH 3X4IN

## (undated) DEVICE — DRAPE OPMI STERILE

## (undated) DEVICE — PAD PREP CUFFED NS 24X48IN

## (undated) DEVICE — CUFF TOURNIQUET DL PRT

## (undated) DEVICE — DRAPE LAP T SHT W/ INSTR PAD

## (undated) DEVICE — STAPLER ECHELON PWR CIR 29MM

## (undated) DEVICE — PIN DISTRACTION 14MM
Type: IMPLANTABLE DEVICE | Site: NECK | Status: NON-FUNCTIONAL
Removed: 2023-10-12

## (undated) DEVICE — DRAPE ABDOMINAL TIBURON 14X11

## (undated) DEVICE — SUPPORT ULNA NERVE PROTECTOR

## (undated) DEVICE — TRAY MINOR ORTHO OMC

## (undated) DEVICE — BLADE MICRO REC SMALL CROSS

## (undated) DEVICE — SOL NS 1000CC

## (undated) DEVICE — TUBING INSUFFLATION 10

## (undated) DEVICE — BNDG COFLEX FOAM LF2 ST 3X5YD

## (undated) DEVICE — PADDING CAST 4IN SPECIALIST

## (undated) DEVICE — DRESSING XEROFORM FOIL PK 1X8

## (undated) DEVICE — TROCAR ENDOPATH XCEL 5MM 7.5CM

## (undated) DEVICE — DRAPE THREE-QTR REINF 53X77IN

## (undated) DEVICE — TUBING HF INSUFFLATION W/ FLTR

## (undated) DEVICE — CORD BIPOLAR 12 FOOT

## (undated) DEVICE — NDL ECLIPSE SAFETY 18GX1-1/2IN

## (undated) DEVICE — DRAPE T EXTRM SURG 121X128X90

## (undated) DEVICE — COVER PROXIMA MAYO STAND

## (undated) DEVICE — TIP YANKAUERS BULB NO VENT

## (undated) DEVICE — PANTIES FEMININE NAPKIN LG/XLG

## (undated) DEVICE — MANIFOLD 4 PORT

## (undated) DEVICE — GAUZE CNFRM STRL 4INX4.1YD

## (undated) DEVICE — KIT ANTIFOG W/SPONG & FLUID

## (undated) DEVICE — TRAY MINOR GEN SURG

## (undated) DEVICE — TRAY SKIN SCRUB WET PREMIUM

## (undated) DEVICE — GLOVE BIOGEL PI MICRO SZ 7

## (undated) DEVICE — TRAY FOLEY 16FR INFECTION CONT

## (undated) DEVICE — TAPE SILK 3IN

## (undated) DEVICE — SYR IRRIGATION BULB STER 60ML

## (undated) DEVICE — ADHESIVE DERMABOND ADVANCED

## (undated) DEVICE — SEE MEDLINE ITEM 157131

## (undated) DEVICE — DRAPE T THYROID STERILE

## (undated) DEVICE — KIT ANTIFOG

## (undated) DEVICE — SUT ETHILON 3-0 PS2 18 BLK

## (undated) DEVICE — ELECTRODE MEGADYNE RETURN DUAL

## (undated) DEVICE — GAUZE SPONGE PEANUT STRL

## (undated) DEVICE — GAUZE AVANT SPNG 4PLY STRL 4X4

## (undated) DEVICE — KIT GELPORT LAPAROSCOPIC ABD

## (undated) DEVICE — COVER LIGHT HANDLE 80/CA

## (undated) DEVICE — MARKER SKIN STND TIP BLUE BARR

## (undated) DEVICE — NDL HYPO REG 25G X 1 1/2

## (undated) DEVICE — DRESSING N ADH OIL EMUL 3X3

## (undated) DEVICE — BUR SURGICAL 13CM

## (undated) DEVICE — SUT MONOCRYL 3-0 PS-2 UND

## (undated) DEVICE — GLOVE BIOGEL PI MICRO INDIC 8

## (undated) DEVICE — SEE MEDLINE ITEM 157144

## (undated) DEVICE — PAD CAST SPECIALIST STRL 3

## (undated) DEVICE — BLADE SURG #15 CARBON STEEL

## (undated) DEVICE — NDL HYPODERMIC BLUNT 18G 1.5IN

## (undated) DEVICE — SOL NACL STRL BOTTLE 1000ML

## (undated) DEVICE — BANDAGE ADHESIVE PLAS STRL 1X3

## (undated) DEVICE — TOWEL OR XRAY BLUE 17X26IN

## (undated) DEVICE — ELECTRODE REM PLYHSV RETURN 9

## (undated) DEVICE — STAPLER CONTOUR 40MM GREEN

## (undated) DEVICE — TUBING SUCTION STERILE

## (undated) DEVICE — BANDAGE ESMARK ELASTIC ST 4X9

## (undated) DEVICE — PAD CAST SPECIALIST STRL 4

## (undated) DEVICE — GLOVE BIOGEL SKINSENSE PI 7.0

## (undated) DEVICE — SPONGE DERMACEA GAUZE 4X4

## (undated) DEVICE — SPONGE COTTON TRAY 4X4IN

## (undated) DEVICE — CLOSURE SKIN STERI STRIP 1/2X4

## (undated) DEVICE — SEE MEDLINE ITEM 146355

## (undated) DEVICE — SEE MEDLINE ITEM 157117

## (undated) DEVICE — KNIFE CARPAL TUNNEL

## (undated) DEVICE — GLOVE SURG BIOGEL LATEX SZ 7.5

## (undated) DEVICE — KIT VUETIP TROCAR SWAB

## (undated) DEVICE — CORD FOR BIPOLAR FORCEPS 12

## (undated) DEVICE — TRAY MINOR GEN SURG OMC

## (undated) DEVICE — BANDAGE ROLL COTTN 4.5INX4.1YD

## (undated) DEVICE — KIT MAJOR SINGLE BASIN

## (undated) DEVICE — SEE MEDLINE ITEM 157116

## (undated) DEVICE — TOWEL OR DISP STRL BLUE 4/PK

## (undated) DEVICE — TROCAR ENDOPATH EXCEL

## (undated) DEVICE — SUT CTD VICRYL 3-0 CR/SH

## (undated) DEVICE — BOVIE SUCTION

## (undated) DEVICE — BANDAGE ADHESIVE

## (undated) DEVICE — BOWL STERILE LARGE 32OZ

## (undated) DEVICE — DRAPE INCISE IOBAN 2 23X17IN

## (undated) DEVICE — BLADE SURG CARBON STEEL SZ11

## (undated) DEVICE — SOL IRR SOD CHL .9% POUR

## (undated) DEVICE — SYR ONLY LUER LOCK 20CC

## (undated) DEVICE — TOURNIQUET SB QC DP 18X4IN

## (undated) DEVICE — SYR B-D DISP CONTROL 10CC100/C

## (undated) DEVICE — BAG TISS RETRV MONARCH 10MM

## (undated) DEVICE — DRAPE MOBILE C-ARM

## (undated) DEVICE — SEALER LIGASURE LAP 37CM 5MM

## (undated) DEVICE — BLADE SURG STAINLESS STEEL #15

## (undated) DEVICE — POUCH SENSURA MIO 3/8X2 1/8IN

## (undated) DEVICE — TROCAR ENDOPATH XCEL 5X75MM

## (undated) DEVICE — SEE MEDLINE ITEM 156955

## (undated) DEVICE — DRESSING XEROFORM NONADH 1X8IN

## (undated) DEVICE — GOWN NONREINF SET-IN SLV XL

## (undated) DEVICE — SEE MEDLINE ITEM 152622

## (undated) DEVICE — NDL INSUF ULTRA VERESS 120MM

## (undated) DEVICE — SEE MEDLINE ITEM 154981

## (undated) DEVICE — DRESSING XEROFORM 1X8IN

## (undated) DEVICE — GLOVE BIOGEL PI MICRO INDIC 7

## (undated) DEVICE — GLOVE BIOGEL SKINSENSE PI 7.5

## (undated) DEVICE — SUT MCRYL PLUS 4-0 PS2 27IN

## (undated) DEVICE — BLADE ELECTRO EDGE INSULATED

## (undated) DEVICE — SEE MEDLINE ITEM 156902

## (undated) DEVICE — CONTAINER SPECIMEN OR STER 4OZ

## (undated) DEVICE — SUT PROLENE 3-0 FS-2 18

## (undated) DEVICE — BANDAGE DERMACEA STRETCH 4X1IN

## (undated) DEVICE — NDL SAFETY 25G X 1.5 ECLIPSE

## (undated) DEVICE — COVER OVERHEAD SURG LT BLUE

## (undated) DEVICE — Device

## (undated) DEVICE — TUBE FRAZIER 5MM 2FT SOFT TIP

## (undated) DEVICE — CHLORAPREP 3ML APPLICATOR TINT

## (undated) DEVICE — DRAPE CORETEMP FLD WRM 56X62IN

## (undated) DEVICE — STOCKINET TUBULAR 1 PLY 6X60IN

## (undated) DEVICE — COVER CAMERA OPERATING ROOM

## (undated) DEVICE — NDL HYPO STD REG BVL 18GX1.5IN

## (undated) DEVICE — PAD PINK TRENDELENBURG POS XL

## (undated) DEVICE — BANDAGE MATRIX HK LOOP 4IN 5YD

## (undated) DEVICE — SEE MEDLINE ITEM 146292

## (undated) DEVICE — LUBRICANT SURGILUBE 2 OZ

## (undated) DEVICE — SYR 10CC LUER LOCK

## (undated) DEVICE — SUT COATED VICRYL 4/0 27IN

## (undated) DEVICE — GAUZE SPONGE 4X4 12PLY

## (undated) DEVICE — SLING ARM MEDIUM FOAM STRAP

## (undated) DEVICE — NDL BOX COUNTER

## (undated) DEVICE — GOWN ECLIPSE REINF LVL4 TWL XL

## (undated) DEVICE — PACK LAPAROSCOPY TIBURON

## (undated) DEVICE — SUT BONE WAX 2.5 GRMS 12/BX

## (undated) DEVICE — SUT 0 VICRYL / UR6 (J603)

## (undated) DEVICE — CATH IV INTROCAN 14G X 2.

## (undated) DEVICE — TROCAR ENDOPATH EXCEL DILATING

## (undated) DEVICE — BLADE SURG CARBON STEEL #10

## (undated) DEVICE — APPLICATOR CHLORAPREP ORN 26ML

## (undated) DEVICE — SUT 3/0 27IN PDS II VIO MO

## (undated) DEVICE — SUT CTD VICRYL VIL BR CR/SH

## (undated) DEVICE — LEGGINGS 48X31 INCH